# Patient Record
Sex: FEMALE | Race: ASIAN | NOT HISPANIC OR LATINO | Employment: FULL TIME | URBAN - METROPOLITAN AREA
[De-identification: names, ages, dates, MRNs, and addresses within clinical notes are randomized per-mention and may not be internally consistent; named-entity substitution may affect disease eponyms.]

---

## 2017-01-04 ENCOUNTER — OFFICE VISIT (OUTPATIENT)
Dept: FAMILY MEDICINE | Facility: CLINIC | Age: 52
End: 2017-01-04
Payer: COMMERCIAL

## 2017-01-04 VITALS — DIASTOLIC BLOOD PRESSURE: 77 MMHG | SYSTOLIC BLOOD PRESSURE: 119 MMHG

## 2017-01-04 DIAGNOSIS — J06.9 VIRAL UPPER RESPIRATORY INFECTION: ICD-10-CM

## 2017-01-04 DIAGNOSIS — J45.20 MILD INTERMITTENT ASTHMA WITHOUT COMPLICATION: ICD-10-CM

## 2017-01-04 DIAGNOSIS — E78.5 OTHER AND UNSPECIFIED HYPERLIPIDEMIA: ICD-10-CM

## 2017-01-04 DIAGNOSIS — R80.9 MICROALBUMINURIA: ICD-10-CM

## 2017-01-04 DIAGNOSIS — N39.3 STRESS INCONTINENCE IN FEMALE: ICD-10-CM

## 2017-01-04 DIAGNOSIS — Z00.00 ROUTINE GENERAL MEDICAL EXAMINATION AT A HEALTH CARE FACILITY: Primary | ICD-10-CM

## 2017-01-04 DIAGNOSIS — I10 ESSENTIAL HYPERTENSION: ICD-10-CM

## 2017-01-04 PROCEDURE — 3074F SYST BP LT 130 MM HG: CPT | Mod: S$GLB,,, | Performed by: FAMILY MEDICINE

## 2017-01-04 PROCEDURE — 99396 PREV VISIT EST AGE 40-64: CPT | Mod: S$GLB,,, | Performed by: FAMILY MEDICINE

## 2017-01-04 PROCEDURE — 99999 PR PBB SHADOW E&M-EST. PATIENT-LVL II: CPT | Mod: PBBFAC,,, | Performed by: FAMILY MEDICINE

## 2017-01-04 PROCEDURE — 3078F DIAST BP <80 MM HG: CPT | Mod: S$GLB,,, | Performed by: FAMILY MEDICINE

## 2017-01-04 RX ORDER — CLOTRIMAZOLE AND BETAMETHASONE DIPROPIONATE 10; .64 MG/G; MG/G
CREAM TOPICAL
Qty: 45 G | Refills: 0 | Status: SHIPPED | OUTPATIENT
Start: 2017-01-04 | End: 2018-02-20

## 2017-01-04 NOTE — MR AVS SNAPSHOT
[facility]  [address]  [place] [address]  Phone: [phone]  Fax: [phone]                  Shanae Kumari   2017 7:40 AM   Office Visit    Description:  Female : 1965   Provider:  Patrick Murray MD   Department:  [facility]           Reason for Visit     Annual Exam     Asthma     Urinary Frequency           Diagnoses this Visit        Comments    Routine general medical examination at a health care facility    -  Primary     Mild intermittent asthma without complication         Essential hypertension         Hyperlipidemia, unspecified hyperlipidemia type         Microalbuminuria         Uncontrolled type 2 diabetes mellitus with microalbuminuria, without long-term current use of insulin         Other and unspecified hyperlipidemia                To Do List           Goals (5 Years of Data)     None       These Medications        Disp Refills Start End    clotrimazole-betamethasone 1-0.05% (LOTRISONE) cream 45 g 0 2017     Apply to affected area BID    Pharmacy: [place] [address] [place] [address] Ph #: [phone]       albuterol (PROAIR HFA) 90 mcg/actuation inhaler 1 Inhaler 6 2017     Inhale 2 puffs into the lungs every 4 (four) hours as needed for Wheezing. - Inhalation    Pharmacy: [place] [address] [place] [address] Ph #: [phone]       beclomethasone (QVAR) 80 mcg/actuation Aero 120 each 6 2017    Inhale 2 puffs into the lungs 2 (two) times daily. - Inhalation    Pharmacy: [place] [address] [place] [facility] [address] Ph #: [phone]       glipiZIDE (GLUCOTROL) 5 MG tablet 90 tablet 6 2017     Take 2 tablets in the morning and 1 with evening meal    Pharmacy: [place] [address] [place] [facility] [address] Ph #: [phone]       diltiaZEM HCl (MATZIM LA) 240 mg 24 hr tablet 30 tablet 6 2017     Take 1 tablet (240 mg total) by mouth  once daily. - Oral    Pharmacy: [place] [address] [place] [address] Ph #: [phone]       metformin (GLUCOPHAGE-XR) 500 MG 24 hr tablet 120 tablet 6 1/5/2017     TAKE ALL 4 TABLETS BY MOUTH WITH EVENING MEAL    Pharmacy: [place] [address] [place] [address] Ph #: [phone]       montelukast (SINGULAIR) 10 mg tablet 30 tablet 6 1/5/2017     Take 1 tablet (10 mg total) by mouth once daily. - Oral    Pharmacy: [place] [address] [place] [address] Ph #: [phone]       pravastatin (PRAVACHOL) 20 MG tablet 30 tablet 6 1/5/2017     Take 1 tablet (20 mg total) by mouth once daily. - Oral    Pharmacy: [place] [address] [place] [address] Ph #: [phone]         [facility]     [facility] On [facility] Nurse Care Line - 24/7 Assistance  Registered nurses in the [facility] Center provide clinical advisement, health education, appointment booking, and other advisory services.  Call for this free service at [phone].             Medications           Message regarding Medications     Verify the changes and/or additions to your medication regime listed below are the same as discussed with your clinician today.  If any of these changes or additions are incorrect, please notify your healthcare provider.        START taking these NEW medications        Refills    clotrimazole-betamethasone 1-0.05% (LOTRISONE) cream 0    Sig: Apply to affected area BID    Class: Normal      CHANGE how you are taking these medications     Start Taking Instead of    diltiaZEM HCl (MATZIM LA) 240 mg 24 hr tablet MATZIM  mg 24 hr tablet    Dosage:  Take 1 tablet (240 mg total) by mouth once daily. Dosage:  TAKE ONE TABLET BY MOUTH ONE TIME DAILY    Reason for Change:  Reorder     Starting on: 1/5/2017     montelukast (SINGULAIR) 10 mg tablet montelukast (SINGULAIR) 10 mg tablet    Dosage:  Take 1 tablet (10 mg total) by mouth once daily. Dosage:  TAKE ONE  TABLET BY MOUTH ONE TIME DAILY    Reason for Change:  Reorder     Starting on: 1/5/2017     pravastatin (PRAVACHOL) 20 MG tablet pravastatin (PRAVACHOL) 20 MG tablet    Dosage:  Take 1 tablet (20 mg total) by mouth once daily. Dosage:  TAKE 1 TABLET (20 MG TOTAL) BY MOUTH ONCE DAILY.    Reason for Change:  Reorder     Starting on: 1/5/2017       STOP taking these medications     doxazosin (CARDURA) 4 MG tablet Take 1 tablet (4 mg total) by mouth every evening.           Verify that the below list of medications is an accurate representation of the medications you are currently taking.  If none reported, the list may be blank. If incorrect, please contact your healthcare provider. Carry this list with you in case of emergency.           Current Medications     albuterol (PROAIR HFA) 90 mcg/actuation inhaler Inhale 2 puffs into the lungs every 4 (four) hours as needed for Wheezing.    beclomethasone (QVAR) 80 mcg/actuation Aero Inhale 2 puffs into the lungs 2 (two) times daily.    clotrimazole-betamethasone 1-0.05% (LOTRISONE) cream Apply to affected area BID    diltiaZEM HCl (MATZIM LA) 240 mg 24 hr tablet Take 1 tablet (240 mg total) by mouth once daily.    glipiZIDE (GLUCOTROL) 5 MG tablet Take 2 tablets in the morning and 1 with evening meal    medroxyPROGESTERone (PROVERA) 10 MG tablet Take 1 tablet (10 mg total) by mouth once daily.    meloxicam (MOBIC) 15 MG tablet Take 1 tablet (15 mg total) by mouth once daily. Take a Prilosec 20 mg tablet (over the counter) once a day every day while taking Mobic    metformin (GLUCOPHAGE-XR) 500 MG 24 hr tablet TAKE ALL 4 TABLETS BY MOUTH WITH EVENING MEAL    montelukast (SINGULAIR) 10 mg tablet Take 1 tablet (10 mg total) by mouth once daily.    montelukast (SINGULAIR) 10 mg tablet Take 1 tablet (10 mg total) by mouth once daily.    pravastatin (PRAVACHOL) 20 MG tablet Take 1 tablet (20 mg total) by mouth once daily.    PROAIR HFA 90 mcg/actuation inhaler INHALE TWO PUFFS  BY MOUTH EVERY FOUR HOURS AS NEEDED FOR WHEEZING           Clinical Reference Information           Vital Signs - Last Recorded  Most recent update: 1/4/2017  8:01 AM by Kat Carmona LPN    BP                   119/77 (BP Location: Right arm, Patient Position: Sitting, BP Method: Automatic)           Blood Pressure          Most Recent Value    BP  119/77      Allergies as of 1/4/2017     Losartan      Immunizations Administered on Date of Encounter - 1/4/2017     None      Orders Placed During Today's Visit     Future Labs/Procedures Expected by Expires    CBC auto differential  1/4/2017 3/5/2018    Comprehensive metabolic panel  1/4/2017 1/4/2018    Hemoglobin A1c  1/4/2017 3/5/2018    Lipid panel  1/4/2017 3/5/2018    Microalbumin/creatinine urine ratio  1/4/2017 1/4/2018    Urinalysis  1/4/2017 1/4/2018

## 2017-01-05 RX ORDER — PRAVASTATIN SODIUM 20 MG/1
20 TABLET ORAL DAILY
Qty: 30 TABLET | Refills: 6 | Status: SHIPPED | OUTPATIENT
Start: 2017-01-05 | End: 2018-02-28 | Stop reason: SDUPTHER

## 2017-01-05 RX ORDER — ALBUTEROL SULFATE 90 UG/1
2 AEROSOL, METERED RESPIRATORY (INHALATION) EVERY 4 HOURS PRN
Qty: 1 INHALER | Refills: 3 | Status: SHIPPED | OUTPATIENT
Start: 2017-01-05 | End: 2017-01-05 | Stop reason: SDUPTHER

## 2017-01-05 RX ORDER — METFORMIN HYDROCHLORIDE 500 MG/1
TABLET, EXTENDED RELEASE ORAL
Qty: 120 TABLET | Refills: 6 | Status: SHIPPED | OUTPATIENT
Start: 2017-01-05 | End: 2018-01-03 | Stop reason: SDUPTHER

## 2017-01-05 RX ORDER — GLIPIZIDE 5 MG/1
TABLET ORAL
Qty: 90 TABLET | Refills: 6 | Status: SHIPPED | OUTPATIENT
Start: 2017-01-05 | End: 2019-03-07

## 2017-01-05 RX ORDER — MONTELUKAST SODIUM 10 MG/1
10 TABLET ORAL DAILY
Qty: 30 TABLET | Refills: 6 | Status: SHIPPED | OUTPATIENT
Start: 2017-01-05 | End: 2018-02-28 | Stop reason: SDUPTHER

## 2017-01-05 RX ORDER — ALBUTEROL SULFATE 90 UG/1
2 AEROSOL, METERED RESPIRATORY (INHALATION) EVERY 4 HOURS PRN
Qty: 1 INHALER | Refills: 6 | Status: SHIPPED | OUTPATIENT
Start: 2017-01-05 | End: 2019-06-25 | Stop reason: SDUPTHER

## 2017-01-05 RX ORDER — ALBUTEROL SULFATE 90 UG/1
AEROSOL, METERED RESPIRATORY (INHALATION)
Qty: 8.5 INHALER | Refills: 5 | Status: SHIPPED | OUTPATIENT
Start: 2017-01-05 | End: 2018-05-16 | Stop reason: SDUPTHER

## 2017-01-05 RX ORDER — DILTIAZEM HYDROCHLORIDE EXTENDED-RELEASE TABLETS 240 MG/1
240 TABLET, EXTENDED RELEASE ORAL DAILY
Qty: 30 TABLET | Refills: 6 | Status: SHIPPED | OUTPATIENT
Start: 2017-01-05 | End: 2017-08-28 | Stop reason: SDUPTHER

## 2017-01-05 NOTE — PROGRESS NOTES
Subjective:       Patient ID: Shanae Kumari is a 51 y.o. female.    Chief Complaint: Annual Exam; Asthma (Since Gerda BALTAZAR has been using an inhaler); and Urinary Frequency    HPI Comments: Patient seen for annual preventative PCP history and physical examination.  She has cardiovascular risk factors of hypertension hyperlipidemia and type 2 diabetes with microalbuminuria.  Other comorbidities include asthma which is mild and intermittent with increased symptoms recently associated with upper respiratory infection she's had over the last week associated with nasal congestion and clear rhinorrhea.  She also has recurring intertriginous rash.  She has been experiencing stress incontinence.  She denies urge incontinence.  She gets her pelvic examinations and Pap smears and mammograms from her gynecologist.    Review of Systems   Constitutional: Negative for appetite change, chills, fatigue, fever and unexpected weight change.   HENT: Positive for congestion and rhinorrhea. Negative for ear pain, hearing loss, postnasal drip, sinus pressure, sneezing, sore throat, trouble swallowing and voice change.    Eyes: Negative for redness and visual disturbance.   Respiratory: Positive for cough and shortness of breath. Negative for chest tightness and wheezing.    Cardiovascular: Negative for chest pain, palpitations and leg swelling.   Gastrointestinal: Positive for constipation and diarrhea. Negative for abdominal distention, abdominal pain, anal bleeding, blood in stool, nausea and vomiting.        Occasional diarrhea or constipation which she attributes to metformin and diltiazem respectively.   Genitourinary: Negative for dysuria, frequency, hematuria, menstrual problem, pelvic pain and urgency.        Stress incontinence   Musculoskeletal: Negative for arthralgias, back pain and gait problem.   Skin: Positive for wound. Negative for rash.   Neurological: Negative for dizziness, syncope, weakness, numbness and  headaches.   Hematological: Negative for adenopathy. Does not bruise/bleed easily.   Psychiatric/Behavioral: Negative for agitation, confusion, dysphoric mood and sleep disturbance. The patient is not nervous/anxious.        Objective:      Physical Exam   Constitutional: She is oriented to person, place, and time. She appears well-developed and well-nourished. No distress.   HENT:   Head: Normocephalic.   Right Ear: External ear normal.   Left Ear: External ear normal.   Nose: Nose normal.   Mouth/Throat: Oropharynx is clear and moist. No oropharyngeal exudate.   Eyes: Conjunctivae are normal. Pupils are equal, round, and reactive to light. Right eye exhibits no discharge. Left eye exhibits no discharge. No scleral icterus.   Neck: Neck supple. No tracheal deviation present. No thyromegaly present.   Cardiovascular: Normal rate, regular rhythm, normal heart sounds and intact distal pulses.  Exam reveals no friction rub.    No murmur heard.  Pulmonary/Chest: Effort normal and breath sounds normal. No respiratory distress. She has no wheezes. She has no rales.   Abdominal: Soft. Bowel sounds are normal. She exhibits no distension and no mass. There is no tenderness. There is no guarding.   Genitourinary: No vaginal discharge found.   Musculoskeletal: She exhibits no edema.   Lymphadenopathy:     She has no cervical adenopathy.   Neurological: She is alert and oriented to person, place, and time. No cranial nerve deficit. Coordination normal.   Skin: No rash noted. No erythema.   Psychiatric: She has a normal mood and affect. Her behavior is normal. Judgment and thought content normal.   Vitals reviewed.      Assessment:       1. Routine general medical examination at a health care facility    2. Mild intermittent asthma without complication    3. Essential hypertension    4. Microalbuminuria    5. Uncontrolled type 2 diabetes mellitus with microalbuminuria, without long-term current use of insulin    6. Other and  unspecified hyperlipidemia    7. Stress incontinence in female        Plan:       Shanae was seen today for annual exam, asthma and urinary frequency.    Diagnoses and all orders for this visit:    Routine general medical examination at a health care facility  -     Urinalysis; Future    Mild intermittent asthma without complication    Essential hypertension  -     CBC auto differential; Future  -     Comprehensive metabolic panel; Future    Microalbuminuria    Uncontrolled type 2 diabetes mellitus with microalbuminuria, without long-term current use of insulin  -     Hemoglobin A1c; Future  -     Microalbumin/creatinine urine ratio; Future    Other and unspecified hyperlipidemia  -     Lipid panel; Future    Stress incontinence in female    Other orders  -     clotrimazole-betamethasone 1-0.05% (LOTRISONE) cream; Apply to affected area BID  -     albuterol (PROAIR HFA) 90 mcg/actuation inhaler; Inhale 2 puffs into the lungs every 4 (four) hours as needed for Wheezing.  -     beclomethasone (QVAR) 80 mcg/actuation Aero; Inhale 2 puffs into the lungs 2 (two) times daily.  -     glipiZIDE (GLUCOTROL) 5 MG tablet; Take 2 tablets in the morning and 1 with evening meal  -     diltiaZEM HCl (MATZIM LA) 240 mg 24 hr tablet; Take 1 tablet (240 mg total) by mouth once daily.  -     metformin (GLUCOPHAGE-XR) 500 MG 24 hr tablet; TAKE ALL 4 TABLETS BY MOUTH WITH EVENING MEAL  -     montelukast (SINGULAIR) 10 mg tablet; Take 1 tablet (10 mg total) by mouth once daily.  -     pravastatin (PRAVACHOL) 20 MG tablet; Take 1 tablet (20 mg total) by mouth once daily.      patient no longer using her controller medication Qvar.  Suggest she resume this and continue it any time she is having more than occasional mild asthma.  She will use her albuterol as needed.  She also remains on Singulair for additional controller medication for asthma.  Recommend Kegel exercises for stress incontinence.  If this is not helpful suggest she  discuss further with her gynecologist.  Continue current medications for stable problems.  Further plans as indicated pending review of the above studies.

## 2017-01-06 ENCOUNTER — PATIENT MESSAGE (OUTPATIENT)
Dept: FAMILY MEDICINE | Facility: CLINIC | Age: 52
End: 2017-01-06

## 2017-01-07 ENCOUNTER — LAB VISIT (OUTPATIENT)
Dept: LAB | Facility: HOSPITAL | Age: 52
End: 2017-01-07
Attending: FAMILY MEDICINE
Payer: COMMERCIAL

## 2017-01-07 DIAGNOSIS — E78.5 OTHER AND UNSPECIFIED HYPERLIPIDEMIA: ICD-10-CM

## 2017-01-07 DIAGNOSIS — I10 ESSENTIAL HYPERTENSION: ICD-10-CM

## 2017-01-07 LAB
ALBUMIN SERPL BCP-MCNC: 3.9 G/DL
ALP SERPL-CCNC: 57 U/L
ALT SERPL W/O P-5'-P-CCNC: 22 U/L
ANION GAP SERPL CALC-SCNC: 12 MMOL/L
AST SERPL-CCNC: 20 U/L
BASOPHILS # BLD AUTO: 0.05 K/UL
BASOPHILS NFR BLD: 0.5 %
BILIRUB SERPL-MCNC: 0.6 MG/DL
BUN SERPL-MCNC: 18 MG/DL
CALCIUM SERPL-MCNC: 10.1 MG/DL
CHLORIDE SERPL-SCNC: 98 MMOL/L
CHOLEST/HDLC SERPL: 3.5 {RATIO}
CO2 SERPL-SCNC: 24 MMOL/L
CREAT SERPL-MCNC: 0.9 MG/DL
DIFFERENTIAL METHOD: ABNORMAL
EOSINOPHIL # BLD AUTO: 0.5 K/UL
EOSINOPHIL NFR BLD: 4.2 %
ERYTHROCYTE [DISTWIDTH] IN BLOOD BY AUTOMATED COUNT: 12.7 %
EST. GFR  (AFRICAN AMERICAN): >60 ML/MIN/1.73 M^2
EST. GFR  (NON AFRICAN AMERICAN): >60 ML/MIN/1.73 M^2
GLUCOSE SERPL-MCNC: 150 MG/DL
HCT VFR BLD AUTO: 41.1 %
HDL/CHOLESTEROL RATIO: 28.4 %
HDLC SERPL-MCNC: 162 MG/DL
HDLC SERPL-MCNC: 46 MG/DL
HGB BLD-MCNC: 13.9 G/DL
LDLC SERPL CALC-MCNC: 64.6 MG/DL
LYMPHOCYTES # BLD AUTO: 1.7 K/UL
LYMPHOCYTES NFR BLD: 15.8 %
MCH RBC QN AUTO: 28.8 PG
MCHC RBC AUTO-ENTMCNC: 33.8 %
MCV RBC AUTO: 85 FL
MONOCYTES # BLD AUTO: 0.7 K/UL
MONOCYTES NFR BLD: 5.9 %
NEUTROPHILS # BLD AUTO: 8 K/UL
NEUTROPHILS NFR BLD: 73.3 %
NONHDLC SERPL-MCNC: 116 MG/DL
PLATELET # BLD AUTO: 353 K/UL
PMV BLD AUTO: 9.6 FL
POTASSIUM SERPL-SCNC: 4.3 MMOL/L
PROT SERPL-MCNC: 7.4 G/DL
RBC # BLD AUTO: 4.83 M/UL
SODIUM SERPL-SCNC: 134 MMOL/L
TRIGL SERPL-MCNC: 257 MG/DL
WBC # BLD AUTO: 10.95 K/UL

## 2017-01-07 PROCEDURE — 83036 HEMOGLOBIN GLYCOSYLATED A1C: CPT

## 2017-01-07 PROCEDURE — 80053 COMPREHEN METABOLIC PANEL: CPT

## 2017-01-07 PROCEDURE — 36415 COLL VENOUS BLD VENIPUNCTURE: CPT | Mod: PO

## 2017-01-07 PROCEDURE — 80061 LIPID PANEL: CPT

## 2017-01-07 PROCEDURE — 85025 COMPLETE CBC W/AUTO DIFF WBC: CPT

## 2017-01-09 ENCOUNTER — TELEPHONE (OUTPATIENT)
Dept: FAMILY MEDICINE | Facility: CLINIC | Age: 52
End: 2017-01-09

## 2017-01-09 DIAGNOSIS — R82.90 ABNORMAL URINALYSIS: Primary | ICD-10-CM

## 2017-01-09 LAB
ESTIMATED AVG GLUCOSE: 212 MG/DL
HBA1C MFR BLD HPLC: 9 %

## 2017-01-09 NOTE — TELEPHONE ENCOUNTER
Patient advised of physician's recommendation. Patient verbalized an understanding.patient states she will come in for urine culture she doesn't want to make an appointment.

## 2017-01-09 NOTE — TELEPHONE ENCOUNTER
Call patient    Urine markedly contaminated with external secretions.  No way to tell if there is infection or not.  Need a careful MIDSTREAM urine culture, ordered.

## 2017-01-13 ENCOUNTER — PATIENT MESSAGE (OUTPATIENT)
Dept: FAMILY MEDICINE | Facility: CLINIC | Age: 52
End: 2017-01-13

## 2017-01-17 ENCOUNTER — TELEPHONE (OUTPATIENT)
Dept: FAMILY MEDICINE | Facility: CLINIC | Age: 52
End: 2017-01-17

## 2017-01-17 NOTE — TELEPHONE ENCOUNTER
I'm just reading the appointment notes and noticed that I should stop taking doxazosin (CARDURA) 4 MG tablet?  Is this correct?  It was not mentioned during the appointment.

## 2017-01-18 RX ORDER — DOXAZOSIN 4 MG/1
TABLET ORAL
Qty: 30 TABLET | Refills: 11 | Status: SHIPPED | OUTPATIENT
Start: 2017-01-18 | End: 2018-02-06 | Stop reason: SDUPTHER

## 2017-01-18 NOTE — TELEPHONE ENCOUNTER
Call patient    If she has been taking the doxazosin she should stay on it.  I'm not sure how it was dropped from her medication list.  Likely some sort of computer clicking glitch.  I'll send a new prescription with refills to her pharmacy.

## 2017-02-21 ENCOUNTER — PATIENT MESSAGE (OUTPATIENT)
Dept: FAMILY MEDICINE | Facility: CLINIC | Age: 52
End: 2017-02-21

## 2017-02-21 NOTE — TELEPHONE ENCOUNTER
Patient is not known to me.  She was seeing Dr. Murray but I have never seen her.  Will discuss with patient relations.     Tima Stanley

## 2017-04-10 ENCOUNTER — PATIENT OUTREACH (OUTPATIENT)
Dept: ADMINISTRATIVE | Facility: HOSPITAL | Age: 52
End: 2017-04-10

## 2017-04-10 NOTE — PROGRESS NOTES
The patient does not require an appointment w/ NP Sheila Turner at this time. The patient was seen by Dr. Murray on 01/04/17.

## 2017-08-28 RX ORDER — DILTIAZEM HYDROCHLORIDE EXTENDED-RELEASE TABLETS 240 MG/1
240 TABLET, EXTENDED RELEASE ORAL DAILY
Qty: 30 TABLET | Refills: 0 | Status: SHIPPED | OUTPATIENT
Start: 2017-08-28 | End: 2018-02-28 | Stop reason: SDUPTHER

## 2017-08-28 NOTE — TELEPHONE ENCOUNTER
Left message for patient to return call to scheduled appointment to establish care with physician of ling.

## 2017-09-27 ENCOUNTER — TELEPHONE (OUTPATIENT)
Dept: FAMILY MEDICINE | Facility: CLINIC | Age: 52
End: 2017-09-27

## 2017-09-27 RX ORDER — GLIPIZIDE 5 MG/1
TABLET ORAL
Qty: 90 TABLET | Refills: 6 | OUTPATIENT
Start: 2017-09-27

## 2017-09-27 NOTE — TELEPHONE ENCOUNTER
Attempted to contact patient concerning denial of refill. LVM instructing patient to call the clinic to make an appointment. Will try again at a later time.

## 2017-09-29 ENCOUNTER — TELEPHONE (OUTPATIENT)
Dept: FAMILY MEDICINE | Facility: CLINIC | Age: 52
End: 2017-09-29

## 2017-09-29 NOTE — TELEPHONE ENCOUNTER
Received a refill request from [facility] requesting a refill on her Pravastatin. Patient does not have a primary care physician a establish care appointment is needed please schedule with provider of her chose.

## 2017-10-17 DIAGNOSIS — J45.20 MILD INTERMITTENT ASTHMA WITHOUT COMPLICATION: ICD-10-CM

## 2017-10-18 RX ORDER — ALBUTEROL SULFATE 90 UG/1
AEROSOL, METERED RESPIRATORY (INHALATION)
Qty: 8.5 INHALER | Refills: 5 | OUTPATIENT
Start: 2017-10-18

## 2017-10-18 NOTE — TELEPHONE ENCOUNTER
Called patient to inform her medication was denied needs appointment no answer left message to call clinic.

## 2017-10-20 RX ORDER — PRAVASTATIN SODIUM 20 MG/1
20 TABLET ORAL DAILY
Qty: 30 TABLET | Refills: 6
Start: 2017-10-20

## 2018-01-03 RX ORDER — METFORMIN HYDROCHLORIDE 500 MG/1
TABLET, EXTENDED RELEASE ORAL
Qty: 120 TABLET | Refills: 0 | Status: SHIPPED | OUTPATIENT
Start: 2018-01-03 | End: 2018-02-28 | Stop reason: SDUPTHER

## 2018-01-04 RX ORDER — METFORMIN HYDROCHLORIDE 500 MG/1
TABLET, EXTENDED RELEASE ORAL
Qty: 120 TABLET | Refills: 6 | Status: SHIPPED | OUTPATIENT
Start: 2018-01-04 | End: 2018-02-19 | Stop reason: SDUPTHER

## 2018-01-08 RX ORDER — DILTIAZEM HYDROCHLORIDE EXTENDED-RELEASE TABLETS 240 MG/1
240 TABLET, EXTENDED RELEASE ORAL DAILY
Qty: 30 TABLET | Refills: 0 | OUTPATIENT
Start: 2018-01-08

## 2018-02-06 RX ORDER — DOXAZOSIN 4 MG/1
TABLET ORAL
Qty: 30 TABLET | Refills: 11 | Status: SHIPPED | OUTPATIENT
Start: 2018-02-06 | End: 2018-02-28 | Stop reason: SDUPTHER

## 2018-02-17 ENCOUNTER — PATIENT MESSAGE (OUTPATIENT)
Dept: FAMILY MEDICINE | Facility: CLINIC | Age: 53
End: 2018-02-17

## 2018-02-19 ENCOUNTER — OFFICE VISIT (OUTPATIENT)
Dept: PODIATRY | Facility: CLINIC | Age: 53
End: 2018-02-19
Payer: COMMERCIAL

## 2018-02-19 VITALS
WEIGHT: 207.44 LBS | HEIGHT: 63 IN | BODY MASS INDEX: 36.75 KG/M2 | DIASTOLIC BLOOD PRESSURE: 70 MMHG | SYSTOLIC BLOOD PRESSURE: 118 MMHG

## 2018-02-19 DIAGNOSIS — E11.9 COMPREHENSIVE DIABETIC FOOT EXAMINATION, TYPE 2 DM, ENCOUNTER FOR: Primary | ICD-10-CM

## 2018-02-19 DIAGNOSIS — M20.41 HAMMER TOE OF RIGHT FOOT: ICD-10-CM

## 2018-02-19 DIAGNOSIS — M21.42 PES PLANUS OF BOTH FEET: ICD-10-CM

## 2018-02-19 DIAGNOSIS — M76.61 ACHILLES TENDINITIS OF RIGHT LOWER EXTREMITY: ICD-10-CM

## 2018-02-19 DIAGNOSIS — M72.2 PLANTAR FASCIITIS: ICD-10-CM

## 2018-02-19 DIAGNOSIS — M21.6X9: ICD-10-CM

## 2018-02-19 DIAGNOSIS — M21.41 PES PLANUS OF BOTH FEET: ICD-10-CM

## 2018-02-19 DIAGNOSIS — M79.671 RIGHT FOOT PAIN: ICD-10-CM

## 2018-02-19 PROCEDURE — 3008F BODY MASS INDEX DOCD: CPT | Mod: S$GLB,,, | Performed by: PODIATRIST

## 2018-02-19 PROCEDURE — 99999 PR PBB SHADOW E&M-EST. PATIENT-LVL III: CPT | Mod: PBBFAC,,, | Performed by: PODIATRIST

## 2018-02-19 PROCEDURE — 99214 OFFICE O/P EST MOD 30 MIN: CPT | Mod: S$GLB,,, | Performed by: PODIATRIST

## 2018-02-19 RX ORDER — MELOXICAM 15 MG/1
15 TABLET ORAL DAILY
Qty: 30 TABLET | Refills: 3 | Status: SHIPPED | OUTPATIENT
Start: 2018-02-19 | End: 2018-06-19 | Stop reason: SDUPTHER

## 2018-02-19 NOTE — PATIENT INSTRUCTIONS
Recommend lotions: eucerin, aquaphor, A&D ointment, gold bond for diabetics, sween    Shoe recommendations: (try 6pm.com, zappos.com , nordstromrack.com, or shoes.com for discounted prices) you can visit [employer] shoes in [place] as well    Asics (GT 1000 or gel foundations), new balance, saucony (stabil c3),  Garcia (transcend), vionic, propet (tennis shoe)    soft brand, clarks, crocs, aerosoles, naturalizers, SAS, ecco, amber, cynthia mcfadden, rockports (dress shoes)    Vionic, volitiles, burkenstocks, fitflops, propet (sandals)    Nike comfort thong sandals, crocs (house shoes)    Nail Home remedy:  Vicks Vapor rub OR Listerine and apple cider vinegar in a spray bottle to nails      Understanding Achilles Tendonitis    Achilles tendonitis is an overuse injury. It results in inflammation of the Achilles tendon. This tendon is found on the back of the ankle. It links the calf muscle to the heel bone. It helps you do pushing-off movements like running or standing on your toes.     How to say it  [id number]-KILL-eez ten-dun-I-tis   What causes Achilles tendonitis?  Achilles tendonitis can happen if you do an activity like running, walking, or jumping too much. This overuse can strain, or pull, the tendon. It may lead to minor tearing of the tendon. An injury to the lower leg or foot can also cause it.  If you dont warm up before taking part in sports such as basketball, you are more likely to suffer from this condition. You are also more prone to it if you do too much of such an activity too quickly. Proper training and rest can help prevent it.  Symptoms of Achilles tendonitis  The main symptom of Achilles tendonitis is pain. This pain mostly happens when you move the ankle. The tendon may also feel stiff after a period of no activity, such as sleeping. It may also become swollen. You may hear a crackling sound when you move your ankle.  Treatment for Achilles tendonitis  Symptoms often get better after starting treatment. A full  recovery may take several months. Treatments include:  · Rest. You should stop or change the activity that caused the injury. The tendon will then have time to heal.  · Cold or heat pack. These help reduce pain and swelling.  · Prescription or over-the-counter pain medicines. These help reduce pain and swelling.  · Shoe inserts. These devices can reduce strain on the Achilles tendon when you move. You may then feel less pain.  · Stretching and strengthening exercises. Certain exercises can help you regain flexibility and strength in your Achilles tendon.  · Surgery. This option can fix the injured tendon. But you dont often need it unless other treatments dont work.     When to call your healthcare provider   Call your healthcare provider right away if you have any of these:  · Fever of 100.4°F (38°C) or higher, or as directed  · Pain that gets worse  · Symptoms that dont get better, or get worse  · New symptoms    Date Last Reviewed: 3/10/2016  © 6198-3593 [employer]. [address] [place] [address], [place] [address]. All rights reserved. This information is not intended as a substitute for professional medical care. Always follow your healthcare professional's instructions.        Achilles Tendon Rupture, Partial or Complete    The Achilles tendon connects the muscles in the back of your lower leg to your heel bone. It lets you move your foot down--called a step on the gas motion. This movement is important for walking, running, and jumping. A sudden strong contraction of the lower leg can partially tear (rupture) the Achilles tendon. This injury can happen while playing sports. This injury is more likely if you have injured the tendon in the past. It is also more likely if the tendon is inflamed from stress.   When the injury happens, you may feel a pop or snap, or like you have been kicked. An Achilles tendon tear will cause swelling, bruising, and pain in the ankle area. You will  have difficulty walking or pushing off with your foot.   A complete Achilles tear is usually treated with surgery to attach the torn ends of the tendon. This is followed by up to 8 weeks in a walking cast, boot, or splint. The injury can also be treated without surgery, but the tendon will take longer to heal. The risk of rupturing it again is also greater than with surgery. With either type of treatment, you will need physical therapy to strengthen your Achilles tendon. It usually takes up to 6 months to return to your former level of activity and about a year to fully recover.  Home care  Medicine  The doctor may prescribe medicines for pain. Or you may use acetaminophen or Ibuprofen to control the pain.  Talk with your doctor before taking these medicines if you have chronic liver or kidney disease. Also talk with your doctor if you have had an ulcer or GI bleeding.  General care  · Rest. Use crutches as directed. Do not put any weight on the injured leg until your doctor says it is OK.  You will be told to see an orthopedic doctor as soon as possible. This is a doctor with special training to treat foot and ankle problems.  · Use ice. Put a cold pack on the injured area for 20 minutes at a time. Do this at least 4 to 8 times a day for the first 48 hours. After the first 48 hours, use the cold pack to ease pain and swelling, as needed. You can make your own cold pack from a sealed bag of frozen peas or ice. Wrap the bag in a towel. Dont put the cold source directly on your skin. (If you are using ice, be careful to avoid getting the cast wet as the ice melts.) If you have a splint that can't be removed, put the cold pack over the splint.  · Use compression. The doctor may give you an elastic wrap, boot, air cast or splint to help ease swelling. Use this as the doctor tells you to.  · Elevate your leg. During the first 48 hours, keep your injured leg elevated on a pillow when you are sitting or lying down. The  pillow should be at a level above your heart. This is very important to reduce swelling.  · Keep the splint or cast dry. When bathing, protect it with a large plastic bag. Make sure to tape the plastic bag closed. If a fiberglass splint or cast gets wet, you can dry it with a hair dryer.  Follow-up care  You will be referred to an orthopedic doctor for follow-up care. Surgery may be needed to repair this injury, so it is very important to make an appointment with the specialist as soon as you can.  When to seek medical advice  Call your healthcare provider right away if any of these occur:  · A plaster splint or cast gets wet or soft or breaks  · A fiberglass splint or cast gets wet and does not dry in 24 hours  · Pain or swelling gets worse, or redness appears  · Toes or the injured area become cold, blue, numb or tingly  · You cannot put weight on the injured leg  Date Last Reviewed: 9/29/2015  © 5419-3540 [employer]. [address] [place] [address]. All rights reserved. This information is not intended as a substitute for professional medical care. Always follow your healthcare professional's instructions.        Calf Raise (Strength)    1. Stand up straight with both feet flat on the floor, slightly apart. Hold onto a sturdy chair, railing, counter, or table.  2. Raise both heels so youre standing on the balls of your feet. Dont lock your knees or arch your back. Hold for 5 seconds. Then slowly lower your heels back down to the floor.  3. Repeat 10 times, or as instructed.  4. Do this exercise 3 times a day, or as instructed.     Challenge yourself  As you become stronger, do this exercise on one foot at a time.   Date Last Reviewed: 3/10/2016  © 5685-8970 [employer]. [address] [place] [address]. All rights reserved. This information is not intended as a substitute for professional medical care. Always follow your healthcare professional's  instructions.        Plantarflexion (Strength)    These instructions are for your right ankle. Switch sides for your left ankle.  5. Sit on a bed or the floor with your right leg out straight.  6. Loop an elastic exercise band or tubing around your right foot. Hold the ends in your hands.  7. Push on the elastic band with the ball of your foot, pointing your toes. Pull the elastic band toward as you do this. Hold for 5 seconds. Then move your foot back to the starting position.  8. Repeat 10 times, or as instructed.  9. Do this exercise 3 times a day, or as instructed.  Date Last Reviewed: 3/10/2016  © 9065-4171 [employer]. [address] [place] [address], [place] [address]. All rights reserved. This information is not intended as a substitute for professional medical care. Always follow your healthcare professional's instructions.        Wearing Proper Shoes                    You walk on your feet every day, forcing them to support the weight of your body. Repeated stress on your feet can cause damage over time. The right shoes can help protect your feet. The wrong shoes can cause more foot problems. Read the information below to help you find a shoe that fits your foot needs.     A good shoe fit will cover your foot outline.       A shoe that doesnt cover the outline is a bad fit.      Whats your foot shape?  To get a good fit, you need to know the shape of your foot. Do this simple test: While standing, place your foot on a piece of paper and trace around it. Is your foot straight or curved? Do you have a foot problem, such as a bunion, that causes your foot outline to show a bulge on the side of your big toe?  Finding your fit  Bring your foot outline to the shoe store to help you find the right shoe. Place a shoe you like on top of the outline to see if it matches the shape. The shoe should cover the outline. (If you have a bunion, the shoe may not cover the bulge on the outline. Look for soft leather  shoes to stretch over the bunion.) Once youve found a pair of proper shoes, put them on. Walk around. Be sure the shoes dont rub or pinch. If the shoes feel good, youve found your fit!  The right shoe for you  A good shoe has features that provide comfort and support. It must also be the right size and shape for your feet. Look for a shoe made of breathable fabric and lining, such as leather or canvas. Be sure that shoes have enough tread to prevent slipping. Go to a good shoe store for help finding the right shoe.  Good shoe features  An ideal shoe has the following:  · Laces for support. If tying laces is a problem for you, try shoes with Velcro fasteners or micki.  · A front of the shoe (toe box) with ½ inch space in front of your longest toes.  · An arch shape that supports your foot.  · No more than 1½ inches of heel.  · A stiff, snug back of the shoe to keep your foot from sliding around.  · A smooth lining with no rough seams.  Shoe shopping tips  Below are some dos and donts for when you go to the shoe store.  Do:  · Select the shoes that feel right. Wear them around the house. Then bring them to your foot healthcare provider to check for fit. If they dont fit well, return them.  · Shop late in the day, when your feet will be slightly bigger.  · Each time you buy shoes, have both your feet measured while you are standing. Foot size changes with time.  · Pick shoes to suit their purpose. High heels are OK for an occasional night on the town. But for everyday wear, choose a more sensible shoe.  · Try on shoes while wearing any inserts specially made for your feet (orthoses).  · Try on both the right and left shoes. If your feet are different sizes, pick a pair that fits the larger foot.  Dont:  · Dont buy shoes based on shoe size alone. Always try on shoes, as sizes differ from brand to brand and within brands.  · Dont expect shoes to break in. If they dont fit at the store, dont buy  them.  · Dont buy a shoe that doesnt match your foot shape.  What about socks?  Always wear socks with shoes. Socks help absorb sweat and reduce friction and blistering. When shopping for shoes, choose soft, padded socks with seams that dont irritate your feet.  If you have foot problems  Some foot problems cause deformities. This can make it hard to find a good fit. Look for shoes made of soft leather to stretch over the deformity. If you have bunions, buy shoes with a wider toe box. To fit hammertoes, look for shoes with a tall toe box. If you have arch problems, you may need inserts. In some cases, youll need to have custom footwear or orthoses made for your feet.  Suggested footwear  Ask your healthcare provider what kind of footwear you need. He or she may recommend a certain brand or shoe store.  Date Last Reviewed: 8/1/2016 © 2000-2016 [employer]. [address] [place] [address]. All rights reserved. This information is not intended as a substitute for professional medical care. Always follow your healthcare professional's instructions.            Understanding Plantar Fasciitis    Plantar fasciitis is a condition that causes foot and heel pain. The plantar fascia is a tough band of tissue that runs across the bottom of the foot from the heel to the toes. This tissue pulls on the heel bone. It supports the arch of the foot as it pushes off the ground. If the tissue becomes irritated or red and swollen (inflamed), it is called plantar fasciitis.  How to say it  PLAN-tuhr fa-see-IY-tis   What causes plantar fasciitis?  Plantar fasciitis most often occurs from overusing the plantar fascia. The tissue may become damaged from activities that put repeated stress on the heel and foot. Or it may wear down over time with age and ankle stiffness. You are more likely to have plantar fasciitis if you:  · Do activities that require a lot of running, jumping, or dancing  · Have a job that  requires being on your feet for long periods  · Are overweight or obese  · Have certain foot problems, such as a tight Achilles tendon, flat feet, or high arches  · Often wear poorly fitting shoes  Symptoms of plantar fasciitis  The condition most often causes pain in the heel and the bottom of the foot. The pain may occur when you take your first steps in the morning. It may get better as you walk throughout the day. But as you continue to put weight on the foot, the pain often returns. Pain may also occur after standing or sitting for long periods.  Treating plantar fasciitis  Treatments for plantar fasciitis include:  · Resting the foot. This involves limiting movements that make your foot hurt. You may also need to avoid certain sports and types of work for a time.  · Using cold packs. Put an ice pack on the heel and foot to help reduce pain and swelling.  · Taking pain medicines. Prescription and over-the-counter pain medicines can help relieve pain and swelling.  · Using heel cups or foot inserts (orthotics). These are placed in the shoes to help support the heel or arch and cushion the heel. You may also be told to buy proper-fitting shoes with good arch support and cushioned soles.  · Taping the foot. This supports the arch and limits the movement of the plantar fascia to help relieve symptoms.  · Wearing a night splint. This stretches the plantar fascia and leg muscles while you sleep. This may help relieve pain.  · Doing exercises and physical therapy. These stretch and strengthen the plantar fascia and the muscles in the leg that support the heel and foot.  · Getting shots of medicine into the foot. These may help relieve symptoms for a time.  · Having surgery. This may be needed if other treatments fail to relieve symptoms. During surgery, the surgeon may partially cut the plantar fascia to release tension.  Possible complications of plantar fasciitis  Without proper care and treatment, healing may take  longer than normal. Also, symptoms may continue or get worse. Over time, the plantar fascia may be damaged. This can make it hard to walk or even stand without pain.  When to call your healthcare provider  Call your healthcare provider right away if you have any of these:  · Fever of 100.4°F (38°C) or higher, or as directed  · Symptoms that dont get better with treatment, or get worse  · New symptoms, such as numbness, tingling, or weakness in the foot   © 8355-3006 [employer]. [address] [place] [address], [place] [address]. All rights reserved. This information is not intended as a substitute for professional medical care. Always follow your healthcare professional's instructions.        Treating Plantar Fasciitis  First, your healthcare provider tries to determine the cause of your problem in order to suggest ways to relieve pain. If your pain is due to poor foot mechanics, custom-made shoe inserts (orthoses) may help.    Reduce symptoms  · To relieve mild symptoms, try aspirin, ibuprofen, or other medicines as directed. Rubbing ice on the affected area may also help.  · To reduce severe pain and swelling, your healthcare provider may prescribe pills or injections or a walking cast in some instances. Physical therapy, such as ultrasound or a daily stretching program, may also be recommended. Surgery is rarely required.  · To reduce symptoms caused by poor foot mechanics, your foot may be taped. This supports the arch and temporarily controls movement. Night splints may also help by stretching the fascia.  Control movement  If taping helps, your healthcare provider may prescribe orthoses. Built from plaster casts of your feet, these inserts control the way your foot moves. As a result, your symptoms should go away.  Reduce overuse  Every time your foot strikes the ground, the plantar fascia is stretched. You can reduce the strain on the plantar fascia and the possibility of overuse by following these  suggestions:  · Lose any excess weight.  · Avoid running on hard or uneven ground.  · Use orthoses at all times in your shoes and house slippers.  If surgery is needed  Your healthcare provider may consider surgery if other types of treatment don't control your pain. During surgery, the plantar fascia is partially cut to release tension. As you heal, fibrous tissue fills the space between the heel bone and the plantar fascia.   © 5817-9140 [employer]. [address] [place] [address], [place] [address]. All rights reserved. This information is not intended as a substitute for professional medical care. Always follow your healthcare professional's instructions.

## 2018-02-19 NOTE — PROGRESS NOTES
Subjective:      Patient ID: Shanae Kumari is a 52 y.o. female.    Chief Complaint: Diabetes Mellitus (pcp Dr. Stanley 2-28-18); Diabetic Foot Exam; and Nail Care    Shanae is a 52 y.o. female who presents to the clinic for evaluation and treatment of high risk feet. Shanae has a past medical history of Asthma; BRCA negative; Diabetes mellitus; and Hypertension. The patient's chief complaint is right posterior heel pain, especially with the first step in the morning. The pain is described as Aching and Throbbing. The onset of the pain was gradual and has worsened over the past several months. Shanae Kumari rates the pain as 8/10. she denies a history of trauma. she relates pain began in 2014 but relates that it has returned in the last several weeks. Prior treatments include stretching. This patient has documented high risk feet requiring routine maintenance secondary to diabetes mellitis and those secondary complications of diabetes, as mentioned..    Hours on Feet: 6+  Exercise: moderately active    PCP: Primary Doctor No    Date Last Seen by PCP:   Chief Complaint   Patient presents with    Diabetes Mellitus     pcp Dr. Stanley 2-28-18    Diabetic Foot Exam    Nail Care     Current shoe gear:  Dress boots     Hemoglobin A1C   Date Value Ref Range Status   01/07/2017 9.0 (H) 4.5 - 6.2 % Final     Comment:     According to ADA guidelines, hemoglobin A1C <7.0% represents  optimal control in non-pregnant diabetic patients.  Different  metrics may apply to specific populations.   Standards of Medical Care in Diabetes - 2016.  For the purpose of screening for the presence of diabetes:  <5.7%     Consistent with the absence of diabetes  5.7-6.4%  Consistent with increasing risk for diabetes   (prediabetes)  >or=6.5%  Consistent with diabetes  Currently no consensus exists for use of hemoglobin A1C  for diagnosis of diabetes for children.     12/31/2015 9.2 (H) 4.5 - 6.2 % Final   09/05/2015 9.0 (H) 4.5  - 6.2 % Final     Patient Active Problem List   Diagnosis    LSIL (low grade squamous intraepithelial lesion) on Pap smear    Asthma, mild intermittent    Essential hypertension    Hyperlipidemia    Right hip pain    Acetabular labrum tear    Pain    Type II diabetes mellitus, uncontrolled    Microalbuminuria     Current Outpatient Prescriptions on File Prior to Visit   Medication Sig Dispense Refill    albuterol (PROAIR HFA) 90 mcg/actuation inhaler Inhale 2 puffs into the lungs every 4 (four) hours as needed for Wheezing. 1 Inhaler 6    beclomethasone (QVAR) 80 mcg/actuation Aero Inhale 2 puffs into the lungs 2 (two) times daily. 120 each 6    diltiaZEM HCl (MATZIM LA) 240 mg 24 hr tablet Take 1 tablet (240 mg total) by mouth once daily. 30 tablet 0    doxazosin (CARDURA) 4 MG tablet Take 1 hs 30 tablet 11    glipiZIDE (GLUCOTROL) 5 MG tablet Take 2 tablets in the morning and 1 with evening meal 90 tablet 6    metFORMIN (GLUCOPHAGE-XR) 500 MG 24 hr tablet TAKE ALL 4 TABLETS BY MOUTH WITH EVENING MEAL; needs new primary care doctor 120 tablet 0    montelukast (SINGULAIR) 10 mg tablet Take 1 tablet (10 mg total) by mouth once daily. 30 tablet 6    pravastatin (PRAVACHOL) 20 MG tablet Take 1 tablet (20 mg total) by mouth once daily. 30 tablet 6    PROAIR HFA 90 mcg/actuation inhaler INHALE TWO PUFFS BY MOUTH EVERY FOUR HOURS AS NEEDED FOR WHEEZING 8.5 Inhaler 5    [DISCONTINUED] clotrimazole-betamethasone 1-0.05% (LOTRISONE) cream Apply to affected area BID 45 g 0    [DISCONTINUED] medroxyPROGESTERone (PROVERA) 10 MG tablet TAKE ONE TABLET BY MOUTH ONE TIME DAILY 10 tablet 7     No current facility-administered medications on file prior to visit.      Review of patient's allergies indicates:   Allergen Reactions    Losartan Hives     angioedema     Past Surgical History:   Procedure Laterality Date    KNEE ARTHROSCOPY      knee surgery      NO PAST SURGERIES       Family History   Problem  "Relation Age of Onset    Breast cancer Other     Diabetes Mother     Diabetes Father     Heart disease Father     Cancer Sister     Colon cancer Neg Hx     Ovarian cancer Neg Hx     Stroke Neg Hx      Social History     Social History    Marital status: Single     Spouse name: N/A    Number of children: N/A    Years of education: N/A     Occupational History    Not on file.     Social History Main Topics    Smoking status: Never Smoker    Smokeless tobacco: Not on file    Alcohol use No    Drug use: No    Sexual activity: Not on file     Other Topics Concern    Not on file     Social History Narrative    No narrative on file         Review of Systems   Constitution: Negative for chills, fever and weakness.   Cardiovascular: Negative for claudication and leg swelling.   Respiratory: Negative for cough and shortness of breath.    Skin: Positive for dry skin. Negative for itching, nail changes and rash.   Musculoskeletal: Positive for arthritis, joint pain and myalgias. Negative for falls, joint swelling and muscle weakness.   Gastrointestinal: Negative for diarrhea, nausea and vomiting.   Neurological: Negative for numbness, paresthesias and tremors.   Psychiatric/Behavioral: Negative for altered mental status and hallucinations.           Objective:       Vitals:    02/19/18 1409   BP: 118/70   Weight: 94.1 kg (207 lb 7.3 oz)   Height: 5' 3" (1.6 m)   PainSc:   7   PainLoc: Foot       Physical Exam   Constitutional:   General: Pt. is well-developed, well-nourished, appears stated age, in no acute distress, alert and oriented x 3. No evidence of depression, anxiety, or agitation. Calm, cooperative, and communicative. Appropriate interactions and affect.       Cardiovascular:   Pulses:       Dorsalis pedis pulses are 2+ on the right side, and 2+ on the left side.        Posterior tibial pulses are 2+ on the right side, and 2+ on the left side.          Musculoskeletal:        Right ankle: She " exhibits decreased range of motion and swelling. No lateral malleolus, no medial malleolus, no AITFL, no CF ligament, no posterior TFL and no head of 5th metatarsal tenderness found. Achilles tendon exhibits pain and abnormal Olivares's test results. Achilles tendon exhibits no defect.        Left ankle: She exhibits decreased range of motion. She exhibits no swelling. No lateral malleolus, no medial malleolus, no AITFL, no CF ligament and no posterior TFL tenderness found. Achilles tendon exhibits no pain and no defect.        Right foot: There is tenderness. There is normal range of motion.        Left foot: There is normal range of motion and no tenderness.   Biomechanical exam: Pain on palpation right medial calcaneal tubercle at origin of plantar fascia. There is equinus deformity bilateral with decreased dorsiflexion at the ankle joint bilateral. No tenderness with compression of heel. Negative tinels sign. Gait analysis reveals excessive pronation through midstance and propulsion with early heel off. Shoes reveals lateral heel counter wear bilateral     Patient has hammertoes of digits 2-5 bilateral partially reducible without symptom today.    Visible and palpable bunion without pain at dorsomedial 1st metatarsal head right and left.  Hallux abducted right and left partially reducible, tracks laterally without being track bound.  No ecchymosis, erythema, edema, or cardinal signs infection or signs of trauma same foot.                                                                 Neurological: No sensory deficit.   Hannibal-Raymundo 5.07 monofilament is intact bilateral feet. Sharp/dull sensation is also intact Bilateral feet.     Skin: Skin is warm, dry and intact. No abrasion, no ecchymosis, no lesion and no rash noted. She is not diaphoretic. No cyanosis or erythema. No pallor. Nails show no clubbing.   Focal hyperkeratotic lesion consisting entirely of hyperkeratotic tissue without open skin, drainage,  pus, fluctuance, malodor, or signs of infection: sub 2nd MTPJ dangelo   Psychiatric: She has a normal mood and affect. Her speech is normal.   Nursing note and vitals reviewed.            Assessment:       Encounter Diagnoses   Name Primary?    Comprehensive diabetic foot examination, type 2 DM, encounter for Yes    Right foot pain     Achilles tendinitis of right lower extremity     Plantar fasciitis     Pes planus of both feet     Hammer toe of right foot     Equinovalgus, acquired, unspecified laterality          Plan:       Shanae was seen today for diabetes mellitus, diabetic foot exam and nail care.    Diagnoses and all orders for this visit:    Comprehensive diabetic foot examination, type 2 DM, encounter for    Right foot pain    Achilles tendinitis of right lower extremity    Plantar fasciitis    Pes planus of both feet    Hammer toe of right foot    Equinovalgus, acquired, unspecified laterality    Other orders  -     meloxicam (MOBIC) 15 MG tablet; Take 1 tablet (15 mg total) by mouth once daily.      I counseled the patient on her conditions, their implications and medical management. Greater than 20 minutes spent on education about the diabetic foot, neuropathy, the affects of high blood sugars on healing,  and prevention of limb loss.    Shoe inspection. Diabetic Foot Education. Patient reminded of the importance of good nutrition and blood sugar control to help prevent podiatric complications of diabetes. Patient instructed on proper foot hygeine. We discussed wearing proper shoe gear, daily foot inspections, never walking without protective shoe gear.      Educated patient on products such as heel cups, arch supports, and orthotics. Encouraged patient to rest. Patient instructed on adequate icing techniques. Patient should ice the affected area at least once per day x 10 minutes for 10 days . I advised the  patient that extra icing would also be beneficial to ensure adequate anti inflammatory  effect. Mobic prescribed. Patient was instructed on dosing information. Discontinue if adverse effects occur     Instructions on elevation to reduce pain and swelling. When sleeping, place a pillow under the injured leg. When sitting, support the injured leg so it is level with your waist.     Stretching handout dispensed to patient. Instructions on adequate stretching reviewed in clinic     She will continue to monitor the areas daily, inspect her feet, wear protective shoe gear when ambulatory, moisturizer to maintain skin integrity and follow in this office in approximately 1-2 months, sooner p.r.n.

## 2018-02-28 ENCOUNTER — DOCUMENTATION ONLY (OUTPATIENT)
Dept: FAMILY MEDICINE | Facility: CLINIC | Age: 53
End: 2018-02-28

## 2018-02-28 ENCOUNTER — OFFICE VISIT (OUTPATIENT)
Dept: FAMILY MEDICINE | Facility: CLINIC | Age: 53
End: 2018-02-28
Payer: COMMERCIAL

## 2018-02-28 VITALS
TEMPERATURE: 97 F | OXYGEN SATURATION: 97 % | SYSTOLIC BLOOD PRESSURE: 110 MMHG | WEIGHT: 199.44 LBS | HEIGHT: 63 IN | BODY MASS INDEX: 35.34 KG/M2 | DIASTOLIC BLOOD PRESSURE: 74 MMHG | HEART RATE: 101 BPM

## 2018-02-28 DIAGNOSIS — Z12.11 ENCOUNTER FOR FIT (FECAL IMMUNOCHEMICAL TEST) SCREENING: ICD-10-CM

## 2018-02-28 DIAGNOSIS — E11.29 TYPE 2 DIABETES MELLITUS WITH MICROALBUMINURIA, WITHOUT LONG-TERM CURRENT USE OF INSULIN: Chronic | ICD-10-CM

## 2018-02-28 DIAGNOSIS — S73.191D TEAR OF RIGHT ACETABULAR LABRUM, SUBSEQUENT ENCOUNTER: ICD-10-CM

## 2018-02-28 DIAGNOSIS — E78.5 HYPERLIPIDEMIA, UNSPECIFIED HYPERLIPIDEMIA TYPE: Chronic | ICD-10-CM

## 2018-02-28 DIAGNOSIS — J45.20 MILD INTERMITTENT ASTHMA WITHOUT COMPLICATION: ICD-10-CM

## 2018-02-28 DIAGNOSIS — R80.9 TYPE 2 DIABETES MELLITUS WITH MICROALBUMINURIA, WITHOUT LONG-TERM CURRENT USE OF INSULIN: Chronic | ICD-10-CM

## 2018-02-28 DIAGNOSIS — I10 ESSENTIAL HYPERTENSION: Chronic | ICD-10-CM

## 2018-02-28 DIAGNOSIS — Z00.00 ROUTINE MEDICAL EXAM: Primary | ICD-10-CM

## 2018-02-28 PROCEDURE — 99396 PREV VISIT EST AGE 40-64: CPT | Mod: S$GLB,,, | Performed by: INTERNAL MEDICINE

## 2018-02-28 PROCEDURE — 99999 PR PBB SHADOW E&M-EST. PATIENT-LVL III: CPT | Mod: PBBFAC,,, | Performed by: INTERNAL MEDICINE

## 2018-02-28 RX ORDER — METFORMIN HYDROCHLORIDE 500 MG/1
TABLET, EXTENDED RELEASE ORAL
Qty: 120 TABLET | Refills: 0 | OUTPATIENT
Start: 2018-02-28

## 2018-02-28 RX ORDER — DILTIAZEM HYDROCHLORIDE EXTENDED-RELEASE TABLETS 240 MG/1
240 TABLET, EXTENDED RELEASE ORAL DAILY
Qty: 90 TABLET | Refills: 3 | Status: SHIPPED | OUTPATIENT
Start: 2018-02-28 | End: 2019-03-09 | Stop reason: SDUPTHER

## 2018-02-28 RX ORDER — PRAVASTATIN SODIUM 20 MG/1
20 TABLET ORAL DAILY
Qty: 90 TABLET | Refills: 3 | Status: SHIPPED | OUTPATIENT
Start: 2018-02-28 | End: 2018-05-16 | Stop reason: SDUPTHER

## 2018-02-28 RX ORDER — MONTELUKAST SODIUM 10 MG/1
10 TABLET ORAL DAILY
Qty: 90 TABLET | Refills: 3 | Status: SHIPPED | OUTPATIENT
Start: 2018-02-28 | End: 2018-03-08 | Stop reason: SDUPTHER

## 2018-02-28 RX ORDER — METFORMIN HYDROCHLORIDE 500 MG/1
500 TABLET, EXTENDED RELEASE ORAL 2 TIMES DAILY WITH MEALS
Qty: 180 TABLET | Refills: 1 | Status: SHIPPED | OUTPATIENT
Start: 2018-02-28 | End: 2018-10-17 | Stop reason: SDUPTHER

## 2018-02-28 RX ORDER — DOXAZOSIN 4 MG/1
TABLET ORAL
Qty: 90 TABLET | Refills: 3 | Status: SHIPPED | OUTPATIENT
Start: 2018-02-28 | End: 2019-03-09 | Stop reason: SDUPTHER

## 2018-02-28 NOTE — PROGRESS NOTES
Assessment & Plan (all problems are new to me)  Problem List Items Addressed This Visit        Pulmonary    Asthma, mild intermittent (Chronic)    Overview     Cat dander, pollen bloom         Current Assessment & Plan     Doing well with montelukast.  Not consistently taking ICS.           Relevant Medications    montelukast (SINGULAIR) 10 mg tablet       Cardiac/Vascular    Essential hypertension (Chronic)    Current Assessment & Plan     The current medical regimen is effective;  continue present plan and medications.          Relevant Medications    diltiaZEM HCl (MATZIM LA) 240 mg 24 hr tablet    doxazosin (CARDURA) 4 MG tablet    Hyperlipidemia (Chronic)    Current Assessment & Plan     The current medical regimen is effective;  continue present plan and medications.          Relevant Medications    pravastatin (PRAVACHOL) 20 MG tablet       Endocrine    Type 2 diabetes mellitus with microalbuminuria, without long-term current use of insulin (Chronic)    Overview     Angioedema to ARB         Current Assessment & Plan     Counseled on taking metformin XR as 2 tabs total daily.  If no diarrhea after 7-10 days with increase to 3 tabs daily.  Recheck labs.  May need DPP-4         Relevant Medications    metFORMIN (GLUCOPHAGE-XR) 500 MG 24 hr tablet    Other Relevant Orders    Microalbumin/creatinine urine ratio       Orthopedic    Acetabular labrum tear    Current Assessment & Plan     Followed by Ortho.  Monitor           Other Visit Diagnoses     Routine medical exam    -  Primary  -    Discussed healthy diet, regular exercise, necessary labs, age appropriate cancer screening, and routine vaccinations.       Relevant Orders    Lipid panel    Hemoglobin A1c    CBC auto differential    Comprehensive metabolic panel    TSH    Encounter for FIT (fecal immunochemical test) screening    -  FitKit screening provided to pt    Relevant Orders    Fecal Immunochemical Test (iFOBT)            Health Maintenance reviewed,  as above.    Follow-up: Follow-up in about 3 months (around 5/28/2018) for DM follow up.  ______________________________________________________________________    Chief Complaint  Chief Complaint   Patient presents with    Establish Care     new to pcp       HPI  Shanae Kumari is a 52 y.o. female with multiple medical diagnoses as listed in the medical history and problem list that presents for establish care.  Pt is new to me but is known to this clinic with her last appointment being 01/2017.  The patient was previously followed by one of my partners that has since retired .  I have reviewed their most recent previous OV with their previous PCP, most recent labs and most recent imaging studies and interpreted them myself.  She is followed by Podiatry, OBGYN.      She never returned for f/u due to PCP retiring and caring for her mother who was diagnosed with lung Ca.  She reports that she had a torn ligament in the left knee dx by Dr. Soriano.    Reports that metformin gives her diarrhea.  She is taking 2 tabs BID.  She doesn't check her blood sugars.  Hasn't consistently taken her metformin.  Has been out of glipizide for a couple of months. Never tried on DPP-4.  Out of Statin as well      PAST MEDICAL HISTORY:  Past Medical History:   Diagnosis Date    Asthma     BRCA negative     Diabetes mellitus     Hypertension        PAST SURGICAL HISTORY:  Past Surgical History:   Procedure Laterality Date    KNEE ARTHROSCOPY      knee surgery         SOCIAL HISTORY:  Social History     Social History    Marital status: Single     Spouse name: N/A    Number of children: N/A    Years of education: N/A     Occupational History    Not on file.     Social History Main Topics    Smoking status: Never Smoker    Smokeless tobacco: Never Used    Alcohol use No    Drug use: No    Sexual activity: Not Currently     Other Topics Concern    Not on file     Social History Narrative    No narrative on file        FAMILY HISTORY:  Family History   Problem Relation Age of Onset    Breast cancer Other     Diabetes Mother     COPD Mother     Cancer Mother      lung    Diabetes Father     Heart disease Father     Hypertension Father     Hyperlipidemia Father     Kidney disease Father     Cancer Sister     Diabetes Brother     Hypertension Brother     Kidney disease Brother      diabetic    Diabetes Maternal Aunt     Diabetes Maternal Uncle     Diabetes Maternal Grandmother     Colon cancer Neg Hx     Ovarian cancer Neg Hx     Stroke Neg Hx        ALLERGIES AND MEDICATIONS: updated and reviewed.  Review of patient's allergies indicates:   Allergen Reactions    Losartan Hives     angioedema     Current Outpatient Prescriptions   Medication Sig Dispense Refill    albuterol (PROAIR HFA) 90 mcg/actuation inhaler Inhale 2 puffs into the lungs every 4 (four) hours as needed for Wheezing. 1 Inhaler 6    beclomethasone (QVAR) 80 mcg/actuation Aero Inhale 2 puffs into the lungs 2 (two) times daily. 120 each 6    diltiaZEM HCl (MATZIM LA) 240 mg 24 hr tablet Take 1 tablet (240 mg total) by mouth once daily. 90 tablet 3    doxazosin (CARDURA) 4 MG tablet Take 1 hs 90 tablet 3    glipiZIDE (GLUCOTROL) 5 MG tablet Take 2 tablets in the morning and 1 with evening meal 90 tablet 6    medroxyPROGESTERone (PROVERA) 10 MG tablet Take 1 tablet (10 mg total) by mouth once daily. 10 tablet 7    meloxicam (MOBIC) 15 MG tablet Take 1 tablet (15 mg total) by mouth once daily. 30 tablet 3    metFORMIN (GLUCOPHAGE-XR) 500 MG 24 hr tablet Take 1 tablet (500 mg total) by mouth 2 (two) times daily with meals. 180 tablet 1    montelukast (SINGULAIR) 10 mg tablet Take 1 tablet (10 mg total) by mouth once daily. 90 tablet 3    pravastatin (PRAVACHOL) 20 MG tablet Take 1 tablet (20 mg total) by mouth once daily. 90 tablet 3    PROAIR HFA 90 mcg/actuation inhaler INHALE TWO PUFFS BY MOUTH EVERY FOUR HOURS AS NEEDED FOR WHEEZING  "8.5 Inhaler 5     No current facility-administered medications for this visit.          ROS  Review of Systems   Constitutional: Positive for activity change. Negative for chills, diaphoresis, fever and unexpected weight change.   HENT: Negative for congestion, dental problem, ear discharge, ear pain, hearing loss, postnasal drip, rhinorrhea, sinus pressure, sore throat and trouble swallowing.    Eyes: Negative for pain, discharge, redness, itching and visual disturbance.   Respiratory: Negative for cough, chest tightness, shortness of breath and wheezing.    Cardiovascular: Negative for chest pain, palpitations and leg swelling.   Gastrointestinal: Negative for abdominal distention, abdominal pain, blood in stool, constipation, diarrhea, nausea and vomiting.        No melena   Endocrine: Negative for cold intolerance, heat intolerance, polydipsia, polyphagia and polyuria.        No nocturia   Genitourinary: Negative for decreased urine volume, difficulty urinating, dysuria, flank pain, frequency, genital sores, hematuria, menstrual problem, pelvic pain, urgency, vaginal bleeding, vaginal discharge and vaginal pain.   Musculoskeletal: Positive for arthralgias and joint swelling. Negative for myalgias, neck pain and neck stiffness.   Skin: Negative for color change, pallor and rash.   Neurological: Negative for dizziness, tremors, seizures, syncope, weakness, light-headedness and headaches.   Hematological: Negative for adenopathy. Does not bruise/bleed easily.   Psychiatric/Behavioral: Negative for confusion, decreased concentration, dysphoric mood, sleep disturbance and suicidal ideas. The patient is not nervous/anxious.         No depression         Physical Exam  Vitals:    02/28/18 1407   BP: 110/74   Pulse: 101   Temp: 97.4 °F (36.3 °C)   SpO2: 97%   Weight: 90.5 kg (199 lb 6.5 oz)   Height: 5' 3" (1.6 m)    Body mass index is 35.32 kg/m².  Weight: 90.5 kg (199 lb 6.5 oz)   Height: 5' 3" (160 cm)   Physical " Exam   Constitutional: She is oriented to person, place, and time. She appears well-developed and well-nourished. No distress.   HENT:   Head: Normocephalic and atraumatic.   Right Ear: Tympanic membrane, external ear and ear canal normal.   Left Ear: Tympanic membrane, external ear and ear canal normal.   Nose: Nose normal. Right sinus exhibits no maxillary sinus tenderness and no frontal sinus tenderness. Left sinus exhibits no maxillary sinus tenderness and no frontal sinus tenderness.   Mouth/Throat: Uvula is midline, oropharynx is clear and moist and mucous membranes are normal. No tonsillar exudate.   Eyes: Conjunctivae, EOM and lids are normal. Pupils are equal, round, and reactive to light. No scleral icterus.   Neck: Full passive range of motion without pain. Neck supple. No JVD present. No spinous process tenderness and no muscular tenderness present. Carotid bruit is not present. No thyromegaly present.   Cardiovascular: Normal rate, regular rhythm, S1 normal, S2 normal and intact distal pulses.  Exam reveals no S3, no S4 and no friction rub.    No murmur heard.  Pulmonary/Chest: Effort normal and breath sounds normal. She has no wheezes. She has no rhonchi. She has no rales.   Abdominal: Soft. Bowel sounds are normal. She exhibits no distension. There is no hepatosplenomegaly. There is no tenderness. There is no rebound and no CVA tenderness.   Musculoskeletal: Normal range of motion. She exhibits no edema or tenderness.   Brace noted on left knee   Lymphadenopathy:        Head (right side): No submental and no submandibular adenopathy present.        Head (left side): No submental and no submandibular adenopathy present.     She has no cervical adenopathy.   Neurological: She is alert and oriented to person, place, and time. Coordination normal.   Motor grossly intact.  Sensory grossly intact.  Symmetric facial movements palate elevated symmetrically tongue midline     Skin: Skin is warm and dry. No  rash noted. No cyanosis. Nails show no clubbing.   Psychiatric: She has a normal mood and affect. Her speech is normal and behavior is normal. Thought content normal. Cognition and memory are normal.           Health Maintenance       Date Due Completion Date    Eye Exam 09/19/1977 ---    TETANUS VACCINE 09/19/1985 ---    Pneumococcal PPSV23 (Medium Risk) (1) 09/19/1985 ---    Hemoglobin A1c 07/07/2017 1/7/2017    Influenza Vaccine 08/01/2017 8/21/2016 (Done)    Override on 8/21/2016: Done    Override on 9/12/2015: Done    Colonoscopy 09/19/2017 ---    Lipid Panel 01/07/2018 1/7/2017    Urine Microalbumin 02/08/2018 2/8/2017    Foot Exam 02/19/2019 2/19/2018 (Done)    Override on 2/19/2018: Done    Pap Smear 02/20/2019 2/20/2018    Low Dose Statin 02/28/2019 2/28/2018    Mammogram 02/28/2019 2/28/2018

## 2018-02-28 NOTE — ASSESSMENT & PLAN NOTE
Counseled on taking metformin XR as 2 tabs total daily.  If no diarrhea after 7-10 days with increase to 3 tabs daily.  Recheck labs.  May need DPP-4

## 2018-03-03 ENCOUNTER — LAB VISIT (OUTPATIENT)
Dept: LAB | Facility: HOSPITAL | Age: 53
End: 2018-03-03
Attending: INTERNAL MEDICINE
Payer: COMMERCIAL

## 2018-03-03 DIAGNOSIS — Z00.00 ROUTINE MEDICAL EXAM: ICD-10-CM

## 2018-03-03 LAB
ALBUMIN SERPL BCP-MCNC: 3.8 G/DL
ALP SERPL-CCNC: 57 U/L
ALT SERPL W/O P-5'-P-CCNC: 27 U/L
ANION GAP SERPL CALC-SCNC: 12 MMOL/L
AST SERPL-CCNC: 29 U/L
BASOPHILS # BLD AUTO: 0.04 K/UL
BASOPHILS NFR BLD: 0.6 %
BILIRUB SERPL-MCNC: 0.8 MG/DL
BUN SERPL-MCNC: 18 MG/DL
CALCIUM SERPL-MCNC: 9.6 MG/DL
CHLORIDE SERPL-SCNC: 103 MMOL/L
CHOLEST SERPL-MCNC: 212 MG/DL
CHOLEST/HDLC SERPL: 5.4 {RATIO}
CO2 SERPL-SCNC: 21 MMOL/L
CREAT SERPL-MCNC: 0.8 MG/DL
DIFFERENTIAL METHOD: NORMAL
EOSINOPHIL # BLD AUTO: 0.3 K/UL
EOSINOPHIL NFR BLD: 4.4 %
ERYTHROCYTE [DISTWIDTH] IN BLOOD BY AUTOMATED COUNT: 12.2 %
EST. GFR  (AFRICAN AMERICAN): >60 ML/MIN/1.73 M^2
EST. GFR  (NON AFRICAN AMERICAN): >60 ML/MIN/1.73 M^2
ESTIMATED AVG GLUCOSE: 217 MG/DL
GLUCOSE SERPL-MCNC: 107 MG/DL
HBA1C MFR BLD HPLC: 9.2 %
HCT VFR BLD AUTO: 40.5 %
HDLC SERPL-MCNC: 39 MG/DL
HDLC SERPL: 18.4 %
HGB BLD-MCNC: 13.4 G/DL
IMM GRANULOCYTES # BLD AUTO: 0.01 K/UL
IMM GRANULOCYTES NFR BLD AUTO: 0.1 %
LDLC SERPL CALC-MCNC: 128.2 MG/DL
LYMPHOCYTES # BLD AUTO: 1.3 K/UL
LYMPHOCYTES NFR BLD: 18.4 %
MCH RBC QN AUTO: 28.8 PG
MCHC RBC AUTO-ENTMCNC: 33.1 G/DL
MCV RBC AUTO: 87 FL
MONOCYTES # BLD AUTO: 0.5 K/UL
MONOCYTES NFR BLD: 6.9 %
NEUTROPHILS # BLD AUTO: 4.7 K/UL
NEUTROPHILS NFR BLD: 69.6 %
NONHDLC SERPL-MCNC: 173 MG/DL
NRBC BLD-RTO: 0 /100 WBC
PLATELET # BLD AUTO: 336 K/UL
PMV BLD AUTO: 9.3 FL
POTASSIUM SERPL-SCNC: 3.7 MMOL/L
PROT SERPL-MCNC: 7.3 G/DL
RBC # BLD AUTO: 4.66 M/UL
SODIUM SERPL-SCNC: 136 MMOL/L
TRIGL SERPL-MCNC: 224 MG/DL
TSH SERPL DL<=0.005 MIU/L-ACNC: 0.56 UIU/ML
WBC # BLD AUTO: 6.8 K/UL

## 2018-03-03 PROCEDURE — 83036 HEMOGLOBIN GLYCOSYLATED A1C: CPT

## 2018-03-03 PROCEDURE — 80053 COMPREHEN METABOLIC PANEL: CPT

## 2018-03-03 PROCEDURE — 84443 ASSAY THYROID STIM HORMONE: CPT

## 2018-03-03 PROCEDURE — 85025 COMPLETE CBC W/AUTO DIFF WBC: CPT

## 2018-03-03 PROCEDURE — 36415 COLL VENOUS BLD VENIPUNCTURE: CPT | Mod: PO

## 2018-03-03 PROCEDURE — 80061 LIPID PANEL: CPT

## 2018-03-06 ENCOUNTER — PATIENT MESSAGE (OUTPATIENT)
Dept: FAMILY MEDICINE | Facility: CLINIC | Age: 53
End: 2018-03-06

## 2018-03-06 NOTE — PROGRESS NOTES
Your lab results are showing that the diabetes is not well controlled.  How are things going with the metformin?  Also the cholesterol is too high and I would like you to take 2 tabs of the pravastatin daily (40mg).      Sincerely,  Tima Stanley  [email or web address]

## 2018-03-08 DIAGNOSIS — J45.20 MILD INTERMITTENT ASTHMA WITHOUT COMPLICATION: ICD-10-CM

## 2018-03-08 RX ORDER — MONTELUKAST SODIUM 10 MG/1
10 TABLET ORAL DAILY
Qty: 90 TABLET | Refills: 3 | Status: SHIPPED | OUTPATIENT
Start: 2018-03-08 | End: 2019-03-09 | Stop reason: SDUPTHER

## 2018-04-13 DIAGNOSIS — E11.9 DIABETES MELLITUS WITHOUT COMPLICATION: ICD-10-CM

## 2018-04-27 DIAGNOSIS — Z13.5 DIABETIC RETINOPATHY SCREENING: ICD-10-CM

## 2018-05-08 ENCOUNTER — PATIENT OUTREACH (OUTPATIENT)
Dept: ADMINISTRATIVE | Facility: HOSPITAL | Age: 53
End: 2018-05-08

## 2018-05-15 ENCOUNTER — PATIENT MESSAGE (OUTPATIENT)
Dept: FAMILY MEDICINE | Facility: CLINIC | Age: 53
End: 2018-05-15

## 2018-05-15 DIAGNOSIS — E78.5 HYPERLIPIDEMIA, UNSPECIFIED HYPERLIPIDEMIA TYPE: Chronic | ICD-10-CM

## 2018-05-16 DIAGNOSIS — J45.20 MILD INTERMITTENT ASTHMA WITHOUT COMPLICATION: ICD-10-CM

## 2018-05-16 RX ORDER — PRAVASTATIN SODIUM 40 MG/1
40 TABLET ORAL DAILY
Qty: 90 TABLET | Refills: 3 | Status: SHIPPED | OUTPATIENT
Start: 2018-05-16 | End: 2019-03-09 | Stop reason: SDUPTHER

## 2018-05-17 RX ORDER — ALBUTEROL SULFATE 90 UG/1
AEROSOL, METERED RESPIRATORY (INHALATION)
Qty: 8.5 INHALER | Refills: 5 | Status: SHIPPED | OUTPATIENT
Start: 2018-05-17 | End: 2019-06-25 | Stop reason: SDUPTHER

## 2018-06-19 RX ORDER — MELOXICAM 15 MG/1
TABLET ORAL
Qty: 30 TABLET | Refills: 3 | Status: SHIPPED | OUTPATIENT
Start: 2018-06-19 | End: 2018-10-13 | Stop reason: SDUPTHER

## 2018-09-28 DIAGNOSIS — E11.9 TYPE 2 DIABETES MELLITUS WITHOUT COMPLICATION: ICD-10-CM

## 2018-10-13 RX ORDER — MELOXICAM 15 MG/1
TABLET ORAL
Qty: 30 TABLET | Refills: 3 | Status: SHIPPED | OUTPATIENT
Start: 2018-10-13 | End: 2019-03-18 | Stop reason: SDUPTHER

## 2018-10-17 DIAGNOSIS — R80.9 TYPE 2 DIABETES MELLITUS WITH MICROALBUMINURIA, WITHOUT LONG-TERM CURRENT USE OF INSULIN: Chronic | ICD-10-CM

## 2018-10-17 DIAGNOSIS — E11.29 TYPE 2 DIABETES MELLITUS WITH MICROALBUMINURIA, WITHOUT LONG-TERM CURRENT USE OF INSULIN: Chronic | ICD-10-CM

## 2018-10-17 RX ORDER — METFORMIN HYDROCHLORIDE 500 MG/1
TABLET, EXTENDED RELEASE ORAL
Qty: 60 TABLET | Refills: 0 | Status: SHIPPED | OUTPATIENT
Start: 2018-10-17 | End: 2019-02-07 | Stop reason: SDUPTHER

## 2018-11-13 ENCOUNTER — PATIENT OUTREACH (OUTPATIENT)
Dept: ADMINISTRATIVE | Facility: HOSPITAL | Age: 53
End: 2018-11-13

## 2018-11-13 ENCOUNTER — TELEPHONE (OUTPATIENT)
Dept: FAMILY MEDICINE | Facility: CLINIC | Age: 53
End: 2018-11-13

## 2018-11-13 DIAGNOSIS — I10 ESSENTIAL HYPERTENSION: Primary | Chronic | ICD-10-CM

## 2018-11-13 NOTE — TELEPHONE ENCOUNTER
----- Message from Roberta Forrest LPN sent at 11/13/2018 12:23 PM CST -----  Regarding: Additional Labs  Good afternoon Dr. Marie,    This patient has an appointment on 11/21/18. I have ordered a hemoglobin a 1c. Please order any additional labs and I will link them to the appointment.    Thanks,    Roberta

## 2018-12-13 ENCOUNTER — PATIENT OUTREACH (OUTPATIENT)
Dept: ADMINISTRATIVE | Facility: HOSPITAL | Age: 53
End: 2018-12-13

## 2019-01-14 ENCOUNTER — PATIENT OUTREACH (OUTPATIENT)
Dept: ADMINISTRATIVE | Facility: HOSPITAL | Age: 54
End: 2019-01-14

## 2019-02-07 DIAGNOSIS — E11.29 TYPE 2 DIABETES MELLITUS WITH MICROALBUMINURIA, WITHOUT LONG-TERM CURRENT USE OF INSULIN: Chronic | ICD-10-CM

## 2019-02-07 DIAGNOSIS — R80.9 TYPE 2 DIABETES MELLITUS WITH MICROALBUMINURIA, WITHOUT LONG-TERM CURRENT USE OF INSULIN: Chronic | ICD-10-CM

## 2019-02-08 RX ORDER — METFORMIN HYDROCHLORIDE 500 MG/1
500 TABLET, EXTENDED RELEASE ORAL 2 TIMES DAILY WITH MEALS
Qty: 60 TABLET | Refills: 0 | Status: SHIPPED | OUTPATIENT
Start: 2019-02-08 | End: 2019-03-09

## 2019-02-22 ENCOUNTER — PATIENT MESSAGE (OUTPATIENT)
Dept: FAMILY MEDICINE | Facility: CLINIC | Age: 54
End: 2019-02-22

## 2019-02-25 ENCOUNTER — PATIENT OUTREACH (OUTPATIENT)
Dept: ADMINISTRATIVE | Facility: HOSPITAL | Age: 54
End: 2019-02-25

## 2019-02-25 DIAGNOSIS — E11.65 UNCONTROLLED TYPE 2 DIABETES MELLITUS WITH HYPERGLYCEMIA: Primary | ICD-10-CM

## 2019-02-26 ENCOUNTER — PATIENT MESSAGE (OUTPATIENT)
Dept: FAMILY MEDICINE | Facility: CLINIC | Age: 54
End: 2019-02-26

## 2019-03-04 ENCOUNTER — LAB VISIT (OUTPATIENT)
Dept: LAB | Facility: HOSPITAL | Age: 54
End: 2019-03-04
Attending: INTERNAL MEDICINE
Payer: COMMERCIAL

## 2019-03-04 DIAGNOSIS — I10 ESSENTIAL HYPERTENSION: Chronic | ICD-10-CM

## 2019-03-04 DIAGNOSIS — E11.9 TYPE 2 DIABETES MELLITUS WITHOUT COMPLICATION: ICD-10-CM

## 2019-03-04 LAB
ANION GAP SERPL CALC-SCNC: 11 MMOL/L
BUN SERPL-MCNC: 16 MG/DL
CALCIUM SERPL-MCNC: 9.4 MG/DL
CHLORIDE SERPL-SCNC: 101 MMOL/L
CHOLEST SERPL-MCNC: 169 MG/DL
CHOLEST/HDLC SERPL: 4 {RATIO}
CO2 SERPL-SCNC: 19 MMOL/L
CREAT SERPL-MCNC: 0.7 MG/DL
EST. GFR  (AFRICAN AMERICAN): >60 ML/MIN/1.73 M^2
EST. GFR  (NON AFRICAN AMERICAN): >60 ML/MIN/1.73 M^2
ESTIMATED AVG GLUCOSE: 206 MG/DL
GLUCOSE SERPL-MCNC: 171 MG/DL
HBA1C MFR BLD HPLC: 8.8 %
HDLC SERPL-MCNC: 42 MG/DL
HDLC SERPL: 24.9 %
LDLC SERPL CALC-MCNC: 66.6 MG/DL
NONHDLC SERPL-MCNC: 127 MG/DL
POTASSIUM SERPL-SCNC: 3.8 MMOL/L
SODIUM SERPL-SCNC: 131 MMOL/L
TRIGL SERPL-MCNC: 302 MG/DL

## 2019-03-04 PROCEDURE — 80048 BASIC METABOLIC PNL TOTAL CA: CPT

## 2019-03-04 PROCEDURE — 80061 LIPID PANEL: CPT

## 2019-03-04 PROCEDURE — 83036 HEMOGLOBIN GLYCOSYLATED A1C: CPT

## 2019-03-04 PROCEDURE — 36415 COLL VENOUS BLD VENIPUNCTURE: CPT | Mod: PO

## 2019-03-07 ENCOUNTER — OFFICE VISIT (OUTPATIENT)
Dept: PODIATRY | Facility: CLINIC | Age: 54
End: 2019-03-07
Payer: COMMERCIAL

## 2019-03-07 VITALS
BODY MASS INDEX: 35.26 KG/M2 | DIASTOLIC BLOOD PRESSURE: 68 MMHG | HEIGHT: 63 IN | WEIGHT: 199 LBS | SYSTOLIC BLOOD PRESSURE: 132 MMHG

## 2019-03-07 DIAGNOSIS — M21.41 PES PLANUS OF BOTH FEET: ICD-10-CM

## 2019-03-07 DIAGNOSIS — M76.61 ACHILLES TENDINITIS OF RIGHT LOWER EXTREMITY: ICD-10-CM

## 2019-03-07 DIAGNOSIS — M21.6X9: ICD-10-CM

## 2019-03-07 DIAGNOSIS — M20.41 HAMMER TOE OF RIGHT FOOT: ICD-10-CM

## 2019-03-07 DIAGNOSIS — E11.9 COMPREHENSIVE DIABETIC FOOT EXAMINATION, TYPE 2 DM, ENCOUNTER FOR: Primary | ICD-10-CM

## 2019-03-07 DIAGNOSIS — M21.42 PES PLANUS OF BOTH FEET: ICD-10-CM

## 2019-03-07 DIAGNOSIS — B35.3 TINEA PEDIS OF LEFT FOOT: ICD-10-CM

## 2019-03-07 PROCEDURE — 99999 PR PBB SHADOW E&M-EST. PATIENT-LVL III: CPT | Mod: PBBFAC,,, | Performed by: PODIATRIST

## 2019-03-07 PROCEDURE — 3008F BODY MASS INDEX DOCD: CPT | Mod: CPTII,S$GLB,, | Performed by: PODIATRIST

## 2019-03-07 PROCEDURE — 3078F PR MOST RECENT DIASTOLIC BLOOD PRESSURE < 80 MM HG: ICD-10-PCS | Mod: CPTII,S$GLB,, | Performed by: PODIATRIST

## 2019-03-07 PROCEDURE — 3045F PR MOST RECENT HEMOGLOBIN A1C LEVEL 7.0-9.0%: ICD-10-PCS | Mod: CPTII,S$GLB,, | Performed by: PODIATRIST

## 2019-03-07 PROCEDURE — 99214 PR OFFICE/OUTPT VISIT, EST, LEVL IV, 30-39 MIN: ICD-10-PCS | Mod: S$GLB,,, | Performed by: PODIATRIST

## 2019-03-07 PROCEDURE — 3008F PR BODY MASS INDEX (BMI) DOCUMENTED: ICD-10-PCS | Mod: CPTII,S$GLB,, | Performed by: PODIATRIST

## 2019-03-07 PROCEDURE — 3045F PR MOST RECENT HEMOGLOBIN A1C LEVEL 7.0-9.0%: CPT | Mod: CPTII,S$GLB,, | Performed by: PODIATRIST

## 2019-03-07 PROCEDURE — 3078F DIAST BP <80 MM HG: CPT | Mod: CPTII,S$GLB,, | Performed by: PODIATRIST

## 2019-03-07 PROCEDURE — 99999 PR PBB SHADOW E&M-EST. PATIENT-LVL III: ICD-10-PCS | Mod: PBBFAC,,, | Performed by: PODIATRIST

## 2019-03-07 PROCEDURE — 99214 OFFICE O/P EST MOD 30 MIN: CPT | Mod: S$GLB,,, | Performed by: PODIATRIST

## 2019-03-07 PROCEDURE — 3075F PR MOST RECENT SYSTOLIC BLOOD PRESS GE 130-139MM HG: ICD-10-PCS | Mod: CPTII,S$GLB,, | Performed by: PODIATRIST

## 2019-03-07 PROCEDURE — 3075F SYST BP GE 130 - 139MM HG: CPT | Mod: CPTII,S$GLB,, | Performed by: PODIATRIST

## 2019-03-07 RX ORDER — CLOTRIMAZOLE AND BETAMETHASONE DIPROPIONATE 10; .64 MG/G; MG/G
CREAM TOPICAL 2 TIMES DAILY
Qty: 45 G | Refills: 3 | Status: SHIPPED | OUTPATIENT
Start: 2019-03-07 | End: 2020-03-08

## 2019-03-07 NOTE — PATIENT INSTRUCTIONS
Recommend lotions: eucerin, eucerin for diabetics, aquaphor, A&D ointment, gold bond for diabetics, sween, Elvis's Bees all purpose baby ointment,  urea 40 with aloe (found on amazon.com)    Shoe recommendations: (try 6pm.com, zappos.[employer] , nordstromrack.[employer], or shoes.[employer] for discounted prices) you can visit [employer] shoes in [place]  or [facility] (there are also several shoe brand outlets in the [facility])    Asics (GT 2000 or gel foundations), new balance stability type shoes (such as the 940 series), saucony (stabil c3),  Garcia (GTS or Beast or transcend), propet (tennis shoe)    Sofft Brand (women) Ke&Buck (men), clarks, crocs, aerosoles, naturalizers, SAS, ecco, born, cynthia mcfadden, rockports (dress shoes)    Vionic, burkenstocks, fitflops, propet (sandals)  Nike comfort thong sandals, crocs, propet (house shoes)    Nail Home remedy:  Vicks Vapor rub to nails for easier manageability    Athletes Foot     Athletes Foot is caused by a fungal infection in the skin. It affects the skin between the toes where it causes fissures (cracks in the skin). It can also affect the bottom of the foot where it causes dry white scales and peeling of the skin. This infection is more likely to occur when the foot is in hot, sweaty socks and shoes for long periods of time.   This infection is treated with skin creams or oral medicine.     Home Care:   It is important to keep the feet dry. Use absorbent cotton socks and change them if they become sweaty; or wear an open-toe shoe or sandal. Wash the feet at least once a day with soap and water.   Rotate your shoes. If you must wear the same shoes everyday then spray the shoes with lysol or antifungal spray and allow that to dry overnight before wearing the shoes again  Apply the antifungal cream as prescribed. Some antifungal creams are available without a prescription (Lotrimin, Tinactin).   It may take a week before the rash starts to improve and it can  take about three to four weeks to completely clear. Continue the medicine until the rash is all gone.   Use over-the-counter antifungal powders or sprays on your feet after exposure to high-risk environments (public showers, gyms and locker rooms) may prevent future infections. You may wish to use appropriate footwear to reduce exposure.  Clean tubs and bathroom floor with bleach  Clean feet with Nizoral shampoo or dial antibacterial soap and then dry completely.    Follow Up   with your doctor as recommended by our staff if the rash is not starting to improve after TEN days of treatment, or if the rash continues to spread.     Get Prompt Medical Attention   if any of the following occur:   Increasing redness or swelling of the foot   Pus draining from cracks in the skin   Fever of 100.4ºF (38ºC) or higher, or as directed by your healthcare provider    © 6705-8728 [employer], [address] [place] [address]. All rights reserved. This information is not intended as a substitute for professional medical care. Always follow your healthcare professional's instructions.         Diabetes: Inspecting Your Feet  Diabetes increases your chances of developing foot problems. So inspect your feet every day. This helps you find small skin irritations before they become serious infections. If you have trouble seeing the bottoms of your feet, use a mirror or ask a family member or friend to help.     Pressure spots on the bottom of the foot are common areas where problems develop.   How to check your feet  Below are tips to help you look for foot problems. Try to check your feet at the same time each day, such as when you get out of bed in the morning:  · Check the top of each foot. The tops of toes, back of the heel, and outer edge of the foot can get a lot of rubbing from poor-fitting shoes.  · Check the bottom of each foot. Daily wear and tear often leads to problems at pressure spots.  · Check the toes and  nails. Fungal infections often occur between toes. Toenail problems can also be a sign of fungal infections or lead to breaks in the skin.  · Check your shoes, too. Loose objects inside a shoe can injure the foot. Use your hand to feel inside your shoes for things like juan, loose stitching, or rough areas that could irritate your skin.  Warning signs  Look for any color changes in the foot. Redness with streaks can signal a severe infection, which needs immediate medical attention. Tell your doctor right away if you have any of these problems:  · Swelling, sometimes with color changes, may be a sign of poor blood flow or infection. Symptoms include tenderness and an increase in the size of your foot.  · Warm or hot areas on your feet may be signs of infection. A foot that is cold may not be getting enough blood.  · Sensations such as burning, tingling, or pins and needles can be signs of a problem. Also check for areas that may be numb.  · Hot spots are caused by friction or pressure. Look for hot spots in areas that get a lot of rubbing. Hot spots can turn into blisters, calluses, or sores.  · Cracks and sores are caused by dry or irritated skin. They are a sign that the skin is breaking down, which can lead to infection.  · Toenail problems to watch for include nails growing into the skin (ingrown toenail) and causing redness or pain. Thick, yellow, or discolored nails can signal a fungal infection.  · Drainage and odor can develop from untreated sores and ulcers. Call your doctor right away if you notice white or yellow drainage, bleeding, or unpleasant odor.   © 6507-3899 [employer] [address] [place] [address]. All rights reserved. This information is not intended as a substitute for professional medical care. Always follow your healthcare professional's instructions.        Step-by-Step:  Inspecting Your Feet (Diabetes)    Date Last Reviewed: 10/1/2016  © 6465-2564 [employer]  [employer]. [address] [place] [address], [place] [address]. All rights reserved. This information is not intended as a substitute for professional medical care. Always follow your healthcare professional's instructions.

## 2019-03-07 NOTE — PROGRESS NOTES
Subjective:      Patient ID: Shanae Kumari is a 53 y.o. female.    Chief Complaint: Diabetes Mellitus; Diabetic Foot Exam; and Nail Care    Shanae is a 53 y.o. female who presents to the clinic for evaluation and treatment of high risk feet. Shanae has a past medical history of Asthma, BRCA negative, Diabetes mellitus, and Hypertension. The patient's chief complaint is itching and burning to the left great toe in the evening, after shoe removal, after water exposure. This patient has documented high risk feet requiring routine maintenance secondary to diabetes mellitis and those secondary complications of diabetes, as mentioned..    Hours on Feet: 6+  Exercise: moderately active    PCP: Tima Stanley MD    Date Last Seen by PCP:   Chief Complaint   Patient presents with    Diabetes Mellitus    Diabetic Foot Exam    Nail Care     Current shoe gear:  Slip on shoes     Hemoglobin A1C   Date Value Ref Range Status   03/04/2019 8.8 (H) 4.0 - 5.6 % Final     Comment:     ADA Screening Guidelines:  5.7-6.4%  Consistent with prediabetes  >or=6.5%  Consistent with diabetes  High levels of fetal hemoglobin interfere with the HbA1C  assay. Heterozygous hemoglobin variants (HbS, HgC, etc)do  not significantly interfere with this assay.   However, presence of multiple variants may affect accuracy.     03/03/2018 9.2 (H) 4.0 - 5.6 % Final     Comment:     According to ADA guidelines, hemoglobin A1c <7.0% represents  optimal control in non-pregnant diabetic patients. Different  metrics may apply to specific patient populations.   Standards of Medical Care in Diabetes-2016.  For the purpose of screening for the presence of diabetes:  <5.7%     Consistent with the absence of diabetes  5.7-6.4%  Consistent with increasing risk for diabetes   (prediabetes)  >or=6.5%  Consistent with diabetes  Currently, no consensus exists for use of hemoglobin A1c  for diagnosis of diabetes for children.  This Hemoglobin A1c assay has  significant interference with fetal   hemoglobin   (HbF). The results are invalid for patients with abnormal amounts of   HbF,   including those with known Hereditary Persistence   of Fetal Hemoglobin. Heterozygous hemoglobin variants (HbAS, HbAC,   HbAD, HbAE, HbA2) do not significantly interfere with this assay;   however, presence of multiple variants in a sample may impact the %   interference.     01/07/2017 9.0 (H) 4.5 - 6.2 % Final     Comment:     According to ADA guidelines, hemoglobin A1C <7.0% represents  optimal control in non-pregnant diabetic patients.  Different  metrics may apply to specific populations.   Standards of Medical Care in Diabetes - 2016.  For the purpose of screening for the presence of diabetes:  <5.7%     Consistent with the absence of diabetes  5.7-6.4%  Consistent with increasing risk for diabetes   (prediabetes)  >or=6.5%  Consistent with diabetes  Currently no consensus exists for use of hemoglobin A1C  for diagnosis of diabetes for children.       Patient Active Problem List   Diagnosis    LSIL (low grade squamous intraepithelial lesion) on Pap smear    Asthma, mild intermittent    Essential hypertension    Hyperlipidemia    Right hip pain    Acetabular labrum tear    Type 2 diabetes mellitus with microalbuminuria, without long-term current use of insulin     Current Outpatient Medications on File Prior to Visit   Medication Sig Dispense Refill    AFLURIA QUAD 8044-4089, PF, 60 mcg/0.5 mL vaccine TO BE ADMINISTERED BY PHARMACIST FOR IMMUNIZATION  0    albuterol (PROAIR HFA) 90 mcg/actuation inhaler Inhale 2 puffs into the lungs every 4 (four) hours as needed for Wheezing. 1 Inhaler 6    albuterol (PROAIR HFA) 90 mcg/actuation inhaler INHALE TWO PUFFS BY MOUTH EVERY FOUR HOURS AS NEEDED FOR WHEEZING 8.5 Inhaler 5    diltiaZEM HCl (MATZIM LA) 240 mg 24 hr tablet Take 1 tablet (240 mg total) by mouth once daily. 90 tablet 3    doxazosin (CARDURA) 4 MG tablet Take 1 hs 90  tablet 3    medroxyPROGESTERone (PROVERA) 10 MG tablet Take 1 tablet (10 mg total) by mouth once daily. 10 tablet 7    meloxicam (MOBIC) 15 MG tablet TAKE 1 TABLET BY MOUTH EVERY DAY 30 tablet 3    metFORMIN (GLUCOPHAGE-XR) 500 MG 24 hr tablet Take 1 tablet (500 mg total) by mouth 2 (two) times daily with meals. 60 tablet 0    montelukast (SINGULAIR) 10 mg tablet Take 1 tablet (10 mg total) by mouth once daily. 90 tablet 3    pravastatin (PRAVACHOL) 40 MG tablet Take 1 tablet (40 mg total) by mouth once daily. 90 tablet 3    [DISCONTINUED] beclomethasone (QVAR) 80 mcg/actuation Aero Inhale 2 puffs into the lungs 2 (two) times daily. 120 each 6    [DISCONTINUED] glipiZIDE (GLUCOTROL) 5 MG tablet Take 2 tablets in the morning and 1 with evening meal 90 tablet 6     No current facility-administered medications on file prior to visit.      Review of patient's allergies indicates:   Allergen Reactions    Losartan Hives     angioedema     Past Surgical History:   Procedure Laterality Date    INJECTION MAJOR JOINT Right 3/10/2016    [facility] [place] [facility]    INJECTION-JOINT Right 9/10/2015    Performed by Quan Butler [facility] at [facility] [place] [facility]    KNEE ARTHROSCOPY      knee surgery       Family History   Problem Relation Age of Onset    Breast cancer Other     Diabetes Mother     COPD Mother     Cancer Mother         lung    Diabetes Father     Heart disease Father     Hypertension Father     Hyperlipidemia Father     Kidney disease Father     Cancer Sister     Diabetes Brother     Hypertension Brother     Kidney disease Brother         diabetic    Diabetes Maternal Aunt     Diabetes Maternal Uncle     Diabetes Maternal Grandmother     Colon cancer Neg Hx     Ovarian cancer Neg Hx     Stroke Neg Hx      Social History     Socioeconomic History    Marital status: Single     Spouse name: Not on file    Number of children: Not on file    Years of education: Not on file     "Highest education level: Not on file   Social Needs    Financial resource strain: Not on file    Food insecurity - worry: Not on file    Food insecurity - inability: Not on file    Transportation needs - medical: Not on file    Transportation needs - non-medical: Not on file   Occupational History    Not on file   Tobacco Use    Smoking status: Never Smoker    Smokeless tobacco: Never Used   Substance and Sexual Activity    Alcohol use: No    Drug use: No    Sexual activity: Not Currently   Other Topics Concern    Not on file   Social History Narrative    Not on file         Review of Systems   Constitution: Negative for chills, fever and weakness.   Cardiovascular: Negative for claudication and leg swelling.   Respiratory: Negative for cough and shortness of breath.    Skin: Positive for dry skin, itching and rash (dorsal left hallux). Negative for nail changes.   Musculoskeletal: Positive for arthritis, joint pain and myalgias. Negative for falls, joint swelling and muscle weakness.   Gastrointestinal: Negative for diarrhea, nausea and vomiting.   Neurological: Negative for numbness, paresthesias and tremors.   Psychiatric/Behavioral: Negative for altered mental status and hallucinations.           Objective:       Vitals:    03/07/19 1109   BP: 132/68   Weight: 90.3 kg (199 lb)   Height: 5' 3" (1.6 m)   PainSc: 0-No pain       Physical Exam   Constitutional:   General: Pt. is well-developed, well-nourished, appears stated age, in no acute distress, alert and oriented x 3. No evidence of depression, anxiety, or agitation. Calm, cooperative, and communicative. Appropriate interactions and affect.       Cardiovascular:   Pulses:       Dorsalis pedis pulses are 2+ on the right side, and 2+ on the left side.        Posterior tibial pulses are 2+ on the right side, and 2+ on the left side.          Musculoskeletal:        Right ankle: She exhibits decreased range of motion. She exhibits no swelling. No " lateral malleolus, no medial malleolus, no AITFL, no CF ligament, no posterior TFL and no head of 5th metatarsal tenderness found. Achilles tendon exhibits pain. Achilles tendon exhibits no defect and normal Olivares's test results.        Left ankle: She exhibits decreased range of motion. She exhibits no swelling. No lateral malleolus, no medial malleolus, no AITFL, no CF ligament and no posterior TFL tenderness found. Achilles tendon exhibits no pain and no defect.        Right foot: There is no tenderness.        Left foot: There is normal range of motion and no tenderness.   Biomechanical exam: Pain on palpation right medial calcaneal tubercle at origin of plantar fascia. There is equinus deformity bilateral with decreased dorsiflexion at the ankle joint bilateral. No tenderness with compression of heel. Negative tinels sign. Gait analysis reveals excessive pronation through midstance and propulsion with early heel off. Shoes reveals lateral heel counter wear bilateral     Patient has hammertoes of digits 2-5 bilateral partially reducible without symptom today.    Visible and palpable bunion without pain at dorsomedial 1st metatarsal head right and left.  Hallux abducted right and left partially reducible, tracks laterally without being track bound.  No ecchymosis, erythema, edema, or cardinal signs infection or signs of trauma same foot.                                                                 Neurological: No sensory deficit.   [place] 5.07 monofilament is intact bilateral feet. Sharp/dull sensation is also intact Bilateral feet.     Skin: Skin is warm and dry. Rash (dorsal left hallux) noted. No abrasion, no bruising, no ecchymosis and no laceration noted. She is not diaphoretic. There is erythema. No cyanosis. No pallor. Nails show no clubbing.   Scaling dryness to dorsal left hallux with associated erythema.    Focal hyperkeratotic lesion consisting entirely of hyperkeratotic tissue without  open skin, drainage, pus, fluctuance, malodor, or signs of infection: sub 2nd MTPJ dangelo   Psychiatric: She has a normal mood and affect. Her speech is normal.   Nursing note and vitals reviewed.            Assessment:       Encounter Diagnoses   Name Primary?    Comprehensive diabetic foot examination, type 2 DM, encounter for Yes    Pes planus of both feet     Hammer toe of right foot     Equinovalgus, acquired, unspecified laterality     Achilles tendinitis of right lower extremity     Tinea pedis of left foot          Plan:       Shanae was seen today for diabetes mellitus, diabetic foot exam and nail care.    Diagnoses and all orders for this visit:    Comprehensive diabetic foot examination, type 2 DM, encounter for    Pes planus of both feet    Hammer toe of right foot    Equinovalgus, acquired, unspecified laterality    Achilles tendinitis of right lower extremity    Tinea pedis of left foot    Other orders  -     clotrimazole-betamethasone 1-0.05% (LOTRISONE) cream; Apply topically 2 (two) times daily.      I        Greater than 50% of this visit spent on counseling and coordination of care.    Education about the diabetic foot, neuropathy, and prevention of limb loss.    Shoe inspection. Diabetic Foot Education. Patient reminded of the importance of good nutrition/healthy diet/weight management and blood sugar control to help prevent podiatric complications of diabetes. Patient instructed on proper foot hygeine. Wear comfortable, proper fitting shoes. Wash feet daily. Dry well. After drying, apply moisturizer to feet (no lotion to webspaces). Inspect feet daily for skin breaks, blisters, swelling, or redness. Wear cotton socks (preferably white)  Change socks every day. Do NOT walk barefoot. Do NOT use heating pads or hot water soaks. We discussed wearing proper shoe gear, daily foot inspections, never walking without protective shoe gear.     Educated patient on products such as heel cups, arch  supports, and orthotics. Encouraged patient to rest. Patient instructed on adequate icing techniques. Patient should ice the affected area at least once per day x 10 minutes for 10 days . I advised the  patient that extra icing would also be beneficial to ensure adequate anti inflammatory effect.     Instructions on elevation to reduce pain and swelling. When sleeping, place a pillow under the injured leg. When sitting, support the injured leg so it is level with your waist.     Stretching handout dispensed to patient. Instructions on adequate stretching reviewed in clinic     Discussed edema control and the importance of daily moisturizer to the feet such as Gold bonds diabetic foot cream    Recommend applying vicks vaporub to thick abnormal toenails daily x 6 months to treat fungal nail infection.    Instructed patient on the importance of keeping feet dry. Patient instructed to use absorbent cotton socks and change them if they become sweaty; or wear an open-toe shoe or sandal. Wash the feet at least once a day with soap and water. Apply the antifungal gel as prescribed. Instructed patient that it takes time for symptoms to completely dissipate. Patient instructed to use lysol or over-the-counter antifungal powders or sprays to shoes daily and allow them to air dry, switching shoes from every other day would be optimal. Patient is to avoid barefoot walking in  high-risk environments (public showers, gyms and locker rooms) may prevent future infections.     Patient to RTC if:  Increasing redness or swelling of the foot   Pus draining from cracks in the skin   Fever of 100.4ºF (38ºC) or higher    She will continue to monitor the areas daily, inspect her feet, wear protective shoe gear when ambulatory, moisturizer to maintain skin integrity and follow in this office in approximately 12 months, sooner p.r.n.

## 2019-03-09 ENCOUNTER — OFFICE VISIT (OUTPATIENT)
Dept: FAMILY MEDICINE | Facility: CLINIC | Age: 54
End: 2019-03-09
Payer: COMMERCIAL

## 2019-03-09 VITALS
SYSTOLIC BLOOD PRESSURE: 116 MMHG | TEMPERATURE: 99 F | DIASTOLIC BLOOD PRESSURE: 76 MMHG | BODY MASS INDEX: 34.25 KG/M2 | HEIGHT: 64 IN | OXYGEN SATURATION: 97 % | HEART RATE: 102 BPM | WEIGHT: 200.63 LBS

## 2019-03-09 DIAGNOSIS — E78.5 HYPERLIPIDEMIA, UNSPECIFIED HYPERLIPIDEMIA TYPE: Chronic | ICD-10-CM

## 2019-03-09 DIAGNOSIS — Z12.11 ENCOUNTER FOR FIT (FECAL IMMUNOCHEMICAL TEST) SCREENING: ICD-10-CM

## 2019-03-09 DIAGNOSIS — J45.20 MILD INTERMITTENT ASTHMA WITHOUT COMPLICATION: ICD-10-CM

## 2019-03-09 DIAGNOSIS — I10 ESSENTIAL HYPERTENSION: Chronic | ICD-10-CM

## 2019-03-09 DIAGNOSIS — Z00.00 ROUTINE MEDICAL EXAM: Primary | ICD-10-CM

## 2019-03-09 DIAGNOSIS — E11.29 TYPE 2 DIABETES MELLITUS WITH MICROALBUMINURIA, WITHOUT LONG-TERM CURRENT USE OF INSULIN: Chronic | ICD-10-CM

## 2019-03-09 DIAGNOSIS — R80.9 TYPE 2 DIABETES MELLITUS WITH MICROALBUMINURIA, WITHOUT LONG-TERM CURRENT USE OF INSULIN: Chronic | ICD-10-CM

## 2019-03-09 DIAGNOSIS — Z23 NEED FOR TDAP VACCINATION: ICD-10-CM

## 2019-03-09 PROCEDURE — 3074F PR MOST RECENT SYSTOLIC BLOOD PRESSURE < 130 MM HG: ICD-10-PCS | Mod: CPTII,S$GLB,, | Performed by: INTERNAL MEDICINE

## 2019-03-09 PROCEDURE — 3078F DIAST BP <80 MM HG: CPT | Mod: CPTII,S$GLB,, | Performed by: INTERNAL MEDICINE

## 2019-03-09 PROCEDURE — 3045F PR MOST RECENT HEMOGLOBIN A1C LEVEL 7.0-9.0%: CPT | Mod: CPTII,S$GLB,, | Performed by: INTERNAL MEDICINE

## 2019-03-09 PROCEDURE — 99396 PR PREVENTIVE VISIT,EST,40-64: ICD-10-PCS | Mod: 25,S$GLB,, | Performed by: INTERNAL MEDICINE

## 2019-03-09 PROCEDURE — 99396 PREV VISIT EST AGE 40-64: CPT | Mod: 25,S$GLB,, | Performed by: INTERNAL MEDICINE

## 2019-03-09 PROCEDURE — 90715 TDAP VACCINE 7 YRS/> IM: CPT | Mod: S$GLB,,, | Performed by: INTERNAL MEDICINE

## 2019-03-09 PROCEDURE — 3078F PR MOST RECENT DIASTOLIC BLOOD PRESSURE < 80 MM HG: ICD-10-PCS | Mod: CPTII,S$GLB,, | Performed by: INTERNAL MEDICINE

## 2019-03-09 PROCEDURE — 3045F PR MOST RECENT HEMOGLOBIN A1C LEVEL 7.0-9.0%: ICD-10-PCS | Mod: CPTII,S$GLB,, | Performed by: INTERNAL MEDICINE

## 2019-03-09 PROCEDURE — 90471 IMMUNIZATION ADMIN: CPT | Mod: S$GLB,,, | Performed by: INTERNAL MEDICINE

## 2019-03-09 PROCEDURE — 3074F SYST BP LT 130 MM HG: CPT | Mod: CPTII,S$GLB,, | Performed by: INTERNAL MEDICINE

## 2019-03-09 PROCEDURE — 90471 TDAP VACCINE GREATER THAN OR EQUAL TO 7YO IM: ICD-10-PCS | Mod: S$GLB,,, | Performed by: INTERNAL MEDICINE

## 2019-03-09 PROCEDURE — 90715 TDAP VACCINE GREATER THAN OR EQUAL TO 7YO IM: ICD-10-PCS | Mod: S$GLB,,, | Performed by: INTERNAL MEDICINE

## 2019-03-09 PROCEDURE — 99999 PR PBB SHADOW E&M-EST. PATIENT-LVL IV: ICD-10-PCS | Mod: PBBFAC,,, | Performed by: INTERNAL MEDICINE

## 2019-03-09 PROCEDURE — 99999 PR PBB SHADOW E&M-EST. PATIENT-LVL IV: CPT | Mod: PBBFAC,,, | Performed by: INTERNAL MEDICINE

## 2019-03-09 RX ORDER — DOXAZOSIN 4 MG/1
TABLET ORAL
Qty: 90 TABLET | Refills: 3 | Status: SHIPPED | OUTPATIENT
Start: 2019-03-09 | End: 2020-01-20

## 2019-03-09 RX ORDER — PRAVASTATIN SODIUM 40 MG/1
40 TABLET ORAL DAILY
Qty: 90 TABLET | Refills: 3 | Status: SHIPPED | OUTPATIENT
Start: 2019-03-09 | End: 2020-07-23 | Stop reason: SDUPTHER

## 2019-03-09 RX ORDER — MONTELUKAST SODIUM 10 MG/1
10 TABLET ORAL DAILY
Qty: 90 TABLET | Refills: 3 | Status: SHIPPED | OUTPATIENT
Start: 2019-03-09 | End: 2020-01-20

## 2019-03-09 RX ORDER — DILTIAZEM HYDROCHLORIDE EXTENDED-RELEASE TABLETS 240 MG/1
240 TABLET, EXTENDED RELEASE ORAL DAILY
Qty: 90 TABLET | Refills: 3 | Status: SHIPPED | OUTPATIENT
Start: 2019-03-09 | End: 2020-03-23 | Stop reason: SDUPTHER

## 2019-03-09 RX ORDER — METFORMIN HYDROCHLORIDE 500 MG/1
500 TABLET, EXTENDED RELEASE ORAL 2 TIMES DAILY WITH MEALS
Qty: 180 TABLET | Refills: 3 | Status: SHIPPED | OUTPATIENT
Start: 2019-03-09 | End: 2019-06-26 | Stop reason: ALTCHOICE

## 2019-03-09 NOTE — PROGRESS NOTES
Assessment & Plan  Problem List Items Addressed This Visit        Pulmonary    Asthma, mild intermittent (Chronic)    Overview     Cat dander, pollen bloom         Current Assessment & Plan     The current medical regimen is effective;  continue present plan and medications.          Relevant Medications    montelukast (SINGULAIR) 10 mg tablet       Cardiac/Vascular    Essential hypertension (Chronic)    Current Assessment & Plan     The current medical regimen is effective;  continue present plan and medications.          Relevant Medications    diltiaZEM HCl (MATZIM LA) 240 mg 24 hr tablet    doxazosin (CARDURA) 4 MG tablet    Hyperlipidemia (Chronic)    Current Assessment & Plan     The current medical regimen is effective;  continue present plan and medications.          Relevant Medications    pravastatin (PRAVACHOL) 40 MG tablet       Endocrine    Type 2 diabetes mellitus with microalbuminuria, without long-term current use of insulin (Chronic)    Overview     Angioedema to ARB         Current Assessment & Plan     Continue metformin.  Had GI side effects with higher doses.  Add DPP-4 and start tighter dietary compliance.  Needs to see ophtho due to abnormal outside optometry findings.          Relevant Medications    SITagliptin (JANUVIA) 100 MG Tab    metFORMIN (GLUCOPHAGE-XR) 500 MG 24 hr tablet    Other Relevant Orders    Ambulatory referral to Ophthalmology    Hemoglobin A1c    Basic metabolic panel      Other Visit Diagnoses     Routine medical exam    -  Primary  -    Discussed healthy diet, regular exercise, necessary labs, age appropriate cancer screening, and routine vaccinations.       Encounter for FIT (fecal immunochemical test) screening    -  FitKit was given to patient on 3/9/2019 12:11 PM     Relevant Orders    Fecal Immunochemical Test (iFOBT)    Need for Tdap vaccination    -  vaccinate    Relevant Orders    Tdap Vaccine            Health Maintenance reviewed, as above.  Has OV with her  LUCY lopez.    Follow-up: Follow-up in about 3 months (around 6/9/2019) for DM follow up Kat.  ______________________________________________________________________    Chief Complaint  Chief Complaint   Patient presents with    Annual Exam       HPI  Shanae Kumari is a 53 y.o. female with medical diagnoses as listed in the medical history and problem list that presents for routine physical.  Pt is known to me with her last appointment 02/2018.  Of note she did not return for her diabetes follow up after that visit.     She had labs prior to this OV that showed reassuring CMP except for elevated glucose, controlled LDL, and reduction in her A1c from 9.2 to 8.8%    Having some diarrhea with her metformin but not as much with the extended release.        PAST MEDICAL HISTORY:  Past Medical History:   Diagnosis Date    Asthma     BRCA negative     Diabetes mellitus     Hypertension        PAST SURGICAL HISTORY:  Past Surgical History:   Procedure Laterality Date    INJECTION MAJOR JOINT Right 3/10/2016    [facility] at [facility]    INJECTION-JOINT Right 9/10/2015    [facility]    KNEE ARTHROSCOPY      knee surgery         SOCIAL HISTORY:  Social History     Socioeconomic History    Marital status: Single     Spouse name: Not on file    Number of children: Not on file    Years of education: Not on file    Highest education level: Not on file   Social Needs    Financial resource strain: Not on file    Food insecurity - worry: Not on file    Food insecurity - inability: Not on file    Transportation needs - medical: Not on file    Transportation needs - non-medical: Not on file   Occupational History    Not on file   Tobacco Use    Smoking status: Never Smoker    Smokeless tobacco: Never Used   Substance and Sexual Activity    Alcohol use: No    Drug use: No    Sexual activity: Not Currently   Other Topics Concern    Not on file    Social History Narrative    Not on file       FAMILY HISTORY:  Family History   Problem Relation Age of Onset    Breast cancer Other     Diabetes Mother     COPD Mother     Cancer Mother         lung    Diabetes Father     Heart disease Father     Hypertension Father     Hyperlipidemia Father     Kidney disease Father     Cancer Sister     Diabetes Brother     Hypertension Brother     Kidney disease Brother         diabetic    Diabetes Maternal Aunt     Diabetes Maternal Uncle     Diabetes Maternal Grandmother     Colon cancer Neg Hx     Ovarian cancer Neg Hx     Stroke Neg Hx        ALLERGIES AND MEDICATIONS: updated and reviewed.  Review of patient's allergies indicates:   Allergen Reactions    Losartan Other (See Comments)     angioedema     Current Outpatient Medications   Medication Sig Dispense Refill    albuterol (PROAIR HFA) 90 mcg/actuation inhaler Inhale 2 puffs into the lungs every 4 (four) hours as needed for Wheezing. 1 Inhaler 6    albuterol (PROAIR HFA) 90 mcg/actuation inhaler INHALE TWO PUFFS BY MOUTH EVERY FOUR HOURS AS NEEDED FOR WHEEZING 8.5 Inhaler 5    clotrimazole-betamethasone 1-0.05% (LOTRISONE) cream Apply topically 2 (two) times daily. 45 g 3    diltiaZEM HCl (MATZIM LA) 240 mg 24 hr tablet Take 1 tablet (240 mg total) by mouth once daily. 90 tablet 3    doxazosin (CARDURA) 4 MG tablet Take 1 hs 90 tablet 3    medroxyPROGESTERone (PROVERA) 10 MG tablet Take 1 tablet (10 mg total) by mouth once daily. 10 tablet 7    meloxicam (MOBIC) 15 MG tablet TAKE 1 TABLET BY MOUTH EVERY DAY 30 tablet 3    metFORMIN (GLUCOPHAGE-XR) 500 MG 24 hr tablet Take 1 tablet (500 mg total) by mouth 2 (two) times daily with meals. 180 tablet 3    montelukast (SINGULAIR) 10 mg tablet Take 1 tablet (10 mg total) by mouth once daily. 90 tablet 3    pravastatin (PRAVACHOL) 40 MG tablet Take 1 tablet (40 mg total) by mouth once daily. 90 tablet 3    SITagliptin (JANUVIA) 100 MG Tab  "Take 1 tablet (100 mg total) by mouth once daily. 90 tablet 3     No current facility-administered medications for this visit.          ROS  Review of Systems   Constitutional: Negative for activity change, chills, fever and unexpected weight change.   HENT: Negative for congestion, ear pain, hearing loss, rhinorrhea, sore throat and trouble swallowing.    Eyes: Negative for discharge, redness and visual disturbance.   Respiratory: Negative for cough, chest tightness, shortness of breath and wheezing.    Cardiovascular: Negative for chest pain, palpitations and leg swelling.   Gastrointestinal: Negative for abdominal pain, blood in stool, constipation, diarrhea, nausea and vomiting.   Endocrine: Negative for polydipsia, polyphagia and polyuria.   Genitourinary: Negative for decreased urine volume, difficulty urinating, dysuria, hematuria and menstrual problem.   Musculoskeletal: Positive for arthralgias. Negative for joint swelling, myalgias and neck pain.   Skin: Negative for color change and rash.   Neurological: Negative for dizziness, weakness, light-headedness and headaches.   Psychiatric/Behavioral: Negative for confusion, decreased concentration, dysphoric mood, sleep disturbance and suicidal ideas.           Physical Exam  Vitals:    03/09/19 1125   BP: 116/76   Pulse: 102   Temp: 98.5 °F (36.9 °C)   TempSrc: Oral   SpO2: 97%   Weight: 91 kg (200 lb 9.9 oz)   Height: 5' 3.75" (1.619 m)    Body mass index is 34.71 kg/m².  Weight: 91 kg (200 lb 9.9 oz)   Height: 5' 3.75" (161.9 cm)   Physical Exam   Constitutional: She is oriented to person, place, and time. She appears well-developed and well-nourished. No distress.   HENT:   Head: Normocephalic and atraumatic.   Right Ear: Tympanic membrane, external ear and ear canal normal.   Left Ear: Tympanic membrane, external ear and ear canal normal.   Nose: Nose normal. Right sinus exhibits no maxillary sinus tenderness and no frontal sinus tenderness. Left sinus " exhibits no maxillary sinus tenderness and no frontal sinus tenderness.   Mouth/Throat: Uvula is midline, oropharynx is clear and moist and mucous membranes are normal. No tonsillar exudate.   Eyes: Conjunctivae, EOM and lids are normal. Pupils are equal, round, and reactive to light. No scleral icterus.   Neck: Full passive range of motion without pain. Neck supple. No JVD present. No spinous process tenderness and no muscular tenderness present. Carotid bruit is not present. No thyromegaly present.   Cardiovascular: Normal rate, regular rhythm, S1 normal, S2 normal and intact distal pulses. Exam reveals no S3, no S4 and no friction rub.   No murmur heard.  Pulmonary/Chest: Effort normal and breath sounds normal. She has no wheezes. She has no rhonchi. She has no rales.   Abdominal: Soft. Bowel sounds are normal. She exhibits no distension. There is no hepatosplenomegaly. There is no tenderness. There is no rebound and no CVA tenderness.   Musculoskeletal: Normal range of motion. She exhibits no edema or tenderness.   Lymphadenopathy:        Head (right side): No submental and no submandibular adenopathy present.        Head (left side): No submental and no submandibular adenopathy present.     She has no cervical adenopathy.   Neurological: She is alert and oriented to person, place, and time. Coordination normal.   Motor grossly intact.  Sensory grossly intact.  Symmetric facial movements palate elevated symmetrically tongue midline     Skin: Skin is warm and dry. No rash noted. No cyanosis. Nails show no clubbing.   Psychiatric: She has a normal mood and affect. Her speech is normal and behavior is normal. Thought content normal. Cognition and memory are normal.           Health Maintenance       Date Due Completion Date    Eye Exam 09/19/1977 ---    TETANUS VACCINE 09/19/1985 ---    Pneumococcal Vaccine (Medium Risk) (1 of 1 - PPSV23) 09/19/1986 ---    Colonoscopy 09/19/2017 ---    Mammogram 02/28/2019  2/28/2018    Pap Smear 02/20/2019 2/20/2018    Hemoglobin A1c 09/04/2019 3/4/2019    Lipid Panel 03/04/2020 3/4/2019    Urine Microalbumin 03/04/2020 3/4/2019    Low Dose Statin 03/07/2020 3/7/2019    Foot Exam 03/07/2020 3/7/2019 (Done)    Override on 3/7/2019: Done    Override on 2/19/2018: Done

## 2019-03-09 NOTE — ASSESSMENT & PLAN NOTE
Continue metformin.  Had GI side effects with higher doses.  Add DPP-4 and start tighter dietary compliance.  Needs to see ophtho due to abnormal outside optometry findings.

## 2019-03-12 ENCOUNTER — TELEPHONE (OUTPATIENT)
Dept: OPTOMETRY | Facility: CLINIC | Age: 54
End: 2019-03-12

## 2019-03-18 RX ORDER — MELOXICAM 15 MG/1
TABLET ORAL
Qty: 30 TABLET | Refills: 3 | Status: SHIPPED | OUTPATIENT
Start: 2019-03-18 | End: 2019-08-01 | Stop reason: SDUPTHER

## 2019-05-29 DIAGNOSIS — E11.9 TYPE 2 DIABETES MELLITUS WITHOUT COMPLICATION, UNSPECIFIED WHETHER LONG TERM INSULIN USE: ICD-10-CM

## 2019-06-11 ENCOUNTER — PATIENT MESSAGE (OUTPATIENT)
Dept: ADMINISTRATIVE | Facility: OTHER | Age: 54
End: 2019-06-11

## 2019-06-11 ENCOUNTER — PATIENT MESSAGE (OUTPATIENT)
Dept: FAMILY MEDICINE | Facility: CLINIC | Age: 54
End: 2019-06-11

## 2019-06-12 DIAGNOSIS — E11.9 TYPE 2 DIABETES MELLITUS: ICD-10-CM

## 2019-06-12 LAB
LEFT EYE DM RETINOPATHY: POSITIVE
RIGHT EYE DM RETINOPATHY: POSITIVE

## 2019-06-17 ENCOUNTER — TELEPHONE (OUTPATIENT)
Dept: ADMINISTRATIVE | Facility: HOSPITAL | Age: 54
End: 2019-06-17

## 2019-06-20 ENCOUNTER — PATIENT MESSAGE (OUTPATIENT)
Dept: FAMILY MEDICINE | Facility: CLINIC | Age: 54
End: 2019-06-20

## 2019-06-21 ENCOUNTER — LAB VISIT (OUTPATIENT)
Dept: LAB | Facility: HOSPITAL | Age: 54
End: 2019-06-21
Attending: INTERNAL MEDICINE
Payer: COMMERCIAL

## 2019-06-21 DIAGNOSIS — R80.9 TYPE 2 DIABETES MELLITUS WITH MICROALBUMINURIA, WITHOUT LONG-TERM CURRENT USE OF INSULIN: Chronic | ICD-10-CM

## 2019-06-21 DIAGNOSIS — E11.29 TYPE 2 DIABETES MELLITUS WITH MICROALBUMINURIA, WITHOUT LONG-TERM CURRENT USE OF INSULIN: Chronic | ICD-10-CM

## 2019-06-21 LAB
ANION GAP SERPL CALC-SCNC: 18 MMOL/L (ref 8–16)
BUN SERPL-MCNC: 14 MG/DL (ref 6–20)
CALCIUM SERPL-MCNC: 10.1 MG/DL (ref 8.7–10.5)
CHLORIDE SERPL-SCNC: 102 MMOL/L (ref 95–110)
CO2 SERPL-SCNC: 17 MMOL/L (ref 23–29)
CREAT SERPL-MCNC: 0.8 MG/DL (ref 0.5–1.4)
EST. GFR  (AFRICAN AMERICAN): >60 ML/MIN/1.73 M^2
EST. GFR  (NON AFRICAN AMERICAN): >60 ML/MIN/1.73 M^2
ESTIMATED AVG GLUCOSE: 192 MG/DL (ref 68–131)
GLUCOSE SERPL-MCNC: 175 MG/DL (ref 70–110)
HBA1C MFR BLD HPLC: 8.3 % (ref 4–5.6)
POTASSIUM SERPL-SCNC: 4.1 MMOL/L (ref 3.5–5.1)
SODIUM SERPL-SCNC: 137 MMOL/L (ref 136–145)

## 2019-06-21 PROCEDURE — 80048 BASIC METABOLIC PNL TOTAL CA: CPT

## 2019-06-21 PROCEDURE — 83036 HEMOGLOBIN GLYCOSYLATED A1C: CPT

## 2019-06-21 PROCEDURE — 36415 COLL VENOUS BLD VENIPUNCTURE: CPT | Mod: PO

## 2019-06-25 ENCOUNTER — OFFICE VISIT (OUTPATIENT)
Dept: FAMILY MEDICINE | Facility: CLINIC | Age: 54
End: 2019-06-25
Payer: COMMERCIAL

## 2019-06-25 ENCOUNTER — LAB VISIT (OUTPATIENT)
Dept: LAB | Facility: HOSPITAL | Age: 54
End: 2019-06-25
Attending: INTERNAL MEDICINE
Payer: COMMERCIAL

## 2019-06-25 VITALS
BODY MASS INDEX: 34.18 KG/M2 | SYSTOLIC BLOOD PRESSURE: 120 MMHG | TEMPERATURE: 99 F | HEIGHT: 64 IN | HEART RATE: 101 BPM | WEIGHT: 200.19 LBS | DIASTOLIC BLOOD PRESSURE: 70 MMHG | OXYGEN SATURATION: 98 %

## 2019-06-25 DIAGNOSIS — R80.9 TYPE 2 DIABETES MELLITUS WITH MICROALBUMINURIA, WITHOUT LONG-TERM CURRENT USE OF INSULIN: Chronic | ICD-10-CM

## 2019-06-25 DIAGNOSIS — J45.20 MILD INTERMITTENT ASTHMA WITHOUT COMPLICATION: Chronic | ICD-10-CM

## 2019-06-25 DIAGNOSIS — E87.20 METABOLIC ACIDOSIS: ICD-10-CM

## 2019-06-25 DIAGNOSIS — E11.29 TYPE 2 DIABETES MELLITUS WITH MICROALBUMINURIA, WITHOUT LONG-TERM CURRENT USE OF INSULIN: Chronic | ICD-10-CM

## 2019-06-25 DIAGNOSIS — I10 ESSENTIAL HYPERTENSION: Primary | Chronic | ICD-10-CM

## 2019-06-25 LAB
BACTERIA #/AREA URNS AUTO: ABNORMAL /HPF
BILIRUB UR QL STRIP: ABNORMAL
CLARITY UR REFRACT.AUTO: ABNORMAL
COLOR UR AUTO: ABNORMAL
GLUCOSE UR QL STRIP: NEGATIVE
HGB UR QL STRIP: NEGATIVE
HYALINE CASTS UR QL AUTO: 24 /LPF
KETONES UR QL STRIP: ABNORMAL
LEUKOCYTE ESTERASE UR QL STRIP: ABNORMAL
MICROSCOPIC COMMENT: ABNORMAL
NITRITE UR QL STRIP: NEGATIVE
PH UR STRIP: 5 [PH] (ref 5–8)
PROT UR QL STRIP: ABNORMAL
RBC #/AREA URNS AUTO: 1 /HPF (ref 0–4)
SP GR UR STRIP: 1.03 (ref 1–1.03)
SQUAMOUS #/AREA URNS AUTO: 9 /HPF
URN SPEC COLLECT METH UR: ABNORMAL
WBC #/AREA URNS AUTO: 13 /HPF (ref 0–5)

## 2019-06-25 PROCEDURE — 81001 URINALYSIS AUTO W/SCOPE: CPT

## 2019-06-25 PROCEDURE — 3008F BODY MASS INDEX DOCD: CPT | Mod: CPTII,S$GLB,, | Performed by: INTERNAL MEDICINE

## 2019-06-25 PROCEDURE — 3078F DIAST BP <80 MM HG: CPT | Mod: CPTII,S$GLB,, | Performed by: INTERNAL MEDICINE

## 2019-06-25 PROCEDURE — 3074F PR MOST RECENT SYSTOLIC BLOOD PRESSURE < 130 MM HG: ICD-10-PCS | Mod: CPTII,S$GLB,, | Performed by: INTERNAL MEDICINE

## 2019-06-25 PROCEDURE — 99214 OFFICE O/P EST MOD 30 MIN: CPT | Mod: S$GLB,,, | Performed by: INTERNAL MEDICINE

## 2019-06-25 PROCEDURE — 3045F PR MOST RECENT HEMOGLOBIN A1C LEVEL 7.0-9.0%: ICD-10-PCS | Mod: CPTII,S$GLB,, | Performed by: INTERNAL MEDICINE

## 2019-06-25 PROCEDURE — 3008F PR BODY MASS INDEX (BMI) DOCUMENTED: ICD-10-PCS | Mod: CPTII,S$GLB,, | Performed by: INTERNAL MEDICINE

## 2019-06-25 PROCEDURE — 99214 PR OFFICE/OUTPT VISIT, EST, LEVL IV, 30-39 MIN: ICD-10-PCS | Mod: S$GLB,,, | Performed by: INTERNAL MEDICINE

## 2019-06-25 PROCEDURE — 99999 PR PBB SHADOW E&M-EST. PATIENT-LVL III: ICD-10-PCS | Mod: PBBFAC,,, | Performed by: INTERNAL MEDICINE

## 2019-06-25 PROCEDURE — 3074F SYST BP LT 130 MM HG: CPT | Mod: CPTII,S$GLB,, | Performed by: INTERNAL MEDICINE

## 2019-06-25 PROCEDURE — 3078F PR MOST RECENT DIASTOLIC BLOOD PRESSURE < 80 MM HG: ICD-10-PCS | Mod: CPTII,S$GLB,, | Performed by: INTERNAL MEDICINE

## 2019-06-25 PROCEDURE — 99999 PR PBB SHADOW E&M-EST. PATIENT-LVL III: CPT | Mod: PBBFAC,,, | Performed by: INTERNAL MEDICINE

## 2019-06-25 PROCEDURE — 3045F PR MOST RECENT HEMOGLOBIN A1C LEVEL 7.0-9.0%: CPT | Mod: CPTII,S$GLB,, | Performed by: INTERNAL MEDICINE

## 2019-06-25 RX ORDER — ALBUTEROL SULFATE 90 UG/1
2 AEROSOL, METERED RESPIRATORY (INHALATION) EVERY 4 HOURS PRN
Qty: 1 INHALER | Refills: 6 | Status: SHIPPED | OUTPATIENT
Start: 2019-06-25 | End: 2020-07-23 | Stop reason: SDUPTHER

## 2019-06-25 NOTE — ASSESSMENT & PLAN NOTE
Discussed other options.  Will consider adding sulfonylurea and rechecking in 3 months.  May need GLP-1.  May have to stop metformin due to acidosis

## 2019-06-25 NOTE — PROGRESS NOTES
Assessment & Plan  Problem List Items Addressed This Visit        Pulmonary    Asthma, mild intermittent (Chronic)    Overview     Cat dander, pollen bloom         Current Assessment & Plan     The current medical regimen is effective;  continue present plan and medications.          Relevant Medications    albuterol (PROAIR HFA) 90 mcg/actuation inhaler       Cardiac/Vascular    Essential hypertension - Primary (Chronic)    Current Assessment & Plan     The current medical regimen is effective;  continue present plan and medications.             Endocrine    Type 2 diabetes mellitus with microalbuminuria, without long-term current use of insulin (Chronic)    Overview     Angioedema to ARB         Current Assessment & Plan     Discussed other options.  Will consider adding sulfonylurea and rechecking in 3 months.  May need GLP-1.  May have to stop metformin due to acidosis         Relevant Orders    Hemoglobin A1c    Basic metabolic panel      Other Visit Diagnoses     Metabolic acidosis      -    Proximal RTA low on differential.  Suspect lactate from metformin or GI loss from metformin.  Check labs.  As above regarding DM control     Relevant Orders    Comprehensive metabolic panel    Urinalysis    Lactate dehydrogenase            Health Maintenance reviewed, pt asked to turn in fitkit.    Follow-up: No follow-ups on file.    ______________________________________________________________________    Chief Complaint  Chief Complaint   Patient presents with    Hypertension    Diabetes    Follow-up       HPI  Shanae NOEMI Kumari is a 53 y.o. female with multiple medical diagnoses as listed in the medical history and problem list that presents for HTN and DM follow up.  Pt is known to me with his last appointment 3/9/2019.  She had labs prior to this OV that showed BMP with low bicarb elevated glucose, and A1c of 8.3%.      Taking and tolerating medications as prescribed without perceived side effects.  No CP, SOB,  palpitations, hypoglycemic symptoms.  She is still having a fair amount of loose stools from her metformin.      Answers for HPI/ROS submitted by the patient on 6/20/2019   Diabetes problem  Diabetes type: type 2  MedicAlert ID: No  Disease duration: 8 years  blurred vision: No  foot paresthesias: No  foot ulcerations: No  visual change: No  weight loss: No  Symptom course: stable  hunger: No  mood changes: No  sleepiness: No  sweats: No  blackouts: No  hospitalization: No  nocturnal hypoglycemia: No  required assistance: No  required glucagon: No  CVA: No  heart disease: No  impotence: No  nephropathy: No  peripheral neuropathy: No  PVD: No  retinopathy: No  autonomic neuropathy: No  CAD risks: dyslipidemia, family history, hypertension, obesity, diabetes mellitus  Current treatments: oral agent (dual therapy)  Treatment compliance: most of the time  Monitoring compliance: poor  Blood glucose trend: fluctuating minimally  Weight trend: stable  Exercise: rarely  Dietitian visit: No  Eye exam current: Yes  Sees podiatrist: Yes        PAST MEDICAL HISTORY:  Past Medical History:   Diagnosis Date    Asthma     BRCA negative     Diabetes mellitus     Hypertension        PAST SURGICAL HISTORY:  Past Surgical History:   Procedure Laterality Date    INJECTION MAJOR JOINT Right 3/10/2016    [facility]    INJECTION-JOINT Right 9/10/2015    [facility]    KNEE ARTHROSCOPY      knee surgery         SOCIAL HISTORY:  Social History     Socioeconomic History    Marital status: Single     Spouse name: Not on file    Number of children: Not on file    Years of education: Not on file    Highest education level: Not on file   Occupational History    Not on file   Social Needs    Financial resource strain: Not on file    Food insecurity:     Worry: Not on file     Inability: Not on file    Transportation needs:     Medical: Not on file      Non-medical: Not on file   Tobacco Use    Smoking status: Never Smoker    Smokeless tobacco: Never Used   Substance and Sexual Activity    Alcohol use: No    Drug use: No    Sexual activity: Not Currently   Lifestyle    Physical activity:     Days per week: Not on file     Minutes per session: Not on file    Stress: Not on file   Relationships    Social connections:     Talks on phone: Not on file     Gets together: Not on file     Attends Orthodoxy service: Not on file     Active member of club or organization: Not on file     Attends meetings of clubs or organizations: Not on file     Relationship status: Not on file   Other Topics Concern    Not on file   Social History Narrative    Not on file       FAMILY HISTORY:  Family History   Problem Relation Age of Onset    Breast cancer Other     Diabetes Mother     COPD Mother     Cancer Mother         lung    Diabetes Father     Heart disease Father     Hypertension Father     Hyperlipidemia Father     Kidney disease Father     Cancer Sister     Diabetes Brother     Hypertension Brother     Kidney disease Brother         diabetic    Diabetes Maternal Aunt     Diabetes Maternal Uncle     Diabetes Maternal Grandmother     Colon cancer Neg Hx     Ovarian cancer Neg Hx     Stroke Neg Hx        ALLERGIES AND MEDICATIONS: updated and reviewed.  Review of patient's allergies indicates:   Allergen Reactions    Allergenic extracts Hives, Itching, Other (See Comments), Rash and Shortness Of Breath    Dog dander Hives, Shortness Of Breath, Itching, Swelling and Rash     Cat's dander, dust,  pollen    Losartan Other (See Comments)     angioedema    Mold Itching and Shortness Of Breath     Current Outpatient Medications   Medication Sig Dispense Refill    albuterol (PROAIR HFA) 90 mcg/actuation inhaler Inhale 2 puffs into the lungs every 4 (four) hours as needed for Wheezing. 1 Inhaler 6    diltiaZEM HCl (MATZIM LA) 240 mg 24 hr tablet Take 1  tablet (240 mg total) by mouth once daily. 90 tablet 3    doxazosin (CARDURA) 4 MG tablet Take 1 hs 90 tablet 3    metFORMIN (GLUCOPHAGE-XR) 500 MG 24 hr tablet Take 1 tablet (500 mg total) by mouth 2 (two) times daily with meals. 180 tablet 3    montelukast (SINGULAIR) 10 mg tablet Take 1 tablet (10 mg total) by mouth once daily. 90 tablet 3    pravastatin (PRAVACHOL) 40 MG tablet Take 1 tablet (40 mg total) by mouth once daily. 90 tablet 3    SITagliptin (JANUVIA) 100 MG Tab Take 1 tablet (100 mg total) by mouth once daily. 90 tablet 3    clotrimazole-betamethasone 1-0.05% (LOTRISONE) cream Apply topically 2 (two) times daily. 45 g 3    medroxyPROGESTERone (PROVERA) 10 MG tablet Take 1 tablet (10 mg total) by mouth once daily. 10 tablet 12    meloxicam (MOBIC) 15 MG tablet TAKE 1 TABLET BY MOUTH EVERY DAY 30 tablet 3     No current facility-administered medications for this visit.          ROS  Review of Systems   Constitutional: Positive for fatigue. Negative for chills, fever and unexpected weight change.   HENT: Negative for congestion, ear pain, hearing loss, rhinorrhea, sore throat and trouble swallowing.    Eyes: Negative for discharge, redness and visual disturbance.   Respiratory: Negative for cough, chest tightness, shortness of breath and wheezing.    Cardiovascular: Negative for chest pain, palpitations and leg swelling.   Gastrointestinal: Negative for abdominal pain, constipation, diarrhea, nausea and vomiting.   Endocrine: Negative for polydipsia, polyphagia and polyuria.   Genitourinary: Negative for decreased urine volume, dysuria and hematuria.   Musculoskeletal: Negative for arthralgias, joint swelling and myalgias.   Skin: Negative for color change, pallor and rash.   Neurological: Negative for dizziness, tremors, seizures, speech difficulty, weakness, light-headedness and headaches.   Psychiatric/Behavioral: Negative for confusion, decreased concentration, dysphoric mood, sleep  "disturbance and suicidal ideas. The patient is not nervous/anxious.            Physical Exam  Vitals:    06/25/19 1305   BP: 120/70   Pulse: 101   Temp: 98.6 °F (37 °C)   SpO2: 98%   Weight: 90.8 kg (200 lb 2.8 oz)   Height: 5' 4" (1.626 m)    Body mass index is 34.36 kg/m².  Weight: 90.8 kg (200 lb 2.8 oz)   Height: 5' 4" (162.6 cm)   Physical Exam   Constitutional: She is oriented to person, place, and time. She appears well-developed and well-nourished. No distress.   HENT:   Head: Normocephalic and atraumatic.   Eyes: Pupils are equal, round, and reactive to light. Conjunctivae, EOM and lids are normal. No scleral icterus.   Neck: Full passive range of motion without pain. Neck supple. No JVD present. Carotid bruit is not present. No thyromegaly present.   Cardiovascular: Normal rate, regular rhythm, normal heart sounds, intact distal pulses and normal pulses. Exam reveals no S3, no S4 and no friction rub.   No murmur heard.  Pulmonary/Chest: Effort normal and breath sounds normal. She has no wheezes. She has no rhonchi. She has no rales.   Abdominal: Soft. Bowel sounds are normal. There is no tenderness.   Musculoskeletal: She exhibits no edema or tenderness.   Lymphadenopathy:        Head (right side): No submental and no submandibular adenopathy present.        Head (left side): No submental and no submandibular adenopathy present.     She has no cervical adenopathy.   Neurological: She is alert and oriented to person, place, and time.   Motor grossly intact.  Sensory grossly intact.  Symmetric facial movements palate elevated symmetrically tongue midline   Skin: Skin is warm and dry. No rash noted.   Psychiatric: She has a normal mood and affect. Her speech is normal and behavior is normal. Thought content normal.         Health Maintenance       Date Due Completion Date    Shingles Vaccine (1 of 2) 09/19/2017 ---    Fecal Occult Blood Test (FOBT)/FitKit 02/20/2019 2/20/2018    Influenza Vaccine 08/01/2019 " 8/23/2018    Override on 8/21/2016: Done    Override on 9/12/2015: Done    Hemoglobin A1c 09/21/2019 6/21/2019    Lipid Panel 03/04/2020 3/4/2019    Urine Microalbumin 03/04/2020 3/4/2019    Foot Exam 03/07/2020 3/7/2019 (Done)    Override on 3/7/2019: Done    Override on 2/19/2018: Done    Low Dose Statin 03/09/2020 3/9/2019    Pap Smear 03/20/2020 3/20/2019    Mammogram 03/25/2020 3/25/2019    Eye Exam 06/12/2020 6/12/2019    TETANUS VACCINE 03/09/2029 3/9/2019

## 2019-06-26 ENCOUNTER — PATIENT MESSAGE (OUTPATIENT)
Dept: FAMILY MEDICINE | Facility: CLINIC | Age: 54
End: 2019-06-26

## 2019-06-26 DIAGNOSIS — R80.9 TYPE 2 DIABETES MELLITUS WITH MICROALBUMINURIA, WITHOUT LONG-TERM CURRENT USE OF INSULIN: Chronic | ICD-10-CM

## 2019-06-26 DIAGNOSIS — E11.29 TYPE 2 DIABETES MELLITUS WITH MICROALBUMINURIA, WITHOUT LONG-TERM CURRENT USE OF INSULIN: Chronic | ICD-10-CM

## 2019-06-26 RX ORDER — GLIMEPIRIDE 2 MG/1
2 TABLET ORAL
Qty: 90 TABLET | Refills: 3 | Status: SHIPPED | OUTPATIENT
Start: 2019-06-26 | End: 2020-02-18 | Stop reason: SDUPTHER

## 2019-06-28 ENCOUNTER — PATIENT OUTREACH (OUTPATIENT)
Dept: ADMINISTRATIVE | Facility: OTHER | Age: 54
End: 2019-06-28

## 2019-07-05 ENCOUNTER — PATIENT MESSAGE (OUTPATIENT)
Dept: FAMILY MEDICINE | Facility: CLINIC | Age: 54
End: 2019-07-05

## 2019-07-05 ENCOUNTER — PATIENT OUTREACH (OUTPATIENT)
Dept: OTHER | Facility: OTHER | Age: 54
End: 2019-07-05

## 2019-07-05 NOTE — LETTER
October 17, 2019     Shanae MONTIEL [address]  [place] [address]       Dear Shanae,    Thank you for your interest in [facility], a clinically-proven program designed to help you manage your chronic condition more conveniently and effectively. [facility] gives you:   Technology that lets you monitor your health at home and send readings directly to your care team at [facility]   Medications managed by a dedicated pharmacist or clinician    A  who calls and helps you take manageable steps towards a healthier lifestyle       We have tried to reach you via phone and [facility] Message to complete your enrollment in the Digital Medicine program. Unfortunately, weve been unable to reach you.     Please call us to complete your enrollment. Our number is [phone]. If we dont hear from you by 11/7/2019, we will be unable to complete your enrollment at this time.     We look forward to hearing from you soon.    Sincerely,     The Digital Medicine Team

## 2019-07-05 NOTE — LETTER
October 29, 2019     Shanae MONTIEL [address]  [place] [address]       Dear Shanae,    We have made several attempts to encourage your participation in [facility]s [facility] Program. Unfortunately, we have been unsuccessful.     This is an official notice that you are no longer enrolled in the digital medicine program, and thus, we will no longer be managing your disease states. Please note this has no impact on your relationship with [facility] or your providers. Going forward, please reach out to your primary care provider with any questions or concerns regarding your health.    Please contact [phone] if you have any additional questions.    Sincerely,  The [facility] Team

## 2019-07-05 NOTE — LETTER
October 17, 2019     Shanae MONTIEL [address]  [place] [address]       Dear Shanae,    Thank you for your interest in [facility], a clinically-proven program designed to help you manage your chronic condition more conveniently and effectively. [facility] gives you:   Technology that lets you monitor your health at home and send readings directly to your care team at [facility]   Medications managed by a dedicated pharmacist or clinician    A  who calls and helps you take manageable steps towards a healthier lifestyle       We have tried to reach you via phone and [facility] Message to complete your enrollment in the Digital Medicine program. Unfortunately, weve been unable to reach you.     Please call us to complete your enrollment. Our number is [phone]. If we dont hear from you by 11/7/2019, we will be unable to complete your enrollment at this time.     We look forward to hearing from you soon.    Sincerely,     [facility] Team

## 2019-07-05 NOTE — PROGRESS NOTES
1st attempt to complete enrollment call. No answer, left voicemail.       Last 6 Patient Entered Readings                                          Most Recent A1c: 8.3% on 6/21/2019  (Goal: 7%)     Recent Readings 7/5/2019 7/4/2019 7/4/2019 7/4/2019 7/4/2019    Blood Glucose (mg/dL) 258 275 217 221 234

## 2019-07-05 NOTE — LETTER
October 29, 2019     Shanae MONTIEL [address]  [place] [address]       Dear Shanae,    We have made several attempts to encourage your participation in [facility]s Digital [facility] Program. Unfortunately, we have been unsuccessful.     This is an official notice that you are no longer enrolled in the digital medicine program, and thus, we will no longer be managing your disease states. Please note this has no impact on your relationship with [facility] or your providers. Going forward, please reach out to your primary care provider with any questions or concerns regarding your health.    Please contact [phone] if you have any additional questions.    Sincerely,  The [facility] Team

## 2019-07-16 NOTE — PROGRESS NOTES
2nd attempt for enrollment call.  No answer, left voicemail.  Sent my portal message.      Last 6 Patient Entered Readings                                          Most Recent A1c: 8.3% on 6/21/2019  (Goal: 7%)     Recent Readings 7/15/2019 7/14/2019 7/10/2019 7/9/2019 7/8/2019    Blood Glucose (mg/dL) 242 246 [phone]

## 2019-07-24 NOTE — PROGRESS NOTES
3rd attempt for enrollment call.  No answer, left voicemail.      Last 6 Patient Entered Readings                                          Most Recent A1c: 8.3% on 6/21/2019  (Goal: 7%)     Recent Readings 7/23/2019 7/19/2019 7/19/2019 7/15/2019 7/14/2019    Blood Glucose (mg/dL) 322 [phone] 174

## 2019-07-31 NOTE — PROGRESS NOTES
4th attempt for enrollment call.  No answer, left voicemail.    Last 6 Patient Entered Readings                                          Most Recent A1c: 8.3% on 6/21/2019  (Goal: 7%)     Recent Readings 7/23/2019 7/19/2019 7/19/2019 7/15/2019 7/14/2019    Blood Glucose (mg/dL) 322 267 [phone]

## 2019-08-01 RX ORDER — MELOXICAM 15 MG/1
TABLET ORAL
Qty: 30 TABLET | Refills: 3 | Status: SHIPPED | OUTPATIENT
Start: 2019-08-01 | End: 2019-10-31 | Stop reason: SDUPTHER

## 2019-08-06 LAB
LEFT EYE DM RETINOPATHY: NEGATIVE
RIGHT EYE DM RETINOPATHY: NEGATIVE

## 2019-08-13 ENCOUNTER — TELEPHONE (OUTPATIENT)
Dept: ADMINISTRATIVE | Facility: HOSPITAL | Age: 54
End: 2019-08-13

## 2019-09-03 ENCOUNTER — PATIENT MESSAGE (OUTPATIENT)
Dept: FAMILY MEDICINE | Facility: CLINIC | Age: 54
End: 2019-09-03

## 2019-09-21 ENCOUNTER — LAB VISIT (OUTPATIENT)
Dept: LAB | Facility: HOSPITAL | Age: 54
End: 2019-09-21
Attending: INTERNAL MEDICINE
Payer: COMMERCIAL

## 2019-09-21 DIAGNOSIS — E11.29 TYPE 2 DIABETES MELLITUS WITH MICROALBUMINURIA, WITHOUT LONG-TERM CURRENT USE OF INSULIN: Chronic | ICD-10-CM

## 2019-09-21 DIAGNOSIS — R80.9 TYPE 2 DIABETES MELLITUS WITH MICROALBUMINURIA, WITHOUT LONG-TERM CURRENT USE OF INSULIN: Chronic | ICD-10-CM

## 2019-09-21 LAB
ANION GAP SERPL CALC-SCNC: 9 MMOL/L (ref 8–16)
BUN SERPL-MCNC: 14 MG/DL (ref 6–20)
CALCIUM SERPL-MCNC: 9.4 MG/DL (ref 8.7–10.5)
CHLORIDE SERPL-SCNC: 102 MMOL/L (ref 95–110)
CO2 SERPL-SCNC: 23 MMOL/L (ref 23–29)
CREAT SERPL-MCNC: 0.8 MG/DL (ref 0.5–1.4)
EST. GFR  (AFRICAN AMERICAN): >60 ML/MIN/1.73 M^2
EST. GFR  (NON AFRICAN AMERICAN): >60 ML/MIN/1.73 M^2
ESTIMATED AVG GLUCOSE: 246 MG/DL (ref 68–131)
GLUCOSE SERPL-MCNC: 207 MG/DL (ref 70–110)
HBA1C MFR BLD HPLC: 10.2 % (ref 4–5.6)
POTASSIUM SERPL-SCNC: 4.1 MMOL/L (ref 3.5–5.1)
SODIUM SERPL-SCNC: 134 MMOL/L (ref 136–145)

## 2019-09-21 PROCEDURE — 83036 HEMOGLOBIN GLYCOSYLATED A1C: CPT

## 2019-09-21 PROCEDURE — 80048 BASIC METABOLIC PNL TOTAL CA: CPT

## 2019-09-21 PROCEDURE — 36415 COLL VENOUS BLD VENIPUNCTURE: CPT | Mod: PO

## 2019-09-24 ENCOUNTER — OFFICE VISIT (OUTPATIENT)
Dept: FAMILY MEDICINE | Facility: CLINIC | Age: 54
End: 2019-09-24
Payer: COMMERCIAL

## 2019-09-24 VITALS
TEMPERATURE: 98 F | BODY MASS INDEX: 36.37 KG/M2 | HEIGHT: 63 IN | SYSTOLIC BLOOD PRESSURE: 126 MMHG | DIASTOLIC BLOOD PRESSURE: 80 MMHG | OXYGEN SATURATION: 98 % | HEART RATE: 92 BPM | WEIGHT: 205.25 LBS

## 2019-09-24 DIAGNOSIS — J30.89 NON-SEASONAL ALLERGIC RHINITIS, UNSPECIFIED TRIGGER: Chronic | ICD-10-CM

## 2019-09-24 DIAGNOSIS — R80.9 TYPE 2 DIABETES MELLITUS WITH MICROALBUMINURIA, WITHOUT LONG-TERM CURRENT USE OF INSULIN: Chronic | ICD-10-CM

## 2019-09-24 DIAGNOSIS — I10 ESSENTIAL HYPERTENSION: Chronic | ICD-10-CM

## 2019-09-24 DIAGNOSIS — E11.29 TYPE 2 DIABETES MELLITUS WITH MICROALBUMINURIA, WITHOUT LONG-TERM CURRENT USE OF INSULIN: Chronic | ICD-10-CM

## 2019-09-24 DIAGNOSIS — K59.01 SLOW TRANSIT CONSTIPATION: Chronic | ICD-10-CM

## 2019-09-24 PROCEDURE — 99214 OFFICE O/P EST MOD 30 MIN: CPT | Mod: S$GLB,,, | Performed by: INTERNAL MEDICINE

## 2019-09-24 PROCEDURE — 3074F PR MOST RECENT SYSTOLIC BLOOD PRESSURE < 130 MM HG: ICD-10-PCS | Mod: CPTII,S$GLB,, | Performed by: INTERNAL MEDICINE

## 2019-09-24 PROCEDURE — 3079F DIAST BP 80-89 MM HG: CPT | Mod: CPTII,S$GLB,, | Performed by: INTERNAL MEDICINE

## 2019-09-24 PROCEDURE — 3046F HEMOGLOBIN A1C LEVEL >9.0%: CPT | Mod: CPTII,S$GLB,, | Performed by: INTERNAL MEDICINE

## 2019-09-24 PROCEDURE — 3046F PR MOST RECENT HEMOGLOBIN A1C LEVEL > 9.0%: ICD-10-PCS | Mod: CPTII,S$GLB,, | Performed by: INTERNAL MEDICINE

## 2019-09-24 PROCEDURE — 99214 PR OFFICE/OUTPT VISIT, EST, LEVL IV, 30-39 MIN: ICD-10-PCS | Mod: S$GLB,,, | Performed by: INTERNAL MEDICINE

## 2019-09-24 PROCEDURE — 99999 PR PBB SHADOW E&M-EST. PATIENT-LVL III: ICD-10-PCS | Mod: PBBFAC,,, | Performed by: INTERNAL MEDICINE

## 2019-09-24 PROCEDURE — 3074F SYST BP LT 130 MM HG: CPT | Mod: CPTII,S$GLB,, | Performed by: INTERNAL MEDICINE

## 2019-09-24 PROCEDURE — 3079F PR MOST RECENT DIASTOLIC BLOOD PRESSURE 80-89 MM HG: ICD-10-PCS | Mod: CPTII,S$GLB,, | Performed by: INTERNAL MEDICINE

## 2019-09-24 PROCEDURE — 99999 PR PBB SHADOW E&M-EST. PATIENT-LVL III: CPT | Mod: PBBFAC,,, | Performed by: INTERNAL MEDICINE

## 2019-09-24 PROCEDURE — 3008F BODY MASS INDEX DOCD: CPT | Mod: CPTII,S$GLB,, | Performed by: INTERNAL MEDICINE

## 2019-09-24 PROCEDURE — 3008F PR BODY MASS INDEX (BMI) DOCUMENTED: ICD-10-PCS | Mod: CPTII,S$GLB,, | Performed by: INTERNAL MEDICINE

## 2019-09-24 RX ORDER — FLUTICASONE PROPIONATE 50 MCG
1 SPRAY, SUSPENSION (ML) NASAL DAILY
Qty: 3 BOTTLE | Refills: 3 | Status: SHIPPED | OUTPATIENT
Start: 2019-09-24 | End: 2023-10-17 | Stop reason: SDUPTHER

## 2019-09-24 NOTE — PROGRESS NOTES
Assessment & Plan  Problem List Items Addressed This Visit        Cardiac/Vascular    Essential hypertension (Chronic)    Current Assessment & Plan     The current medical regimen is effective;  continue present plan and medications.              Endocrine    Type 2 diabetes mellitus with microalbuminuria, without long-term current use of insulin (Chronic)    Overview     Angioedema to ARB.  Persistent metabolic acidosis when on metformin that resolved with stopping.  ?GI losses         Current Assessment & Plan     Continue glimepiride.  Will change DPP-4 to GLP-1.  Start lower dose and will increase to full dose in 1 month.  May need to increase glimepiride during ramp up period.           Relevant Medications    dulaglutide (TRULICITY) 0.75 mg/0.5 mL PnIj    Other Relevant Orders    Hemoglobin A1c    Comprehensive metabolic panel       GI    Slow transit constipation (Chronic)    Current Assessment & Plan     Counseled on stool softening regimen with Colace or Miralax            Other    Non-seasonal allergic rhinitis (Chronic)    Current Assessment & Plan     Continue singulair.  Add flonase; I have discussed the common side effects of this medication and black box warnings (if applicable to this medication) with the patient and answered all of the questions they had at the time of this visit regarding this medication. I taught the patient the correct technique for nasal spray use.           Relevant Medications    fluticasone propionate (FLONASE) 50 mcg/actuation nasal spray            Health Maintenance reviewed, has FitKit. Will turn in once constipation better.    Follow-up: Follow up in about 3 months (around 12/24/2019) for DM follow up.  ______________________________________________________________________    Chief Complaint  Chief Complaint   Patient presents with    Diabetes    Hypertension    Follow-up       HPI  Shanae NOEMI Quoc is a 54 y.o. female with medical diagnoses as listed in the medical  history and problem list that presents for DM, HTN follow up.  Pt is known to me with her last appointment 6/25/2019.  She had labs prior to this OV that showed A1c increase to 10.3%.  BMP reassuring with improvement in her metabolic acidosis off of metformin.      Answers for HPI/ROS submitted by the patient on 9/22/2019   Diabetes problem  Diabetes type: type 2  MedicAlert ID: No  Disease duration: 9 years  blurred vision: No  foot paresthesias: No  foot ulcerations: No  visual change: No  weight loss: No  Symptom course: stable  hunger: No  mood changes: No  sleepiness: Yes  sweats: No  blackouts: No  hospitalization: No  nocturnal hypoglycemia: No  required assistance: No  required glucagon: No  CVA: No  heart disease: No  impotence: No  nephropathy: No  peripheral neuropathy: No  PVD: No  retinopathy: No  autonomic neuropathy: No  CAD risks: family history, hypertension, obesity, diabetes mellitus  Current treatments: diet, oral agent (dual therapy)  Treatment compliance: most of the time  Home urines: <1 x per month  Monitoring compliance: inadequate  Blood glucose trend: increasing steadily  Weight trend: stable  Meal planning: avoidance of concentrated sweets  Exercise: intermittently  Dietitian visit: No  Eye exam current: Yes  Sees podiatrist: Yes    Taking and tolerating medications as prescribed without perceived side effects.  No CP, SOB, palpitations, hypoglycemic symptoms.        PAST MEDICAL HISTORY:  Past Medical History:   Diagnosis Date    Asthma     BRCA negative     Diabetes mellitus     Hypertension        PAST SURGICAL HISTORY:  Past Surgical History:   Procedure Laterality Date    KNEE ARTHROSCOPY      knee surgery         SOCIAL HISTORY:  Social History     Socioeconomic History    Marital status: Single     Spouse name: Not on file    Number of children: Not on file    Years of education: Not on file    Highest education level: Not on file   Occupational History    Not on file    Social Needs    Financial resource strain: Not on file    Food insecurity:     Worry: Not on file     Inability: Not on file    Transportation needs:     Medical: Not on file     Non-medical: Not on file   Tobacco Use    Smoking status: Never Smoker    Smokeless tobacco: Never Used   Substance and Sexual Activity    Alcohol use: No     Frequency: Never     Drinks per session: Patient refused     Binge frequency: Never    Drug use: No    Sexual activity: Not Currently   Lifestyle    Physical activity:     Days per week: Not on file     Minutes per session: Not on file    Stress: Not on file   Relationships    Social connections:     Talks on phone: Not on file     Gets together: Not on file     Attends Shinto service: Not on file     Active member of club or organization: Not on file     Attends meetings of clubs or organizations: Not on file     Relationship status: Not on file   Other Topics Concern    Not on file   Social History Narrative    Not on file       FAMILY HISTORY:  Family History   Problem Relation Age of Onset    Breast cancer Other     Diabetes Mother     COPD Mother     Cancer Mother         lung    Diabetes Father     Heart disease Father     Hypertension Father     Hyperlipidemia Father     Kidney disease Father     Cancer Sister     Diabetes Brother     Hypertension Brother     Kidney disease Brother         diabetic    Diabetes Maternal Aunt     Diabetes Maternal Uncle     Diabetes Maternal Grandmother     Colon cancer Neg Hx     Ovarian cancer Neg Hx     Stroke Neg Hx        ALLERGIES AND MEDICATIONS: updated and reviewed.  Review of patient's allergies indicates:   Allergen Reactions    Allergenic extracts Hives, Itching, Other (See Comments), Rash and Shortness Of Breath    Dog dander Hives, Shortness Of Breath, Itching, Swelling and Rash     Cat's dander, dust,  pollen    Losartan Other (See Comments)     angioedema    Mold Itching and Shortness Of  Breath     Current Outpatient Medications   Medication Sig Dispense Refill    diltiaZEM HCl (MATZIM LA) 240 mg 24 hr tablet Take 1 tablet (240 mg total) by mouth once daily. 90 tablet 3    doxazosin (CARDURA) 4 MG tablet Take 1 hs 90 tablet 3    glimepiride (AMARYL) 2 MG tablet Take 1 tablet (2 mg total) by mouth before breakfast. 90 tablet 3    medroxyPROGESTERone (PROVERA) 10 MG tablet Take 1 tablet (10 mg total) by mouth once daily. 10 tablet 12    meloxicam (MOBIC) 15 MG tablet TAKE 1 TABLET BY MOUTH EVERY DAY 30 tablet 3    montelukast (SINGULAIR) 10 mg tablet Take 1 tablet (10 mg total) by mouth once daily. 90 tablet 3    pravastatin (PRAVACHOL) 40 MG tablet Take 1 tablet (40 mg total) by mouth once daily. 90 tablet 3    albuterol (PROAIR HFA) 90 mcg/actuation inhaler Inhale 2 puffs into the lungs every 4 (four) hours as needed for Wheezing. (Patient not taking: Reported on 9/24/2019) 1 Inhaler 6    clotrimazole-betamethasone 1-0.05% (LOTRISONE) cream Apply topically 2 (two) times daily. (Patient not taking: Reported on 9/24/2019) 45 g 3    dulaglutide (TRULICITY) 0.75 mg/0.5 mL PnIj Inject 0.5 mLs (0.75 mg total) into the skin every 7 days. 1 Syringe 3    fluticasone propionate (FLONASE) 50 mcg/actuation nasal spray 1 spray (50 mcg total) by Each Nostril route once daily. 3 Bottle 3     No current facility-administered medications for this visit.          ROS  Review of Systems   Constitutional: Positive for fatigue.   Cardiovascular: Negative for chest pain.   Endocrine: Positive for polyuria. Negative for polydipsia and polyphagia.   Skin: Negative for pallor.   Neurological: Negative for dizziness, tremors, seizures, speech difficulty, weakness and headaches.   Psychiatric/Behavioral: Negative for confusion. The patient is not nervous/anxious.            Physical Exam  Vitals:    09/24/19 1508 09/24/19 1535   BP: (!) 140/80 126/80   Pulse: 101 92   Temp: 98.3 °F (36.8 °C)    SpO2: 98%    Weight:  "93.1 kg (205 lb 4 oz)    Height: 5' 3" (1.6 m)     Body mass index is 36.36 kg/m².  Weight: 93.1 kg (205 lb 4 oz)   Height: 5' 3" (160 cm)   Physical Exam   Constitutional: She is oriented to person, place, and time. She appears well-developed and well-nourished. No distress.   HENT:   Head: Normocephalic and atraumatic.   Eyes: Pupils are equal, round, and reactive to light. Conjunctivae, EOM and lids are normal. No scleral icterus.   Neck: Full passive range of motion without pain. Neck supple. No JVD present. Carotid bruit is not present. No thyromegaly present.   Cardiovascular: Normal rate, regular rhythm, normal heart sounds, intact distal pulses and normal pulses. Exam reveals no S3, no S4 and no friction rub.   No murmur heard.  Pulmonary/Chest: Effort normal and breath sounds normal. She has no wheezes. She has no rhonchi. She has no rales.   Abdominal: Soft. Bowel sounds are normal. There is no tenderness.   Musculoskeletal: She exhibits no edema or tenderness.   Lymphadenopathy:        Head (right side): No submental and no submandibular adenopathy present.        Head (left side): No submental and no submandibular adenopathy present.     She has no cervical adenopathy.   Neurological: She is alert and oriented to person, place, and time.   Motor grossly intact.  Sensory grossly intact.  Symmetric facial movements palate elevated symmetrically tongue midline   Skin: Skin is warm and dry. No rash noted.   Psychiatric: She has a normal mood and affect. Her speech is normal and behavior is normal. Thought content normal.           Health Maintenance       Date Due Completion Date    Shingles Vaccine (1 of 2) 09/19/2017 ---    Fecal Occult Blood Test (FOBT)/FitKit 02/20/2019 2/20/2018    Hemoglobin A1c 12/21/2019 9/21/2019    Lipid Panel 03/04/2020 3/4/2019    Urine Microalbumin 03/04/2020 3/4/2019    Foot Exam 03/07/2020 3/7/2019 (Done)    Override on 3/7/2019: Done    Override on 2/19/2018: Done    Pap " Smear 03/20/2020 3/20/2019    Mammogram 03/25/2020 3/25/2019    Low Dose Statin 06/25/2020 6/25/2019    Eye Exam 08/06/2020 8/6/2019    TETANUS VACCINE 03/09/2029 3/9/2019

## 2019-09-24 NOTE — PATIENT INSTRUCTIONS
Use pre-bottled nasal saline at least once a day but as often as desired for comfort (if you are mixing your own solution, you MUST use either distilled water or boiled water that has been allowed to cool).  Blow your nose after you spray each nostril.  AFTER using the nasal saline, use the nasal steroid in the following manner:    Place the tip of the bottle into your nostril aiming towards the outside corner of each eye.  You may find it beneficial to use the opposite hand for the nostril that you are spraying. Do not aim the bottle straight up your nose. Mosca the medicine but do not perform a hard sniff when you do.  If you can taste the medication, then you have sniffed too hard.  Dab any medication that drips out of your nose but do not blow your nose as this will expel the medication.

## 2019-09-24 NOTE — ASSESSMENT & PLAN NOTE
Continue singulair.  Add flonase; I have discussed the common side effects of this medication and black box warnings (if applicable to this medication) with the patient and answered all of the questions they had at the time of this visit regarding this medication. I taught the patient the correct technique for nasal spray use.

## 2019-09-27 NOTE — PROGRESS NOTES
Digital Medicine Program Enrollment  HPI     5th  attempt for enrollment call.  No answer, left voicemail.  Also sent enrolling provider message

## 2019-10-10 ENCOUNTER — PATIENT MESSAGE (OUTPATIENT)
Dept: FAMILY MEDICINE | Facility: CLINIC | Age: 54
End: 2019-10-10

## 2019-10-17 NOTE — PROGRESS NOTES
6th attempt to complete enrollment call. No answer, left voicemail. Sent incomplete enrollment letter.

## 2019-10-29 NOTE — PROGRESS NOTES
Digital Medicine Program Enrollment  HPI     Several attempts have been made to complete the enrollment for the Diabetes program however all attempts have been unsuccessful.  Pt has been discharged from the program as of today and discharge letter has been sent via certified mail.  Tracking#[id number]

## 2019-11-01 RX ORDER — MELOXICAM 15 MG/1
TABLET ORAL
Qty: 30 TABLET | Refills: 3 | Status: SHIPPED | OUTPATIENT
Start: 2019-11-01 | End: 2020-09-02

## 2019-11-20 ENCOUNTER — TELEPHONE (OUTPATIENT)
Dept: FAMILY MEDICINE | Facility: CLINIC | Age: 54
End: 2019-11-20

## 2019-12-29 ENCOUNTER — PATIENT MESSAGE (OUTPATIENT)
Dept: FAMILY MEDICINE | Facility: CLINIC | Age: 54
End: 2019-12-29

## 2020-01-02 ENCOUNTER — PATIENT MESSAGE (OUTPATIENT)
Dept: FAMILY MEDICINE | Facility: CLINIC | Age: 55
End: 2020-01-02

## 2020-01-07 DIAGNOSIS — E11.29 TYPE 2 DIABETES MELLITUS WITH MICROALBUMINURIA, WITHOUT LONG-TERM CURRENT USE OF INSULIN: Primary | Chronic | ICD-10-CM

## 2020-01-07 DIAGNOSIS — R80.9 TYPE 2 DIABETES MELLITUS WITH MICROALBUMINURIA, WITHOUT LONG-TERM CURRENT USE OF INSULIN: Primary | Chronic | ICD-10-CM

## 2020-01-10 RX ORDER — CLOTRIMAZOLE AND BETAMETHASONE DIPROPIONATE 10; .64 MG/G; MG/G
CREAM TOPICAL
Qty: 45 G | Refills: 3 | OUTPATIENT
Start: 2020-01-10

## 2020-01-13 ENCOUNTER — PATIENT MESSAGE (OUTPATIENT)
Dept: FAMILY MEDICINE | Facility: CLINIC | Age: 55
End: 2020-01-13

## 2020-01-18 DIAGNOSIS — J45.20 MILD INTERMITTENT ASTHMA WITHOUT COMPLICATION: ICD-10-CM

## 2020-01-18 DIAGNOSIS — E11.29 TYPE 2 DIABETES MELLITUS WITH MICROALBUMINURIA, WITHOUT LONG-TERM CURRENT USE OF INSULIN: Chronic | ICD-10-CM

## 2020-01-18 DIAGNOSIS — R80.9 TYPE 2 DIABETES MELLITUS WITH MICROALBUMINURIA, WITHOUT LONG-TERM CURRENT USE OF INSULIN: Chronic | ICD-10-CM

## 2020-01-18 DIAGNOSIS — I10 ESSENTIAL HYPERTENSION: Chronic | ICD-10-CM

## 2020-01-20 RX ORDER — SITAGLIPTIN 100 MG/1
TABLET, FILM COATED ORAL
Qty: 90 TABLET | Refills: 0 | OUTPATIENT
Start: 2020-01-20

## 2020-01-20 RX ORDER — MONTELUKAST SODIUM 10 MG/1
TABLET ORAL
Qty: 90 TABLET | Refills: 0 | Status: SHIPPED | OUTPATIENT
Start: 2020-01-20 | End: 2020-07-23 | Stop reason: SDUPTHER

## 2020-01-20 RX ORDER — DOXAZOSIN 4 MG/1
TABLET ORAL
Qty: 90 TABLET | Refills: 0 | Status: SHIPPED | OUTPATIENT
Start: 2020-01-20 | End: 2020-07-23 | Stop reason: SDUPTHER

## 2020-01-20 NOTE — TELEPHONE ENCOUNTER
Refill approved.  Overdue for DM follow up with labs prior.  Please sched with me or Kat    Thank you,  Wagner

## 2020-02-18 DIAGNOSIS — R80.9 TYPE 2 DIABETES MELLITUS WITH MICROALBUMINURIA, WITHOUT LONG-TERM CURRENT USE OF INSULIN: Chronic | ICD-10-CM

## 2020-02-18 DIAGNOSIS — E11.29 TYPE 2 DIABETES MELLITUS WITH MICROALBUMINURIA, WITHOUT LONG-TERM CURRENT USE OF INSULIN: Chronic | ICD-10-CM

## 2020-02-18 RX ORDER — GLIMEPIRIDE 2 MG/1
2 TABLET ORAL
Qty: 90 TABLET | Refills: 3 | Status: SHIPPED | OUTPATIENT
Start: 2020-02-18 | End: 2020-09-02 | Stop reason: SDUPTHER

## 2020-02-18 NOTE — TELEPHONE ENCOUNTER
Missed her follow up.  Refill approved.  Due for DM follow up with labs prior.  Please sched with Kat    Thank you,  Wagner

## 2020-02-19 NOTE — TELEPHONE ENCOUNTER
Left message for the patient to return staff call.  Please call to schedule labs and an office visit

## 2020-02-21 DIAGNOSIS — R80.9 TYPE 2 DIABETES MELLITUS WITH MICROALBUMINURIA, WITHOUT LONG-TERM CURRENT USE OF INSULIN: Chronic | ICD-10-CM

## 2020-02-21 DIAGNOSIS — E11.29 TYPE 2 DIABETES MELLITUS WITH MICROALBUMINURIA, WITHOUT LONG-TERM CURRENT USE OF INSULIN: Chronic | ICD-10-CM

## 2020-02-22 RX ORDER — SITAGLIPTIN 100 MG/1
TABLET, FILM COATED ORAL
Qty: 90 TABLET | Refills: 3 | Status: SHIPPED | OUTPATIENT
Start: 2020-02-22 | End: 2020-11-23

## 2020-02-26 ENCOUNTER — TELEPHONE (OUTPATIENT)
Dept: FAMILY MEDICINE | Facility: CLINIC | Age: 55
End: 2020-02-26

## 2020-03-08 RX ORDER — CLOTRIMAZOLE AND BETAMETHASONE DIPROPIONATE 10; .64 MG/G; MG/G
CREAM TOPICAL 2 TIMES DAILY
Qty: 45 G | Refills: 0 | Status: SHIPPED | OUTPATIENT
Start: 2020-03-08 | End: 2020-11-27 | Stop reason: SDUPTHER

## 2020-03-13 DIAGNOSIS — E11.9 TYPE 2 DIABETES MELLITUS WITHOUT COMPLICATION: ICD-10-CM

## 2020-03-23 ENCOUNTER — PATIENT MESSAGE (OUTPATIENT)
Dept: FAMILY MEDICINE | Facility: CLINIC | Age: 55
End: 2020-03-23

## 2020-03-23 DIAGNOSIS — I10 ESSENTIAL HYPERTENSION: Chronic | ICD-10-CM

## 2020-03-24 RX ORDER — DILTIAZEM HYDROCHLORIDE EXTENDED-RELEASE TABLETS 240 MG/1
240 TABLET, EXTENDED RELEASE ORAL DAILY
Qty: 90 TABLET | Refills: 0 | Status: SHIPPED | OUTPATIENT
Start: 2020-03-24 | End: 2020-06-15

## 2020-03-24 NOTE — TELEPHONE ENCOUNTER
Refill approved.  Due for DM follow up with labs prior.  Please sched as virtual visit    Thank you,  Wagner

## 2020-03-28 ENCOUNTER — PATIENT MESSAGE (OUTPATIENT)
Dept: FAMILY MEDICINE | Facility: CLINIC | Age: 55
End: 2020-03-28

## 2020-04-13 ENCOUNTER — PATIENT MESSAGE (OUTPATIENT)
Dept: FAMILY MEDICINE | Facility: CLINIC | Age: 55
End: 2020-04-13

## 2020-04-19 ENCOUNTER — PATIENT MESSAGE (OUTPATIENT)
Dept: FAMILY MEDICINE | Facility: CLINIC | Age: 55
End: 2020-04-19

## 2020-04-19 DIAGNOSIS — Z12.11 SCREENING FOR COLON CANCER: Primary | ICD-10-CM

## 2020-05-07 DIAGNOSIS — E11.9 TYPE 2 DIABETES MELLITUS WITHOUT COMPLICATION: ICD-10-CM

## 2020-05-07 DIAGNOSIS — Z12.11 COLON CANCER SCREENING: ICD-10-CM

## 2020-06-26 ENCOUNTER — PATIENT MESSAGE (OUTPATIENT)
Dept: FAMILY MEDICINE | Facility: CLINIC | Age: 55
End: 2020-06-26

## 2020-06-26 DIAGNOSIS — Z12.11 SCREENING FOR COLON CANCER: Primary | ICD-10-CM

## 2020-07-13 ENCOUNTER — PATIENT MESSAGE (OUTPATIENT)
Dept: ADMINISTRATIVE | Facility: HOSPITAL | Age: 55
End: 2020-07-13

## 2020-07-23 DIAGNOSIS — J45.20 MILD INTERMITTENT ASTHMA WITHOUT COMPLICATION: ICD-10-CM

## 2020-07-23 DIAGNOSIS — E78.5 HYPERLIPIDEMIA, UNSPECIFIED HYPERLIPIDEMIA TYPE: Chronic | ICD-10-CM

## 2020-07-23 DIAGNOSIS — I10 ESSENTIAL HYPERTENSION: Chronic | ICD-10-CM

## 2020-07-27 RX ORDER — PRAVASTATIN SODIUM 40 MG/1
40 TABLET ORAL DAILY
Qty: 90 TABLET | Refills: 0 | Status: SHIPPED | OUTPATIENT
Start: 2020-07-27 | End: 2020-11-26

## 2020-07-27 RX ORDER — MONTELUKAST SODIUM 10 MG/1
10 TABLET ORAL DAILY
Qty: 90 TABLET | Refills: 0 | Status: SHIPPED | OUTPATIENT
Start: 2020-07-27 | End: 2020-11-27 | Stop reason: SDUPTHER

## 2020-07-27 RX ORDER — ALBUTEROL SULFATE 90 UG/1
2 AEROSOL, METERED RESPIRATORY (INHALATION) EVERY 4 HOURS PRN
Qty: 18 G | Refills: 0 | Status: SHIPPED | OUTPATIENT
Start: 2020-07-27 | End: 2022-05-16 | Stop reason: SDUPTHER

## 2020-07-27 RX ORDER — DOXAZOSIN 4 MG/1
TABLET ORAL
Qty: 90 TABLET | Refills: 0 | Status: SHIPPED | OUTPATIENT
Start: 2020-07-27 | End: 2020-12-24 | Stop reason: SDUPTHER

## 2020-07-27 NOTE — TELEPHONE ENCOUNTER
Refill approved.  Due for lab follow up with virtual visit afterwards.  Ok for me or Kat.  Please sched.    Thank you,  Wagner

## 2020-08-07 ENCOUNTER — PATIENT MESSAGE (OUTPATIENT)
Dept: FAMILY MEDICINE | Facility: CLINIC | Age: 55
End: 2020-08-07

## 2020-08-07 DIAGNOSIS — E11.29 TYPE 2 DIABETES MELLITUS WITH MICROALBUMINURIA, WITHOUT LONG-TERM CURRENT USE OF INSULIN: Chronic | ICD-10-CM

## 2020-08-07 DIAGNOSIS — R80.9 TYPE 2 DIABETES MELLITUS WITH MICROALBUMINURIA, WITHOUT LONG-TERM CURRENT USE OF INSULIN: Chronic | ICD-10-CM

## 2020-08-13 LAB — HM MAMMOGRAM: NEGATIVE

## 2020-08-21 DIAGNOSIS — E11.9 TYPE 2 DIABETES MELLITUS WITHOUT COMPLICATION, UNSPECIFIED WHETHER LONG TERM INSULIN USE: ICD-10-CM

## 2020-08-26 ENCOUNTER — LAB VISIT (OUTPATIENT)
Dept: LAB | Facility: HOSPITAL | Age: 55
End: 2020-08-26
Attending: INTERNAL MEDICINE
Payer: COMMERCIAL

## 2020-08-26 DIAGNOSIS — R80.9 TYPE 2 DIABETES MELLITUS WITH MICROALBUMINURIA, WITHOUT LONG-TERM CURRENT USE OF INSULIN: Chronic | ICD-10-CM

## 2020-08-26 DIAGNOSIS — E11.29 TYPE 2 DIABETES MELLITUS WITH MICROALBUMINURIA, WITHOUT LONG-TERM CURRENT USE OF INSULIN: Chronic | ICD-10-CM

## 2020-08-26 LAB
ALBUMIN/CREAT UR: 206.7 UG/MG (ref 0–30)
CREAT UR-MCNC: 15 MG/DL (ref 15–325)
MICROALBUMIN UR DL<=1MG/L-MCNC: 31 UG/ML

## 2020-08-26 PROCEDURE — 82043 UR ALBUMIN QUANTITATIVE: CPT

## 2020-08-28 ENCOUNTER — PATIENT MESSAGE (OUTPATIENT)
Dept: FAMILY MEDICINE | Facility: CLINIC | Age: 55
End: 2020-08-28

## 2020-09-02 ENCOUNTER — OFFICE VISIT (OUTPATIENT)
Dept: FAMILY MEDICINE | Facility: CLINIC | Age: 55
End: 2020-09-02
Payer: COMMERCIAL

## 2020-09-02 VITALS
HEIGHT: 63 IN | HEART RATE: 99 BPM | DIASTOLIC BLOOD PRESSURE: 86 MMHG | SYSTOLIC BLOOD PRESSURE: 138 MMHG | WEIGHT: 205.56 LBS | TEMPERATURE: 99 F | OXYGEN SATURATION: 96 % | BODY MASS INDEX: 36.42 KG/M2

## 2020-09-02 DIAGNOSIS — J45.20 MILD INTERMITTENT ASTHMA WITHOUT COMPLICATION: Chronic | ICD-10-CM

## 2020-09-02 DIAGNOSIS — E11.29 TYPE 2 DIABETES MELLITUS WITH MICROALBUMINURIA, WITHOUT LONG-TERM CURRENT USE OF INSULIN: Primary | Chronic | ICD-10-CM

## 2020-09-02 DIAGNOSIS — R80.9 TYPE 2 DIABETES MELLITUS WITH MICROALBUMINURIA, WITHOUT LONG-TERM CURRENT USE OF INSULIN: Primary | Chronic | ICD-10-CM

## 2020-09-02 DIAGNOSIS — Z23 NEEDS FLU SHOT: ICD-10-CM

## 2020-09-02 DIAGNOSIS — E78.5 HYPERLIPIDEMIA, UNSPECIFIED HYPERLIPIDEMIA TYPE: Chronic | ICD-10-CM

## 2020-09-02 DIAGNOSIS — I10 ESSENTIAL HYPERTENSION: Chronic | ICD-10-CM

## 2020-09-02 PROCEDURE — 90686 IIV4 VACC NO PRSV 0.5 ML IM: CPT | Mod: S$GLB,,, | Performed by: NURSE PRACTITIONER

## 2020-09-02 PROCEDURE — 3008F PR BODY MASS INDEX (BMI) DOCUMENTED: ICD-10-PCS | Mod: CPTII,S$GLB,, | Performed by: NURSE PRACTITIONER

## 2020-09-02 PROCEDURE — 90471 IMMUNIZATION ADMIN: CPT | Mod: S$GLB,,, | Performed by: NURSE PRACTITIONER

## 2020-09-02 PROCEDURE — 3046F HEMOGLOBIN A1C LEVEL >9.0%: CPT | Mod: CPTII,S$GLB,, | Performed by: NURSE PRACTITIONER

## 2020-09-02 PROCEDURE — 99214 PR OFFICE/OUTPT VISIT, EST, LEVL IV, 30-39 MIN: ICD-10-PCS | Mod: 25,S$GLB,, | Performed by: NURSE PRACTITIONER

## 2020-09-02 PROCEDURE — 99999 PR PBB SHADOW E&M-EST. PATIENT-LVL IV: ICD-10-PCS | Mod: PBBFAC,,, | Performed by: NURSE PRACTITIONER

## 2020-09-02 PROCEDURE — 3075F PR MOST RECENT SYSTOLIC BLOOD PRESS GE 130-139MM HG: ICD-10-PCS | Mod: CPTII,S$GLB,, | Performed by: NURSE PRACTITIONER

## 2020-09-02 PROCEDURE — 3079F DIAST BP 80-89 MM HG: CPT | Mod: CPTII,S$GLB,, | Performed by: NURSE PRACTITIONER

## 2020-09-02 PROCEDURE — 3046F PR MOST RECENT HEMOGLOBIN A1C LEVEL > 9.0%: ICD-10-PCS | Mod: CPTII,S$GLB,, | Performed by: NURSE PRACTITIONER

## 2020-09-02 PROCEDURE — 3008F BODY MASS INDEX DOCD: CPT | Mod: CPTII,S$GLB,, | Performed by: NURSE PRACTITIONER

## 2020-09-02 PROCEDURE — 3075F SYST BP GE 130 - 139MM HG: CPT | Mod: CPTII,S$GLB,, | Performed by: NURSE PRACTITIONER

## 2020-09-02 PROCEDURE — 99999 PR PBB SHADOW E&M-EST. PATIENT-LVL IV: CPT | Mod: PBBFAC,,, | Performed by: NURSE PRACTITIONER

## 2020-09-02 PROCEDURE — 90471 FLU VACCINE (QUAD) GREATER THAN OR EQUAL TO 3YO PRESERVATIVE FREE IM: ICD-10-PCS | Mod: S$GLB,,, | Performed by: NURSE PRACTITIONER

## 2020-09-02 PROCEDURE — 3079F PR MOST RECENT DIASTOLIC BLOOD PRESSURE 80-89 MM HG: ICD-10-PCS | Mod: CPTII,S$GLB,, | Performed by: NURSE PRACTITIONER

## 2020-09-02 PROCEDURE — 90686 FLU VACCINE (QUAD) GREATER THAN OR EQUAL TO 3YO PRESERVATIVE FREE IM: ICD-10-PCS | Mod: S$GLB,,, | Performed by: NURSE PRACTITIONER

## 2020-09-02 PROCEDURE — 99214 OFFICE O/P EST MOD 30 MIN: CPT | Mod: 25,S$GLB,, | Performed by: NURSE PRACTITIONER

## 2020-09-02 RX ORDER — GLIMEPIRIDE 4 MG/1
4 TABLET ORAL
Qty: 90 TABLET | Refills: 3 | Status: SHIPPED | OUTPATIENT
Start: 2020-09-02 | End: 2021-08-13

## 2020-09-02 NOTE — PROGRESS NOTES
Pt tolerated injection well, pt instructed to wait inside this facility for 15 min prior to departing to monitor for allergic/adverse reaction. Pt verbalized understanding.

## 2020-09-02 NOTE — PROGRESS NOTES
History of Present Illness   Shanae Kumari is a 54 y.o. woman with medical history as listed below who presents today for routine follow-up, T2DM, HTN, and HLD. She had labs prior to our visit with worsening hemoglobin A1c from 10.2 to 11.3 and microalbumin/creatinine ratio 206.7, hyponatremia 129, and otherwise stable CMP, CBC, and lipid panel. She reports she is taking medications as prescribed, taking Glimepiride and Januvia without perceived SE. She never received the Trulicity. She cannot take Metformin secondary to persistent metabolic acidosis. She does not monitor her glucose at home denies symptoms of hypoglycemia. Diet recall: not monitoring her diet. Complaints today: None. She is requesting a letter for work accommodations given her uncontrolled T2DM and risks associated with COVID-19. She has no additional complaints and is otherwise healthy on today's visit.     Past Medical History:   Diagnosis Date    Asthma     BRCA negative     Diabetes mellitus     Hypertension        Past Surgical History:   Procedure Laterality Date    KNEE ARTHROSCOPY      knee surgery         Social History     Socioeconomic History    Marital status: Single     Spouse name: Not on file    Number of children: Not on file    Years of education: Not on file    Highest education level: Not on file   Occupational History    Not on file   Social Needs    Financial resource strain: Not on file    Food insecurity     Worry: Not on file     Inability: Not on file    Transportation needs     Medical: Not on file     Non-medical: Not on file   Tobacco Use    Smoking status: Never Smoker    Smokeless tobacco: Never Used   Substance and Sexual Activity    Alcohol use: No     Frequency: Never     Drinks per session: Patient refused     Binge frequency: Never    Drug use: No    Sexual activity: Not Currently   Lifestyle    Physical activity     Days per week: Not on file     Minutes per session: Not on file     "Stress: Not on file   Relationships    Social connections     Talks on phone: Not on file     Gets together: Not on file     Attends [employer] service: Not on file     Active member of club or organization: Not on file     Attends meetings of clubs or organizations: Not on file     Relationship status: Not on file   Other Topics Concern    Not on file   Social History Narrative    Not on file       Family History   Problem Relation Age of Onset    Breast cancer Other     Diabetes Mother     COPD Mother     Cancer Mother         lung    Diabetes Father     Heart disease Father     Hypertension Father     Hyperlipidemia Father     Kidney disease Father     Cancer Sister     Diabetes Brother     Hypertension Brother     Kidney disease Brother         diabetic    Diabetes Maternal Aunt     Diabetes Maternal Uncle     Diabetes Maternal Grandmother     Colon cancer Neg Hx     Ovarian cancer Neg Hx     Stroke Neg Hx        Review of Systems  Review of Systems   Constitutional: Negative for malaise/fatigue and weight loss.   Eyes: Negative for blurred vision and double vision.   Respiratory: Negative for shortness of breath and wheezing.    Cardiovascular: Negative for chest pain, palpitations, orthopnea and leg swelling.   Gastrointestinal: Negative for blood in stool and melena.   Genitourinary: Negative for flank pain and hematuria.   Musculoskeletal: Positive for joint pain (bilateral feet).   Neurological: Negative for dizziness, loss of consciousness and headaches.   Endo/Heme/Allergies: Negative for polydipsia.     A complete review of systems was otherwise negative.    Physical Exam  /86   Pulse 99   Temp 98.8 °F (37.1 °C) (Temporal)   Ht 5' 3" (1.6 m)   Wt 93.2 kg (205 lb 9.3 oz)   SpO2 96%   BMI 36.42 kg/m²   General appearance: alert, appears stated age, cooperative and no distress  Eyes: negative findings: lids and lashes normal, conjunctivae and sclerae normal and pupils equal, " round, reactive to light and accomodation  Lungs: clear to auscultation bilaterally  Heart: regular rate and rhythm, S1, S2 normal, no murmur, click, rub or gallop  Extremities: extremities normal, atraumatic, no cyanosis or edema  Pulses: 2+ and symmetric  Skin: Skin color, texture, turgor normal. No rashes or lesions  Neurologic: Grossly normal    Assessment/Plan  Type 2 diabetes mellitus with microalbuminuria, without long-term current use of insulin  Increase Glimepiride.  I will resend the RX for Trulicity with plan to double dose after one month.  Continue Januvia until she receives the Trulicity and stop the day before her first Trulicity dose.  Repeat labs in 3 months with follow-up.  If glycemic control still poor, add Lantus and referral to endocrinology.  She is aware that she is due to schedule follow-up with optometry and podiatry as well.  -     glimepiride (AMARYL) 4 MG tablet; Take 1 tablet (4 mg total) by mouth before breakfast.  Dispense: 90 tablet; Refill: 3  -     dulaglutide (TRULICITY) 0.75 mg/0.5 mL pen injector; Inject 0.75 mg into the skin every 7 days.  Dispense: 2 mL; Refill: 3  -     Hemoglobin A1C; Future; Expected date: 12/02/2020  -     Basic metabolic panel; Future; Expected date: 12/02/2020    Essential hypertension  The current medical regimen is effective;  continue present plan and medications.    Hyperlipidemia, unspecified hyperlipidemia type  The current medical regimen is effective;  continue present plan and medications.    Mild intermittent asthma without complication  The current medical regimen is effective;  continue present plan and medications.    Needs flu shot  Given today.  -     Influenza - Quadrivalent (PF)    Patient has verbalized understanding and is in agreement with plan of care.    Follow up in about 3 months (around 12/2/2020) for diabetes follow-up with labs prior, shingrix at next appointment.

## 2020-09-05 DIAGNOSIS — I10 ESSENTIAL HYPERTENSION: Chronic | ICD-10-CM

## 2020-09-07 RX ORDER — DILTIAZEM HYDROCHLORIDE 240 MG/1
TABLET, EXTENDED RELEASE ORAL
Qty: 90 TABLET | Refills: 1 | Status: SHIPPED | OUTPATIENT
Start: 2020-09-07 | End: 2020-12-24 | Stop reason: SDUPTHER

## 2020-09-08 ENCOUNTER — TELEPHONE (OUTPATIENT)
Dept: FAMILY MEDICINE | Facility: CLINIC | Age: 55
End: 2020-09-08

## 2020-09-08 NOTE — TELEPHONE ENCOUNTER
Please contact the patient and check on the Trulicity- was she able to get it filled?    Thanks!  CLARITA Ascencio

## 2020-10-05 ENCOUNTER — PATIENT MESSAGE (OUTPATIENT)
Dept: FAMILY MEDICINE | Facility: CLINIC | Age: 55
End: 2020-10-05

## 2020-10-31 ENCOUNTER — PATIENT MESSAGE (OUTPATIENT)
Dept: FAMILY MEDICINE | Facility: CLINIC | Age: 55
End: 2020-10-31

## 2020-10-31 DIAGNOSIS — E11.29 TYPE 2 DIABETES MELLITUS WITH MICROALBUMINURIA, WITHOUT LONG-TERM CURRENT USE OF INSULIN: Chronic | ICD-10-CM

## 2020-10-31 DIAGNOSIS — R80.9 TYPE 2 DIABETES MELLITUS WITH MICROALBUMINURIA, WITHOUT LONG-TERM CURRENT USE OF INSULIN: Chronic | ICD-10-CM

## 2020-11-02 ENCOUNTER — PATIENT MESSAGE (OUTPATIENT)
Dept: FAMILY MEDICINE | Facility: CLINIC | Age: 55
End: 2020-11-02

## 2020-11-22 ENCOUNTER — PATIENT MESSAGE (OUTPATIENT)
Dept: FAMILY MEDICINE | Facility: CLINIC | Age: 55
End: 2020-11-22

## 2020-11-22 DIAGNOSIS — R80.9 TYPE 2 DIABETES MELLITUS WITH MICROALBUMINURIA, WITHOUT LONG-TERM CURRENT USE OF INSULIN: Chronic | ICD-10-CM

## 2020-11-22 DIAGNOSIS — E11.29 TYPE 2 DIABETES MELLITUS WITH MICROALBUMINURIA, WITHOUT LONG-TERM CURRENT USE OF INSULIN: Chronic | ICD-10-CM

## 2020-11-23 ENCOUNTER — HOSPITAL ENCOUNTER (OUTPATIENT)
Dept: RADIOLOGY | Facility: HOSPITAL | Age: 55
Discharge: HOME | End: 2020-11-23
Attending: ORTHOPAEDIC SURGERY
Payer: COMMERCIAL

## 2020-11-23 ENCOUNTER — OFFICE VISIT (OUTPATIENT)
Dept: SPORTS MEDICINE | Facility: CLINIC | Age: 55
End: 2020-11-23
Payer: COMMERCIAL

## 2020-11-23 VITALS
DIASTOLIC BLOOD PRESSURE: 86 MMHG | SYSTOLIC BLOOD PRESSURE: 149 MMHG | HEART RATE: 82 BPM | WEIGHT: 205 LBS | HEIGHT: 63 IN | BODY MASS INDEX: 36.32 KG/M2

## 2020-11-23 DIAGNOSIS — M25.551 RIGHT HIP PAIN: Primary | ICD-10-CM

## 2020-11-23 DIAGNOSIS — M25.551 RIGHT HIP PAIN: ICD-10-CM

## 2020-11-23 DIAGNOSIS — M76.891 TENDINITIS OF RIGHT HIP FLEXOR: ICD-10-CM

## 2020-11-23 DIAGNOSIS — M76.11 PSOAS TENDONITIS OF RIGHT SIDE: ICD-10-CM

## 2020-11-23 PROCEDURE — 1125F PR PAIN SEVERITY QUANTIFIED, PAIN PRESENT: ICD-10-PCS | Mod: S$GLB,,, | Performed by: ORTHOPAEDIC SURGERY

## 2020-11-23 PROCEDURE — 99203 OFFICE O/P NEW LOW 30 MIN: CPT | Mod: S$GLB,,, | Performed by: ORTHOPAEDIC SURGERY

## 2020-11-23 PROCEDURE — 1125F AMNT PAIN NOTED PAIN PRSNT: CPT | Mod: S$GLB,,, | Performed by: ORTHOPAEDIC SURGERY

## 2020-11-23 PROCEDURE — 99999 PR PBB SHADOW E&M-EST. PATIENT-LVL III: ICD-10-PCS | Mod: PBBFAC,,, | Performed by: ORTHOPAEDIC SURGERY

## 2020-11-23 PROCEDURE — 3008F BODY MASS INDEX DOCD: CPT | Mod: CPTII,S$GLB,, | Performed by: ORTHOPAEDIC SURGERY

## 2020-11-23 PROCEDURE — 3079F DIAST BP 80-89 MM HG: CPT | Mod: CPTII,S$GLB,, | Performed by: ORTHOPAEDIC SURGERY

## 2020-11-23 PROCEDURE — 73503 X-RAY EXAM HIP UNI 4/> VIEWS: CPT | Mod: TC,RT

## 2020-11-23 PROCEDURE — 73503 X-RAY EXAM HIP UNI 4/> VIEWS: CPT | Mod: 26,RT,, | Performed by: RADIOLOGY

## 2020-11-23 PROCEDURE — 3077F SYST BP >= 140 MM HG: CPT | Mod: CPTII,S$GLB,, | Performed by: ORTHOPAEDIC SURGERY

## 2020-11-23 PROCEDURE — 3079F PR MOST RECENT DIASTOLIC BLOOD PRESSURE 80-89 MM HG: ICD-10-PCS | Mod: CPTII,S$GLB,, | Performed by: ORTHOPAEDIC SURGERY

## 2020-11-23 PROCEDURE — 73503 XR HIP 4 OR MORE VIEWS RIGHT: ICD-10-PCS | Mod: 26,RT,, | Performed by: RADIOLOGY

## 2020-11-23 PROCEDURE — 3008F PR BODY MASS INDEX (BMI) DOCUMENTED: ICD-10-PCS | Mod: CPTII,S$GLB,, | Performed by: ORTHOPAEDIC SURGERY

## 2020-11-23 PROCEDURE — 99203 PR OFFICE/OUTPT VISIT, NEW, LEVL III, 30-44 MIN: ICD-10-PCS | Mod: S$GLB,,, | Performed by: ORTHOPAEDIC SURGERY

## 2020-11-23 PROCEDURE — 99999 PR PBB SHADOW E&M-EST. PATIENT-LVL III: CPT | Mod: PBBFAC,,, | Performed by: ORTHOPAEDIC SURGERY

## 2020-11-23 PROCEDURE — 3077F PR MOST RECENT SYSTOLIC BLOOD PRESSURE >= 140 MM HG: ICD-10-PCS | Mod: CPTII,S$GLB,, | Performed by: ORTHOPAEDIC SURGERY

## 2020-11-23 NOTE — PROGRESS NOTES
CC: right hip pain, patient works for the public school system (involves walking, standing, and driving)    HPI:   Shanae Kumari is a pleasant 55 y.o. patient who reports to clinic with right hip pain. No trauma[facility]    Patient notes feeling the same symptoms as last time (2016)  She notes that she has been doing well since then but the pain flared up    Patient notes that her pain has been present for the past 10 days to 2 weeks   She denies any new injury or change in her activity  She notes snapping in her hip that was present last time her hip was bothering her in 2016    She is taking Mobic for other issues, she is unsure if this is helping her hip pain     Affecting ADLs and exercising    Prolonged walking activity worst followed by prolonged driving     Patient was previously treated with physical therapy, hip joint injection with Dr. Butler      Patient notes 3 year history of left knee ACL tear treated conservatively     Review of Systems   Constitution: Negative. Negative for chills, fever and night sweats.   HENT: Negative for congestion and headaches.    Eyes: Negative for blurred vision, left vision loss and right vision loss.   Cardiovascular: Negative for chest pain and syncope.   Respiratory: Negative for cough and shortness of breath.    Endocrine: Negative for polydipsia, polyphagia and polyuria.   Hematologic/Lymphatic: Negative for bleeding problem. Does not bruise/bleed easily.   Skin: Negative for dry skin, itching and rash.   Musculoskeletal: Negative for falls and muscle weakness.   Gastrointestinal: Negative for abdominal pain and bowel incontinence.   Genitourinary: Negative for bladder incontinence and nocturia.   Neurological: Negative for disturbances in coordination, loss of balance and seizures.   Psychiatric/Behavioral: Negative for depression. The patient does not have insomnia.    Allergic/Immunologic: Negative for hives and persistent infections.     PAST  MEDICAL HISTORY:   Past Medical History:   Diagnosis Date    Asthma     BRCA negative     Diabetes mellitus     Hypertension      PAST SURGICAL HISTORY:   Past Surgical History:   Procedure Laterality Date    KNEE ARTHROSCOPY      knee surgery       FAMILY HISTORY:   Family History   Problem Relation Age of Onset    Breast cancer Other     Diabetes Mother     COPD Mother     Cancer Mother         lung    Diabetes Father     Heart disease Father     Hypertension Father     Hyperlipidemia Father     Kidney disease Father     Cancer Sister     Diabetes Brother     Hypertension Brother     Kidney disease Brother         diabetic    Diabetes Maternal Aunt     Diabetes Maternal Uncle     Diabetes Maternal Grandmother     Colon cancer Neg Hx     Ovarian cancer Neg Hx     Stroke Neg Hx      SOCIAL HISTORY:   Social History     Socioeconomic History    Marital status: Single     Spouse name: Not on file    Number of children: Not on file    Years of education: Not on file    Highest education level: Not on file   Occupational History    Not on file   Social Needs    Financial resource strain: Not on file    Food insecurity     Worry: Not on file     Inability: Not on file    Transportation needs     Medical: Not on file     Non-medical: Not on file   Tobacco Use    Smoking status: Never Smoker    Smokeless tobacco: Never Used   Substance and Sexual Activity    Alcohol use: No     Frequency: Never     Drinks per session: Patient refused     Binge frequency: Never    Drug use: No    Sexual activity: Not Currently   Lifestyle    Physical activity     Days per week: Not on file     Minutes per session: Not on file    Stress: Not on file   Relationships    Social connections     Talks on phone: Not on file     Gets together: Not on file     Attends Protestant service: Not on file     Active member of club or organization: Not on file     Attends meetings of clubs or organizations: Not on  "file     Relationship status: Not on file   Other Topics Concern    Not on file   Social History Narrative    Not on file       MEDICATIONS:   Current Outpatient Medications:     albuterol (PROAIR HFA) 90 mcg/actuation inhaler, Inhale 2 puffs into the lungs every 4 (four) hours as needed for Wheezing., Disp: 18 g, Rfl: 0    clotrimazole-betamethasone 1-0.05% (LOTRISONE) cream, Apply topically 2 (two) times daily. Apply to foot, Disp: 45 g, Rfl: 0    doxazosin (CARDURA) 4 MG tablet, TAKE 1 TABLET BY MOUTH ONCE NIGHTLY, Disp: 90 tablet, Rfl: 0    dulaglutide (TRULICITY) 1.5 mg/0.5 mL pen injector, Inject 1.5 mg into the skin every 7 days., Disp: 4 pen, Rfl: 2    fluticasone propionate (FLONASE) 50 mcg/actuation nasal spray, 1 spray (50 mcg total) by Each Nostril route once daily., Disp: 3 Bottle, Rfl: 3    glimepiride (AMARYL) 4 MG tablet, Take 1 tablet (4 mg total) by mouth before breakfast., Disp: 90 tablet, Rfl: 3    MATZIM  mg 24 hr tablet, TAKE 1 TABLET (240 MG TOTAL) BY MOUTH ONCE DAILY., Disp: 90 tablet, Rfl: 1    medroxyPROGESTERone (PROVERA) 10 MG tablet, Take 1 tablet (10 mg total) by mouth once daily., Disp: 10 tablet, Rfl: 12    montelukast (SINGULAIR) 10 mg tablet, Take 1 tablet (10 mg total) by mouth once daily., Disp: 90 tablet, Rfl: 0    pravastatin (PRAVACHOL) 40 MG tablet, Take 1 tablet (40 mg total) by mouth once daily., Disp: 90 tablet, Rfl: 0  ALLERGIES:   Review of patient's allergies indicates:   Allergen Reactions    Allergenic extracts Hives, Itching, Other (See Comments), Rash and Shortness Of Breath    Dog dander Hives, Shortness Of Breath, Itching, Swelling and Rash     Cat's dander, dust,  pollen    Losartan Other (See Comments)     angioedema    Mold Itching and Shortness Of Breath       VITAL SIGNS: BP (!) 149/86   Pulse 82   Ht 5' 3" (1.6 m)   Wt 93 kg (205 lb)   BMI 36.31 kg/m²        PHYSICAL EXAM /  HIP  PHYSICAL EXAMINATION  General:  The patient is alert " and oriented x 3.  Mood is pleasant.  Observation of ears, eyes and nose reveal no gross abnormalities.  HEENT: NCAT, sclera nonicteric  Lungs: Respirations are equal and unlabored..    right HIP EXAMINATION     OBSERVATION / INSPECTION  Gait:   Nonantalgic   Alignment:  Neutral   Scars:   None   Muscle atrophy: None   Effusion:  None   Warmth:  None   Discoloration:   None   Leg lengths:   Equal   Pelvis:   Level     TENDERNESS / CREPITUS (T/C):      T / C  Trochanteric bursa   - / -  Piriformis    - / -  SI joint    - / -  Psoas tendon   - / -  Rectus insertion  - / -  Adductor insertion  - / -  Pubic symphysis  - / -    ROM: (* = pain)    Flexion:    125 degrees  External rotation: 75 degrees  Internal rotation with axial load: 40 degrees  Internal rotation without axial load: 30 degrees  Abduction:  90 degrees  Adduction:   25 degrees    SPECIAL TESTS:  Pain w/ forced internal rotation (FADIR): -   Pain w/ forced external rotation (LYNN): Absent   Circumduction test:    + for pain and snapping  Stinchfield test:    Negative   Log roll:      Negative   Snapping hip (internal):   Negative   Sit-up pain:     Negative   Resisted sit-up pain:    Negative   Resisted sit-up with adductor contraction pain:  Negative   Step-down test:    NT  Trendelenburg test:    Negative  Bridge test     +     EXTREMITY NEURO-VASCULAR EXAMINATION:   Sensation:  Grossly intact to light touch all dermatomal regions.   Motor Function:  Fully intact motor function at hip, knee, foot and ankle    DTRs;  quadriceps and  achilles 2+.  No clonus and downgoing Babinski.    Vascular status:  DP and PT pulses 2+, brisk capillary refill, symmetric.    Skin:  intact, compartments soft.    OTHER FINDINGS:  Pain with resisted hip flexion (knee straight and bent)    XRAYS:  Mild osteoarthritis with no acute process seen.    ASSESSMENT:    1. right hip abd/core weakness    PLAN:  1. PT for right hip abd/core strengthing  2. NSAIDS prn  3. All  questions were answered, pt will contact us for questions or concerns in the interim.

## 2020-11-27 DIAGNOSIS — J45.20 MILD INTERMITTENT ASTHMA WITHOUT COMPLICATION: ICD-10-CM

## 2020-11-27 RX ORDER — CLOTRIMAZOLE AND BETAMETHASONE DIPROPIONATE 10; .64 MG/G; MG/G
CREAM TOPICAL 2 TIMES DAILY
Qty: 45 G | Refills: 0 | Status: SHIPPED | OUTPATIENT
Start: 2020-11-27 | End: 2021-11-18

## 2020-11-27 RX ORDER — MONTELUKAST SODIUM 10 MG/1
10 TABLET ORAL DAILY
Qty: 90 TABLET | Refills: 1 | Status: SHIPPED | OUTPATIENT
Start: 2020-11-27 | End: 2022-01-12

## 2020-12-16 ENCOUNTER — PATIENT MESSAGE (OUTPATIENT)
Dept: FAMILY MEDICINE | Facility: CLINIC | Age: 55
End: 2020-12-16

## 2020-12-17 ENCOUNTER — OFFICE VISIT (OUTPATIENT)
Dept: FAMILY MEDICINE | Facility: CLINIC | Age: 55
End: 2020-12-17
Payer: COMMERCIAL

## 2020-12-17 ENCOUNTER — PATIENT MESSAGE (OUTPATIENT)
Dept: FAMILY MEDICINE | Facility: CLINIC | Age: 55
End: 2020-12-17

## 2020-12-17 ENCOUNTER — TELEPHONE (OUTPATIENT)
Dept: FAMILY MEDICINE | Facility: CLINIC | Age: 55
End: 2020-12-17

## 2020-12-17 ENCOUNTER — LAB VISIT (OUTPATIENT)
Dept: LAB | Facility: HOSPITAL | Age: 55
End: 2020-12-17
Payer: COMMERCIAL

## 2020-12-17 VITALS
TEMPERATURE: 98 F | HEART RATE: 104 BPM | SYSTOLIC BLOOD PRESSURE: 132 MMHG | DIASTOLIC BLOOD PRESSURE: 86 MMHG | HEIGHT: 63 IN | WEIGHT: 198.5 LBS | BODY MASS INDEX: 35.17 KG/M2 | OXYGEN SATURATION: 97 %

## 2020-12-17 DIAGNOSIS — F43.22 ADJUSTMENT DISORDER WITH ANXIOUS MOOD: Primary | ICD-10-CM

## 2020-12-17 DIAGNOSIS — I10 ESSENTIAL HYPERTENSION: Chronic | ICD-10-CM

## 2020-12-17 DIAGNOSIS — E78.5 HYPERLIPIDEMIA, UNSPECIFIED HYPERLIPIDEMIA TYPE: Chronic | ICD-10-CM

## 2020-12-17 DIAGNOSIS — E11.29 TYPE 2 DIABETES MELLITUS WITH MICROALBUMINURIA, WITHOUT LONG-TERM CURRENT USE OF INSULIN: Chronic | ICD-10-CM

## 2020-12-17 DIAGNOSIS — R80.9 TYPE 2 DIABETES MELLITUS WITH MICROALBUMINURIA, WITHOUT LONG-TERM CURRENT USE OF INSULIN: Chronic | ICD-10-CM

## 2020-12-17 LAB
AMYLASE SERPL-CCNC: 74 U/L (ref 20–110)
ANION GAP SERPL CALC-SCNC: 10 MMOL/L (ref 8–16)
BUN SERPL-MCNC: 20 MG/DL (ref 6–20)
CALCIUM SERPL-MCNC: 9.8 MG/DL (ref 8.7–10.5)
CHLORIDE SERPL-SCNC: 103 MMOL/L (ref 95–110)
CO2 SERPL-SCNC: 25 MMOL/L (ref 23–29)
CREAT SERPL-MCNC: 0.9 MG/DL (ref 0.5–1.4)
EST. GFR  (AFRICAN AMERICAN): >60 ML/MIN/1.73 M^2
EST. GFR  (NON AFRICAN AMERICAN): >60 ML/MIN/1.73 M^2
ESTIMATED AVG GLUCOSE: 223 MG/DL (ref 68–131)
GLUCOSE SERPL-MCNC: 318 MG/DL (ref 70–110)
HBA1C MFR BLD HPLC: 9.4 % (ref 4–5.6)
LIPASE SERPL-CCNC: 59 U/L (ref 4–60)
POTASSIUM SERPL-SCNC: 4.2 MMOL/L (ref 3.5–5.1)
SODIUM SERPL-SCNC: 138 MMOL/L (ref 136–145)

## 2020-12-17 PROCEDURE — 83036 HEMOGLOBIN GLYCOSYLATED A1C: CPT

## 2020-12-17 PROCEDURE — 3079F DIAST BP 80-89 MM HG: CPT | Mod: CPTII,S$GLB,, | Performed by: NURSE PRACTITIONER

## 2020-12-17 PROCEDURE — 82150 ASSAY OF AMYLASE: CPT

## 2020-12-17 PROCEDURE — 1126F AMNT PAIN NOTED NONE PRSNT: CPT | Mod: S$GLB,,, | Performed by: NURSE PRACTITIONER

## 2020-12-17 PROCEDURE — 80048 BASIC METABOLIC PNL TOTAL CA: CPT

## 2020-12-17 PROCEDURE — 3075F PR MOST RECENT SYSTOLIC BLOOD PRESS GE 130-139MM HG: ICD-10-PCS | Mod: CPTII,S$GLB,, | Performed by: NURSE PRACTITIONER

## 2020-12-17 PROCEDURE — 3008F PR BODY MASS INDEX (BMI) DOCUMENTED: ICD-10-PCS | Mod: CPTII,S$GLB,, | Performed by: NURSE PRACTITIONER

## 2020-12-17 PROCEDURE — 3046F PR MOST RECENT HEMOGLOBIN A1C LEVEL > 9.0%: ICD-10-PCS | Mod: CPTII,S$GLB,, | Performed by: NURSE PRACTITIONER

## 2020-12-17 PROCEDURE — 99214 OFFICE O/P EST MOD 30 MIN: CPT | Mod: S$GLB,,, | Performed by: NURSE PRACTITIONER

## 2020-12-17 PROCEDURE — 99999 PR PBB SHADOW E&M-EST. PATIENT-LVL IV: CPT | Mod: PBBFAC,,, | Performed by: NURSE PRACTITIONER

## 2020-12-17 PROCEDURE — 3079F PR MOST RECENT DIASTOLIC BLOOD PRESSURE 80-89 MM HG: ICD-10-PCS | Mod: CPTII,S$GLB,, | Performed by: NURSE PRACTITIONER

## 2020-12-17 PROCEDURE — 3008F BODY MASS INDEX DOCD: CPT | Mod: CPTII,S$GLB,, | Performed by: NURSE PRACTITIONER

## 2020-12-17 PROCEDURE — 3075F SYST BP GE 130 - 139MM HG: CPT | Mod: CPTII,S$GLB,, | Performed by: NURSE PRACTITIONER

## 2020-12-17 PROCEDURE — 3046F HEMOGLOBIN A1C LEVEL >9.0%: CPT | Mod: CPTII,S$GLB,, | Performed by: NURSE PRACTITIONER

## 2020-12-17 PROCEDURE — 1126F PR PAIN SEVERITY QUANTIFIED, NO PAIN PRESENT: ICD-10-PCS | Mod: S$GLB,,, | Performed by: NURSE PRACTITIONER

## 2020-12-17 PROCEDURE — 83690 ASSAY OF LIPASE: CPT

## 2020-12-17 PROCEDURE — 36415 COLL VENOUS BLD VENIPUNCTURE: CPT | Mod: PO

## 2020-12-17 PROCEDURE — 99214 PR OFFICE/OUTPT VISIT, EST, LEVL IV, 30-39 MIN: ICD-10-PCS | Mod: S$GLB,,, | Performed by: NURSE PRACTITIONER

## 2020-12-17 PROCEDURE — 99999 PR PBB SHADOW E&M-EST. PATIENT-LVL IV: ICD-10-PCS | Mod: PBBFAC,,, | Performed by: NURSE PRACTITIONER

## 2020-12-17 RX ORDER — SERTRALINE HYDROCHLORIDE 25 MG/1
25 TABLET, FILM COATED ORAL DAILY
Qty: 30 TABLET | Refills: 2 | Status: SHIPPED | OUTPATIENT
Start: 2020-12-17 | End: 2021-02-18

## 2020-12-17 NOTE — TELEPHONE ENCOUNTER
----- Message from Jessica Dominguez sent at 12/17/2020  2:29 PM CST -----  Regarding: Return Call  Contact: Patient  Type:  Patient Returning Call    Who Called: Patient    Who Left Message for Patient: Lopez    Does the patient know what this is regarding?: Yes     Would the patient rather a call back or a response via [facility]?  Call    Best Call Back Number: [phone] (home)     Additional Information:

## 2020-12-17 NOTE — PROGRESS NOTES
History of Present Illness   Shanae Kumari is a 55 y.o. woman with medical history as listed below who presents today for evaluation of work related anxiety and stress. 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 She reports her anxiety has increased significantly with the increase in cases among her students and staff at the schools. She reports concern given her chronic medical conditions and risk of severe COVID-19 symptoms. She reports panic symptoms before going in to the schools. She is feeling on edge and irritable as well. She would like to discuss medication management for anxiety.     Of note, at the end of the visit, she reports that she believes she is having side effects from the Trulicity. After each dose, she has one day of nausea, vomiting, and LUQ abdominal pain. This resolves within 24 hours.     She has no additional complaints and is otherwise healthy on today's visit.    Past Medical History:   Diagnosis Date    Asthma     BRCA negative     Diabetes mellitus     Hypertension        Past Surgical History:   Procedure Laterality Date    KNEE ARTHROSCOPY      knee surgery         Social History     Socioeconomic History    Marital status: Unknown     Spouse name: Not on file    Number of children: Not on file    Years of education: Not on file    Highest education level: Not on file   Occupational History    Not on file   Social Needs    Financial resource strain: Not very hard    Food insecurity     Worry: Never true     Inability: Never true    Transportation needs     Medical: No     Non-medical: No   Tobacco Use    Smoking status: Never Smoker    Smokeless tobacco: Never Used   Substance and Sexual Activity    Alcohol use: No     Frequency: Never     Drinks per session: Patient refused     Binge frequency: Never    Drug use: No    Sexual activity: Not Currently  "  Lifestyle    Physical activity     Days per week: 0 days     Minutes per session: 0 min    Stress: Rather much   Relationships    Social connections     Talks on phone: More than three times a week     Gets together: Never     Attends Hoahaoism service: Not on file     Active member of club or organization: Yes     Attends meetings of clubs or organizations: Never     Relationship status: Patient refused   Other Topics Concern    Not on file   Social History Narrative    Not on file       Family History   Problem Relation Age of Onset    Breast cancer Other     Diabetes Mother     COPD Mother     Cancer Mother         lung    Diabetes Father     Heart disease Father     Hypertension Father     Hyperlipidemia Father     Kidney disease Father     Cancer Sister     Diabetes Brother     Hypertension Brother     Kidney disease Brother         diabetic    Diabetes Maternal Aunt     Diabetes Maternal Uncle     Diabetes Maternal Grandmother     Colon cancer Neg Hx     Ovarian cancer Neg Hx     Stroke Neg Hx        Review of Systems  Review of Systems   Constitutional: Negative for malaise/fatigue and weight loss.   Respiratory: Negative for shortness of breath and wheezing.    Cardiovascular: Negative for chest pain and palpitations.   Gastrointestinal: Positive for abdominal pain, nausea and vomiting. Negative for blood in stool, constipation and diarrhea.   Genitourinary: Negative for dysuria and hematuria.   Neurological: Negative for dizziness, loss of consciousness and headaches.   Endo/Heme/Allergies: Negative for polydipsia.   Psychiatric/Behavioral: Negative for depression and suicidal ideas. The patient is nervous/anxious and has insomnia.      A complete review of systems was otherwise negative.    Physical Exam  /86   Pulse 104   Temp 97.6 °F (36.4 °C) (Temporal)   Ht 5' 3" (1.6 m)   Wt 90.1 kg (198 lb 8.4 oz)   SpO2 97%   BMI 35.17 kg/m²   General appearance: alert, appears " stated age, cooperative and no distress  Lungs: clear to auscultation bilaterally  Heart: regular rate and rhythm, S1, S2 normal, no murmur, click, rub or gallop  Abdomen: soft, non-tender; bowel sounds normal; no masses,  no organomegaly  Extremities: extremities normal, atraumatic, no cyanosis or edema  Pulses: 2+ and symmetric  Skin: Skin color, texture, turgor normal. No rashes or lesions  Neurologic: Grossly normal  Psychiatric: She displays normal mood and affect, she denies depressed mood, she denies SI or HI, she reports intermittent panic episodes    Assessment/Plan  Adjustment disorder with anxious mood  Start Zoloft daily.  Discussed coping mechanisms and ways to manage work stressors.  Follow-up in one week as scheduled with PCP.  -     sertraline (ZOLOFT) 25 MG tablet; Take 1 tablet (25 mg total) by mouth once daily.  Dispense: 30 tablet; Refill: 2    Type 2 diabetes mellitus with microalbuminuria, without long-term current use of insulin  Labs today as ordered per PCP.  Concern given LUQ pain and N/V with Trulicity- hold Trulicity dose pending labs below.  Follow-up in one week as scheduled with PCP.  -     Amylase; Future; Expected date: 12/17/2020  -     Lipase; Future; Expected date: 12/17/2020    Essential hypertension  The current medical regimen is effective;  continue present plan and medications.    Hyperlipidemia, unspecified hyperlipidemia type  The current medical regimen is effective;  continue present plan and medications.    Patient has verbalized understanding and is in agreement with plan of care.    Follow up in about 1 week (around 12/24/2020) for routine physical.

## 2020-12-24 ENCOUNTER — OFFICE VISIT (OUTPATIENT)
Dept: FAMILY MEDICINE | Facility: CLINIC | Age: 55
End: 2020-12-24
Payer: COMMERCIAL

## 2020-12-24 VITALS
BODY MASS INDEX: 34.88 KG/M2 | OXYGEN SATURATION: 97 % | DIASTOLIC BLOOD PRESSURE: 76 MMHG | WEIGHT: 196.88 LBS | HEIGHT: 63 IN | SYSTOLIC BLOOD PRESSURE: 134 MMHG | HEART RATE: 74 BPM

## 2020-12-24 DIAGNOSIS — E78.5 HYPERLIPIDEMIA, UNSPECIFIED HYPERLIPIDEMIA TYPE: Chronic | ICD-10-CM

## 2020-12-24 DIAGNOSIS — R80.9 TYPE 2 DIABETES MELLITUS WITH MICROALBUMINURIA, WITHOUT LONG-TERM CURRENT USE OF INSULIN: Chronic | ICD-10-CM

## 2020-12-24 DIAGNOSIS — Z00.00 ROUTINE MEDICAL EXAM: Primary | ICD-10-CM

## 2020-12-24 DIAGNOSIS — E66.9 OBESITY, CLASS I, BMI 30-34.9: Chronic | ICD-10-CM

## 2020-12-24 DIAGNOSIS — R07.89 OTHER CHEST PAIN: ICD-10-CM

## 2020-12-24 DIAGNOSIS — E11.29 TYPE 2 DIABETES MELLITUS WITH MICROALBUMINURIA, WITHOUT LONG-TERM CURRENT USE OF INSULIN: Chronic | ICD-10-CM

## 2020-12-24 DIAGNOSIS — Z12.11 SCREENING FOR COLON CANCER: ICD-10-CM

## 2020-12-24 DIAGNOSIS — F43.22 ADJUSTMENT DISORDER WITH ANXIOUS MOOD: ICD-10-CM

## 2020-12-24 DIAGNOSIS — I10 ESSENTIAL HYPERTENSION: Chronic | ICD-10-CM

## 2020-12-24 PROBLEM — E66.811 OBESITY, CLASS I, BMI 30-34.9: Chronic | Status: ACTIVE | Noted: 2020-12-24

## 2020-12-24 PROCEDURE — 1126F PR PAIN SEVERITY QUANTIFIED, NO PAIN PRESENT: ICD-10-PCS | Mod: S$GLB,,, | Performed by: INTERNAL MEDICINE

## 2020-12-24 PROCEDURE — 99396 PR PREVENTIVE VISIT,EST,40-64: ICD-10-PCS | Mod: S$GLB,,, | Performed by: INTERNAL MEDICINE

## 2020-12-24 PROCEDURE — 3046F PR MOST RECENT HEMOGLOBIN A1C LEVEL > 9.0%: ICD-10-PCS | Mod: CPTII,S$GLB,, | Performed by: INTERNAL MEDICINE

## 2020-12-24 PROCEDURE — 3075F PR MOST RECENT SYSTOLIC BLOOD PRESS GE 130-139MM HG: ICD-10-PCS | Mod: CPTII,S$GLB,, | Performed by: INTERNAL MEDICINE

## 2020-12-24 PROCEDURE — 93010 ELECTROCARDIOGRAM REPORT: CPT | Mod: S$GLB,,, | Performed by: INTERNAL MEDICINE

## 2020-12-24 PROCEDURE — 99999 PR PBB SHADOW E&M-EST. PATIENT-LVL IV: CPT | Mod: PBBFAC,,, | Performed by: INTERNAL MEDICINE

## 2020-12-24 PROCEDURE — 3046F HEMOGLOBIN A1C LEVEL >9.0%: CPT | Mod: CPTII,S$GLB,, | Performed by: INTERNAL MEDICINE

## 2020-12-24 PROCEDURE — 3078F PR MOST RECENT DIASTOLIC BLOOD PRESSURE < 80 MM HG: ICD-10-PCS | Mod: CPTII,S$GLB,, | Performed by: INTERNAL MEDICINE

## 2020-12-24 PROCEDURE — 93005 ELECTROCARDIOGRAM TRACING: CPT | Mod: S$GLB,,, | Performed by: INTERNAL MEDICINE

## 2020-12-24 PROCEDURE — 93010 EKG 12-LEAD: ICD-10-PCS | Mod: S$GLB,,, | Performed by: INTERNAL MEDICINE

## 2020-12-24 PROCEDURE — 3075F SYST BP GE 130 - 139MM HG: CPT | Mod: CPTII,S$GLB,, | Performed by: INTERNAL MEDICINE

## 2020-12-24 PROCEDURE — 3008F PR BODY MASS INDEX (BMI) DOCUMENTED: ICD-10-PCS | Mod: CPTII,S$GLB,, | Performed by: INTERNAL MEDICINE

## 2020-12-24 PROCEDURE — 3078F DIAST BP <80 MM HG: CPT | Mod: CPTII,S$GLB,, | Performed by: INTERNAL MEDICINE

## 2020-12-24 PROCEDURE — 3008F BODY MASS INDEX DOCD: CPT | Mod: CPTII,S$GLB,, | Performed by: INTERNAL MEDICINE

## 2020-12-24 PROCEDURE — 99396 PREV VISIT EST AGE 40-64: CPT | Mod: S$GLB,,, | Performed by: INTERNAL MEDICINE

## 2020-12-24 PROCEDURE — 93005 EKG 12-LEAD: ICD-10-PCS | Mod: S$GLB,,, | Performed by: INTERNAL MEDICINE

## 2020-12-24 PROCEDURE — 1126F AMNT PAIN NOTED NONE PRSNT: CPT | Mod: S$GLB,,, | Performed by: INTERNAL MEDICINE

## 2020-12-24 PROCEDURE — 99999 PR PBB SHADOW E&M-EST. PATIENT-LVL IV: ICD-10-PCS | Mod: PBBFAC,,, | Performed by: INTERNAL MEDICINE

## 2020-12-24 RX ORDER — DOXAZOSIN 4 MG/1
TABLET ORAL
Qty: 90 TABLET | Refills: 1 | Status: SHIPPED | OUTPATIENT
Start: 2020-12-24 | End: 2021-10-14

## 2020-12-24 RX ORDER — PRAVASTATIN SODIUM 40 MG/1
40 TABLET ORAL DAILY
Qty: 90 TABLET | Refills: 1 | Status: SHIPPED | OUTPATIENT
Start: 2020-12-24 | End: 2021-04-13

## 2020-12-24 RX ORDER — DILTIAZEM HYDROCHLORIDE EXTENDED-RELEASE TABLETS 240 MG/1
TABLET, EXTENDED RELEASE ORAL
Qty: 90 TABLET | Refills: 1 | Status: SHIPPED | OUTPATIENT
Start: 2020-12-24 | End: 2022-01-04 | Stop reason: SDUPTHER

## 2020-12-24 NOTE — ASSESSMENT & PLAN NOTE
The current medical regimen is effective;  continue present plan and medications. Hasn't been on full dose long enough to see full effect on A1c.  Recheck labs in 3 mo

## 2020-12-24 NOTE — PROGRESS NOTES
Assessment & Plan  Problem List Items Addressed This Visit        Cardiac/Vascular    Hyperlipidemia (Chronic)    Current Assessment & Plan     The current medical regimen is effective;  continue present plan and medications.          Relevant Medications    pravastatin (PRAVACHOL) 40 MG tablet    Essential hypertension (Chronic)    Current Assessment & Plan     The current medical regimen is effective;  continue present plan and medications.          Relevant Medications    doxazosin (CARDURA) 4 MG tablet    diltiaZEM HCl (MATZIM LA) 240 mg 24 hr tablet       Endocrine    Type 2 diabetes mellitus with microalbuminuria, without long-term current use of insulin (Chronic)    Overview     Angioedema to ARB.  Persistent metabolic acidosis when on metformin that resolved with stopping.  ?GI losses         Current Assessment & Plan     The current medical regimen is effective;  continue present plan and medications. Hasn't been on full dose long enough to see full effect on A1c.  Recheck labs in 3 mo         Relevant Orders    Hemoglobin A1C    Comprehensive Metabolic Panel    Microalbumin/Creatinine Ratio, Urine    Obesity, Class I, BMI 30-34.9 (Chronic)    Current Assessment & Plan     Doing well with weight loss.  Keep up the great work!            Other Visit Diagnoses     Routine medical exam    -  Primary  -    Discussed healthy diet, regular exercise, necessary labs, age appropriate cancer screening, and routine vaccinations.       Other chest pain    -  EKG per my read shows NSR without AV block, prolonged QTc, Q wave, or ST changes.   There is some T wave change noted in precordial leads.  Given risk factors, will refer to cardiology for evaluation for baseline testing.     Relevant Orders    EKG 12-lead    Ambulatory referral/consult to Cardiology    Screening for colon cancer    -  Screen with cologuard    Relevant Orders    Cologuard Screening (Multitarget Stool DNA)    Adjustment disorder with anxious mood     -  The current medical regimen is effective;  continue present plan and medications. Portal outreach in 4 weeks            Health Maintenance reviewed, foot exam deferred today due to above.    Follow-up: Follow up in about 3 months (around 3/24/2021) for follow up for chronic conditions.  ______________________________________________________________________    Chief Complaint  Chief Complaint   Patient presents with    Annual Exam       HPI  Shanae Kumari is a 55 y.o. female with medical diagnoses as listed in the medical history and problem list that presents for routine physical.  Pt is known to me with her last appointment 12/17/2020.  Labs at that time showed reassuring BMP other than sugar of 318.  Amylase and lipase normal (eval'd for Nausea after trulicity doses).  A1c down to 9.4% <--11.3% <-- 10.2%.  Had been on the full dose of Trulicity for only 3 weeks prior to the labs.      She was started on sertraline for anxiety and panic attacks at the [facility].  She has been having some chest tightness/pain and palpitations that she feels is with her anxiety/stress levels.  No diaphoresis, dizziness with these episodes,  These episodes last minutes.  No clear exertional symptoms.      No concern for side effects from the sertraline.       PAST MEDICAL HISTORY:  Past Medical History:   Diagnosis Date    Asthma     BRCA negative     Diabetes mellitus     Hypertension        PAST SURGICAL HISTORY:  Past Surgical History:   Procedure Laterality Date    KNEE ARTHROSCOPY      knee surgery         SOCIAL HISTORY:  Social History     Socioeconomic History    Marital status: Unknown     Spouse name: Not on file    Number of children: Not on file    Years of education: Not on file    Highest education level: Not on file   Occupational History    Not on file   Social Needs    Financial resource strain: Not very hard    Food insecurity     Worry: Never true     Inability: Never true    Transportation needs      Medical: No     Non-medical: No   Tobacco Use    Smoking status: Never Smoker    Smokeless tobacco: Never Used   Substance and Sexual Activity    Alcohol use: No     Frequency: Never     Drinks per session: Patient refused     Binge frequency: Never    Drug use: No    Sexual activity: Not Currently   Lifestyle    Physical activity     Days per week: 0 days     Minutes per session: 0 min    Stress: Not at all   Relationships    Social connections     Talks on phone: Three times a week     Gets together: Never     Attends Zoroastrian service: Not on file     Active member of club or organization: Yes     Attends meetings of clubs or organizations: Never     Relationship status: Patient refused   Other Topics Concern    Not on file   Social History Narrative    Not on file       FAMILY HISTORY:  Family History   Problem Relation Age of Onset    Breast cancer Other     Diabetes Mother     COPD Mother     Cancer Mother         lung    Diabetes Father     Heart disease Father     Hypertension Father     Hyperlipidemia Father     Kidney disease Father     Cancer Sister     Diabetes Brother     Hypertension Brother     Kidney disease Brother         diabetic    Diabetes Maternal Aunt     Diabetes Maternal Uncle     Diabetes Maternal Grandmother     Colon cancer Neg Hx     Ovarian cancer Neg Hx     Stroke Neg Hx        ALLERGIES AND MEDICATIONS: updated and reviewed.  Review of patient's allergies indicates:   Allergen Reactions    Allergenic extracts Hives, Itching, Other (See Comments), Rash and Shortness Of Breath    Dog dander Hives, Shortness Of Breath, Itching, Swelling and Rash     Cat's dander, dust,  pollen    Losartan Other (See Comments)     angioedema    Mold Itching and Shortness Of Breath    Metformin Other (See Comments)     Current Outpatient Medications   Medication Sig Dispense Refill    albuterol (PROAIR HFA) 90 mcg/actuation inhaler Inhale 2 puffs into the lungs every 4  (four) hours as needed for Wheezing. 18 g 0    clotrimazole-betamethasone 1-0.05% (LOTRISONE) cream Apply topically 2 (two) times daily. Apply to foot 45 g 0    diltiaZEM HCl (MATZIM LA) 240 mg 24 hr tablet TAKE 1 TABLET (240 MG TOTAL) BY MOUTH ONCE DAILY. 90 tablet 1    doxazosin (CARDURA) 4 MG tablet TAKE 1 TABLET BY MOUTH ONCE NIGHTLY 90 tablet 1    dulaglutide (TRULICITY) 1.5 mg/0.5 mL pen injector Inject 1.5 mg into the skin every 7 days. 4 pen 2    fluticasone propionate (FLONASE) 50 mcg/actuation nasal spray 1 spray (50 mcg total) by Each Nostril route once daily. 3 Bottle 3    glimepiride (AMARYL) 4 MG tablet Take 1 tablet (4 mg total) by mouth before breakfast. 90 tablet 3    medroxyPROGESTERone (PROVERA) 10 MG tablet Take 1 tablet (10 mg total) by mouth once daily. 10 tablet 12    meloxicam (MOBIC) 15 MG tablet TAKE 1 TABLET BY MOUTH EVERY DAY 90 tablet 1    montelukast (SINGULAIR) 10 mg tablet Take 1 tablet (10 mg total) by mouth once daily. 90 tablet 1    pravastatin (PRAVACHOL) 40 MG tablet Take 1 tablet (40 mg total) by mouth once daily. 90 tablet 1    sertraline (ZOLOFT) 25 MG tablet Take 1 tablet (25 mg total) by mouth once daily. 30 tablet 2     No current facility-administered medications for this visit.          ROS  Review of Systems   Constitutional: Negative for activity change and unexpected weight change.   HENT: Negative for hearing loss, rhinorrhea and trouble swallowing.    Eyes: Negative for discharge and visual disturbance.   Respiratory: Positive for chest tightness. Negative for wheezing.    Cardiovascular: Positive for chest pain and palpitations. Negative for leg swelling.   Gastrointestinal: Positive for constipation and vomiting. Negative for blood in stool and diarrhea.   Endocrine: Negative for polydipsia and polyuria.   Genitourinary: Negative for difficulty urinating, dysuria, hematuria and menstrual problem.   Musculoskeletal: Positive for arthralgias and joint  "swelling. Negative for neck pain.   Neurological: Positive for weakness and headaches.   Psychiatric/Behavioral: Positive for dysphoric mood. Negative for confusion. The patient is nervous/anxious.            Physical Exam  Vitals:    12/24/20 0722 12/24/20 0758   BP: 132/84 134/76   BP Location: Right arm    Patient Position: Sitting    BP Method: Large (Automatic)    Pulse: 74    SpO2: 97%    Weight: 89.3 kg (196 lb 13.9 oz)    Height: 5' 3" (1.6 m)     Body mass index is 34.87 kg/m².  Weight: 89.3 kg (196 lb 13.9 oz)   Height: 5' 3" (160 cm)   Physical Exam  Constitutional:       General: She is not in acute distress.     Appearance: She is well-developed.   HENT:      Head: Normocephalic and atraumatic.   Eyes:      General: Lids are normal. No scleral icterus.     Conjunctiva/sclera: Conjunctivae normal.      Pupils: Pupils are equal, round, and reactive to light.   Neck:      Musculoskeletal: Full passive range of motion without pain and neck supple.      Thyroid: No thyromegaly.      Vascular: No carotid bruit or JVD.   Cardiovascular:      Rate and Rhythm: Normal rate and regular rhythm.      Pulses: Normal pulses.      Heart sounds: Normal heart sounds. No murmur. No friction rub. No S3 or S4 sounds.    Pulmonary:      Effort: Pulmonary effort is normal.      Breath sounds: Normal breath sounds. No wheezing, rhonchi or rales.   Abdominal:      General: Bowel sounds are normal.      Palpations: Abdomen is soft.      Tenderness: There is no abdominal tenderness.   Musculoskeletal:         General: No tenderness.      Right lower leg: No edema.      Left lower leg: No edema.   Skin:     General: Skin is warm and dry.      Findings: No rash.   Neurological:      Mental Status: She is alert and oriented to person, place, and time.   Psychiatric:         Speech: Speech normal.         Behavior: Behavior normal.         Thought Content: Thought content normal.             Health Maintenance       Date Due " Completion Date    Shingles Vaccine (1 of 2) 09/19/2017 ---    Colorectal Cancer Screening 02/21/2018 2/20/2018    Foot Exam 03/07/2020 3/7/2019 (Done)    Override on 3/7/2019: Done    Override on 2/19/2018: Done    Eye Exam 08/06/2020 8/6/2019    Hemoglobin A1c 03/17/2021 12/17/2020    Mammogram 08/13/2021 8/13/2020    Lipid Panel 08/26/2021 8/26/2020    Urine Microalbumin 08/26/2021 8/26/2020    Low Dose Statin 12/17/2021 12/17/2020    Cervical Cancer Screening 03/20/2022 3/20/2019    TETANUS VACCINE 03/09/2029 3/9/2019

## 2020-12-29 ENCOUNTER — TELEPHONE (OUTPATIENT)
Dept: FAMILY MEDICINE | Facility: CLINIC | Age: 55
End: 2020-12-29

## 2021-01-13 ENCOUNTER — PATIENT MESSAGE (OUTPATIENT)
Dept: FAMILY MEDICINE | Facility: CLINIC | Age: 56
End: 2021-01-13

## 2021-01-24 ENCOUNTER — PATIENT MESSAGE (OUTPATIENT)
Dept: FAMILY MEDICINE | Facility: CLINIC | Age: 56
End: 2021-01-24

## 2021-02-17 ENCOUNTER — PATIENT MESSAGE (OUTPATIENT)
Dept: FAMILY MEDICINE | Facility: CLINIC | Age: 56
End: 2021-02-17

## 2021-02-17 DIAGNOSIS — F43.22 ADJUSTMENT DISORDER WITH ANXIOUS MOOD: ICD-10-CM

## 2021-02-18 ENCOUNTER — PATIENT MESSAGE (OUTPATIENT)
Dept: FAMILY MEDICINE | Facility: CLINIC | Age: 56
End: 2021-02-18

## 2021-02-18 RX ORDER — SERTRALINE HYDROCHLORIDE 50 MG/1
50 TABLET, FILM COATED ORAL DAILY
Qty: 90 TABLET | Refills: 0 | Status: SHIPPED | OUTPATIENT
Start: 2021-02-18 | End: 2021-05-10

## 2021-02-27 ENCOUNTER — IMMUNIZATION (OUTPATIENT)
Dept: INTERNAL MEDICINE | Facility: CLINIC | Age: 56
End: 2021-02-27
Payer: COMMERCIAL

## 2021-02-27 DIAGNOSIS — Z23 NEED FOR VACCINATION: Primary | ICD-10-CM

## 2021-02-27 PROCEDURE — 91300 COVID-19, MRNA, LNP-S, PF, 30 MCG/0.3 ML DOSE VACCINE: CPT | Mod: PBBFAC | Performed by: FAMILY MEDICINE

## 2021-03-09 ENCOUNTER — PATIENT MESSAGE (OUTPATIENT)
Dept: FAMILY MEDICINE | Facility: CLINIC | Age: 56
End: 2021-03-09

## 2021-03-15 ENCOUNTER — PATIENT OUTREACH (OUTPATIENT)
Dept: ADMINISTRATIVE | Facility: OTHER | Age: 56
End: 2021-03-15

## 2021-03-17 ENCOUNTER — PATIENT MESSAGE (OUTPATIENT)
Dept: CARDIOLOGY | Facility: CLINIC | Age: 56
End: 2021-03-17

## 2021-03-19 ENCOUNTER — OFFICE VISIT (OUTPATIENT)
Dept: CARDIOLOGY | Facility: CLINIC | Age: 56
End: 2021-03-19
Payer: COMMERCIAL

## 2021-03-19 VITALS
DIASTOLIC BLOOD PRESSURE: 84 MMHG | OXYGEN SATURATION: 98 % | SYSTOLIC BLOOD PRESSURE: 138 MMHG | HEIGHT: 63 IN | BODY MASS INDEX: 35.7 KG/M2 | HEART RATE: 98 BPM | WEIGHT: 201.5 LBS

## 2021-03-19 DIAGNOSIS — R07.9 CHEST PAIN, UNSPECIFIED TYPE: Primary | ICD-10-CM

## 2021-03-19 DIAGNOSIS — I10 ESSENTIAL HYPERTENSION: ICD-10-CM

## 2021-03-19 DIAGNOSIS — R80.9 TYPE 2 DIABETES MELLITUS WITH MICROALBUMINURIA, WITHOUT LONG-TERM CURRENT USE OF INSULIN: ICD-10-CM

## 2021-03-19 DIAGNOSIS — E66.9 OBESITY, CLASS I, BMI 30-34.9: ICD-10-CM

## 2021-03-19 DIAGNOSIS — J30.89 NON-SEASONAL ALLERGIC RHINITIS, UNSPECIFIED TRIGGER: ICD-10-CM

## 2021-03-19 DIAGNOSIS — E11.29 TYPE 2 DIABETES MELLITUS WITH MICROALBUMINURIA, WITHOUT LONG-TERM CURRENT USE OF INSULIN: ICD-10-CM

## 2021-03-19 DIAGNOSIS — J45.20 MILD INTERMITTENT ASTHMA WITHOUT COMPLICATION: ICD-10-CM

## 2021-03-19 DIAGNOSIS — E78.5 HYPERLIPIDEMIA, UNSPECIFIED HYPERLIPIDEMIA TYPE: ICD-10-CM

## 2021-03-19 PROCEDURE — 3008F PR BODY MASS INDEX (BMI) DOCUMENTED: ICD-10-PCS | Mod: CPTII,S$GLB,, | Performed by: INTERNAL MEDICINE

## 2021-03-19 PROCEDURE — 1126F PR PAIN SEVERITY QUANTIFIED, NO PAIN PRESENT: ICD-10-PCS | Mod: S$GLB,,, | Performed by: INTERNAL MEDICINE

## 2021-03-19 PROCEDURE — 3075F SYST BP GE 130 - 139MM HG: CPT | Mod: CPTII,S$GLB,, | Performed by: INTERNAL MEDICINE

## 2021-03-19 PROCEDURE — 99204 PR OFFICE/OUTPT VISIT, NEW, LEVL IV, 45-59 MIN: ICD-10-PCS | Mod: S$GLB,,, | Performed by: INTERNAL MEDICINE

## 2021-03-19 PROCEDURE — 3079F DIAST BP 80-89 MM HG: CPT | Mod: CPTII,S$GLB,, | Performed by: INTERNAL MEDICINE

## 2021-03-19 PROCEDURE — 99204 OFFICE O/P NEW MOD 45 MIN: CPT | Mod: S$GLB,,, | Performed by: INTERNAL MEDICINE

## 2021-03-19 PROCEDURE — 3075F PR MOST RECENT SYSTOLIC BLOOD PRESS GE 130-139MM HG: ICD-10-PCS | Mod: CPTII,S$GLB,, | Performed by: INTERNAL MEDICINE

## 2021-03-19 PROCEDURE — 99999 PR PBB SHADOW E&M-EST. PATIENT-LVL IV: CPT | Mod: PBBFAC,,, | Performed by: INTERNAL MEDICINE

## 2021-03-19 PROCEDURE — 99999 PR PBB SHADOW E&M-EST. PATIENT-LVL IV: ICD-10-PCS | Mod: PBBFAC,,, | Performed by: INTERNAL MEDICINE

## 2021-03-19 PROCEDURE — 1126F AMNT PAIN NOTED NONE PRSNT: CPT | Mod: S$GLB,,, | Performed by: INTERNAL MEDICINE

## 2021-03-19 PROCEDURE — 3079F PR MOST RECENT DIASTOLIC BLOOD PRESSURE 80-89 MM HG: ICD-10-PCS | Mod: CPTII,S$GLB,, | Performed by: INTERNAL MEDICINE

## 2021-03-19 PROCEDURE — 3046F HEMOGLOBIN A1C LEVEL >9.0%: CPT | Mod: CPTII,S$GLB,, | Performed by: INTERNAL MEDICINE

## 2021-03-19 PROCEDURE — 3046F PR MOST RECENT HEMOGLOBIN A1C LEVEL > 9.0%: ICD-10-PCS | Mod: CPTII,S$GLB,, | Performed by: INTERNAL MEDICINE

## 2021-03-19 PROCEDURE — 3008F BODY MASS INDEX DOCD: CPT | Mod: CPTII,S$GLB,, | Performed by: INTERNAL MEDICINE

## 2021-03-20 ENCOUNTER — IMMUNIZATION (OUTPATIENT)
Dept: INTERNAL MEDICINE | Facility: CLINIC | Age: 56
End: 2021-03-20
Payer: COMMERCIAL

## 2021-03-20 DIAGNOSIS — Z23 NEED FOR VACCINATION: Primary | ICD-10-CM

## 2021-03-20 PROCEDURE — 0002A COVID-19, MRNA, LNP-S, PF, 30 MCG/0.3 ML DOSE VACCINE: CPT | Mod: PBBFAC | Performed by: FAMILY MEDICINE

## 2021-03-20 PROCEDURE — 91300 COVID-19, MRNA, LNP-S, PF, 30 MCG/0.3 ML DOSE VACCINE: CPT | Mod: PBBFAC | Performed by: FAMILY MEDICINE

## 2021-03-30 ENCOUNTER — OFFICE VISIT (OUTPATIENT)
Dept: FAMILY MEDICINE | Facility: CLINIC | Age: 56
End: 2021-03-30
Payer: COMMERCIAL

## 2021-03-30 VITALS
DIASTOLIC BLOOD PRESSURE: 87 MMHG | TEMPERATURE: 98 F | BODY MASS INDEX: 34.45 KG/M2 | RESPIRATION RATE: 14 BRPM | SYSTOLIC BLOOD PRESSURE: 138 MMHG | OXYGEN SATURATION: 97 % | HEIGHT: 64 IN | HEART RATE: 95 BPM | WEIGHT: 201.81 LBS

## 2021-03-30 DIAGNOSIS — E11.29 TYPE 2 DIABETES MELLITUS WITH MICROALBUMINURIA, WITHOUT LONG-TERM CURRENT USE OF INSULIN: Chronic | ICD-10-CM

## 2021-03-30 DIAGNOSIS — E66.9 OBESITY, CLASS I, BMI 30-34.9: Chronic | ICD-10-CM

## 2021-03-30 DIAGNOSIS — E78.5 HYPERLIPIDEMIA, UNSPECIFIED HYPERLIPIDEMIA TYPE: Chronic | ICD-10-CM

## 2021-03-30 DIAGNOSIS — R80.9 TYPE 2 DIABETES MELLITUS WITH MICROALBUMINURIA, WITHOUT LONG-TERM CURRENT USE OF INSULIN: Chronic | ICD-10-CM

## 2021-03-30 DIAGNOSIS — R20.2 PARESTHESIA OF FINGER: Primary | ICD-10-CM

## 2021-03-30 DIAGNOSIS — I10 ESSENTIAL HYPERTENSION: Chronic | ICD-10-CM

## 2021-03-30 PROCEDURE — 99213 OFFICE O/P EST LOW 20 MIN: CPT | Mod: S$GLB,,, | Performed by: NURSE PRACTITIONER

## 2021-03-30 PROCEDURE — 1125F AMNT PAIN NOTED PAIN PRSNT: CPT | Mod: S$GLB,,, | Performed by: NURSE PRACTITIONER

## 2021-03-30 PROCEDURE — 3008F PR BODY MASS INDEX (BMI) DOCUMENTED: ICD-10-PCS | Mod: CPTII,S$GLB,, | Performed by: NURSE PRACTITIONER

## 2021-03-30 PROCEDURE — 3075F PR MOST RECENT SYSTOLIC BLOOD PRESS GE 130-139MM HG: ICD-10-PCS | Mod: CPTII,S$GLB,, | Performed by: NURSE PRACTITIONER

## 2021-03-30 PROCEDURE — 3046F PR MOST RECENT HEMOGLOBIN A1C LEVEL > 9.0%: ICD-10-PCS | Mod: CPTII,S$GLB,, | Performed by: NURSE PRACTITIONER

## 2021-03-30 PROCEDURE — 1125F PR PAIN SEVERITY QUANTIFIED, PAIN PRESENT: ICD-10-PCS | Mod: S$GLB,,, | Performed by: NURSE PRACTITIONER

## 2021-03-30 PROCEDURE — 3008F BODY MASS INDEX DOCD: CPT | Mod: CPTII,S$GLB,, | Performed by: NURSE PRACTITIONER

## 2021-03-30 PROCEDURE — 3079F PR MOST RECENT DIASTOLIC BLOOD PRESSURE 80-89 MM HG: ICD-10-PCS | Mod: CPTII,S$GLB,, | Performed by: NURSE PRACTITIONER

## 2021-03-30 PROCEDURE — 3075F SYST BP GE 130 - 139MM HG: CPT | Mod: CPTII,S$GLB,, | Performed by: NURSE PRACTITIONER

## 2021-03-30 PROCEDURE — 3046F HEMOGLOBIN A1C LEVEL >9.0%: CPT | Mod: CPTII,S$GLB,, | Performed by: NURSE PRACTITIONER

## 2021-03-30 PROCEDURE — 99999 PR PBB SHADOW E&M-EST. PATIENT-LVL V: ICD-10-PCS | Mod: PBBFAC,,, | Performed by: NURSE PRACTITIONER

## 2021-03-30 PROCEDURE — 99213 PR OFFICE/OUTPT VISIT, EST, LEVL III, 20-29 MIN: ICD-10-PCS | Mod: S$GLB,,, | Performed by: NURSE PRACTITIONER

## 2021-03-30 PROCEDURE — 3079F DIAST BP 80-89 MM HG: CPT | Mod: CPTII,S$GLB,, | Performed by: NURSE PRACTITIONER

## 2021-03-30 PROCEDURE — 99999 PR PBB SHADOW E&M-EST. PATIENT-LVL V: CPT | Mod: PBBFAC,,, | Performed by: NURSE PRACTITIONER

## 2021-04-05 ENCOUNTER — HOSPITAL ENCOUNTER (OUTPATIENT)
Dept: RADIOLOGY | Facility: HOSPITAL | Age: 56
Discharge: HOME | End: 2021-04-05
Attending: INTERNAL MEDICINE
Payer: COMMERCIAL

## 2021-04-05 ENCOUNTER — HOSPITAL ENCOUNTER (OUTPATIENT)
Dept: CARDIOLOGY | Facility: HOSPITAL | Age: 56
Discharge: HOME | End: 2021-04-05
Attending: INTERNAL MEDICINE
Payer: COMMERCIAL

## 2021-04-05 DIAGNOSIS — R07.9 CHEST PAIN, UNSPECIFIED TYPE: ICD-10-CM

## 2021-04-05 LAB
ASCENDING AORTA: 3.29 CM
AV INDEX (PROSTH): 0.9
AV MEAN GRADIENT: 5 MMHG
AV PEAK GRADIENT: 9 MMHG
AV VALVE AREA: 2.93 CM2
AV VELOCITY RATIO: 0.88
CV ECHO LV RWT: 0.61 CM
CV STRESS BASE HR: 88 BPM
DIASTOLIC BLOOD PRESSURE: 63 MMHG
DOP CALC AO PEAK VEL: 1.48 M/S
DOP CALC AO VTI: 29.02 CM
DOP CALC LVOT AREA: 3.3 CM2
DOP CALC LVOT DIAMETER: 2.04 CM
DOP CALC LVOT PEAK VEL: 1.3 M/S
DOP CALC LVOT STROKE VOLUME: 85.04 CM3
DOP CALC RVOT PEAK VEL: 0.94 M/S
DOP CALCLVOT PEAK VEL VTI: 26.03 CM
E WAVE DECELERATION TIME: 174.59 MSEC
E/A RATIO: 0.78
E/E' RATIO: 13 M/S
ECHO LV POSTERIOR WALL: 1.16 CM (ref 0.6–1.1)
EJECTION FRACTION: 65 %
FRACTIONAL SHORTENING: 48 % (ref 28–44)
INTERVENTRICULAR SEPTUM: 1.13 CM (ref 0.6–1.1)
IVRT: 156.99 MSEC
LA MAJOR: 5.85 CM
LA MINOR: 5.04 CM
LA WIDTH: 4.17 CM
LEFT ATRIUM SIZE: 5.25 CM
LEFT ATRIUM VOLUME: 100.76 CM3
LEFT INTERNAL DIMENSION IN SYSTOLE: 1.98 CM (ref 2.1–4)
LEFT VENTRICLE DIASTOLIC VOLUME: 62.28 ML
LEFT VENTRICLE SYSTOLIC VOLUME: 12.38 ML
LEFT VENTRICULAR INTERNAL DIMENSION IN DIASTOLE: 3.81 CM (ref 3.5–6)
LEFT VENTRICULAR MASS: 143.43 G
LV LATERAL E/E' RATIO: 11.14 M/S
LV SEPTAL E/E' RATIO: 15.6 M/S
MV MEAN GRADIENT: 1 MMHG
MV PEAK A VEL: 1 M/S
MV PEAK E VEL: 0.78 M/S
MV PEAK GRADIENT: 4 MMHG
MV STENOSIS PRESSURE HALF TIME: 50.63 MS
MV VALVE AREA P 1/2 METHOD: 4.35 CM2
OHS CV CPX 85 PERCENT MAX PREDICTED HEART RATE MALE: 135
OHS CV CPX MAX PREDICTED HEART RATE: 159
OHS CV CPX PATIENT IS FEMALE: 1
OHS CV CPX PATIENT IS MALE: 0
OHS CV CPX PEAK DIASTOLIC BLOOD PRESSURE: 54 MMHG
OHS CV CPX PEAK HEAR RATE: 95 BPM
OHS CV CPX PEAK RATE PRESSURE PRODUCT: NORMAL
OHS CV CPX PEAK SYSTOLIC BLOOD PRESSURE: 129 MMHG
OHS CV CPX PERCENT MAX PREDICTED HEART RATE ACHIEVED: 60
OHS CV CPX RATE PRESSURE PRODUCT PRESENTING: 8888
PISA TR MAX VEL: 1.18 M/S
PULM VEIN S/D RATIO: 2.24
PV PEAK D VEL: 0.21 M/S
PV PEAK S VEL: 0.47 M/S
PV PEAK VELOCITY: 1.1 CM/S
RA MAJOR: 4.82 CM
RA PRESSURE: 8 MMHG
RA WIDTH: 2.92 CM
RIGHT VENTRICULAR END-DIASTOLIC DIMENSION: 3.51 CM
RV TISSUE DOPPLER FREE WALL SYSTOLIC VELOCITY 1 (APICAL 4 CHAMBER VIEW): 13.08 CM/S
SINUS: 3.6 CM
STJ: 3.13 CM
SYSTOLIC BLOOD PRESSURE: 101 MMHG
TDI LATERAL: 0.07 M/S
TDI SEPTAL: 0.05 M/S
TDI: 0.06 M/S
TR MAX PG: 6 MMHG
TRICUSPID ANNULAR PLANE SYSTOLIC EXCURSION: 1.94 CM
TV REST PULMONARY ARTERY PRESSURE: 14 MMHG

## 2021-04-05 PROCEDURE — 63600175 PHARM REV CODE 636 W HCPCS: Performed by: INTERNAL MEDICINE

## 2021-04-05 PROCEDURE — A9502 TC99M TETROFOSMIN: HCPCS

## 2021-04-05 PROCEDURE — 93018 CV STRESS TEST I&R ONLY: CPT | Mod: ,,, | Performed by: INTERNAL MEDICINE

## 2021-04-05 PROCEDURE — 93018 STRESS TEST WITH MYOCARDIAL PERFUSION (CUPID ONLY): ICD-10-PCS | Mod: ,,, | Performed by: INTERNAL MEDICINE

## 2021-04-05 PROCEDURE — 78452 HT MUSCLE IMAGE SPECT MULT: CPT | Mod: 26,,, | Performed by: INTERNAL MEDICINE

## 2021-04-05 PROCEDURE — 78452 STRESS TEST WITH MYOCARDIAL PERFUSION (CUPID ONLY): ICD-10-PCS | Mod: 26,,, | Performed by: INTERNAL MEDICINE

## 2021-04-05 PROCEDURE — 93016 STRESS TEST WITH MYOCARDIAL PERFUSION (CUPID ONLY): ICD-10-PCS | Mod: ,,, | Performed by: INTERNAL MEDICINE

## 2021-04-05 PROCEDURE — 78452 HT MUSCLE IMAGE SPECT MULT: CPT

## 2021-04-05 PROCEDURE — 93306 TTE W/DOPPLER COMPLETE: CPT

## 2021-04-05 PROCEDURE — 93017 CV STRESS TEST TRACING ONLY: CPT

## 2021-04-05 PROCEDURE — 93306 TTE W/DOPPLER COMPLETE: CPT | Mod: 26,,, | Performed by: INTERNAL MEDICINE

## 2021-04-05 PROCEDURE — 93306 ECHO (CUPID ONLY): ICD-10-PCS | Mod: 26,,, | Performed by: INTERNAL MEDICINE

## 2021-04-05 PROCEDURE — 93016 CV STRESS TEST SUPVJ ONLY: CPT | Mod: ,,, | Performed by: INTERNAL MEDICINE

## 2021-04-05 RX ORDER — REGADENOSON 0.08 MG/ML
0.4 INJECTION, SOLUTION INTRAVENOUS ONCE
Status: COMPLETED | OUTPATIENT
Start: 2021-04-05 | End: 2021-04-05

## 2021-04-05 RX ADMIN — REGADENOSON 0.4 MG: 0.08 INJECTION, SOLUTION INTRAVENOUS at 09:04

## 2021-04-13 ENCOUNTER — OFFICE VISIT (OUTPATIENT)
Dept: CARDIOLOGY | Facility: CLINIC | Age: 56
End: 2021-04-13
Payer: COMMERCIAL

## 2021-04-13 VITALS
OXYGEN SATURATION: 97 % | BODY MASS INDEX: 34.25 KG/M2 | HEIGHT: 64 IN | WEIGHT: 200.63 LBS | DIASTOLIC BLOOD PRESSURE: 62 MMHG | SYSTOLIC BLOOD PRESSURE: 104 MMHG | HEART RATE: 104 BPM

## 2021-04-13 DIAGNOSIS — E78.5 HYPERLIPIDEMIA, UNSPECIFIED HYPERLIPIDEMIA TYPE: ICD-10-CM

## 2021-04-13 DIAGNOSIS — R80.9 TYPE 2 DIABETES MELLITUS WITH MICROALBUMINURIA, WITHOUT LONG-TERM CURRENT USE OF INSULIN: ICD-10-CM

## 2021-04-13 DIAGNOSIS — I10 ESSENTIAL HYPERTENSION: Primary | ICD-10-CM

## 2021-04-13 DIAGNOSIS — E66.9 OBESITY, CLASS I, BMI 30-34.9: ICD-10-CM

## 2021-04-13 DIAGNOSIS — E11.29 TYPE 2 DIABETES MELLITUS WITH MICROALBUMINURIA, WITHOUT LONG-TERM CURRENT USE OF INSULIN: ICD-10-CM

## 2021-04-13 PROCEDURE — 3078F DIAST BP <80 MM HG: CPT | Mod: CPTII,S$GLB,, | Performed by: INTERNAL MEDICINE

## 2021-04-13 PROCEDURE — 1126F PR PAIN SEVERITY QUANTIFIED, NO PAIN PRESENT: ICD-10-PCS | Mod: S$GLB,,, | Performed by: INTERNAL MEDICINE

## 2021-04-13 PROCEDURE — 99999 PR PBB SHADOW E&M-EST. PATIENT-LVL IV: CPT | Mod: PBBFAC,,, | Performed by: INTERNAL MEDICINE

## 2021-04-13 PROCEDURE — 3008F PR BODY MASS INDEX (BMI) DOCUMENTED: ICD-10-PCS | Mod: CPTII,S$GLB,, | Performed by: INTERNAL MEDICINE

## 2021-04-13 PROCEDURE — 99214 PR OFFICE/OUTPT VISIT, EST, LEVL IV, 30-39 MIN: ICD-10-PCS | Mod: S$GLB,,, | Performed by: INTERNAL MEDICINE

## 2021-04-13 PROCEDURE — 3008F BODY MASS INDEX DOCD: CPT | Mod: CPTII,S$GLB,, | Performed by: INTERNAL MEDICINE

## 2021-04-13 PROCEDURE — 1126F AMNT PAIN NOTED NONE PRSNT: CPT | Mod: S$GLB,,, | Performed by: INTERNAL MEDICINE

## 2021-04-13 PROCEDURE — 3074F SYST BP LT 130 MM HG: CPT | Mod: CPTII,S$GLB,, | Performed by: INTERNAL MEDICINE

## 2021-04-13 PROCEDURE — 3074F PR MOST RECENT SYSTOLIC BLOOD PRESSURE < 130 MM HG: ICD-10-PCS | Mod: CPTII,S$GLB,, | Performed by: INTERNAL MEDICINE

## 2021-04-13 PROCEDURE — 99999 PR PBB SHADOW E&M-EST. PATIENT-LVL IV: ICD-10-PCS | Mod: PBBFAC,,, | Performed by: INTERNAL MEDICINE

## 2021-04-13 PROCEDURE — 99214 OFFICE O/P EST MOD 30 MIN: CPT | Mod: S$GLB,,, | Performed by: INTERNAL MEDICINE

## 2021-04-13 PROCEDURE — 3078F PR MOST RECENT DIASTOLIC BLOOD PRESSURE < 80 MM HG: ICD-10-PCS | Mod: CPTII,S$GLB,, | Performed by: INTERNAL MEDICINE

## 2021-04-13 RX ORDER — ATORVASTATIN CALCIUM 40 MG/1
40 TABLET, FILM COATED ORAL NIGHTLY
Qty: 90 TABLET | Refills: 3 | Status: SHIPPED | OUTPATIENT
Start: 2021-04-13 | End: 2022-03-10

## 2021-04-13 RX ORDER — NAPROXEN SODIUM 220 MG/1
81 TABLET, FILM COATED ORAL DAILY
Qty: 90 TABLET | Refills: 3 | Status: SHIPPED | OUTPATIENT
Start: 2021-04-13 | End: 2022-03-10

## 2021-04-13 RX ORDER — NITROGLYCERIN 0.4 MG/1
0.4 TABLET SUBLINGUAL EVERY 5 MIN PRN
Qty: 90 TABLET | Refills: 3 | Status: SHIPPED | OUTPATIENT
Start: 2021-04-13 | End: 2022-05-16 | Stop reason: SDUPTHER

## 2021-04-20 ENCOUNTER — PATIENT MESSAGE (OUTPATIENT)
Dept: FAMILY MEDICINE | Facility: CLINIC | Age: 56
End: 2021-04-20

## 2021-04-20 DIAGNOSIS — R20.2 PARESTHESIA OF FINGER: Primary | ICD-10-CM

## 2021-04-22 ENCOUNTER — TELEPHONE (OUTPATIENT)
Dept: FAMILY MEDICINE | Facility: CLINIC | Age: 56
End: 2021-04-22

## 2021-04-29 ENCOUNTER — PATIENT MESSAGE (OUTPATIENT)
Dept: FAMILY MEDICINE | Facility: CLINIC | Age: 56
End: 2021-04-29

## 2021-05-09 DIAGNOSIS — F43.22 ADJUSTMENT DISORDER WITH ANXIOUS MOOD: ICD-10-CM

## 2021-05-10 RX ORDER — SERTRALINE HYDROCHLORIDE 50 MG/1
TABLET, FILM COATED ORAL
Qty: 90 TABLET | Refills: 1 | Status: SHIPPED | OUTPATIENT
Start: 2021-05-10 | End: 2021-10-14

## 2021-05-14 ENCOUNTER — PATIENT MESSAGE (OUTPATIENT)
Dept: OPHTHALMOLOGY | Facility: CLINIC | Age: 56
End: 2021-05-14

## 2021-06-01 ENCOUNTER — PATIENT OUTREACH (OUTPATIENT)
Dept: ADMINISTRATIVE | Facility: OTHER | Age: 56
End: 2021-06-01

## 2021-06-30 DIAGNOSIS — E11.9 TYPE 2 DIABETES MELLITUS WITHOUT COMPLICATION: ICD-10-CM

## 2021-07-02 ENCOUNTER — PATIENT OUTREACH (OUTPATIENT)
Dept: ADMINISTRATIVE | Facility: OTHER | Age: 56
End: 2021-07-02

## 2021-07-12 ENCOUNTER — DOCUMENTATION ONLY (OUTPATIENT)
Dept: BARIATRICS | Facility: CLINIC | Age: 56
End: 2021-07-12

## 2021-07-21 ENCOUNTER — OFFICE VISIT (OUTPATIENT)
Dept: CARDIOLOGY | Facility: CLINIC | Age: 56
End: 2021-07-21
Payer: COMMERCIAL

## 2021-07-21 ENCOUNTER — TELEPHONE (OUTPATIENT)
Dept: FAMILY MEDICINE | Facility: CLINIC | Age: 56
End: 2021-07-21

## 2021-07-21 VITALS
BODY MASS INDEX: 35.31 KG/M2 | OXYGEN SATURATION: 97 % | HEART RATE: 95 BPM | HEIGHT: 64 IN | DIASTOLIC BLOOD PRESSURE: 82 MMHG | SYSTOLIC BLOOD PRESSURE: 128 MMHG | WEIGHT: 206.81 LBS

## 2021-07-21 DIAGNOSIS — I25.10 CORONARY ARTERY DISEASE, ANGINA PRESENCE UNSPECIFIED, UNSPECIFIED VESSEL OR LESION TYPE, UNSPECIFIED WHETHER NATIVE OR TRANSPLANTED HEART: Primary | ICD-10-CM

## 2021-07-21 DIAGNOSIS — I10 ESSENTIAL HYPERTENSION: ICD-10-CM

## 2021-07-21 DIAGNOSIS — E66.9 OBESITY, CLASS I, BMI 30-34.9: ICD-10-CM

## 2021-07-21 DIAGNOSIS — J30.89 NON-SEASONAL ALLERGIC RHINITIS, UNSPECIFIED TRIGGER: ICD-10-CM

## 2021-07-21 DIAGNOSIS — E78.5 HYPERLIPIDEMIA, UNSPECIFIED HYPERLIPIDEMIA TYPE: ICD-10-CM

## 2021-07-21 DIAGNOSIS — R80.9 TYPE 2 DIABETES MELLITUS WITH MICROALBUMINURIA, WITHOUT LONG-TERM CURRENT USE OF INSULIN: ICD-10-CM

## 2021-07-21 DIAGNOSIS — E11.29 TYPE 2 DIABETES MELLITUS WITH MICROALBUMINURIA, WITHOUT LONG-TERM CURRENT USE OF INSULIN: ICD-10-CM

## 2021-07-21 PROCEDURE — 3008F PR BODY MASS INDEX (BMI) DOCUMENTED: ICD-10-PCS | Mod: CPTII,S$GLB,, | Performed by: INTERNAL MEDICINE

## 2021-07-21 PROCEDURE — 93000 EKG 12-LEAD: ICD-10-PCS | Mod: S$GLB,,, | Performed by: INTERNAL MEDICINE

## 2021-07-21 PROCEDURE — 3074F PR MOST RECENT SYSTOLIC BLOOD PRESSURE < 130 MM HG: ICD-10-PCS | Mod: CPTII,S$GLB,, | Performed by: INTERNAL MEDICINE

## 2021-07-21 PROCEDURE — 3079F PR MOST RECENT DIASTOLIC BLOOD PRESSURE 80-89 MM HG: ICD-10-PCS | Mod: CPTII,S$GLB,, | Performed by: INTERNAL MEDICINE

## 2021-07-21 PROCEDURE — 99214 PR OFFICE/OUTPT VISIT, EST, LEVL IV, 30-39 MIN: ICD-10-PCS | Mod: S$GLB,,, | Performed by: INTERNAL MEDICINE

## 2021-07-21 PROCEDURE — 99214 OFFICE O/P EST MOD 30 MIN: CPT | Mod: S$GLB,,, | Performed by: INTERNAL MEDICINE

## 2021-07-21 PROCEDURE — 3074F SYST BP LT 130 MM HG: CPT | Mod: CPTII,S$GLB,, | Performed by: INTERNAL MEDICINE

## 2021-07-21 PROCEDURE — 3079F DIAST BP 80-89 MM HG: CPT | Mod: CPTII,S$GLB,, | Performed by: INTERNAL MEDICINE

## 2021-07-21 PROCEDURE — 99999 PR PBB SHADOW E&M-EST. PATIENT-LVL IV: CPT | Mod: PBBFAC,,, | Performed by: INTERNAL MEDICINE

## 2021-07-21 PROCEDURE — 93000 ELECTROCARDIOGRAM COMPLETE: CPT | Mod: S$GLB,,, | Performed by: INTERNAL MEDICINE

## 2021-07-21 PROCEDURE — 1126F PR PAIN SEVERITY QUANTIFIED, NO PAIN PRESENT: ICD-10-PCS | Mod: CPTII,S$GLB,, | Performed by: INTERNAL MEDICINE

## 2021-07-21 PROCEDURE — 3008F BODY MASS INDEX DOCD: CPT | Mod: CPTII,S$GLB,, | Performed by: INTERNAL MEDICINE

## 2021-07-21 PROCEDURE — 1126F AMNT PAIN NOTED NONE PRSNT: CPT | Mod: CPTII,S$GLB,, | Performed by: INTERNAL MEDICINE

## 2021-07-21 PROCEDURE — 99999 PR PBB SHADOW E&M-EST. PATIENT-LVL IV: ICD-10-PCS | Mod: PBBFAC,,, | Performed by: INTERNAL MEDICINE

## 2021-07-21 RX ORDER — METOPROLOL TARTRATE 50 MG/1
50 TABLET ORAL
Qty: 4 TABLET | Refills: 0 | Status: SHIPPED | OUTPATIENT
Start: 2021-07-21 | End: 2021-08-02

## 2021-07-29 ENCOUNTER — PATIENT MESSAGE (OUTPATIENT)
Dept: CARDIOLOGY | Facility: CLINIC | Age: 56
End: 2021-07-29

## 2021-08-04 ENCOUNTER — PATIENT MESSAGE (OUTPATIENT)
Dept: ADMINISTRATIVE | Facility: HOSPITAL | Age: 56
End: 2021-08-04

## 2021-08-07 ENCOUNTER — PATIENT MESSAGE (OUTPATIENT)
Dept: CARDIOLOGY | Facility: CLINIC | Age: 56
End: 2021-08-07

## 2021-08-16 LAB
HM MAMMOGRAM: NEGATIVE
HPV, HIGH-RISK: NOT DETECTED
PAP RECOMMENDATION EXT: ABNORMAL
PAP SMEAR: ABNORMAL

## 2021-08-18 ENCOUNTER — HOSPITAL ENCOUNTER (OUTPATIENT)
Dept: RADIOLOGY | Facility: HOSPITAL | Age: 56
Discharge: HOME | End: 2021-08-18
Attending: INTERNAL MEDICINE
Payer: COMMERCIAL

## 2021-08-18 VITALS
DIASTOLIC BLOOD PRESSURE: 60 MMHG | HEART RATE: 70 BPM | RESPIRATION RATE: 18 BRPM | OXYGEN SATURATION: 100 % | SYSTOLIC BLOOD PRESSURE: 102 MMHG

## 2021-08-18 DIAGNOSIS — I25.10 CORONARY ARTERY DISEASE, ANGINA PRESENCE UNSPECIFIED, UNSPECIFIED VESSEL OR LESION TYPE, UNSPECIFIED WHETHER NATIVE OR TRANSPLANTED HEART: ICD-10-CM

## 2021-08-18 LAB
CREAT SERPL-MCNC: 0.7 MG/DL (ref 0.5–1.4)
SAMPLE: NORMAL

## 2021-08-18 PROCEDURE — 25000003 PHARM REV CODE 250: Performed by: INTERNAL MEDICINE

## 2021-08-18 PROCEDURE — 75574 CTA CARDIAC: ICD-10-PCS | Mod: 26,,, | Performed by: RADIOLOGY

## 2021-08-18 PROCEDURE — 25000242 PHARM REV CODE 250 ALT 637 W/ HCPCS: Performed by: INTERNAL MEDICINE

## 2021-08-18 PROCEDURE — 75574 CT ANGIO HRT W/3D IMAGE: CPT | Mod: 26,,, | Performed by: RADIOLOGY

## 2021-08-18 PROCEDURE — 25500020 PHARM REV CODE 255: Performed by: INTERNAL MEDICINE

## 2021-08-18 PROCEDURE — 75574 CT ANGIO HRT W/3D IMAGE: CPT | Mod: TC

## 2021-08-18 RX ORDER — METOPROLOL TARTRATE 1 MG/ML
30 INJECTION, SOLUTION INTRAVENOUS ONCE
Status: COMPLETED | OUTPATIENT
Start: 2021-08-18 | End: 2021-08-18

## 2021-08-18 RX ORDER — NITROGLYCERIN 0.4 MG/1
0.4 TABLET SUBLINGUAL ONCE
Status: COMPLETED | OUTPATIENT
Start: 2021-08-18 | End: 2021-08-18

## 2021-08-18 RX ADMIN — NITROGLYCERIN 0.4 MG: 0.4 TABLET, ORALLY DISINTEGRATING SUBLINGUAL at 02:08

## 2021-08-18 RX ADMIN — IOHEXOL 100 ML: 350 INJECTION, SOLUTION INTRAVENOUS at 03:08

## 2021-08-18 RX ADMIN — METOROPROLOL TARTRATE 30 MG: 5 INJECTION, SOLUTION INTRAVENOUS at 02:08

## 2021-08-25 ENCOUNTER — PATIENT MESSAGE (OUTPATIENT)
Dept: CARDIOLOGY | Facility: CLINIC | Age: 56
End: 2021-08-25

## 2021-09-07 ENCOUNTER — PATIENT OUTREACH (OUTPATIENT)
Dept: ADMINISTRATIVE | Facility: OTHER | Age: 56
End: 2021-09-07

## 2021-09-21 ENCOUNTER — PATIENT MESSAGE (OUTPATIENT)
Dept: FAMILY MEDICINE | Facility: CLINIC | Age: 56
End: 2021-09-21

## 2021-09-21 DIAGNOSIS — R80.9 TYPE 2 DIABETES MELLITUS WITH MICROALBUMINURIA, WITHOUT LONG-TERM CURRENT USE OF INSULIN: Chronic | ICD-10-CM

## 2021-09-21 DIAGNOSIS — I10 ESSENTIAL HYPERTENSION: Primary | Chronic | ICD-10-CM

## 2021-09-21 DIAGNOSIS — E11.29 TYPE 2 DIABETES MELLITUS WITH MICROALBUMINURIA, WITHOUT LONG-TERM CURRENT USE OF INSULIN: Chronic | ICD-10-CM

## 2021-09-21 DIAGNOSIS — Z00.00 ROUTINE MEDICAL EXAM: ICD-10-CM

## 2021-09-21 DIAGNOSIS — E78.5 HYPERLIPIDEMIA, UNSPECIFIED HYPERLIPIDEMIA TYPE: Chronic | ICD-10-CM

## 2021-09-25 ENCOUNTER — IMMUNIZATION (OUTPATIENT)
Dept: PRIMARY CARE CLINIC | Facility: CLINIC | Age: 56
End: 2021-09-25
Payer: COMMERCIAL

## 2021-09-25 DIAGNOSIS — Z23 NEED FOR VACCINATION: Primary | ICD-10-CM

## 2021-09-25 PROCEDURE — 0003A COVID-19, MRNA, LNP-S, PF, 30 MCG/0.3 ML DOSE VACCINE: CPT | Mod: CV19,PBBFAC | Performed by: INTERNAL MEDICINE

## 2021-09-25 PROCEDURE — 91300 COVID-19, MRNA, LNP-S, PF, 30 MCG/0.3 ML DOSE VACCINE: CPT | Mod: PBBFAC | Performed by: INTERNAL MEDICINE

## 2021-09-28 ENCOUNTER — LAB VISIT (OUTPATIENT)
Dept: LAB | Facility: HOSPITAL | Age: 56
End: 2021-09-28
Attending: NURSE PRACTITIONER
Payer: COMMERCIAL

## 2021-09-28 DIAGNOSIS — R80.9 TYPE 2 DIABETES MELLITUS WITH MICROALBUMINURIA, WITHOUT LONG-TERM CURRENT USE OF INSULIN: Chronic | ICD-10-CM

## 2021-09-28 DIAGNOSIS — E11.29 TYPE 2 DIABETES MELLITUS WITH MICROALBUMINURIA, WITHOUT LONG-TERM CURRENT USE OF INSULIN: Chronic | ICD-10-CM

## 2021-09-28 LAB
ALBUMIN/CREAT UR: 171.7 UG/MG (ref 0–30)
CREAT UR-MCNC: 92 MG/DL (ref 15–325)
MICROALBUMIN UR DL<=1MG/L-MCNC: 158 UG/ML

## 2021-09-28 PROCEDURE — 82570 ASSAY OF URINE CREATININE: CPT | Performed by: NURSE PRACTITIONER

## 2021-09-29 ENCOUNTER — OFFICE VISIT (OUTPATIENT)
Dept: CARDIOLOGY | Facility: CLINIC | Age: 56
End: 2021-09-29
Payer: COMMERCIAL

## 2021-09-29 VITALS
WEIGHT: 199.75 LBS | DIASTOLIC BLOOD PRESSURE: 74 MMHG | SYSTOLIC BLOOD PRESSURE: 132 MMHG | OXYGEN SATURATION: 97 % | HEIGHT: 64 IN | BODY MASS INDEX: 34.1 KG/M2 | HEART RATE: 91 BPM

## 2021-09-29 DIAGNOSIS — I25.10 CORONARY ARTERY DISEASE, ANGINA PRESENCE UNSPECIFIED, UNSPECIFIED VESSEL OR LESION TYPE, UNSPECIFIED WHETHER NATIVE OR TRANSPLANTED HEART: Primary | ICD-10-CM

## 2021-09-29 DIAGNOSIS — E66.01 MORBID OBESITY: ICD-10-CM

## 2021-09-29 DIAGNOSIS — I10 ESSENTIAL HYPERTENSION: ICD-10-CM

## 2021-09-29 DIAGNOSIS — E78.5 HYPERLIPIDEMIA, UNSPECIFIED HYPERLIPIDEMIA TYPE: ICD-10-CM

## 2021-09-29 DIAGNOSIS — R80.9 TYPE 2 DIABETES MELLITUS WITH MICROALBUMINURIA, WITHOUT LONG-TERM CURRENT USE OF INSULIN: ICD-10-CM

## 2021-09-29 DIAGNOSIS — I25.10 CAD (CORONARY ARTERY DISEASE): ICD-10-CM

## 2021-09-29 DIAGNOSIS — E78.5 DYSLIPIDEMIA: ICD-10-CM

## 2021-09-29 DIAGNOSIS — E11.29 TYPE 2 DIABETES MELLITUS WITH MICROALBUMINURIA, WITHOUT LONG-TERM CURRENT USE OF INSULIN: ICD-10-CM

## 2021-09-29 DIAGNOSIS — E66.9 OBESITY, CLASS I, BMI 30-34.9: ICD-10-CM

## 2021-09-29 PROCEDURE — 1159F PR MEDICATION LIST DOCUMENTED IN MEDICAL RECORD: ICD-10-PCS | Mod: CPTII,S$GLB,, | Performed by: INTERNAL MEDICINE

## 2021-09-29 PROCEDURE — 3046F HEMOGLOBIN A1C LEVEL >9.0%: CPT | Mod: CPTII,S$GLB,, | Performed by: INTERNAL MEDICINE

## 2021-09-29 PROCEDURE — 3060F PR POS MICROALBUMINURIA RESULT DOCUMENTED/REVIEW: ICD-10-PCS | Mod: CPTII,S$GLB,, | Performed by: INTERNAL MEDICINE

## 2021-09-29 PROCEDURE — 3008F PR BODY MASS INDEX (BMI) DOCUMENTED: ICD-10-PCS | Mod: CPTII,S$GLB,, | Performed by: INTERNAL MEDICINE

## 2021-09-29 PROCEDURE — 3078F DIAST BP <80 MM HG: CPT | Mod: CPTII,S$GLB,, | Performed by: INTERNAL MEDICINE

## 2021-09-29 PROCEDURE — 3008F BODY MASS INDEX DOCD: CPT | Mod: CPTII,S$GLB,, | Performed by: INTERNAL MEDICINE

## 2021-09-29 PROCEDURE — 99215 PR OFFICE/OUTPT VISIT, EST, LEVL V, 40-54 MIN: ICD-10-PCS | Mod: S$GLB,,, | Performed by: INTERNAL MEDICINE

## 2021-09-29 PROCEDURE — 99999 PR PBB SHADOW E&M-EST. PATIENT-LVL IV: CPT | Mod: PBBFAC,,, | Performed by: INTERNAL MEDICINE

## 2021-09-29 PROCEDURE — 1160F RVW MEDS BY RX/DR IN RCRD: CPT | Mod: CPTII,S$GLB,, | Performed by: INTERNAL MEDICINE

## 2021-09-29 PROCEDURE — 99215 OFFICE O/P EST HI 40 MIN: CPT | Mod: S$GLB,,, | Performed by: INTERNAL MEDICINE

## 2021-09-29 PROCEDURE — 3075F PR MOST RECENT SYSTOLIC BLOOD PRESS GE 130-139MM HG: ICD-10-PCS | Mod: CPTII,S$GLB,, | Performed by: INTERNAL MEDICINE

## 2021-09-29 PROCEDURE — 3075F SYST BP GE 130 - 139MM HG: CPT | Mod: CPTII,S$GLB,, | Performed by: INTERNAL MEDICINE

## 2021-09-29 PROCEDURE — 3066F NEPHROPATHY DOC TX: CPT | Mod: CPTII,S$GLB,, | Performed by: INTERNAL MEDICINE

## 2021-09-29 PROCEDURE — 3046F PR MOST RECENT HEMOGLOBIN A1C LEVEL > 9.0%: ICD-10-PCS | Mod: CPTII,S$GLB,, | Performed by: INTERNAL MEDICINE

## 2021-09-29 PROCEDURE — 1160F PR REVIEW ALL MEDS BY PRESCRIBER/CLIN PHARMACIST DOCUMENTED: ICD-10-PCS | Mod: CPTII,S$GLB,, | Performed by: INTERNAL MEDICINE

## 2021-09-29 PROCEDURE — 3078F PR MOST RECENT DIASTOLIC BLOOD PRESSURE < 80 MM HG: ICD-10-PCS | Mod: CPTII,S$GLB,, | Performed by: INTERNAL MEDICINE

## 2021-09-29 PROCEDURE — 3060F POS MICROALBUMINURIA REV: CPT | Mod: CPTII,S$GLB,, | Performed by: INTERNAL MEDICINE

## 2021-09-29 PROCEDURE — 99999 PR PBB SHADOW E&M-EST. PATIENT-LVL IV: ICD-10-PCS | Mod: PBBFAC,,, | Performed by: INTERNAL MEDICINE

## 2021-09-29 PROCEDURE — 3066F PR DOCUMENTATION OF TREATMENT FOR NEPHROPATHY: ICD-10-PCS | Mod: CPTII,S$GLB,, | Performed by: INTERNAL MEDICINE

## 2021-09-29 PROCEDURE — 1159F MED LIST DOCD IN RCRD: CPT | Mod: CPTII,S$GLB,, | Performed by: INTERNAL MEDICINE

## 2021-09-29 RX ORDER — SODIUM CHLORIDE 9 MG/ML
INJECTION, SOLUTION INTRAVENOUS CONTINUOUS
Status: CANCELLED | OUTPATIENT
Start: 2021-09-29

## 2021-09-29 RX ORDER — CLOPIDOGREL 300 MG/1
600 TABLET, FILM COATED ORAL ONCE
Status: CANCELLED | OUTPATIENT
Start: 2021-11-24

## 2021-09-29 RX ORDER — DIPHENHYDRAMINE HCL 25 MG
50 CAPSULE ORAL ONCE
Status: CANCELLED | OUTPATIENT
Start: 2021-09-29 | End: 2021-09-29

## 2021-10-04 ENCOUNTER — PATIENT MESSAGE (OUTPATIENT)
Dept: ADMINISTRATIVE | Facility: HOSPITAL | Age: 56
End: 2021-10-04

## 2021-10-05 ENCOUNTER — TELEPHONE (OUTPATIENT)
Dept: FAMILY MEDICINE | Facility: CLINIC | Age: 56
End: 2021-10-05

## 2021-10-06 ENCOUNTER — PATIENT MESSAGE (OUTPATIENT)
Dept: FAMILY MEDICINE | Facility: CLINIC | Age: 56
End: 2021-10-06

## 2021-10-09 ENCOUNTER — IMMUNIZATION (OUTPATIENT)
Dept: PRIMARY CARE CLINIC | Facility: CLINIC | Age: 56
End: 2021-10-09
Payer: COMMERCIAL

## 2021-10-09 PROCEDURE — 90686 FLU VACCINE (QUAD) GREATER THAN OR EQUAL TO 3YO PRESERVATIVE FREE IM: ICD-10-PCS | Mod: S$GLB,,, | Performed by: INTERNAL MEDICINE

## 2021-10-09 PROCEDURE — 90471 IMMUNIZATION ADMIN: CPT | Mod: S$GLB,,, | Performed by: INTERNAL MEDICINE

## 2021-10-09 PROCEDURE — 90471 FLU VACCINE (QUAD) GREATER THAN OR EQUAL TO 3YO PRESERVATIVE FREE IM: ICD-10-PCS | Mod: S$GLB,,, | Performed by: INTERNAL MEDICINE

## 2021-10-09 PROCEDURE — 90686 IIV4 VACC NO PRSV 0.5 ML IM: CPT | Mod: S$GLB,,, | Performed by: INTERNAL MEDICINE

## 2021-10-14 DIAGNOSIS — I10 ESSENTIAL HYPERTENSION: Chronic | ICD-10-CM

## 2021-10-14 DIAGNOSIS — F43.22 ADJUSTMENT DISORDER WITH ANXIOUS MOOD: ICD-10-CM

## 2021-10-14 RX ORDER — DOXAZOSIN 4 MG/1
TABLET ORAL
Qty: 90 TABLET | Refills: 1 | Status: ON HOLD | OUTPATIENT
Start: 2021-10-14 | End: 2021-11-24 | Stop reason: HOSPADM

## 2021-10-14 RX ORDER — SERTRALINE HYDROCHLORIDE 50 MG/1
TABLET, FILM COATED ORAL
Qty: 90 TABLET | Refills: 1 | Status: SHIPPED | OUTPATIENT
Start: 2021-10-14 | End: 2022-04-11

## 2021-10-15 ENCOUNTER — TELEPHONE (OUTPATIENT)
Dept: FAMILY MEDICINE | Facility: CLINIC | Age: 56
End: 2021-10-15

## 2021-10-18 ENCOUNTER — PATIENT MESSAGE (OUTPATIENT)
Dept: ADMINISTRATIVE | Facility: HOSPITAL | Age: 56
End: 2021-10-18
Payer: COMMERCIAL

## 2021-10-29 ENCOUNTER — PATIENT MESSAGE (OUTPATIENT)
Dept: OPHTHALMOLOGY | Facility: CLINIC | Age: 56
End: 2021-10-29
Payer: COMMERCIAL

## 2021-11-02 ENCOUNTER — PATIENT OUTREACH (OUTPATIENT)
Dept: ADMINISTRATIVE | Facility: OTHER | Age: 56
End: 2021-11-02
Payer: COMMERCIAL

## 2021-11-17 ENCOUNTER — HOSPITAL ENCOUNTER (OUTPATIENT)
Dept: PREADMISSION TESTING | Facility: HOSPITAL | Age: 56
Discharge: HOME | End: 2021-11-17
Attending: INTERNAL MEDICINE
Payer: COMMERCIAL

## 2021-11-17 VITALS
BODY MASS INDEX: 34.89 KG/M2 | HEART RATE: 88 BPM | SYSTOLIC BLOOD PRESSURE: 115 MMHG | OXYGEN SATURATION: 97 % | HEIGHT: 64 IN | WEIGHT: 204.38 LBS | RESPIRATION RATE: 18 BRPM | TEMPERATURE: 97 F | DIASTOLIC BLOOD PRESSURE: 72 MMHG

## 2021-11-17 DIAGNOSIS — I25.10 CORONARY ARTERY DISEASE: ICD-10-CM

## 2021-11-17 DIAGNOSIS — Z01.818 PREOPERATIVE TESTING: Primary | ICD-10-CM

## 2021-11-17 DIAGNOSIS — I25.10 CORONARY ARTERY DISEASE, ANGINA PRESENCE UNSPECIFIED, UNSPECIFIED VESSEL OR LESION TYPE, UNSPECIFIED WHETHER NATIVE OR TRANSPLANTED HEART: ICD-10-CM

## 2021-11-17 LAB
ANION GAP SERPL CALC-SCNC: 9 MMOL/L (ref 8–16)
BASOPHILS # BLD AUTO: 0.04 K/UL (ref 0–0.2)
BASOPHILS NFR BLD: 0.4 % (ref 0–1.9)
BUN SERPL-MCNC: 25 MG/DL (ref 6–20)
CALCIUM SERPL-MCNC: 10.6 MG/DL (ref 8.7–10.5)
CHLORIDE SERPL-SCNC: 103 MMOL/L (ref 95–110)
CO2 SERPL-SCNC: 25 MMOL/L (ref 23–29)
CREAT SERPL-MCNC: 1 MG/DL (ref 0.5–1.4)
DIFFERENTIAL METHOD: ABNORMAL
EOSINOPHIL # BLD AUTO: 0.4 K/UL (ref 0–0.5)
EOSINOPHIL NFR BLD: 4.5 % (ref 0–8)
ERYTHROCYTE [DISTWIDTH] IN BLOOD BY AUTOMATED COUNT: 12.4 % (ref 11.5–14.5)
EST. GFR  (AFRICAN AMERICAN): >60 ML/MIN/1.73 M^2
EST. GFR  (NON AFRICAN AMERICAN): >60 ML/MIN/1.73 M^2
GLUCOSE SERPL-MCNC: 165 MG/DL (ref 70–110)
HCT VFR BLD AUTO: 41.3 % (ref 37–48.5)
HGB BLD-MCNC: 13.8 G/DL (ref 12–16)
IMM GRANULOCYTES # BLD AUTO: 0.02 K/UL (ref 0–0.04)
IMM GRANULOCYTES NFR BLD AUTO: 0.2 % (ref 0–0.5)
LYMPHOCYTES # BLD AUTO: 1.2 K/UL (ref 1–4.8)
LYMPHOCYTES NFR BLD: 11.8 % (ref 18–48)
MCH RBC QN AUTO: 28.9 PG (ref 27–31)
MCHC RBC AUTO-ENTMCNC: 33.4 G/DL (ref 32–36)
MCV RBC AUTO: 87 FL (ref 82–98)
MONOCYTES # BLD AUTO: 0.6 K/UL (ref 0.3–1)
MONOCYTES NFR BLD: 6.4 % (ref 4–15)
NEUTROPHILS # BLD AUTO: 7.5 K/UL (ref 1.8–7.7)
NEUTROPHILS NFR BLD: 76.7 % (ref 38–73)
NRBC BLD-RTO: 0 /100 WBC
PLATELET # BLD AUTO: 306 K/UL (ref 150–450)
PMV BLD AUTO: 9.2 FL (ref 9.2–12.9)
POTASSIUM SERPL-SCNC: 4.5 MMOL/L (ref 3.5–5.1)
RBC # BLD AUTO: 4.77 M/UL (ref 4–5.4)
SODIUM SERPL-SCNC: 137 MMOL/L (ref 136–145)
WBC # BLD AUTO: 9.79 K/UL (ref 3.9–12.7)

## 2021-11-17 PROCEDURE — 36415 COLL VENOUS BLD VENIPUNCTURE: CPT | Performed by: INTERNAL MEDICINE

## 2021-11-17 PROCEDURE — 80048 BASIC METABOLIC PNL TOTAL CA: CPT | Performed by: INTERNAL MEDICINE

## 2021-11-17 PROCEDURE — 85025 COMPLETE CBC W/AUTO DIFF WBC: CPT | Performed by: INTERNAL MEDICINE

## 2021-11-18 ENCOUNTER — OFFICE VISIT (OUTPATIENT)
Dept: ENDOCRINOLOGY | Facility: CLINIC | Age: 56
End: 2021-11-18
Payer: COMMERCIAL

## 2021-11-18 ENCOUNTER — LAB VISIT (OUTPATIENT)
Dept: LAB | Facility: HOSPITAL | Age: 56
End: 2021-11-18
Payer: COMMERCIAL

## 2021-11-18 VITALS
SYSTOLIC BLOOD PRESSURE: 132 MMHG | HEART RATE: 102 BPM | BODY MASS INDEX: 34.84 KG/M2 | HEIGHT: 64 IN | OXYGEN SATURATION: 98 % | DIASTOLIC BLOOD PRESSURE: 84 MMHG | WEIGHT: 204.06 LBS

## 2021-11-18 DIAGNOSIS — E11.29 TYPE 2 DIABETES MELLITUS WITH MICROALBUMINURIA, WITHOUT LONG-TERM CURRENT USE OF INSULIN: Primary | ICD-10-CM

## 2021-11-18 DIAGNOSIS — E66.9 OBESITY (BMI 30-39.9): ICD-10-CM

## 2021-11-18 DIAGNOSIS — R80.9 TYPE 2 DIABETES MELLITUS WITH MICROALBUMINURIA, WITHOUT LONG-TERM CURRENT USE OF INSULIN: Primary | ICD-10-CM

## 2021-11-18 DIAGNOSIS — R80.9 TYPE 2 DIABETES MELLITUS WITH MICROALBUMINURIA, WITHOUT LONG-TERM CURRENT USE OF INSULIN: ICD-10-CM

## 2021-11-18 DIAGNOSIS — I10 ESSENTIAL HYPERTENSION: Chronic | ICD-10-CM

## 2021-11-18 DIAGNOSIS — E11.29 TYPE 2 DIABETES MELLITUS WITH MICROALBUMINURIA, WITHOUT LONG-TERM CURRENT USE OF INSULIN: ICD-10-CM

## 2021-11-18 LAB
ESTIMATED AVG GLUCOSE: 263 MG/DL (ref 68–131)
HBA1C MFR BLD: 10.8 % (ref 4–5.6)

## 2021-11-18 PROCEDURE — 99204 OFFICE O/P NEW MOD 45 MIN: CPT | Mod: S$GLB,,, | Performed by: INTERNAL MEDICINE

## 2021-11-18 PROCEDURE — 99204 PR OFFICE/OUTPT VISIT, NEW, LEVL IV, 45-59 MIN: ICD-10-PCS | Mod: S$GLB,,, | Performed by: INTERNAL MEDICINE

## 2021-11-18 PROCEDURE — 99999 PR PBB SHADOW E&M-EST. PATIENT-LVL V: ICD-10-PCS | Mod: PBBFAC,,, | Performed by: INTERNAL MEDICINE

## 2021-11-18 PROCEDURE — 36415 COLL VENOUS BLD VENIPUNCTURE: CPT | Performed by: STUDENT IN AN ORGANIZED HEALTH CARE EDUCATION/TRAINING PROGRAM

## 2021-11-18 PROCEDURE — 99999 PR PBB SHADOW E&M-EST. PATIENT-LVL V: CPT | Mod: PBBFAC,,, | Performed by: INTERNAL MEDICINE

## 2021-11-18 PROCEDURE — 83036 HEMOGLOBIN GLYCOSYLATED A1C: CPT | Performed by: STUDENT IN AN ORGANIZED HEALTH CARE EDUCATION/TRAINING PROGRAM

## 2021-11-18 RX ORDER — CANAGLIFLOZIN 100 MG/1
100 TABLET, FILM COATED ORAL DAILY
Qty: 30 TABLET | Refills: 2 | Status: SHIPPED | OUTPATIENT
Start: 2021-11-18 | End: 2021-12-01 | Stop reason: SDUPTHER

## 2021-11-19 ENCOUNTER — OFFICE VISIT (OUTPATIENT)
Dept: FAMILY MEDICINE | Facility: CLINIC | Age: 56
End: 2021-11-19
Payer: COMMERCIAL

## 2021-11-19 VITALS
BODY MASS INDEX: 35.12 KG/M2 | HEART RATE: 89 BPM | OXYGEN SATURATION: 96 % | WEIGHT: 205.69 LBS | DIASTOLIC BLOOD PRESSURE: 74 MMHG | RESPIRATION RATE: 18 BRPM | HEIGHT: 64 IN | TEMPERATURE: 98 F | SYSTOLIC BLOOD PRESSURE: 132 MMHG

## 2021-11-19 DIAGNOSIS — G89.29 CHRONIC BILATERAL LOW BACK PAIN WITHOUT SCIATICA: Chronic | ICD-10-CM

## 2021-11-19 DIAGNOSIS — E11.29 TYPE 2 DIABETES MELLITUS WITH MICROALBUMINURIA, WITHOUT LONG-TERM CURRENT USE OF INSULIN: Chronic | ICD-10-CM

## 2021-11-19 DIAGNOSIS — I25.10 CORONARY ARTERY DISEASE, UNSPECIFIED VESSEL OR LESION TYPE, UNSPECIFIED WHETHER ANGINA PRESENT, UNSPECIFIED WHETHER NATIVE OR TRANSPLANTED HEART: ICD-10-CM

## 2021-11-19 DIAGNOSIS — G47.9 SLEEP DISTURBANCES: Chronic | ICD-10-CM

## 2021-11-19 DIAGNOSIS — Z00.00 ROUTINE MEDICAL EXAM: Primary | ICD-10-CM

## 2021-11-19 DIAGNOSIS — M54.50 CHRONIC BILATERAL LOW BACK PAIN WITHOUT SCIATICA: Chronic | ICD-10-CM

## 2021-11-19 DIAGNOSIS — F43.22 ADJUSTMENT DISORDER WITH ANXIOUS MOOD: ICD-10-CM

## 2021-11-19 DIAGNOSIS — R80.9 TYPE 2 DIABETES MELLITUS WITH MICROALBUMINURIA, WITHOUT LONG-TERM CURRENT USE OF INSULIN: Chronic | ICD-10-CM

## 2021-11-19 DIAGNOSIS — I10 ESSENTIAL HYPERTENSION: Chronic | ICD-10-CM

## 2021-11-19 PROCEDURE — 3046F PR MOST RECENT HEMOGLOBIN A1C LEVEL > 9.0%: ICD-10-PCS | Mod: CPTII,S$GLB,, | Performed by: INTERNAL MEDICINE

## 2021-11-19 PROCEDURE — 3066F NEPHROPATHY DOC TX: CPT | Mod: CPTII,S$GLB,, | Performed by: INTERNAL MEDICINE

## 2021-11-19 PROCEDURE — 3078F DIAST BP <80 MM HG: CPT | Mod: CPTII,S$GLB,, | Performed by: INTERNAL MEDICINE

## 2021-11-19 PROCEDURE — 99999 PR PBB SHADOW E&M-EST. PATIENT-LVL V: ICD-10-PCS | Mod: PBBFAC,,, | Performed by: INTERNAL MEDICINE

## 2021-11-19 PROCEDURE — 3008F BODY MASS INDEX DOCD: CPT | Mod: CPTII,S$GLB,, | Performed by: INTERNAL MEDICINE

## 2021-11-19 PROCEDURE — 1160F PR REVIEW ALL MEDS BY PRESCRIBER/CLIN PHARMACIST DOCUMENTED: ICD-10-PCS | Mod: CPTII,S$GLB,, | Performed by: INTERNAL MEDICINE

## 2021-11-19 PROCEDURE — 1160F RVW MEDS BY RX/DR IN RCRD: CPT | Mod: CPTII,S$GLB,, | Performed by: INTERNAL MEDICINE

## 2021-11-19 PROCEDURE — 1159F PR MEDICATION LIST DOCUMENTED IN MEDICAL RECORD: ICD-10-PCS | Mod: CPTII,S$GLB,, | Performed by: INTERNAL MEDICINE

## 2021-11-19 PROCEDURE — 3075F PR MOST RECENT SYSTOLIC BLOOD PRESS GE 130-139MM HG: ICD-10-PCS | Mod: CPTII,S$GLB,, | Performed by: INTERNAL MEDICINE

## 2021-11-19 PROCEDURE — 99999 PR PBB SHADOW E&M-EST. PATIENT-LVL V: CPT | Mod: PBBFAC,,, | Performed by: INTERNAL MEDICINE

## 2021-11-19 PROCEDURE — 3075F SYST BP GE 130 - 139MM HG: CPT | Mod: CPTII,S$GLB,, | Performed by: INTERNAL MEDICINE

## 2021-11-19 PROCEDURE — 1159F MED LIST DOCD IN RCRD: CPT | Mod: CPTII,S$GLB,, | Performed by: INTERNAL MEDICINE

## 2021-11-19 PROCEDURE — 3078F PR MOST RECENT DIASTOLIC BLOOD PRESSURE < 80 MM HG: ICD-10-PCS | Mod: CPTII,S$GLB,, | Performed by: INTERNAL MEDICINE

## 2021-11-19 PROCEDURE — 99396 PREV VISIT EST AGE 40-64: CPT | Mod: S$GLB,,, | Performed by: INTERNAL MEDICINE

## 2021-11-19 PROCEDURE — 3060F PR POS MICROALBUMINURIA RESULT DOCUMENTED/REVIEW: ICD-10-PCS | Mod: CPTII,S$GLB,, | Performed by: INTERNAL MEDICINE

## 2021-11-19 PROCEDURE — 3008F PR BODY MASS INDEX (BMI) DOCUMENTED: ICD-10-PCS | Mod: CPTII,S$GLB,, | Performed by: INTERNAL MEDICINE

## 2021-11-19 PROCEDURE — 99396 PR PREVENTIVE VISIT,EST,40-64: ICD-10-PCS | Mod: S$GLB,,, | Performed by: INTERNAL MEDICINE

## 2021-11-19 PROCEDURE — 3046F HEMOGLOBIN A1C LEVEL >9.0%: CPT | Mod: CPTII,S$GLB,, | Performed by: INTERNAL MEDICINE

## 2021-11-19 PROCEDURE — 3066F PR DOCUMENTATION OF TREATMENT FOR NEPHROPATHY: ICD-10-PCS | Mod: CPTII,S$GLB,, | Performed by: INTERNAL MEDICINE

## 2021-11-19 PROCEDURE — 3060F POS MICROALBUMINURIA REV: CPT | Mod: CPTII,S$GLB,, | Performed by: INTERNAL MEDICINE

## 2021-11-21 ENCOUNTER — PATIENT MESSAGE (OUTPATIENT)
Dept: ENDOCRINOLOGY | Facility: CLINIC | Age: 56
End: 2021-11-21
Payer: COMMERCIAL

## 2021-11-21 ENCOUNTER — PATIENT MESSAGE (OUTPATIENT)
Dept: FAMILY MEDICINE | Facility: CLINIC | Age: 56
End: 2021-11-21
Payer: COMMERCIAL

## 2021-11-21 DIAGNOSIS — R80.9 TYPE 2 DIABETES MELLITUS WITH MICROALBUMINURIA, WITHOUT LONG-TERM CURRENT USE OF INSULIN: Chronic | ICD-10-CM

## 2021-11-21 DIAGNOSIS — E11.29 TYPE 2 DIABETES MELLITUS WITH MICROALBUMINURIA, WITHOUT LONG-TERM CURRENT USE OF INSULIN: Chronic | ICD-10-CM

## 2021-11-22 ENCOUNTER — LAB VISIT (OUTPATIENT)
Dept: LAB | Facility: HOSPITAL | Age: 56
End: 2021-11-22
Attending: INTERNAL MEDICINE
Payer: COMMERCIAL

## 2021-11-22 DIAGNOSIS — Z01.818 PREOPERATIVE TESTING: ICD-10-CM

## 2021-11-22 LAB — B-HCG UR QL: NEGATIVE

## 2021-11-22 PROCEDURE — 81025 URINE PREGNANCY TEST: CPT | Performed by: INTERNAL MEDICINE

## 2021-11-22 RX ORDER — DULAGLUTIDE 1.5 MG/.5ML
1.5 INJECTION, SOLUTION SUBCUTANEOUS WEEKLY
Qty: 4 PEN | Refills: 2 | Status: SHIPPED | OUTPATIENT
Start: 2021-11-22 | End: 2022-01-19 | Stop reason: SDUPTHER

## 2021-11-23 ENCOUNTER — PATIENT MESSAGE (OUTPATIENT)
Dept: CARDIOLOGY | Facility: HOSPITAL | Age: 56
End: 2021-11-23
Payer: COMMERCIAL

## 2021-11-23 ENCOUNTER — TELEPHONE (OUTPATIENT)
Dept: CARDIOLOGY | Facility: CLINIC | Age: 56
End: 2021-11-23
Payer: COMMERCIAL

## 2021-11-24 ENCOUNTER — HOSPITAL ENCOUNTER (OUTPATIENT)
Facility: HOSPITAL | Age: 56
Discharge: HOME | End: 2021-11-24
Attending: INTERNAL MEDICINE | Admitting: INTERNAL MEDICINE
Payer: COMMERCIAL

## 2021-11-24 VITALS
HEART RATE: 66 BPM | SYSTOLIC BLOOD PRESSURE: 110 MMHG | BODY MASS INDEX: 35.07 KG/M2 | OXYGEN SATURATION: 97 % | WEIGHT: 204.31 LBS | TEMPERATURE: 98 F | DIASTOLIC BLOOD PRESSURE: 57 MMHG | RESPIRATION RATE: 18 BRPM

## 2021-11-24 DIAGNOSIS — R07.9 CHEST PAIN: ICD-10-CM

## 2021-11-24 DIAGNOSIS — I25.10 CAD (CORONARY ARTERY DISEASE): ICD-10-CM

## 2021-11-24 DIAGNOSIS — I10 ESSENTIAL HYPERTENSION: Chronic | ICD-10-CM

## 2021-11-24 DIAGNOSIS — Z01.818 PREOPERATIVE TESTING: Primary | ICD-10-CM

## 2021-11-24 DIAGNOSIS — I25.10 CORONARY ARTERY DISEASE: ICD-10-CM

## 2021-11-24 DIAGNOSIS — I25.10 CORONARY ARTERY DISEASE, ANGINA PRESENCE UNSPECIFIED, UNSPECIFIED VESSEL OR LESION TYPE, UNSPECIFIED WHETHER NATIVE OR TRANSPLANTED HEART: ICD-10-CM

## 2021-11-24 PROCEDURE — 25000003 PHARM REV CODE 250: Performed by: INTERNAL MEDICINE

## 2021-11-24 PROCEDURE — 99499 NO LOS: ICD-10-PCS | Mod: ,,, | Performed by: INTERNAL MEDICINE

## 2021-11-24 PROCEDURE — 99499 UNLISTED E&M SERVICE: CPT | Mod: ,,, | Performed by: INTERNAL MEDICINE

## 2021-11-24 RX ORDER — CLOPIDOGREL 300 MG/1
600 TABLET, FILM COATED ORAL ONCE
Status: COMPLETED | OUTPATIENT
Start: 2021-11-24 | End: 2021-11-24

## 2021-11-24 RX ORDER — SODIUM CHLORIDE 9 MG/ML
INJECTION, SOLUTION INTRAVENOUS CONTINUOUS
Status: DISCONTINUED | OUTPATIENT
Start: 2021-11-24 | End: 2021-11-26 | Stop reason: HOSPADM

## 2021-11-24 RX ORDER — CLOPIDOGREL BISULFATE 75 MG/1
75 TABLET ORAL DAILY
Qty: 30 TABLET | Refills: 11 | Status: SHIPPED | OUTPATIENT
Start: 2021-11-24 | End: 2023-01-15

## 2021-11-24 RX ORDER — DIPHENHYDRAMINE HCL 25 MG
50 CAPSULE ORAL ONCE
Status: COMPLETED | OUTPATIENT
Start: 2021-11-24 | End: 2021-11-24

## 2021-11-24 RX ADMIN — SODIUM CHLORIDE: 0.9 INJECTION, SOLUTION INTRAVENOUS at 02:11

## 2021-11-24 RX ADMIN — SODIUM CHLORIDE: 0.9 INJECTION, SOLUTION INTRAVENOUS at 08:11

## 2021-11-24 RX ADMIN — CLOPIDOGREL BISULFATE 600 MG: 300 TABLET, FILM COATED ORAL at 08:11

## 2021-11-24 RX ADMIN — DIPHENHYDRAMINE HYDROCHLORIDE 50 MG: 25 CAPSULE ORAL at 08:11

## 2021-11-26 ENCOUNTER — HOSPITAL ENCOUNTER (OUTPATIENT)
Facility: HOSPITAL | Age: 56
Discharge: HOME | End: 2021-11-26
Attending: INTERNAL MEDICINE | Admitting: INTERNAL MEDICINE
Payer: COMMERCIAL

## 2021-11-26 VITALS
DIASTOLIC BLOOD PRESSURE: 84 MMHG | SYSTOLIC BLOOD PRESSURE: 144 MMHG | TEMPERATURE: 98 F | RESPIRATION RATE: 16 BRPM | HEART RATE: 82 BPM | OXYGEN SATURATION: 99 %

## 2021-11-26 DIAGNOSIS — S73.191D TEAR OF RIGHT ACETABULAR LABRUM, SUBSEQUENT ENCOUNTER: ICD-10-CM

## 2021-11-26 DIAGNOSIS — E78.5 HYPERLIPIDEMIA, UNSPECIFIED HYPERLIPIDEMIA TYPE: Chronic | ICD-10-CM

## 2021-11-26 DIAGNOSIS — I10 ESSENTIAL HYPERTENSION: Chronic | ICD-10-CM

## 2021-11-26 DIAGNOSIS — J30.89 NON-SEASONAL ALLERGIC RHINITIS, UNSPECIFIED TRIGGER: Chronic | ICD-10-CM

## 2021-11-26 DIAGNOSIS — M25.551 RIGHT HIP PAIN: ICD-10-CM

## 2021-11-26 DIAGNOSIS — R07.9 CHEST PAIN: ICD-10-CM

## 2021-11-26 DIAGNOSIS — E66.9 OBESITY (BMI 30-39.9): ICD-10-CM

## 2021-11-26 DIAGNOSIS — I77.9 CAROTID ARTERY DISEASE, UNSPECIFIED LATERALITY, UNSPECIFIED TYPE: ICD-10-CM

## 2021-11-26 DIAGNOSIS — I25.10 CORONARY ARTERY DISEASE, UNSPECIFIED VESSEL OR LESION TYPE, UNSPECIFIED WHETHER ANGINA PRESENT, UNSPECIFIED WHETHER NATIVE OR TRANSPLANTED HEART: Primary | ICD-10-CM

## 2021-11-26 DIAGNOSIS — G89.29 CHRONIC BILATERAL LOW BACK PAIN WITHOUT SCIATICA: Chronic | ICD-10-CM

## 2021-11-26 DIAGNOSIS — M54.50 CHRONIC BILATERAL LOW BACK PAIN WITHOUT SCIATICA: Chronic | ICD-10-CM

## 2021-11-26 DIAGNOSIS — R80.9 TYPE 2 DIABETES MELLITUS WITH MICROALBUMINURIA, WITHOUT LONG-TERM CURRENT USE OF INSULIN: Chronic | ICD-10-CM

## 2021-11-26 DIAGNOSIS — K59.01 SLOW TRANSIT CONSTIPATION: Chronic | ICD-10-CM

## 2021-11-26 DIAGNOSIS — G47.9 SLEEP DISTURBANCES: Chronic | ICD-10-CM

## 2021-11-26 DIAGNOSIS — E11.29 TYPE 2 DIABETES MELLITUS WITH MICROALBUMINURIA, WITHOUT LONG-TERM CURRENT USE OF INSULIN: Chronic | ICD-10-CM

## 2021-11-26 DIAGNOSIS — J45.20 MILD INTERMITTENT ASTHMA WITHOUT COMPLICATION: Chronic | ICD-10-CM

## 2021-11-26 LAB
ALLENS TEST: ABNORMAL
ALLENS TEST: ABNORMAL
POC ACTIVATED CLOTTING TIME K: 261 SEC (ref 74–137)
POC ACTIVATED CLOTTING TIME K: 321 SEC (ref 74–137)
POCT GLUCOSE: 106 MG/DL (ref 70–110)
POCT GLUCOSE: 201 MG/DL (ref 70–110)
POCT GLUCOSE: 296 MG/DL (ref 70–110)
SAMPLE: ABNORMAL
SAMPLE: ABNORMAL
SITE: ABNORMAL
SITE: ABNORMAL

## 2021-11-26 PROCEDURE — 93005 ELECTROCARDIOGRAM TRACING: CPT

## 2021-11-26 PROCEDURE — 99152 MOD SED SAME PHYS/QHP 5/>YRS: CPT | Performed by: INTERNAL MEDICINE

## 2021-11-26 PROCEDURE — 92928 PRQ TCAT PLMT NTRAC ST 1 LES: CPT | Mod: LD,,, | Performed by: INTERNAL MEDICINE

## 2021-11-26 PROCEDURE — C1874 STENT, COATED/COV W/DEL SYS: HCPCS | Performed by: INTERNAL MEDICINE

## 2021-11-26 PROCEDURE — 25500020 PHARM REV CODE 255: Performed by: INTERNAL MEDICINE

## 2021-11-26 PROCEDURE — 92928 PR STENT: ICD-10-PCS | Mod: LD,,, | Performed by: INTERNAL MEDICINE

## 2021-11-26 PROCEDURE — C1725 CATH, TRANSLUMIN NON-LASER: HCPCS | Performed by: INTERNAL MEDICINE

## 2021-11-26 PROCEDURE — 25000003 PHARM REV CODE 250: Performed by: INTERNAL MEDICINE

## 2021-11-26 PROCEDURE — 99152 PR MOD CONSCIOUS SEDATION, SAME PHYS, 5+ YRS, FIRST 15 MIN: ICD-10-PCS | Mod: ,,, | Performed by: INTERNAL MEDICINE

## 2021-11-26 PROCEDURE — 27201423 OPTIME MED/SURG SUP & DEVICES STERILE SUPPLY: Performed by: INTERNAL MEDICINE

## 2021-11-26 PROCEDURE — 99152 MOD SED SAME PHYS/QHP 5/>YRS: CPT | Mod: ,,, | Performed by: INTERNAL MEDICINE

## 2021-11-26 PROCEDURE — 63600175 PHARM REV CODE 636 W HCPCS: Performed by: INTERNAL MEDICINE

## 2021-11-26 PROCEDURE — 99153 MOD SED SAME PHYS/QHP EA: CPT | Performed by: INTERNAL MEDICINE

## 2021-11-26 PROCEDURE — C1887 CATHETER, GUIDING: HCPCS | Performed by: INTERNAL MEDICINE

## 2021-11-26 PROCEDURE — 93010 EKG 12-LEAD: ICD-10-PCS | Mod: ,,, | Performed by: INTERNAL MEDICINE

## 2021-11-26 PROCEDURE — C1894 INTRO/SHEATH, NON-LASER: HCPCS | Performed by: INTERNAL MEDICINE

## 2021-11-26 PROCEDURE — 93010 ELECTROCARDIOGRAM REPORT: CPT | Mod: ,,, | Performed by: INTERNAL MEDICINE

## 2021-11-26 PROCEDURE — C1753 CATH, INTRAVAS ULTRASOUND: HCPCS | Performed by: INTERNAL MEDICINE

## 2021-11-26 PROCEDURE — C9600 PERC DRUG-EL COR STENT SING: HCPCS | Mod: LD | Performed by: INTERNAL MEDICINE

## 2021-11-26 PROCEDURE — C1769 GUIDE WIRE: HCPCS | Performed by: INTERNAL MEDICINE

## 2021-11-26 DEVICE — STENT RONYX35038UX RESOLUTE ONYX 3.50X38
Type: IMPLANTABLE DEVICE | Site: HEART | Status: FUNCTIONAL
Brand: RESOLUTE ONYX™

## 2021-11-26 RX ORDER — NITROGLYCERIN 20 MG/100ML
INJECTION INTRAVENOUS
Status: DISCONTINUED | OUTPATIENT
Start: 2021-11-26 | End: 2021-11-26 | Stop reason: HOSPADM

## 2021-11-26 RX ORDER — MORPHINE SULFATE 4 MG/ML
3 INJECTION, SOLUTION INTRAMUSCULAR; INTRAVENOUS
Status: DISCONTINUED | OUTPATIENT
Start: 2021-11-26 | End: 2021-11-26 | Stop reason: HOSPADM

## 2021-11-26 RX ORDER — VERAPAMIL HYDROCHLORIDE 2.5 MG/ML
INJECTION, SOLUTION INTRAVENOUS
Status: DISCONTINUED | OUTPATIENT
Start: 2021-11-26 | End: 2021-11-26 | Stop reason: HOSPADM

## 2021-11-26 RX ORDER — ONDANSETRON 8 MG/1
8 TABLET, ORALLY DISINTEGRATING ORAL EVERY 8 HOURS PRN
Status: DISCONTINUED | OUTPATIENT
Start: 2021-11-26 | End: 2021-11-26 | Stop reason: HOSPADM

## 2021-11-26 RX ORDER — ACETAMINOPHEN 325 MG/1
650 TABLET ORAL EVERY 4 HOURS PRN
Status: DISCONTINUED | OUTPATIENT
Start: 2021-11-26 | End: 2021-11-26 | Stop reason: HOSPADM

## 2021-11-26 RX ORDER — OXYCODONE HYDROCHLORIDE 5 MG/1
5 TABLET ORAL EVERY 4 HOURS PRN
Status: DISCONTINUED | OUTPATIENT
Start: 2021-11-26 | End: 2021-11-26 | Stop reason: HOSPADM

## 2021-11-26 RX ORDER — HEPARIN SODIUM 1000 [USP'U]/ML
INJECTION, SOLUTION INTRAVENOUS; SUBCUTANEOUS
Status: DISCONTINUED | OUTPATIENT
Start: 2021-11-26 | End: 2021-11-26 | Stop reason: HOSPADM

## 2021-11-26 RX ORDER — FENTANYL CITRATE 50 UG/ML
INJECTION, SOLUTION INTRAMUSCULAR; INTRAVENOUS
Status: DISCONTINUED | OUTPATIENT
Start: 2021-11-26 | End: 2021-11-26 | Stop reason: HOSPADM

## 2021-11-26 RX ORDER — SODIUM CHLORIDE 9 MG/ML
INJECTION, SOLUTION INTRAVENOUS CONTINUOUS
Status: DISCONTINUED | OUTPATIENT
Start: 2021-11-26 | End: 2021-11-26 | Stop reason: HOSPADM

## 2021-11-26 RX ORDER — MIDAZOLAM HYDROCHLORIDE 1 MG/ML
INJECTION INTRAMUSCULAR; INTRAVENOUS
Status: DISCONTINUED | OUTPATIENT
Start: 2021-11-26 | End: 2021-11-26 | Stop reason: HOSPADM

## 2021-11-26 RX ORDER — ONDANSETRON 2 MG/ML
4 INJECTION INTRAMUSCULAR; INTRAVENOUS EVERY 12 HOURS PRN
Status: DISCONTINUED | OUTPATIENT
Start: 2021-11-26 | End: 2021-11-26 | Stop reason: HOSPADM

## 2021-11-26 RX ORDER — DIPHENHYDRAMINE HCL 25 MG
50 CAPSULE ORAL ONCE
Status: DISCONTINUED | OUTPATIENT
Start: 2021-11-26 | End: 2021-11-26

## 2021-11-26 RX ORDER — LIDOCAINE HYDROCHLORIDE 10 MG/ML
INJECTION, SOLUTION EPIDURAL; INFILTRATION; INTRACAUDAL; PERINEURAL
Status: DISCONTINUED | OUTPATIENT
Start: 2021-11-26 | End: 2021-11-26 | Stop reason: HOSPADM

## 2021-11-26 RX ORDER — ACETAMINOPHEN 325 MG/1
650 TABLET ORAL EVERY 4 HOURS PRN
Status: DISCONTINUED | OUTPATIENT
Start: 2021-11-26 | End: 2021-11-26

## 2021-11-26 RX ORDER — DIPHENHYDRAMINE HCL 25 MG
50 CAPSULE ORAL ONCE
Status: COMPLETED | OUTPATIENT
Start: 2021-11-26 | End: 2021-11-26

## 2021-11-26 RX ADMIN — SODIUM CHLORIDE: 0.9 INJECTION, SOLUTION INTRAVENOUS at 12:11

## 2021-11-26 RX ADMIN — SODIUM CHLORIDE: 0.9 INJECTION, SOLUTION INTRAVENOUS at 07:11

## 2021-11-26 RX ADMIN — DIPHENHYDRAMINE HYDROCHLORIDE 50 MG: 25 CAPSULE ORAL at 10:11

## 2021-12-01 DIAGNOSIS — E11.29 TYPE 2 DIABETES MELLITUS WITH MICROALBUMINURIA, WITHOUT LONG-TERM CURRENT USE OF INSULIN: ICD-10-CM

## 2021-12-01 DIAGNOSIS — R80.9 TYPE 2 DIABETES MELLITUS WITH MICROALBUMINURIA, WITHOUT LONG-TERM CURRENT USE OF INSULIN: ICD-10-CM

## 2021-12-02 RX ORDER — CANAGLIFLOZIN 100 MG/1
100 TABLET, FILM COATED ORAL DAILY
Qty: 30 TABLET | Refills: 2 | Status: SHIPPED | OUTPATIENT
Start: 2021-12-02 | End: 2021-12-15

## 2021-12-03 ENCOUNTER — OFFICE VISIT (OUTPATIENT)
Dept: DERMATOLOGY | Facility: CLINIC | Age: 56
End: 2021-12-03
Payer: COMMERCIAL

## 2021-12-03 DIAGNOSIS — L40.0 PSORIASIS VULGARIS: ICD-10-CM

## 2021-12-03 DIAGNOSIS — L23.9 ALLERGIC CONTACT DERMATITIS, UNSPECIFIED TRIGGER: Primary | ICD-10-CM

## 2021-12-03 PROCEDURE — 99204 PR OFFICE/OUTPT VISIT, NEW, LEVL IV, 45-59 MIN: ICD-10-PCS | Mod: S$GLB,,, | Performed by: DERMATOLOGY

## 2021-12-03 PROCEDURE — 99204 OFFICE O/P NEW MOD 45 MIN: CPT | Mod: S$GLB,,, | Performed by: DERMATOLOGY

## 2021-12-03 PROCEDURE — 99999 PR PBB SHADOW E&M-EST. PATIENT-LVL III: ICD-10-PCS | Mod: PBBFAC,,, | Performed by: DERMATOLOGY

## 2021-12-03 PROCEDURE — 3060F PR POS MICROALBUMINURIA RESULT DOCUMENTED/REVIEW: ICD-10-PCS | Mod: CPTII,S$GLB,, | Performed by: DERMATOLOGY

## 2021-12-03 PROCEDURE — 3066F NEPHROPATHY DOC TX: CPT | Mod: CPTII,S$GLB,, | Performed by: DERMATOLOGY

## 2021-12-03 PROCEDURE — 99999 PR PBB SHADOW E&M-EST. PATIENT-LVL III: CPT | Mod: PBBFAC,,, | Performed by: DERMATOLOGY

## 2021-12-03 PROCEDURE — 3060F POS MICROALBUMINURIA REV: CPT | Mod: CPTII,S$GLB,, | Performed by: DERMATOLOGY

## 2021-12-03 PROCEDURE — 3066F PR DOCUMENTATION OF TREATMENT FOR NEPHROPATHY: ICD-10-PCS | Mod: CPTII,S$GLB,, | Performed by: DERMATOLOGY

## 2021-12-03 RX ORDER — CLOBETASOL PROPIONATE 0.5 MG/G
OINTMENT TOPICAL
Qty: 60 G | Refills: 3 | Status: SHIPPED | OUTPATIENT
Start: 2021-12-03 | End: 2022-10-20 | Stop reason: SDUPTHER

## 2021-12-07 ENCOUNTER — PATIENT OUTREACH (OUTPATIENT)
Dept: ADMINISTRATIVE | Facility: OTHER | Age: 56
End: 2021-12-07
Payer: COMMERCIAL

## 2021-12-09 ENCOUNTER — PATIENT MESSAGE (OUTPATIENT)
Dept: CARDIOLOGY | Facility: CLINIC | Age: 56
End: 2021-12-09
Payer: COMMERCIAL

## 2021-12-10 ENCOUNTER — OFFICE VISIT (OUTPATIENT)
Dept: CARDIOLOGY | Facility: CLINIC | Age: 56
End: 2021-12-10
Payer: COMMERCIAL

## 2021-12-10 VITALS
OXYGEN SATURATION: 98 % | WEIGHT: 204 LBS | DIASTOLIC BLOOD PRESSURE: 82 MMHG | BODY MASS INDEX: 34.83 KG/M2 | SYSTOLIC BLOOD PRESSURE: 124 MMHG | HEIGHT: 64 IN | HEART RATE: 104 BPM

## 2021-12-10 DIAGNOSIS — E66.9 OBESITY (BMI 30-39.9): ICD-10-CM

## 2021-12-10 DIAGNOSIS — G89.29 CHRONIC BILATERAL LOW BACK PAIN WITHOUT SCIATICA: ICD-10-CM

## 2021-12-10 DIAGNOSIS — R80.9 TYPE 2 DIABETES MELLITUS WITH MICROALBUMINURIA, WITHOUT LONG-TERM CURRENT USE OF INSULIN: ICD-10-CM

## 2021-12-10 DIAGNOSIS — M54.50 CHRONIC BILATERAL LOW BACK PAIN WITHOUT SCIATICA: ICD-10-CM

## 2021-12-10 DIAGNOSIS — E11.29 TYPE 2 DIABETES MELLITUS WITH MICROALBUMINURIA, WITHOUT LONG-TERM CURRENT USE OF INSULIN: ICD-10-CM

## 2021-12-10 DIAGNOSIS — E78.5 HYPERLIPIDEMIA, UNSPECIFIED HYPERLIPIDEMIA TYPE: ICD-10-CM

## 2021-12-10 DIAGNOSIS — I77.9 CAROTID ARTERY DISEASE, UNSPECIFIED LATERALITY, UNSPECIFIED TYPE: ICD-10-CM

## 2021-12-10 DIAGNOSIS — R07.9 CHEST PAIN, UNSPECIFIED TYPE: ICD-10-CM

## 2021-12-10 DIAGNOSIS — I25.10 CORONARY ARTERY DISEASE, UNSPECIFIED VESSEL OR LESION TYPE, UNSPECIFIED WHETHER ANGINA PRESENT, UNSPECIFIED WHETHER NATIVE OR TRANSPLANTED HEART: ICD-10-CM

## 2021-12-10 DIAGNOSIS — I10 ESSENTIAL HYPERTENSION: Primary | ICD-10-CM

## 2021-12-10 PROCEDURE — 3079F DIAST BP 80-89 MM HG: CPT | Mod: CPTII,S$GLB,, | Performed by: INTERNAL MEDICINE

## 2021-12-10 PROCEDURE — 3066F NEPHROPATHY DOC TX: CPT | Mod: CPTII,S$GLB,, | Performed by: INTERNAL MEDICINE

## 2021-12-10 PROCEDURE — 3046F HEMOGLOBIN A1C LEVEL >9.0%: CPT | Mod: CPTII,S$GLB,, | Performed by: INTERNAL MEDICINE

## 2021-12-10 PROCEDURE — 1159F PR MEDICATION LIST DOCUMENTED IN MEDICAL RECORD: ICD-10-PCS | Mod: CPTII,S$GLB,, | Performed by: INTERNAL MEDICINE

## 2021-12-10 PROCEDURE — 99999 PR PBB SHADOW E&M-EST. PATIENT-LVL IV: CPT | Mod: PBBFAC,,, | Performed by: INTERNAL MEDICINE

## 2021-12-10 PROCEDURE — 3046F PR MOST RECENT HEMOGLOBIN A1C LEVEL > 9.0%: ICD-10-PCS | Mod: CPTII,S$GLB,, | Performed by: INTERNAL MEDICINE

## 2021-12-10 PROCEDURE — 99214 OFFICE O/P EST MOD 30 MIN: CPT | Mod: S$GLB,,, | Performed by: INTERNAL MEDICINE

## 2021-12-10 PROCEDURE — 3008F BODY MASS INDEX DOCD: CPT | Mod: CPTII,S$GLB,, | Performed by: INTERNAL MEDICINE

## 2021-12-10 PROCEDURE — 1160F PR REVIEW ALL MEDS BY PRESCRIBER/CLIN PHARMACIST DOCUMENTED: ICD-10-PCS | Mod: CPTII,S$GLB,, | Performed by: INTERNAL MEDICINE

## 2021-12-10 PROCEDURE — 1160F RVW MEDS BY RX/DR IN RCRD: CPT | Mod: CPTII,S$GLB,, | Performed by: INTERNAL MEDICINE

## 2021-12-10 PROCEDURE — 3074F SYST BP LT 130 MM HG: CPT | Mod: CPTII,S$GLB,, | Performed by: INTERNAL MEDICINE

## 2021-12-10 PROCEDURE — 3008F PR BODY MASS INDEX (BMI) DOCUMENTED: ICD-10-PCS | Mod: CPTII,S$GLB,, | Performed by: INTERNAL MEDICINE

## 2021-12-10 PROCEDURE — 3066F PR DOCUMENTATION OF TREATMENT FOR NEPHROPATHY: ICD-10-PCS | Mod: CPTII,S$GLB,, | Performed by: INTERNAL MEDICINE

## 2021-12-10 PROCEDURE — 99214 PR OFFICE/OUTPT VISIT, EST, LEVL IV, 30-39 MIN: ICD-10-PCS | Mod: S$GLB,,, | Performed by: INTERNAL MEDICINE

## 2021-12-10 PROCEDURE — 3079F PR MOST RECENT DIASTOLIC BLOOD PRESSURE 80-89 MM HG: ICD-10-PCS | Mod: CPTII,S$GLB,, | Performed by: INTERNAL MEDICINE

## 2021-12-10 PROCEDURE — 3060F PR POS MICROALBUMINURIA RESULT DOCUMENTED/REVIEW: ICD-10-PCS | Mod: CPTII,S$GLB,, | Performed by: INTERNAL MEDICINE

## 2021-12-10 PROCEDURE — 99999 PR PBB SHADOW E&M-EST. PATIENT-LVL IV: ICD-10-PCS | Mod: PBBFAC,,, | Performed by: INTERNAL MEDICINE

## 2021-12-10 PROCEDURE — 3060F POS MICROALBUMINURIA REV: CPT | Mod: CPTII,S$GLB,, | Performed by: INTERNAL MEDICINE

## 2021-12-10 PROCEDURE — 1159F MED LIST DOCD IN RCRD: CPT | Mod: CPTII,S$GLB,, | Performed by: INTERNAL MEDICINE

## 2021-12-10 PROCEDURE — 3074F PR MOST RECENT SYSTOLIC BLOOD PRESSURE < 130 MM HG: ICD-10-PCS | Mod: CPTII,S$GLB,, | Performed by: INTERNAL MEDICINE

## 2021-12-14 DIAGNOSIS — R80.9 TYPE 2 DIABETES MELLITUS WITH MICROALBUMINURIA, WITHOUT LONG-TERM CURRENT USE OF INSULIN: ICD-10-CM

## 2021-12-14 DIAGNOSIS — E11.29 TYPE 2 DIABETES MELLITUS WITH MICROALBUMINURIA, WITHOUT LONG-TERM CURRENT USE OF INSULIN: ICD-10-CM

## 2021-12-16 RX ORDER — EMPAGLIFLOZIN 25 MG/1
25 TABLET, FILM COATED ORAL DAILY
Qty: 90 TABLET | Refills: 1 | Status: SHIPPED | OUTPATIENT
Start: 2021-12-16 | End: 2022-05-16 | Stop reason: SDUPTHER

## 2021-12-17 ENCOUNTER — PATIENT MESSAGE (OUTPATIENT)
Dept: FAMILY MEDICINE | Facility: CLINIC | Age: 56
End: 2021-12-17
Payer: COMMERCIAL

## 2021-12-18 ENCOUNTER — PATIENT MESSAGE (OUTPATIENT)
Dept: CARDIOLOGY | Facility: CLINIC | Age: 56
End: 2021-12-18
Payer: COMMERCIAL

## 2021-12-22 ENCOUNTER — CLINICAL SUPPORT (OUTPATIENT)
Dept: DIABETES | Facility: CLINIC | Age: 56
End: 2021-12-22
Payer: COMMERCIAL

## 2021-12-22 DIAGNOSIS — R80.9 TYPE 2 DIABETES MELLITUS WITH MICROALBUMINURIA, WITHOUT LONG-TERM CURRENT USE OF INSULIN: Primary | ICD-10-CM

## 2021-12-22 DIAGNOSIS — E11.29 TYPE 2 DIABETES MELLITUS WITH MICROALBUMINURIA, WITHOUT LONG-TERM CURRENT USE OF INSULIN: ICD-10-CM

## 2021-12-22 DIAGNOSIS — R80.9 TYPE 2 DIABETES MELLITUS WITH MICROALBUMINURIA, WITHOUT LONG-TERM CURRENT USE OF INSULIN: ICD-10-CM

## 2021-12-22 DIAGNOSIS — E11.29 TYPE 2 DIABETES MELLITUS WITH MICROALBUMINURIA, WITHOUT LONG-TERM CURRENT USE OF INSULIN: Primary | ICD-10-CM

## 2021-12-22 PROCEDURE — G0108 PR DIAB MANAGE TRN  PER INDIV: ICD-10-PCS | Mod: S$GLB,,, | Performed by: INTERNAL MEDICINE

## 2021-12-22 PROCEDURE — 99999 PR PBB SHADOW E&M-EST. PATIENT-LVL I: CPT | Mod: PBBFAC,,,

## 2021-12-22 PROCEDURE — G0108 DIAB MANAGE TRN  PER INDIV: HCPCS | Mod: S$GLB,,, | Performed by: INTERNAL MEDICINE

## 2021-12-22 PROCEDURE — 99999 PR PBB SHADOW E&M-EST. PATIENT-LVL I: ICD-10-PCS | Mod: PBBFAC,,,

## 2021-12-22 RX ORDER — BLOOD-GLUCOSE CONTROL, NORMAL
1 EACH MISCELLANEOUS DAILY
Qty: 100 EACH | Refills: 3 | Status: SHIPPED | OUTPATIENT
Start: 2021-12-22 | End: 2022-12-22

## 2021-12-22 RX ORDER — INSULIN PUMP SYRINGE, 3 ML
EACH MISCELLANEOUS
Qty: 1 EACH | Refills: 0 | Status: CANCELLED | OUTPATIENT
Start: 2021-12-22 | End: 2022-12-22

## 2021-12-22 RX ORDER — FLASH GLUCOSE SENSOR
1 KIT MISCELLANEOUS
Qty: 2 KIT | Refills: 11 | Status: SHIPPED | OUTPATIENT
Start: 2021-12-22 | End: 2022-08-06 | Stop reason: SDUPTHER

## 2021-12-22 RX ORDER — INSULIN PUMP SYRINGE, 3 ML
EACH MISCELLANEOUS
Qty: 1 EACH | Refills: 0 | Status: SHIPPED | OUTPATIENT
Start: 2021-12-22

## 2021-12-22 RX ORDER — BLOOD-GLUCOSE CONTROL, NORMAL
1 EACH MISCELLANEOUS DAILY
Qty: 100 EACH | Refills: 3 | Status: CANCELLED | OUTPATIENT
Start: 2021-12-22 | End: 2022-12-22

## 2021-12-22 RX ORDER — FLASH GLUCOSE SCANNING READER
1 EACH MISCELLANEOUS DAILY
Qty: 1 EACH | Refills: 0 | Status: SHIPPED | OUTPATIENT
Start: 2021-12-22 | End: 2022-10-15 | Stop reason: SDUPTHER

## 2022-01-03 ENCOUNTER — PATIENT MESSAGE (OUTPATIENT)
Dept: FAMILY MEDICINE | Facility: CLINIC | Age: 57
End: 2022-01-03
Payer: COMMERCIAL

## 2022-01-03 DIAGNOSIS — M54.50 CHRONIC BILATERAL LOW BACK PAIN WITHOUT SCIATICA: Chronic | ICD-10-CM

## 2022-01-03 DIAGNOSIS — G89.29 CHRONIC BILATERAL LOW BACK PAIN WITHOUT SCIATICA: Chronic | ICD-10-CM

## 2022-01-08 ENCOUNTER — PATIENT MESSAGE (OUTPATIENT)
Dept: FAMILY MEDICINE | Facility: CLINIC | Age: 57
End: 2022-01-08
Payer: COMMERCIAL

## 2022-01-08 ENCOUNTER — PATIENT MESSAGE (OUTPATIENT)
Dept: CARDIOLOGY | Facility: CLINIC | Age: 57
End: 2022-01-08
Payer: COMMERCIAL

## 2022-01-08 DIAGNOSIS — M54.50 CHRONIC BILATERAL LOW BACK PAIN WITHOUT SCIATICA: Chronic | ICD-10-CM

## 2022-01-08 DIAGNOSIS — G89.29 CHRONIC BILATERAL LOW BACK PAIN WITHOUT SCIATICA: Chronic | ICD-10-CM

## 2022-01-09 DIAGNOSIS — J45.20 MILD INTERMITTENT ASTHMA WITHOUT COMPLICATION: ICD-10-CM

## 2022-01-09 RX ORDER — TIZANIDINE 2 MG/1
TABLET ORAL
Qty: 60 TABLET | Refills: 2 | Status: SHIPPED | OUTPATIENT
Start: 2022-01-09 | End: 2022-04-04

## 2022-01-09 NOTE — TELEPHONE ENCOUNTER
No new care gaps identified.  Powered by [employer]. Reference number: [id number].   1/09/2022 3:20:34 PM CST

## 2022-01-12 RX ORDER — MONTELUKAST SODIUM 10 MG/1
TABLET ORAL
Qty: 90 TABLET | Refills: 1 | Status: SHIPPED | OUTPATIENT
Start: 2022-01-12 | End: 2022-07-11 | Stop reason: SDUPTHER

## 2022-01-19 ENCOUNTER — PATIENT MESSAGE (OUTPATIENT)
Dept: CARDIOLOGY | Facility: CLINIC | Age: 57
End: 2022-01-19
Payer: COMMERCIAL

## 2022-01-19 DIAGNOSIS — E11.29 TYPE 2 DIABETES MELLITUS WITH MICROALBUMINURIA, WITHOUT LONG-TERM CURRENT USE OF INSULIN: Chronic | ICD-10-CM

## 2022-01-19 DIAGNOSIS — R80.9 TYPE 2 DIABETES MELLITUS WITH MICROALBUMINURIA, WITHOUT LONG-TERM CURRENT USE OF INSULIN: Chronic | ICD-10-CM

## 2022-01-19 RX ORDER — DULAGLUTIDE 1.5 MG/.5ML
1.5 INJECTION, SOLUTION SUBCUTANEOUS WEEKLY
Qty: 4 PEN | Refills: 0 | Status: SHIPPED | OUTPATIENT
Start: 2022-01-19 | End: 2022-04-02 | Stop reason: SDUPTHER

## 2022-01-19 NOTE — TELEPHONE ENCOUNTER
No new care gaps identified.  Powered by "[employer] by [employer]. Reference number: [id number].   1/19/2022 4:22:34 AM CST

## 2022-01-21 ENCOUNTER — PATIENT OUTREACH (OUTPATIENT)
Dept: ADMINISTRATIVE | Facility: OTHER | Age: 57
End: 2022-01-21
Payer: COMMERCIAL

## 2022-02-03 ENCOUNTER — PATIENT MESSAGE (OUTPATIENT)
Dept: REHABILITATION | Facility: HOSPITAL | Age: 57
End: 2022-02-03

## 2022-02-03 ENCOUNTER — CLINICAL SUPPORT (OUTPATIENT)
Dept: REHABILITATION | Facility: HOSPITAL | Age: 57
End: 2022-02-03
Attending: INTERNAL MEDICINE
Payer: COMMERCIAL

## 2022-02-03 DIAGNOSIS — R29.898 DECREASED STRENGTH OF TRUNK AND BACK: ICD-10-CM

## 2022-02-03 DIAGNOSIS — M54.50 CHRONIC BILATERAL LOW BACK PAIN WITHOUT SCIATICA: Chronic | ICD-10-CM

## 2022-02-03 DIAGNOSIS — G89.29 CHRONIC BILATERAL LOW BACK PAIN WITHOUT SCIATICA: Chronic | ICD-10-CM

## 2022-02-03 PROBLEM — M25.69 DECREASED ROM OF TRUNK AND BACK: Status: ACTIVE | Noted: 2022-02-03

## 2022-02-03 PROCEDURE — 97162 PT EVAL MOD COMPLEX 30 MIN: CPT | Mod: PN

## 2022-02-03 PROCEDURE — 97110 THERAPEUTIC EXERCISES: CPT | Mod: PN

## 2022-02-03 NOTE — PLAN OF CARE
"  [facility] - PHYSICAL THERAPY EVALUATION     Name: Shanae Kumari  Clinic number: [id number]    Therapy diagnosis:   Encounter Diagnoses   Name Primary?    Chronic bilateral low back pain without sciatica     Decreased ROM of trunk and back     Decreased strength of trunk and back      Physician: Tima Stanley MD    Physician orders: PT Eval and Treat   Medical diagnosis from referral: chronic bilateral low back pain without sciatica  Evaluation date: 02/03/22  Authorization period expiration: 01/04/23  Plan of care expiration: 05/03/22  Reassessment due: 03/03/22  Visit # / visits authorized: 1/1    Time in: 1505  Time out: 1615  Total billable time: 70 minutes    Precautions: Standard, DM, HTN, currently in cardiac rehab*    Pattern of pain determined: Undetermined; decreased trunk endurance and trunk instability      SUBJECTIVE   Date of onset: "years ago"  History of current condition - Shanae reports that back pain issues began years ago. Remarks that the issue first started in low back, but over the years has progressed upwards towards thoracic spine. Describes sensation moreso as "fatigue, tiredness" than pain, stating when she stands/walks for 30 minutes or more she feels as if she has to sit because her back is no longer supporting her. Destinee has worked as a  for years (is still currently working), and reports that because of this she has learned some compensatory patterns/techniques over the years (ie. pillow propping, sitting with orthopedic pillow, etc.) that she uses to support back. Does not use lumbar roll. Reports alleviating factors to be heat, rest/sitting on orthopedic cushion, and aleve. Other contributing orthopedic injuries include torn ACL of L knee (managing c/ bracing) and R hip issues (impingement) for which she has done extensive PT. Has never participated in PT to address back. Is also starting cardiac rehab at this time, as she had stent placement in " Nov 2021. States she would just like to go about her workday without so many rest breaks.      Medical history:   Past Medical History:   Diagnosis Date    Asthma     BRCA negative     Diabetes mellitus     Hypertension     Nausea after anesthesia 1989       Surgical history:   Shanae Kumari  has a past surgical history that includes knee surgery; Knee arthroscopy; Coronary angiography (N/A, 11/26/2021); and Ultrasound guidance (Right, 11/26/2021).    Medications:   Shanae has a current medication list which includes the following prescription(s): albuterol, aspirin, atorvastatin, blood sugar diagnostic, blood-glucose meter, clobetasol 0.05%, clopidogrel, trulicity, jardiance, freestyle jatinder 2 reader, freestyle jatinder 2 sensor, fluticasone propionate, glimepiride, lancets, matzim la, medroxyprogesterone, meloxicam, montelukast, nitroglycerin, sertraline, and tizanidine.    Allergies:   Review of patient's allergies indicates:   Allergen Reactions    Allergenic extracts Hives, Itching, Other (See Comments), Rash and Shortness Of Breath    Dog dander Hives, Shortness Of Breath, Itching, Swelling and Rash     Cat's dander, dust,  pollen    Losartan Other (See Comments)     angioedema    Mold Itching and Shortness Of Breath    Metformin Other (See Comments)        Imaging: none related to encounter     Prior therapy and/or treatment(s): stretch zone (stretching studio), swedish or Maltese massage, PT (for hip)  Social history: mom lives c/ her; helps to take care of mom  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  Where is your pain the worst? low back  2.  Is your pain constant or intermittent? constant  3.  Does bending forward make your typical pain worse? not usually   4.  Since the start of your back pain, has there been a change in your bowel or bladder? no  5.  What can't you do now that you use to be able to do? stand/walk for more than 30 min without back pain/fatigue      Pts goals: "If I could get through the day of work without stopping/slowing down."      *Clinical red flags*:  Cough/sneeze strain: (--)  Bladder/bowel changes: (--)  Falls: (--)  Night pain: sometimes  Unexplained weight loss: (--)  General health: fair     OBJECTIVE     Postural examination/scapula alignment: Rounded shoulder, Head forward, Posterior pelvic tilt and Slouched posture  Joint integrity: hypomobility of t-spine  Palpation: T8-L4 spinous processes TTP  Leg length: symmetrical  Posture (sitting/standing): poor  Correction of posture: no effect with lumbar roll    Trunk mobility:   Norm ROM loss   Flexion Fingers touch toes, sacral angle >/= 70 deg, uniform spinal curvature, posterior weight shift  minimal loss   Extension ASIS surpasses toes, spine of scapulae surpasses heels, uniform spinal curve minimal loss   Side-bending right  minimal loss   Side-bending left  minimal loss   Rotation right PT observes contralateral shoulder minimal loss   Rotation left PT observes contra lateral shoulder minimal loss   Thoracic rotation (QD) 50 deg Limited B   Lumbar rotation (QD) 30 deg Limited B   *no pain in any direction    LE mobility:   Norm Observed   HS length        70-90 deg WNL   Hip rotation     IR 30 deg    ER 40 deg TBA   Patricio test (hip flexors/quads)  TBA     LE strength:  RLE  LLE    Hip flexion 4/5 Hip flexion 4-/5   Hip extension 4-/5 Hip " extension: 4-/5   Hip abduction 4-/5 Hip abduction 4-/5   Hip adduction 4-/5 Hip adduction  4-/5   Hip IR/ER   4-/5 Hip IR/ER 4-/5   Knee flexion 4-/5 Knee flexion 4-/5   Knee extension 5/5 Knee extension 5/5   Ankle dorsiflexion 5/5 Ankle dorsiflexion 5/5   Ankle plantarflexion 4/5 Ankle plantarflexion 4/5     Gait:  Assistive device used: none  Level of assistance: independent  Patient displays the following gait deviations: dec gait speed, asymmetrical weight shifting     Special tests:   Test name  Result   Prone Instability Test (--)   SIJ Provocation Test(s):  LYNN, compression, distraction, sacral thrust (--)   Straight Leg Raise (--)   Neural Tension (Slump) Test (--)   Crossed Straight Leg Raise (--)   [place]'s sign  (--)   Bridge test (--) no pain   Active SLR (--) no pain       Neurological screening:    Sensation: intact LT BLEs  LE Reflexes: N/T  Clonus and/or Babinski: N/T    Repeated movements:  Repeated standing flexion no worse   Repeated standing extension no worse   Repeated lying flexion no effect   Repeated lying extension mildly worse- reported as pressure      Static postures:  Sitting slouched  worse   Sitting erect better   Standing slouched worse   Standing erect  better   Prone extension mildly worse- reported as pressure       Baseline isometric testing on [employer] equipment:   Testing administered by PT    Date of testin22  ROM 0-45 deg   Max Peak Torque 51   Min Peak Torque 13   Flex/Ext Ratio 3.9:1   % below normative data 72%         Limitation/restriction for FOTO 22 Survey    Therapist reviewed FOTO scores for Shanae Kumari on 2/3/2022.   FOTO documents entered into [employer] - see Media section.    Limitation Score: 56%         TREATMENT   Treatment time in: 1550  Treatment time out: 1615  Total tx time separate from evaluation: 25 minutes      Shanae received therapeutic exercises to develop/improve posture, lumbar/thoracic ROM, strength and muscular endurance for 25  minutes including the following exercises:     MedX dynamic exercise and baseline IM test    [facility] 2/3/2022   Visit Number 1   VAS Pain Rating 5   Lumbar Extension Seat Pad 1   Femur Restraint 5   Top Dead Center 24   Counterweight 123   Lumbar Flexion 45   Lumbar Extension 0   Lumbar Peak Torque 51   Min Torque 13   Test Percent Below Normative Data 72       Manual therapy provided: null    Written home exercises provided: yes; PPT, glute bridge, slouch correct, supine alternating leg lifts    Exercises were reviewed and Shanae was able to demonstrate them prior to the end of the session.  Shanae demonstrated good understanding of the education provided.     See EMR under patient instructions for exercises provided 2/3/2022.      Education provided:   - Discussed proper sitting and standing posture, body mechanics, applicable ergonomics, and lumbar roll implications.  - Discussed sleep positioning, morning stiffness, and log rolling technique for reduced spinal strain.  - Pt received [facility] handouts which discussed therapy expectations, purpose/opportunity for health coaching and wellness program when discharged from therapy, back education and care specifically for seated/standing posture, lifting techniques, components of exercise, and the importance of nutrition, hydration, and sleep.   - Pt received a handout regarding anticipated muscular soreness following MedX isometric testing and strategies for management were reviewed with patient including stretching, cold application and scheduled rest.   - Pt received education on the [facility] program, purpose of isometric test assessment, and progression of back and peripheral strengthening.  - PT discussed importance of attending therapy and performing home exercises consistently for optimal therapeutic gain, as well as discussed attendance policy in full.       ASSESSMENT   Shanae is a 56 y.o. female referred to [facility] with a medical diagnosis of chronic bilateral low back pain without  sciatica. Pt presents with reported low back fatigue that is chronic and constant in nature and is reproduced c/ prolonged standing or walking. Upon physical assessment, pt demonstrates significantly poor posturing in both standing and sitting, impaired hip strength and trunk and hip mobility deficits. Aggravating movements/postures included prone lying and repeated prone extension, though this was reported to be a "pressure" sensation. Upon further local examination, hip, lumbar, and thoracic rotation were all limited significantly. All special and neurogenic testing was negative. Palpation demonstrated hypomobility of thoracic spine and tenderness of various thoracolumbar vertebrae. Per lumbar MedX testing, pt was 72% below average in strength when compared to those of her same age/height/weight/gender. All of the above noted supports general trunkal instability, decreased trunk endurance, and postural-associated pain, thus pt is a good candidate for the [facility] Program. Pt would benefit from LE and trunk mobility training, stability training,  improved cardiovascular and muscular endurance, neuromuscular re-education for posture, coordination, and muscular recruitment and education on positional offloading techniques to decrease the intensity and frequency of flare-ups.    Pain pattern: Pattern 2; decreased endurance and trunk instability    Based on the above history and physical examination, an active physical therapy program is recommended. Pt prognosis is good and pt would likely benefit from skilled outpatient physical therapy to address the deficits stated above and in the chart below, to provide pt/family education, and to maximize pt's level of function and independence in both the home and community.     Plan of care discussed with patient: yes  Pt's spiritual, cultural and educational needs have been considered and pt is " agreeable to the plan of care and goals as stated below:     Anticipated barriers for therapy: chronicity of condition    PT evaluation completed? yes    Medical necessity is demonstrated by the following problem list.    Pt presents with the following impairments:     History  Co-morbidities and personal factors that may impact POC Co-morbidities:   HTN, DM, asthma  Hx R hip issues  Torn ACL  Heart stent placed in Nov 2021    Personal factors:   lifestyle     high   Examination  Body structures and functions, activity limitations and participation restrictions that may impact POC Body regions:   back  lower extremities  trunk    Body systems:    gross symmetry  ROM  strength  gross coordinated movement  gait    Participation restrictions:  Performing full extent of work duties  Lifting  Activities that involve prolonged standing/walking    Activity limitations:  Learning and applying knowledge  no deficits    General tasks/commands  no deficits    Communication  no deficits    Mobility  lifting and carrying objects  walking    Self-care  looking after one's health    Domestic life  shopping  cooking  doing house work (cleaning house, washing dishes, laundry)    Interactions/relationships  no deficits     Life areas  no deficits    Community/social life  no deficits         moderate   Clinical presentation evolving clinical presentation with changing clinical characteristics moderate   Decision making/complexity score: moderate       Goals:  Pt is in agreement with the following goals.    Short term goals: 6 weeks or 10 visits   1.  Pt will demonstrate increased lumbar ROM by at least 3 degrees from the initial ROM value with improvements noted in functional ROM and ability to perform ADLs.  (appropriate & ongoing)  2.  Pt will demonstrate increased MedX average isometric strength value by 40% from initial test, resulting in improved ability to perform bending, lifting, and carrying activities safely and  "confidently.  (appropriate & ongoing)  3.  Pt will report a reduction in worst pain score by 1-2 points for improved tolerance for 30 min of standing/walking for work duties.  (appropriate & ongoing)  4.  Pt will be able to perform HEP correctly with minimal cueing or supervision from therapist to encourage independent management of symptoms. (appropriate & ongoing)    Long term goals: 10 weeks or 20 visits   1. Pt will demonstrate increased lumbar ROM by at least 6 degrees from initial ROM value, resulting in improved ability to perform functional fwd bending while standing and sitting. (appropriate & ongoing)  2. Pt will demonstrate increased MedX average isometric strength value by 80% from initial test, resulting in improved ability to perform bending, lifting, and carrying activities safely and confidently.  (appropriate & ongoing)  3. Pt to demonstrate ability to independently control and/or reduce their pain through posture positioning and mechanical movements throughout a typical day. (appropriate & ongoing)  4.  Pt will demonstrate reduced pain and improved functional outcomes as reported on the FOTO by reaching a limitation score of < or = 40% or less in order to demonstrate subjective improvement in pt's condition.  (appropriate & ongoing)  5. Pt will demonstrate independence with HEP at discharge. (appropriate & ongoing)  6.  Pt will report being able to walk/stand for at least 45 minutes without needing a rest break. (appropriate & ongoing)      Plan   Outpatient physical therapy 2x week for 10 weeks or 20 visits to include the following:   - Patient education  - Therapeutic exercise  - Manual therapy  - Performance testing   - Neuromuscular re-education  - Therapeutic activity   - Modalities    Pt may be seen by PTA as part of the rehabilitation team.     Therapist: Suri Luna, PT, DPT  02/03/22    "I certify the need for these services furnished under this plan of treatment and while under my " "care."    ____________________________________  Physician/referring practitioner    _______________  Date of signature            "

## 2022-02-07 ENCOUNTER — PATIENT MESSAGE (OUTPATIENT)
Dept: CARDIOLOGY | Facility: CLINIC | Age: 57
End: 2022-02-07
Payer: COMMERCIAL

## 2022-02-07 DIAGNOSIS — I25.10 CORONARY ARTERY DISEASE, UNSPECIFIED VESSEL OR LESION TYPE, UNSPECIFIED WHETHER ANGINA PRESENT, UNSPECIFIED WHETHER NATIVE OR TRANSPLANTED HEART: Primary | ICD-10-CM

## 2022-02-09 ENCOUNTER — PATIENT MESSAGE (OUTPATIENT)
Dept: DERMATOLOGY | Facility: CLINIC | Age: 57
End: 2022-02-09
Payer: COMMERCIAL

## 2022-02-09 ENCOUNTER — OFFICE VISIT (OUTPATIENT)
Dept: OPTOMETRY | Facility: CLINIC | Age: 57
End: 2022-02-09
Payer: COMMERCIAL

## 2022-02-09 DIAGNOSIS — E11.3293 MILD NONPROLIFERATIVE DIABETIC RETINOPATHY OF BOTH EYES WITHOUT MACULAR EDEMA ASSOCIATED WITH TYPE 2 DIABETES MELLITUS: Primary | ICD-10-CM

## 2022-02-09 PROCEDURE — 92004 COMPRE OPH EXAM NEW PT 1/>: CPT | Mod: S$GLB,,, | Performed by: OPTOMETRIST

## 2022-02-09 PROCEDURE — 99999 PR PBB SHADOW E&M-EST. PATIENT-LVL III: CPT | Mod: PBBFAC,,, | Performed by: OPTOMETRIST

## 2022-02-09 PROCEDURE — 92004 PR EYE EXAM, NEW PATIENT,COMPREHESV: ICD-10-PCS | Mod: S$GLB,,, | Performed by: OPTOMETRIST

## 2022-02-09 PROCEDURE — 1159F PR MEDICATION LIST DOCUMENTED IN MEDICAL RECORD: ICD-10-PCS | Mod: CPTII,S$GLB,, | Performed by: OPTOMETRIST

## 2022-02-09 PROCEDURE — 1160F RVW MEDS BY RX/DR IN RCRD: CPT | Mod: CPTII,S$GLB,, | Performed by: OPTOMETRIST

## 2022-02-09 PROCEDURE — 1160F PR REVIEW ALL MEDS BY PRESCRIBER/CLIN PHARMACIST DOCUMENTED: ICD-10-PCS | Mod: CPTII,S$GLB,, | Performed by: OPTOMETRIST

## 2022-02-09 PROCEDURE — 99999 PR PBB SHADOW E&M-EST. PATIENT-LVL III: ICD-10-PCS | Mod: PBBFAC,,, | Performed by: OPTOMETRIST

## 2022-02-09 PROCEDURE — 1159F MED LIST DOCD IN RCRD: CPT | Mod: CPTII,S$GLB,, | Performed by: OPTOMETRIST

## 2022-02-09 NOTE — PROGRESS NOTES
"[facility] Physical Therapy Treatment      Name: Shanae Kumari  Clinic Number: [id number]    Therapy Diagnosis:   Encounter Diagnosis   Name Primary?    Decreased strength of trunk and back Yes     Physician: Tima Stanley MD    Visit Date: 2/10/2022    Physician orders: PT Eval and Treat   Medical diagnosis from referral: chronic bilateral low back pain without sciatica  Evaluation date: 22  Authorization period expiration: 23  Plan of care expiration: 22  Reassessment due: 22  Visit # / visits authorized:      Time in:  4:30 pm  Time out: 5: 34 pm  Total billable time: 61 minutes     Precautions: Standard, DM, HTN, currently in cardiac rehab*     Pattern of pain determined: Undetermined; decreased trunk endurance and trunk instability         Subjective   Shanae reports she works as a  for years.   Reports she is very mobile and has had multiple joint problems including left ACL tear knee and shoulder issues-discussed we would watch joints during peripheral strengthening and she should inform us of any difficulties or pain.   She has never participated in PT to address back.  She is also starting cardiac rehab at this time, as she had stent placement in 2021. States she would just like to go about her workday without so many rest breaks.      .      Patient reports tolerating previous visit well  Patient reports their pain to be no discomfort/10 on a 0-10 scale with 0 being no pain and 10 being the worst pain imaginable.  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   FOTO documents entered into [employer] - see Media section.     Limitation Score: 56%  Goal < 40% limited             Treatment    Pt was instructed in and performed the following:     Shanae received therapeutic exercises to develop/improved posture, cardiovascular endurance, muscular endurance, lumbar  ROM, strength and muscular endurance for 61 minutes including the following exercises:     Engaging core 10 reps  Engaging core and pelvic tilts 10 reps  Bridging 10 reps  Discussed breathing and diaphragmatic breathing for her to work on in the evening    [facility] 2/10/2022   Visit Number 2   VAS Pain Rating 0   Treadmill Time (in min.) 5   Speed 1.5   Lumbar Extension Seat Pad -   Femur Restraint -   Top Dead Center -   Counterweight -   Lumbar Flexion -   Lumbar Extension -   Lumbar Peak Torque -   Min Torque -   Test Percent Below Normative Data -   Lumbar Weight 40   Repetitions 18   Rating of Perceived Exertion 3                 Peripheral muscle strengthening which included 1 set of 15-20 repetitions at a slow, controlled 10-13 second per rep pace focused on strengthening supporting musculature for improved body mechanics and functional mobility.  Pt and therapist focused on proper form during treatment to ensure optimal strengthening of each targeted muscle group.  Machines were utilized including torso rotation, chest press, rowing, biceps, and triceps. Leg extension, leg curl, hip abd, hip add, and leg press added visit 3       Shanae received the following manual therapy techniques: nil      Home Exercises Provided and Patient Education Provided   Stretching: glute bridge, slouch correct, supine alternating leg lifts    Strengthening: not yet started  Cardio program (V5):  not yet started  Lifting education (V11):not yet started  Using Lumbar Roll:encouraged    Education provided:   - progressive resistance strengthening - including 1 set of 15-20 repetitions at a slow, controlled 10-13 second per rep pace focused on strengthening supporting musculature for improved body mechanics and functional mobility and feeling exertion not pain  -patient told to inform us of any joint problems     Written Home Exercises Provided: Patient instructed to cont prior HEP.  Exercises were reviewed and Shanae was able to demonstrate them prior to the end of the session.  Shanae demonstrated good  understanding of the education provided.     See EMR under Patient Instructions for exercises provided prior visit.          Assessment       Patient is making good progress towards established goals.   She attended for her 2nd healthy back visit and we implemented the strengthening component for her back and upper extremities today, protecting joints and encouraging patient to inform us of any issues/pain.  She presents with significant mobility of peripheral joints and we discussed maximizing stability to match mobility.   She tolerated treadmill, stretching, engaging core, and medx back strengthening well.   UE machines introduced with joint protection.      Pt will continue to benefit from skilled outpatient physical therapy to address the deficits stated in the impairment chart, provide pt/family education and to maximize pt's level of independence in the home and community environment.     Anticipated Barriers for therapy: nil  Pt's spiritual, cultural and educational needs considered and pt agreeable to plan of care and goals as stated below:     Goals:  Pt is in agreement with the following goals.     Short term goals: 6 weeks or 10 visits   1.  Pt will demonstrate increased lumbar ROM by at least 3 degrees from the initial ROM value with improvements noted in functional ROM and ability to perform  ADLs.  (appropriate & ongoing)  2.  Pt will demonstrate increased MedX average isometric strength value by 40% from initial test, resulting in improved ability to perform bending, lifting, and carrying activities safely and confidently.  (appropriate & ongoing)  3.  Pt will report a reduction in worst pain score by 1-2 points for improved tolerance for 30 min of standing/walking for work duties.  (appropriate & ongoing)  4.  Pt will be able to perform HEP correctly with minimal cueing or supervision from therapist to encourage independent management of symptoms. (appropriate & ongoing)     Long term goals: 10 weeks or 20 visits   1. Pt will demonstrate increased lumbar ROM by at least 6 degrees from initial ROM value, resulting in improved ability to perform functional fwd bending while standing and sitting. (appropriate & ongoing)  2. Pt will demonstrate increased MedX average isometric strength value by 80% from initial test, resulting in improved ability to perform bending, lifting, and carrying activities safely and confidently.  (appropriate & ongoing)  3. Pt to demonstrate ability to independently control and/or reduce their pain through posture positioning and mechanical movements throughout a typical day. (appropriate & ongoing)  4.  Pt will demonstrate reduced pain and improved functional outcomes as reported on the FOTO by reaching a limitation score of < or = 40% or less in order to demonstrate subjective improvement in pt's condition.  (appropriate & ongoing)  5. Pt will demonstrate independence with HEP at discharge. (appropriate & ongoing)  6.  Pt will report being able to walk/stand for at least 45 minutes without needing a rest break. (appropriate & ongoing)         Plan   Continue with established Plan of Care towards established PT goals.     Renita Arrington, PT  2/10/22

## 2022-02-09 NOTE — PROGRESS NOTES
Subjective:       Patient ID: Shanae Kumari is a 56 y.o. female      Chief Complaint   Patient presents with    Concerns About Ocular Health    Diabetic Eye Exam     History of Present Illness   Dls: vision works x 4-5 months     55 y/o female presents today for diabetic eye exam.   Pt states no changes in vision. Pt wears single vision glasses and pal's    LBS ?     No tearing  No itching  No burning  No  pain  No ha's  No floaters  No flashes    Eye meds  Otc gtts ou prn    Hemoglobin A1C       Date                     Value               Ref Range             Status                11/18/2021               10.8 (H)            4.0 - 5.6 %           Final                  09/28/2021               11.8 (H)            4.0 - 5.6 %           Final                  12/17/2020               9.4 (H)             4.0 - 5.6 %           Final                 Assessment/Plan:     1. 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 Discussed with pt the effects of diabetes on vision, importance of good blood sugar control, compliance with meds, and follow up care with PCP. Return in 1 year for dilated eye exam, sooner PRN.      Follow up in about 1 year (around 2/9/2023) for Diabetic Eye Exam.

## 2022-02-10 ENCOUNTER — CLINICAL SUPPORT (OUTPATIENT)
Dept: REHABILITATION | Facility: HOSPITAL | Age: 57
End: 2022-02-10
Attending: INTERNAL MEDICINE
Payer: COMMERCIAL

## 2022-02-10 DIAGNOSIS — R29.898 DECREASED STRENGTH OF TRUNK AND BACK: Primary | ICD-10-CM

## 2022-02-10 PROCEDURE — 97110 THERAPEUTIC EXERCISES: CPT | Mod: PN | Performed by: PHYSICAL MEDICINE & REHABILITATION

## 2022-02-11 ENCOUNTER — PATIENT MESSAGE (OUTPATIENT)
Dept: CARDIOLOGY | Facility: CLINIC | Age: 57
End: 2022-02-11
Payer: COMMERCIAL

## 2022-02-11 ENCOUNTER — TELEPHONE (OUTPATIENT)
Dept: CARDIAC REHAB | Facility: CLINIC | Age: 57
End: 2022-02-11
Payer: COMMERCIAL

## 2022-02-11 NOTE — TELEPHONE ENCOUNTER
Left message for pt to see where she would like to attend Phase II cardiac rehab.  She lives in [place].

## 2022-02-16 ENCOUNTER — TELEPHONE (OUTPATIENT)
Dept: CARDIAC REHAB | Facility: CLINIC | Age: 57
End: 2022-02-16
Payer: COMMERCIAL

## 2022-02-21 NOTE — PROGRESS NOTES
"[facility]      Name: Shanae Kumari  Clinic Number: [id number]    Therapy Diagnosis:   Encounter Diagnosis   Name Primary?    Decreased strength of trunk and back Yes     Physician: Tima Stanley MD    Visit Date: 2022    Physician orders: PT Eval and Treat   Medical diagnosis from referral: chronic bilateral low back pain without sciatica  Evaluation date: 22  Authorization period expiration: 23  Plan of care expiration: 22  Reassessment due: 22  Visit # / visits authorized: 3/20     Time in:  3:48 pm   Time out: 4:32 pm   Total billable time: 44 minutes     Precautions: Standard, DM, HTN, currently in cardiac rehab*,      Pattern of pain determined: Undetermined; decreased trunk endurance and trunk instability         Subjective   Shanae reports she feels exhausted. Her back feels tired all the time.   .      Patient reports tolerating previous visit well. No soreness.   Patient reports their pain to be no discomfort/10 on a 0-10 scale with 0 being no pain and 10 being the worst pain imaginable.  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   FOTO documents entered into [employer] - see Media section.     Limitation Score: 56%  Goal < 40% limited             Treatment    Pt was instructed in and performed the following:     Shanae received " therapeutic exercises to develop/improved posture, cardiovascular endurance, muscular endurance, lumbar  ROM, strength and muscular endurance for 44 minutes including the following exercises:     Treadmill 8'     Engaging core 10 reps  Engaging core and pelvic tilts 10 reps  Bridging +2 x10 reps  +clams RTB 2 x 10     [facility] 2/22/2022   Visit Number 3   VAS Pain Rating 0   Treadmill Time (in min.) 8   Speed 1.5   Lumbar Extension Seat Pad -   Femur Restraint -   Top Dead Center -   Counterweight -   Lumbar Flexion -   Lumbar Extension -   Lumbar Peak Torque -   Min Torque -   Test Percent Below Normative Data -   Lumbar Weight 40   Repetitions 20   Rating of Perceived Exertion 2         Peripheral muscle strengthening which included 1 set of 15-20 repetitions at a slow, controlled 10-13 second per rep pace focused on strengthening supporting musculature for improved body mechanics and functional mobility.  Pt and therapist focused on proper form during treatment to ensure optimal strengthening of each targeted muscle group.  Machines were utilized including torso rotation, chest press, rowing, biceps, and triceps. Leg extension, leg curl, hip abd, hip add, and leg press added visit 3       Shanae received the following manual therapy techniques: nil      Home Exercises Provided and Patient Education Provided   Stretching: glute bridge, slouch correct, supine alternating leg lifts    Strengthening: not yet started  Cardio program (V5): not yet started  Lifting education (V11):not yet started  Using Lumbar Roll:encouraged    Education provided:   - progressive resistance strengthening - including 1 set of 15-20 repetitions at a slow, controlled 10-13 second per rep pace focused on strengthening supporting musculature for improved body mechanics and functional mobility and feeling exertion not pain  -patient told to inform us of any joint problems     Written Home Exercises Provided: Patient instructed  to cont prior HEP.  Exercises were reviewed and Shanae was able to demonstrate them prior to the end of the session.  Shanae demonstrated good  understanding of the education provided.     See EMR under Patient Instructions for exercises provided prior visit.    Assessment   Shanae tolerated session well. She reports to session without c/o pain. Continued previously prescribed mat exercises with some progression of glut strengthening without issue. MedX was performed at 40ft lbs with 20 reps performed at an exertion rating of 2/10. Recommend an increase of 10% next visit. Introduced LE Matrix strengthening with caution due to pt reports of an old ACL tear.       Pt will continue to benefit from skilled outpatient physical therapy to address the deficits stated in the impairment chart, provide pt/family education and to maximize pt's level of independence in the home and community environment.     Anticipated Barriers for therapy: nil  Pt's spiritual, cultural and educational needs considered and pt agreeable to plan of care and goals as stated below:     Goals:  Pt is in agreement with the following goals.     Short term goals: 6 weeks or 10 visits   1.  Pt will demonstrate increased lumbar ROM by at least 3 degrees from the initial ROM value with improvements noted in functional ROM and ability to perform ADLs.  (appropriate & ongoing)  2.  Pt will demonstrate increased MedX average isometric strength value by 40% from initial test, resulting in improved ability to perform bending, lifting, and carrying activities safely and confidently.  (appropriate & ongoing)  3.  Pt will report a reduction in worst pain score by 1-2 points for improved tolerance for 30 min of standing/walking for work duties.  (appropriate & ongoing)  4.  Pt will be able to perform HEP correctly with minimal cueing or supervision from therapist to encourage independent management of symptoms. (appropriate & ongoing)     Long term goals:  10 weeks or 20 visits   1. Pt will demonstrate increased lumbar ROM by at least 6 degrees from initial ROM value, resulting in improved ability to perform functional fwd bending while standing and sitting. (appropriate & ongoing)  2. Pt will demonstrate increased MedX average isometric strength value by 80% from initial test, resulting in improved ability to perform bending, lifting, and carrying activities safely and confidently.  (appropriate & ongoing)  3. Pt to demonstrate ability to independently control and/or reduce their pain through posture positioning and mechanical movements throughout a typical day. (appropriate & ongoing)  4.  Pt will demonstrate reduced pain and improved functional outcomes as reported on the FOTO by reaching a limitation score of < or = 40% or less in order to demonstrate subjective improvement in pt's condition.  (appropriate & ongoing)  5. Pt will demonstrate independence with HEP at discharge. (appropriate & ongoing)  6.  Pt will report being able to walk/stand for at least 45 minutes without needing a rest break. (appropriate & ongoing)         Plan   Continue with established Plan of Care towards established PT goals.     Benny Sosa, PTA   02/22/2022

## 2022-02-22 ENCOUNTER — CLINICAL SUPPORT (OUTPATIENT)
Dept: REHABILITATION | Facility: HOSPITAL | Age: 57
End: 2022-02-22
Attending: INTERNAL MEDICINE
Payer: COMMERCIAL

## 2022-02-22 DIAGNOSIS — R29.898 DECREASED STRENGTH OF TRUNK AND BACK: Primary | ICD-10-CM

## 2022-02-22 PROCEDURE — 97110 THERAPEUTIC EXERCISES: CPT | Mod: PN,CQ

## 2022-02-23 NOTE — PROGRESS NOTES
"[facility]      Name: Shanae Kumari  Clinic Number: [id number]    Therapy Diagnosis:   Encounter Diagnosis   Name Primary?    Decreased strength of trunk and back Yes     Physician: Tima Stanley MD    Visit Date: 2022    Physician orders: PT Eval and Treat   Medical diagnosis from referral: chronic bilateral low back pain without sciatica  Evaluation date: 22  Authorization period expiration: 23  Plan of care expiration: 22  Reassessment due: 22  Visit # / visits authorized:      Time in:  4:20 pm   Time out:  5:15 pm   Total billable time: 54 minutes     Precautions: Standard, DM, HTN, currently in cardiac rehab*,      Pattern of pain determined: Undetermined; decreased trunk endurance and trunk instability         Subjective   Shanae reports she good today.   No back pain. She got her lumbar roll, but hasn't used it.  Suggested she use it in the car and at the office.    .      Patient reports tolerating previous visit well. No soreness.   Patient reports their pain to be no discomfort/10 on a 0-10 scale with 0 being no pain and 10 being the worst pain imaginable.  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   FOTO documents entered into [employer] - see Media section.     Limitation Score: 56%  Goal < 40% limited             Treatment    Pt " was instructed in and performed the following:     Shanae received therapeutic exercises to develop/improved posture, cardiovascular endurance, muscular endurance, lumbar  ROM, strength and muscular endurance for 44 minutes including the following exercises:     Treadmill 8' 1.6 mph    Engaging core 10 reps  Engaging core and pelvic tilts 10 reps  Engaging core and hip fall outs 10 reps bilat  Bridging +2 x10 reps  Single leg bridge 10 reps bilat  Ext in standing with slight end range pain, no worse, added to HEP at work after sitting as long as not worse  +clams RTB 2 x 10      [facility] 2/24/2022   Visit Number 4   VAS Pain Rating 0   Treadmill Time (in min.) 8   Speed 1.6   Extension in Standing 10   Lumbar Extension Seat Pad -   Femur Restraint -   Top Dead Center -   Counterweight -   Lumbar Flexion -   Lumbar Extension -   Lumbar Peak Torque -   Min Torque -   Test Percent Below Normative Data -   Lumbar Weight 44   Repetitions 15   Rating of Perceived Exertion 4         Peripheral muscle strengthening which included 1 set of 15-20 repetitions at a slow, controlled 10-13 second per rep pace focused on strengthening supporting musculature for improved body mechanics and functional mobility.  Pt and therapist focused on proper form during treatment to ensure optimal strengthening of each targeted muscle group.  Machines were utilized including torso rotation, chest press, rowing, biceps, and triceps. Leg extension, leg curl, hip abd, hip add, and leg press added visit 3       Shanae received the following manual therapy techniques: nil      Home Exercises Provided and Patient Education Provided   Stretching: glute bridge, slouch correct, supine alternating leg lifts, ext in standing  Strengthening: not yet started  Cardio program (V5): not yet started  Lifting education (V11):not yet started  Using Lumbar Roll: she got 2/24/22    Education provided:   - reviewed progressive resistance strengthening  - including 1 set of 15-20 repetitions at a slow, controlled 10-13 second per rep pace focused on strengthening supporting musculature for improved body mechanics and functional mobility and feeling exertion not pain  -patient told to inform us of any joint problems  -praised her for getting lumbar roll and encouraged use in office and car  -added ext in standing to HEP     Written Home Exercises Provided: Patient instructed to cont prior HEP.  Exercises were reviewed and Shanae was able to demonstrate them prior to the end of the session.  Shanae demonstrated good  understanding of the education provided.     See EMR under Patient Instructions for exercises provided prior visit.    Assessment   Shanae tolerated session well. She reports to session without c/o pain. Continued previously prescribed mat exercises with some progression of glut strengthening without issue. Added ext in standing to HEP and she tolerated it with slight end range pain, no worse.  She bought lumbar roll but hasn't started using it yet.  MedX was performed at 44ft lbs with 15 reps performed at an exertion rating of 4/10. Maintain next visit.  Continued  LE Matrix strengthening with caution due to pt reports of an old ACL tear.       Pt will continue to benefit from skilled outpatient physical therapy to address the deficits stated in the impairment chart, provide pt/family education and to maximize pt's level of independence in the home and community environment.     Anticipated Barriers for therapy: nil  Pt's spiritual, cultural and educational needs considered and pt agreeable to plan of care and goals as stated below:     Goals:  Pt is in agreement with the following goals.     Short term goals: 6 weeks or 10 visits   1.  Pt will demonstrate increased lumbar ROM by at least 3 degrees from the initial ROM value with improvements noted in functional ROM and ability to perform ADLs.  (appropriate & ongoing)  2.  Pt will demonstrate  increased MedX average isometric strength value by 40% from initial test, resulting in improved ability to perform bending, lifting, and carrying activities safely and confidently.  (appropriate & ongoing)  3.  Pt will report a reduction in worst pain score by 1-2 points for improved tolerance for 30 min of standing/walking for work duties.  (appropriate & ongoing)  4.  Pt will be able to perform HEP correctly with minimal cueing or supervision from therapist to encourage independent management of symptoms. (appropriate & ongoing)     Long term goals: 10 weeks or 20 visits   1. Pt will demonstrate increased lumbar ROM by at least 6 degrees from initial ROM value, resulting in improved ability to perform functional fwd bending while standing and sitting. (appropriate & ongoing)  2. Pt will demonstrate increased MedX average isometric strength value by 80% from initial test, resulting in improved ability to perform bending, lifting, and carrying activities safely and confidently.  (appropriate & ongoing)  3. Pt to demonstrate ability to independently control and/or reduce their pain through posture positioning and mechanical movements throughout a typical day. (appropriate & ongoing)  4.  Pt will demonstrate reduced pain and improved functional outcomes as reported on the FOTO by reaching a limitation score of < or = 40% or less in order to demonstrate subjective improvement in pt's condition.  (appropriate & ongoing)  5. Pt will demonstrate independence with HEP at discharge. (appropriate & ongoing)  6.  Pt will report being able to walk/stand for at least 45 minutes without needing a rest break. (appropriate & ongoing)         Plan   Continue with established Plan of Care towards established PT goals.     Renita Arrington, PT   02/24/2022

## 2022-02-24 ENCOUNTER — CLINICAL SUPPORT (OUTPATIENT)
Dept: REHABILITATION | Facility: HOSPITAL | Age: 57
End: 2022-02-24
Attending: INTERNAL MEDICINE
Payer: COMMERCIAL

## 2022-02-24 DIAGNOSIS — R29.898 DECREASED STRENGTH OF TRUNK AND BACK: Primary | ICD-10-CM

## 2022-02-24 PROCEDURE — 97110 THERAPEUTIC EXERCISES: CPT | Mod: PN | Performed by: PHYSICAL MEDICINE & REHABILITATION

## 2022-02-24 NOTE — PATIENT INSTRUCTIONS
Backward Bend (Standing)        Arch backward to make hollow of back deeper. Hold __2__ seconds.  Repeat __10__ times per set.  Do _2___ sessions per day.

## 2022-03-02 ENCOUNTER — PATIENT MESSAGE (OUTPATIENT)
Dept: FAMILY MEDICINE | Facility: CLINIC | Age: 57
End: 2022-03-02
Payer: COMMERCIAL

## 2022-03-03 ENCOUNTER — TELEPHONE (OUTPATIENT)
Dept: CARDIAC REHAB | Facility: CLINIC | Age: 57
End: 2022-03-03
Payer: COMMERCIAL

## 2022-03-07 ENCOUNTER — CLINICAL SUPPORT (OUTPATIENT)
Dept: REHABILITATION | Facility: HOSPITAL | Age: 57
End: 2022-03-07
Attending: INTERNAL MEDICINE
Payer: COMMERCIAL

## 2022-03-07 DIAGNOSIS — R29.898 DECREASED STRENGTH OF TRUNK AND BACK: Primary | ICD-10-CM

## 2022-03-07 PROCEDURE — 97110 THERAPEUTIC EXERCISES: CPT | Mod: PN

## 2022-03-07 NOTE — PROGRESS NOTES
"  [facility]      Name: Shanae Kumari  Clinic Number: [id number]    Therapy Diagnosis:   Encounter Diagnosis   Name Primary?    Decreased strength of trunk and back Yes     Physician: Tima Stanley MD    Visit Date: 3/7/2022    Physician orders: PT Eval and Treat   Medical diagnosis from referral: chronic bilateral low back pain without sciatica  Evaluation date: 22  Authorization period expiration: 23  Plan of care expiration: 22  Reassessment due: 22  Visit # / visits authorized:      Time in: 1515  Time out:  1705  Total billable time: 50 minutes     Precautions: Standard, DM, HTN, currently in cardiac rehab*,      Pattern of pain determined: Undetermined; decreased trunk endurance and trunk instability       Subjective   Shanae reports nothing new. Has had a short lapse in therapy due to scheduling conflicts. Remarks that her issue seems to be moreso endurance/fatigue in low back than it is pain. Has been stretching at home but hasn't been super consistent c/ her actual HEP.       Patient reports tolerating previous visit well. No soreness.   Patient reports their pain to be 0/10 on a 0-10 scale with 0 being no pain and 10 being the worst pain imaginable.  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   FOTO documents entered into [employer] - see " Media section.     Limitation Score: 56%  Goal < 40% limited             Treatment    Pt was instructed in and performed the following:     Shanae received therapeutic exercises to develop/improved posture, cardiovascular endurance, muscular endurance, lumbar  ROM, strength and muscular endurance for 48 minutes including the following exercises:     Treadmill (1.6 mph), x8 min    Standing extensions, 5 sec holds x10  Slouch correct, x10  +Seated abd bracing c/ ball, x20  PPT, x10  PPT + bent knee fall outs, x10 ea   +Alternating leg hovers, 2x10  SL bridge, 2x8 ea   +SL clams c/ GTB, 2x10      [facility] 3/7/2022   Visit Number 5   VAS Pain Rating 0   Treadmill Time (in min.) 8   Speed 1.6   Lumbar Stretches - Slouch Overcorrection 10   Extension in Standing 10   Lumbar Extension Seat Pad -   Femur Restraint -   Top Dead Center -   Counterweight -   Lumbar Flexion 51   Lumbar Extension 0   Lumbar Peak Torque -   Min Torque -   Test Percent Below Normative Data -   Lumbar Weight 44   Repetitions 20   Rating of Perceived Exertion 4       Peripheral muscle strengthening which included 1 set of 15-20 repetitions at a slow, controlled 10-13 second per rep pace focused on strengthening supporting musculature for improved body mechanics and functional mobility.  Pt and therapist focused on proper form during treatment to ensure optimal strengthening of each targeted muscle group.  Machines were utilized including torso rotation, chest press, rowing, biceps, and triceps. Leg extension, leg curl, hip abd, hip add, and leg press added visit 3       Shanae received the following manual therapy techniques: null      Home Exercises Provided and Patient Education Provided   Stretching: glute bridge, slouch correct, supine alternating leg lifts, ext in standing  Strengthening: not yet started  Cardio program (V5): not yet started  Lifting education (V11):not yet started  Using Lumbar Roll: she got 2/24/22    Education  provided:   - Rationale for POC and progressions     Written Home Exercises Provided: Patient instructed to cont prior HEP.  Exercises were reviewed and Shanae was able to demonstrate them prior to the end of the session.  Shanae demonstrated good  understanding of the education provided.     See EMR under Patient Instructions for exercises provided prior visit.    Assessment   Shanae tolerated session well. Brief reassessment performed c/ pt demonstrating improved lumbar ROM (+6 deg) and improved strength. Pt can now tolerate 44 ft lbs on lumbar MedX, performing 20 reps c/ a reported exertion of 4/10. Core stability progressions tolerated without issue, though some cueing was required for pacing and proper form. PT reinforced importance of HEP performance and therapy consistency for optimal therapeutic gain. Will continue to progress exercise as functional implicated and appropriate.    Pt will continue to benefit from skilled outpatient physical therapy to address the deficits stated in the impairment chart, provide pt/family education and to maximize pt's level of independence in the home and community environment.     Anticipated Barriers for therapy: nil  Pt's spiritual, cultural and educational needs considered and pt agreeable to plan of care and goals as stated below:       Goals:  Pt is in agreement with the following goals.     Short term goals: 6 weeks or 10 visits   1.  Pt will demonstrate increased lumbar ROM by at least 3 degrees from the initial ROM value with improvements noted in functional ROM and ability to perform ADLs. GOAL MET 03/07/22  2.  Pt will demonstrate increased MedX average isometric strength value by 40% from initial test, resulting in improved ability to perform bending, lifting, and carrying activities safely and confidently.  (appropriate & ongoing)  3.  Pt will report a reduction in worst pain score by 1-2 points for improved tolerance for 30 min of standing/walking for work  duties.  (appropriate & ongoing)  4.  Pt will be able to perform HEP correctly with minimal cueing or supervision from therapist to encourage independent management of symptoms. (appropriate & ongoing)     Long term goals: 10 weeks or 20 visits   1. Pt will demonstrate increased lumbar ROM by at least 6 degrees from initial ROM value, resulting in improved ability to perform functional fwd bending while standing and sitting.  GOAL MET 03/07/22  2. Pt will demonstrate increased MedX average isometric strength value by 80% from initial test, resulting in improved ability to perform bending, lifting, and carrying activities safely and confidently.  (appropriate & ongoing)  3. Pt to demonstrate ability to independently control and/or reduce their pain through posture positioning and mechanical movements throughout a typical day. (appropriate & ongoing)  4.  Pt will demonstrate reduced pain and improved functional outcomes as reported on the FOTO by reaching a limitation score of < or = 40% or less in order to demonstrate subjective improvement in pt's condition.  (appropriate & ongoing)  5. Pt will demonstrate independence with HEP at discharge. (appropriate & ongoing)  6.  Pt will report being able to walk/stand for at least 45 minutes without needing a rest break. (appropriate & ongoing)         Plan   Continue with established Plan of Care towards established PT goals.     Suri Luna, PT, DPT  03/07/2022

## 2022-03-09 DIAGNOSIS — I25.10 CORONARY ATHEROSCLEROSIS OF NATIVE CORONARY ARTERY: ICD-10-CM

## 2022-03-09 DIAGNOSIS — Z98.61 POSTSURGICAL PERCUTANEOUS TRANSLUMINAL CORONARY ANGIOPLASTY STATUS: Primary | ICD-10-CM

## 2022-03-10 ENCOUNTER — CLINICAL SUPPORT (OUTPATIENT)
Dept: REHABILITATION | Facility: HOSPITAL | Age: 57
End: 2022-03-10
Attending: INTERNAL MEDICINE
Payer: COMMERCIAL

## 2022-03-10 DIAGNOSIS — R29.898 DECREASED STRENGTH OF TRUNK AND BACK: Primary | ICD-10-CM

## 2022-03-10 PROCEDURE — 97110 THERAPEUTIC EXERCISES: CPT | Mod: PN

## 2022-03-10 NOTE — PROGRESS NOTES
"  [facility]      Name: Shanae Kumari  Clinic Number: [id number]    Therapy Diagnosis:   Encounter Diagnosis   Name Primary?    Decreased strength of trunk and back Yes     Physician: Tima Stanley MD    Visit Date: 3/10/2022    Physician orders: PT Eval and Treat   Medical diagnosis from referral: chronic bilateral low back pain without sciatica  Evaluation date: 22  Authorization period expiration: 23  Plan of care expiration: 22  Reassessment due: 22  Visit # / visits authorized:      Time in: 1342  Time out: 1433  Total billable time: 51 minutes     Precautions: Standard, DM, HTN, currently in cardiac rehab*,      Pattern of pain determined: Undetermined; decreased trunk endurance and trunk instability       Subjective   Shanae reports no issues following last session. Remarks that she was a little sore, but denies any exacerbation of pain.       Patient reports tolerating previous visit well.   Patient reports their pain to be 0/10 on a 0-10 scale with 0 being no pain and 10 being the worst pain imaginable.  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   FOTO documents entered into [employer] - see Media section.     Limitation Score: 56%  Goal < 40% limited             Treatment    Pt was instructed in and performed the following:     Shanae " received therapeutic exercises to develop/improved posture, cardiovascular endurance, muscular endurance, lumbar/thoracic ROM, strength and muscular endurance for 48 minutes including the following exercises:     Treadmill (1.6 mph), x8 min    Standing extensions, 5 sec holds x10  Slouch correct, x15  Seated abd bracing c/ ball, x20  PPT, x15  PPT + bent knee fall outs, x10 ea   Alternating leg hovers, 2x10  SL bridge, 2x8 ea   SL clams c/ GTB, 2x10    [facility] 3/10/2022   Visit Number 6   VAS Pain Rating 0   Treadmill Time (in min.) 8   Speed 1.6   Lumbar Stretches - Slouch Overcorrection 15   Extension in Standing 10   Lumbar Extension Seat Pad -   Femur Restraint -   Top Dead Center -   Counterweight -   Lumbar Flexion -   Lumbar Extension -   Lumbar Peak Torque -   Min Torque -   Test Percent Below Normative Data -   Lumbar Weight 47   Repetitions 15   Rating of Perceived Exertion 3       Peripheral muscle strengthening which included 1 set of 15-20 repetitions at a slow, controlled 10-13 second per rep pace focused on strengthening supporting musculature for improved body mechanics and functional mobility.  Pt and therapist focused on proper form during treatment to ensure optimal strengthening of each targeted muscle group.  Machines were utilized including torso rotation, chest press, rowing, biceps, and triceps. Leg extension, leg curl, hip abd, hip add, and leg press added visit 3       Shanae received the following manual therapy techniques: null      Home Exercises Provided and Patient Education Provided   Stretching: glute bridge, slouch correct, supine alternating leg lifts, ext in standing  Strengthening: not yet started  Cardio program (V5): not yet started  Lifting education (V11):not yet started  Using Lumbar Roll: she got 2/24/22    Education provided:   - Rationale for POC and progressions     Written Home Exercises Provided: Patient instructed to cont prior HEP.  Exercises were  reviewed and Shanae was able to demonstrate them prior to the end of the session.  Shanae demonstrated good  understanding of the education provided.     See EMR under Patient Instructions for exercises provided prior visit.    Assessment   Shanae tolerated session well. Tx maintained to that of previous session to assess pt tolerance. No exacerbation of pain noted throughout session. Good form demonstrated. MedX resistance increased to 47 ft lbs c/ pt performing 15 reps c/ a reported exertion of 3/10. Will continue to progress as appropriate and tolerable.       Pt will continue to benefit from skilled outpatient physical therapy to address the deficits stated in the impairment chart, provide pt/family education and to maximize pt's level of independence in the home and community environment.     Anticipated Barriers for therapy: nil  Pt's spiritual, cultural and educational needs considered and pt agreeable to plan of care and goals as stated below:       Goals:  Pt is in agreement with the following goals.     Short term goals: 6 weeks or 10 visits   1.  Pt will demonstrate increased lumbar ROM by at least 3 degrees from the initial ROM value with improvements noted in functional ROM and ability to perform ADLs. GOAL MET 03/07/22  2.  Pt will demonstrate increased MedX average isometric strength value by 40% from initial test, resulting in improved ability to perform bending, lifting, and carrying activities safely and confidently.  (appropriate & ongoing)  3.  Pt will report a reduction in worst pain score by 1-2 points for improved tolerance for 30 min of standing/walking for work duties.  (appropriate & ongoing)  4.  Pt will be able to perform HEP correctly with minimal cueing or supervision from therapist to encourage independent management of symptoms. (appropriate & ongoing)     Long term goals: 10 weeks or 20 visits   1. Pt will demonstrate increased lumbar ROM by at least 6 degrees from initial ROM  value, resulting in improved ability to perform functional fwd bending while standing and sitting.  GOAL MET 03/07/22  2. Pt will demonstrate increased MedX average isometric strength value by 80% from initial test, resulting in improved ability to perform bending, lifting, and carrying activities safely and confidently.  (appropriate & ongoing)  3. Pt to demonstrate ability to independently control and/or reduce their pain through posture positioning and mechanical movements throughout a typical day. (appropriate & ongoing)  4.  Pt will demonstrate reduced pain and improved functional outcomes as reported on the FOTO by reaching a limitation score of < or = 40% or less in order to demonstrate subjective improvement in pt's condition.  (appropriate & ongoing)  5. Pt will demonstrate independence with HEP at discharge. (appropriate & ongoing)  6.  Pt will report being able to walk/stand for at least 45 minutes without needing a rest break. (appropriate & ongoing)         Plan   Continue with established Plan of Care towards established PT goals.     Suri Luna, PT, DPT  03/10/2022

## 2022-03-28 ENCOUNTER — TELEPHONE (OUTPATIENT)
Dept: CARDIAC REHAB | Facility: CLINIC | Age: 57
End: 2022-03-28
Payer: COMMERCIAL

## 2022-03-31 ENCOUNTER — CLINICAL SUPPORT (OUTPATIENT)
Dept: REHABILITATION | Facility: HOSPITAL | Age: 57
End: 2022-03-31
Attending: INTERNAL MEDICINE
Payer: COMMERCIAL

## 2022-03-31 DIAGNOSIS — R29.898 DECREASED STRENGTH OF TRUNK AND BACK: Primary | ICD-10-CM

## 2022-03-31 PROCEDURE — 97110 THERAPEUTIC EXERCISES: CPT | Mod: PN

## 2022-03-31 NOTE — PROGRESS NOTES
"  [facility]      Name: Shanae Kumari  Clinic Number: [id number]    Therapy Diagnosis:   Encounter Diagnosis   Name Primary?    Decreased strength of trunk and back Yes     Physician: Tima Stanley MD    Visit Date: 3/31/2022    Physician orders: PT Eval and Treat   Medical diagnosis from referral: chronic bilateral low back pain without sciatica  Evaluation date: 22  Authorization period expiration: 23  Plan of care expiration: 22  Reassessment due: 22  Visit # / visits authorized:      Time in: 1420  Time out: 1515  Total billable time: 55 minutes     Precautions: Standard, DM, HTN, currently in cardiac rehab*, cannot tolerable quadruped      Pattern of pain determined: Undetermined; decreased trunk endurance and trunk instability       Subjective   Shanae reports nothing new regarding back, stating it continues to feel fatigued. She has been having to cancel Tuesday appts due to schedule conflicts, as well as had to cancel last Thursday's appt due to severe pain after going on a school tour which required 2 hours of walking.       Patient reports tolerating previous visit well.   Patient reports their pain to be 0/10 on a 0-10 scale with 0 being no pain and 10 being the worst pain imaginable.  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   FOTO documents entered " into [employer] - see Media section.     Limitation Score: 56%  Goal < 40% limited             Treatment    Pt was instructed in and performed the following:     Shanae received therapeutic exercises to develop/improved posture, cardiovascular endurance, muscular endurance, lumbar/thoracic ROM, strength and muscular endurance for 53 minutes including the following exercises:     Treadmill (1.6 mph), x8 min    Standing extensions, 5 sec holds x10  PPT, x15  PPT + bent knee fall outs, x10 ea   +Alternating toe taps from table top position, 2x10   SL bridge, 2x8 ea   SL clams c/ GTB, 2x10  +Sit to stand (focus on eccentric lowering), x10  +Palloff press on FM (7#), x15 ea     [facility] 3/31/2022   Visit Number 7   VAS Pain Rating 0   Treadmill Time (in min.) 8   Speed 1.6   Lumbar Stretches - Slouch Overcorrection -   Extension in Standing 10   Lumbar Extension Seat Pad -   Femur Restraint -   Top Dead Center -   Counterweight -   Lumbar Flexion -   Lumbar Extension -   Lumbar Peak Torque -   Min Torque -   Test Percent Below Normative Data -   Lumbar Weight 47   Repetitions 18   Rating of Perceived Exertion 3       Peripheral muscle strengthening which included 1 set of 15-20 repetitions at a slow, controlled 10-13 second per rep pace focused on strengthening supporting musculature for improved body mechanics and functional mobility.  Pt and therapist focused on proper form during treatment to ensure optimal strengthening of each targeted muscle group.  Machines were utilized including torso rotation, chest press, rowing, biceps, and triceps. Leg extension, leg curl, hip abd, hip add, and leg press added visit 3       Shanae received the following manual therapy techniques: null      Home Exercises Provided and Patient Education Provided   Stretching: glute bridge, slouch correct, supine alternating leg lifts, ext in standing  Strengthening: not yet started  Cardio program (V5): not yet started  Lifting  education (V11): not yet started  Using Lumbar Roll: she got 2/24/22    Education provided:   - Rationale for POC and progressions     Written Home Exercises Provided: Patient instructed to cont prior HEP.  Exercises were reviewed and Shanae was able to demonstrate them prior to the end of the session.  Shanae demonstrated good  understanding of the education provided.     See EMR under Patient Instructions for exercises provided prior visit.    Assessment   Shanae tolerated session well. Additional exercises incorporated for purpose of lumbopelvic stability and trunk endurance c/ no provocation of pain noted. Some reminders for paced breathing given throughout treatment. MedX resistance maintained from previous session (47 ft lbs) c/ pt performing 18 reps c/ a reported exertion of 3/10. Will continue to progress as functionally implicated.       Pt will continue to benefit from skilled outpatient physical therapy to address the deficits stated in the impairment chart, provide pt/family education and to maximize pt's level of independence in the home and community environment.     Anticipated Barriers for therapy: nil  Pt's spiritual, cultural and educational needs considered and pt agreeable to plan of care and goals as stated below:       Goals:  Pt is in agreement with the following goals.     Short term goals: 6 weeks or 10 visits   1.  Pt will demonstrate increased lumbar ROM by at least 3 degrees from the initial ROM value with improvements noted in functional ROM and ability to perform ADLs. GOAL MET 03/07/22  2.  Pt will demonstrate increased MedX average isometric strength value by 40% from initial test, resulting in improved ability to perform bending, lifting, and carrying activities safely and confidently.  (appropriate & ongoing)  3.  Pt will report a reduction in worst pain score by 1-2 points for improved tolerance for 30 min of standing/walking for work duties.  (appropriate & ongoing)  4.  Pt  will be able to perform HEP correctly with minimal cueing or supervision from therapist to encourage independent management of symptoms. (appropriate & ongoing)     Long term goals: 10 weeks or 20 visits   1. Pt will demonstrate increased lumbar ROM by at least 6 degrees from initial ROM value, resulting in improved ability to perform functional fwd bending while standing and sitting.  GOAL MET 03/07/22  2. Pt will demonstrate increased MedX average isometric strength value by 80% from initial test, resulting in improved ability to perform bending, lifting, and carrying activities safely and confidently.  (appropriate & ongoing)  3. Pt to demonstrate ability to independently control and/or reduce their pain through posture positioning and mechanical movements throughout a typical day. (appropriate & ongoing)  4.  Pt will demonstrate reduced pain and improved functional outcomes as reported on the FOTO by reaching a limitation score of < or = 40% or less in order to demonstrate subjective improvement in pt's condition.  (appropriate & ongoing)  5. Pt will demonstrate independence with HEP at discharge. (appropriate & ongoing)  6.  Pt will report being able to walk/stand for at least 45 minutes without needing a rest break. (appropriate & ongoing)         Plan   Continue with established Plan of Care towards established PT goals.     Suri Luna, PT, DPT  03/31/22

## 2022-04-02 DIAGNOSIS — E11.29 TYPE 2 DIABETES MELLITUS WITH MICROALBUMINURIA, WITHOUT LONG-TERM CURRENT USE OF INSULIN: Chronic | ICD-10-CM

## 2022-04-02 DIAGNOSIS — R80.9 TYPE 2 DIABETES MELLITUS WITH MICROALBUMINURIA, WITHOUT LONG-TERM CURRENT USE OF INSULIN: Chronic | ICD-10-CM

## 2022-04-02 NOTE — TELEPHONE ENCOUNTER
Care Due:                  Date            Visit Type   Department     Provider  --------------------------------------------------------------------------------                                MYCHART                              ANNUAL       [facility]                              CHECKUP/PHY  MED/ INTERNAL  Last Visit: 11-      S            MED/ PEDS      Tima Stanley                              [facility]                              FOLLOWUP/OF  MED/ INTERNAL  Next Visit: 06-      [facility]ICE VISIT   MED/ PEDS      Tima Stanley                                                            Last  Test          Frequency    Reason                     Performed    Due Date  --------------------------------------------------------------------------------    HBA1C.......  6 months...  dulaglutide..............  11- 05-    Powered by [employer]. Reference number: [id number].   4/02/2022 3:57:53 PM CDT

## 2022-04-04 ENCOUNTER — TELEPHONE (OUTPATIENT)
Dept: CARDIAC REHAB | Facility: CLINIC | Age: 57
End: 2022-04-04
Payer: COMMERCIAL

## 2022-04-04 DIAGNOSIS — M54.50 CHRONIC BILATERAL LOW BACK PAIN WITHOUT SCIATICA: Chronic | ICD-10-CM

## 2022-04-04 DIAGNOSIS — G89.29 CHRONIC BILATERAL LOW BACK PAIN WITHOUT SCIATICA: Chronic | ICD-10-CM

## 2022-04-04 RX ORDER — TIZANIDINE 2 MG/1
TABLET ORAL
Qty: 180 TABLET | Refills: 1 | Status: SHIPPED | OUTPATIENT
Start: 2022-04-04 | End: 2022-10-15 | Stop reason: SDUPTHER

## 2022-04-04 NOTE — TELEPHONE ENCOUNTER
No new care gaps identified.  Powered by [employer]. Reference number: [id number].   4/04/2022 12:35:29 AM CDT

## 2022-04-05 NOTE — TELEPHONE ENCOUNTER
Left voicemail message to inform the Patient, Rx refill has been approved and sent to preferred pharmacy.  Sent a message thru [facility].

## 2022-04-06 NOTE — TELEPHONE ENCOUNTER
Refill Routing Note   Medication(s) are not appropriate for processing by [facility] for the following reason(s):      - Patient has been seen in the Emergency Room/Department since the last PCP visit    [facility] action(s):  Defer       Medication Therapy Plan: FLOS  Medication reconciliation completed: No     Appointments  past 12m or future 3m with PCP    Date Provider   Last Visit   11/19/2021 Tima Stanley MD   Next Visit   4/4/2022 Tima Stanley MD   ED visits in past 90 days: 0        Note composed:5:20 PM 04/06/2022

## 2022-04-07 ENCOUNTER — IMMUNIZATION (OUTPATIENT)
Dept: PHARMACY | Facility: CLINIC | Age: 57
End: 2022-04-07
Payer: COMMERCIAL

## 2022-04-07 DIAGNOSIS — Z23 NEED FOR VACCINATION: Primary | ICD-10-CM

## 2022-04-07 RX ORDER — DULAGLUTIDE 1.5 MG/.5ML
1.5 INJECTION, SOLUTION SUBCUTANEOUS WEEKLY
Qty: 4 PEN | Refills: 0 | Status: SHIPPED | OUTPATIENT
Start: 2022-04-07 | End: 2022-05-16 | Stop reason: SDUPTHER

## 2022-04-07 NOTE — TELEPHONE ENCOUNTER
Refill approved.  Please ensure that she has an A1c with her next lab draw.  I've also added a CMP order if we can please link this to her 4/13 lab appt.     Thank you,  Wagner

## 2022-04-11 DIAGNOSIS — F43.22 ADJUSTMENT DISORDER WITH ANXIOUS MOOD: ICD-10-CM

## 2022-04-11 RX ORDER — SERTRALINE HYDROCHLORIDE 50 MG/1
TABLET, FILM COATED ORAL
Qty: 90 TABLET | Refills: 0 | Status: SHIPPED | OUTPATIENT
Start: 2022-04-11 | End: 2022-06-27

## 2022-04-11 NOTE — TELEPHONE ENCOUNTER
No new care gaps identified.  Powered by [employer]. Reference number: [id number].   4/11/2022 12:33:46 AM CDT

## 2022-04-11 NOTE — TELEPHONE ENCOUNTER
Refill Routing Note   Medication(s) are not appropriate for processing by [facility] for the following reason(s):      - Patient has been seen in the ED/Hospital since the last PCP visit    [facility] action(s):  Defer          Medication reconciliation completed: No     Appointments  past 12m or future 3m with PCP    Date Provider   Last Visit   11/19/2021 Tima Stanley MD   Next Visit   6/10/2022 Tima Stanley MD   ED visits in past 90 days: 0        Note composed:12:14 PM 04/11/2022

## 2022-04-13 ENCOUNTER — HOSPITAL ENCOUNTER (OUTPATIENT)
Dept: CARDIOLOGY | Facility: HOSPITAL | Age: 57
Discharge: HOME | End: 2022-04-13
Attending: INTERNAL MEDICINE
Payer: COMMERCIAL

## 2022-04-13 VITALS
HEIGHT: 64 IN | HEART RATE: 97 BPM | WEIGHT: 202 LBS | DIASTOLIC BLOOD PRESSURE: 79 MMHG | BODY MASS INDEX: 34.49 KG/M2 | SYSTOLIC BLOOD PRESSURE: 129 MMHG

## 2022-04-13 DIAGNOSIS — I25.10 CORONARY ATHEROSCLEROSIS OF NATIVE CORONARY ARTERY: ICD-10-CM

## 2022-04-13 DIAGNOSIS — Z98.61 POSTSURGICAL PERCUTANEOUS TRANSLUMINAL CORONARY ANGIOPLASTY STATUS: ICD-10-CM

## 2022-04-13 LAB
CV STRESS BASE HR: 97 BPM
DIASTOLIC BLOOD PRESSURE: 79 MMHG
OHS CV CPX 1 MINUTE RECOVERY HEART RATE: 129 BPM
OHS CV CPX 85 PERCENT MAX PREDICTED HEART RATE MALE: 135
OHS CV CPX ABDOMINAL GIRTH: 47.1 CM
OHS CV CPX DATA GRADE - PEAK: 10.4
OHS CV CPX DATA O2 SAT - PEAK: 95
OHS CV CPX DATA O2 SAT - REST: 98
OHS CV CPX DATA SPEED - PEAK: 3
OHS CV CPX DATA TIME - PEAK: 5.05
OHS CV CPX DATA VE/VCO2 - PEAK: 33
OHS CV CPX DATA VE/VO2 - PEAK: 29
OHS CV CPX DATA VO2 - PEAK: 16.7
OHS CV CPX DATA VO2 - REST: 4.1
OHS CV CPX ESTIMATED METS: 7
OHS CV CPX FEV1/FVC: 0.85
OHS CV CPX FORCED EXPIRATORY VOLUME: 2.28
OHS CV CPX FORCED VITAL CAPACITY (FVC): 2.67
OHS CV CPX HIGHEST VO: 30.2
OHS CV CPX MAX PREDICTED HEART RATE: 158
OHS CV CPX MAXIMAL VOLUNTARY VENTILATION (MVV) PREDICTED: 91.2
OHS CV CPX MAXIMAL VOLUNTARY VENTILATION (MVV): 95
OHS CV CPX MAXIUMUM EXERCISE VENTILATION (VE MAX): 50.6
OHS CV CPX PATIENT AGE: 54
OHS CV CPX PATIENT HEIGHT IN: 64
OHS CV CPX PATIENT IS FEMALE AGE 11-19: 0
OHS CV CPX PATIENT IS FEMALE AGE GREATER THAN 19: 1
OHS CV CPX PATIENT IS FEMALE AGE LESS THAN 11: 0
OHS CV CPX PATIENT IS FEMALE: 1
OHS CV CPX PATIENT IS MALE AGE 11-25: 0
OHS CV CPX PATIENT IS MALE AGE GREATER THAN 25: 0
OHS CV CPX PATIENT IS MALE AGE LESS THAN 11: 0
OHS CV CPX PATIENT IS MALE GREATER THAN 18: 0
OHS CV CPX PATIENT IS MALE LESS THAN OR EQUAL TO 18: 0
OHS CV CPX PATIENT IS MALE: 0
OHS CV CPX PATIENT WEIGHT RETURNED IN OZ: 3232
OHS CV CPX PEAK DIASTOLIC BLOOD PRESSURE: 63 MMHG
OHS CV CPX PEAK HEAR RATE: 134 BPM
OHS CV CPX PEAK RATE PRESSURE PRODUCT: NORMAL
OHS CV CPX PEAK SYSTOLIC BLOOD PRESSURE: 169 MMHG
OHS CV CPX PERCENT BODY FAT: 31
OHS CV CPX PERCENT MAX PREDICTED HEART RATE ACHIEVED: 85
OHS CV CPX PREDICTED VO2: 30.2 ML/KG/MIN
OHS CV CPX RATE PRESSURE PRODUCT PRESENTING: NORMAL
OHS CV CPX REST PET CO2: 29
OHS CV CPX VE/VCO2 SLOPE: 32.3
STRESS ECHO POST EXERCISE DUR MIN: 5 MINUTES
STRESS ECHO POST EXERCISE DUR SEC: 3 SECONDS
SYSTOLIC BLOOD PRESSURE: 129 MMHG

## 2022-04-13 PROCEDURE — 94621 CARDIOPULM EXERCISE TESTING: CPT

## 2022-04-13 PROCEDURE — 94621 CARDIOPULMONARY EXERCISE TESTING (CUPID ONLY): ICD-10-PCS | Mod: 26,,, | Performed by: INTERNAL MEDICINE

## 2022-04-13 PROCEDURE — 94621 CARDIOPULM EXERCISE TESTING: CPT | Mod: 26,,, | Performed by: INTERNAL MEDICINE

## 2022-04-14 NOTE — PROGRESS NOTES
Session: Orientation   Cardiac Rehab Individual Treatment Plan - Initial Assessment      Patient Name: Shanae Kumari MRN: [id number]   : 1965   Age: 56 y.o.   Primary Diagnosis: PTCA/STENT  Date of Event: 21  EF: 65%  Risk Stratification: high  Referring Physician: GONZALO   Exercise Assessment:     CPX/TM Date: 22 Results   RHR 97   Max    Peak VO2 (CPX only) 16.7   Actual METS (CPX only) 4.8   Estimated METS 7.0     Anthropometrics    Height 64 inches   Weight 202 lbs   BMI 34.7   Abdominal Girth 47.1   Body Composition 31.0%     ST Depression noted on Stress Test?:No  Angina with exercise?: No   Fall Risk: No   Assistive Devices:  independent   Currently exercising? No   There were many limitations noted by the patient.  Shanae stated she has torn left ACL, chronic low back pain, and torn labrum on the right hip pain.  Exercise modalities will be adjusted.      Exercise Plan:   Goals:  CR Exercise Goals: Attend Cardiac Rehab 3 times/week: In Progress  Home Aerobic Exercise: 2 additional days/week for 30-60 minutes: In Progress  Intensity of 12-15 on the Rate of Perceived Exertion (RPE) scale: In Progress  30% increase in entry estimated METS: 9.1 : In Progress  5 days/week for 30-60 minutes: In Progress    Intervention:   Discussed importance of regular attendance to cardiac rehab class    Exercise Prescription:  THR Range 119-129   Mode: Treadmill  Recumbent Bike  Upright Bike  Nustep  Elliptical   Frequency:  3 days/week   Duration:  30 - 60 minutes   Intensity:  12 - 15 RPE   Resistance Training:  Yes: 0 to 5 lb weights with 10-15 reps based on strength and range of motion assessment     Home Prescription:  Mode Aerobic   Frequency: 2- 3 days/week   Duration: 30-60 minutes   Resistance Training: None        Education:  Orientation to Equipment; verbalizes understanding; Date: 22  Exercise Recommendations; verbalizes understanding; Date: 22  Exercise Safety; verbalizes  understanding; Date: 4-26-22  Class Preparation: verbalizes understanding; Date: 4-26-22  Signs and symptoms to report: verbalizes understanding; Date: 4-26-22  Caffeine/Hydration: verbalizes understanding; Date: 4-26-22  Exercise Terminology: verbalizes understanding; Date: 4-26-22  Resistance Training: verbalizes understanding; Date: 4-26-22    Comments:  Shanae was encouraged to begin thinking about some type of aerobic exercise she can participate in at least 2 non-rehab days per week for at least 30 minutes in addition to attending Phase II cardiac rehab classes 3 days per week.  She stated understanding.    All consent forms were signed, proper attire and shoes were discussed.       Shanae will begin Cardiac Rehab on Friday, May 20 at 3:30pm.    The exercise prescription will be adjusted based on tolerance of exercise intensity by patient.    Ashley Posey., CEP

## 2022-04-14 NOTE — PROGRESS NOTES
HISTORY: S/P PTCA/STENT (11-26-21), CAD, HTN, HLP, DM, EF=65%(4-5-21)    ANTHROPOMETRICS:     PRE   Height (in) 64   Weight (lb) 202   BMI 34.7   Abdominal Girth (in) 47.1   % Body Fat 31.0%       LAB RESULTS:    Lab Results   Component Value Date    HGB 14.0 04/13/2022     Lab Results   Component Value Date    HCT 43.8 04/13/2022     Lab Results   Component Value Date    MPV 9.3 04/13/2022       Lab Results   Component Value Date    CHOL 184 04/13/2022     Lab Results   Component Value Date    HDL 42 04/13/2022     Lab Results   Component Value Date    LDLCALC 107.8 04/13/2022     Lab Results   Component Value Date    TRIG 171 (H) 04/13/2022     Lab Results   Component Value Date    CHOLHDL 22.8 04/13/2022       Lab Results   Component Value Date    GLUF 189 (H) 04/13/2022     Lab Results   Component Value Date    HGBA1C 10.9 (H) 04/13/2022        Lab Results   Component Value Date    HSCRP 2.02 04/13/2022         BRITTNY SCORES:     PRE   Anxiety 3   Depression 1   Somatic 4   Hostility 2     SF-36 SCORES:     PRE   Physical Function 19   Social Function 8   Mental Health 25   Pain 6   Change in Health 2   Physical Role Limitation 1   Mental Role Limitation 3   Energy/Fatigue 9   Health Perceptions 12   Total Score 85     PHQ-9:  PHQ-9 Depression Patient Health Questionnaire 4/26/2022   Over the last two weeks how often have you been bothered by little interest or pleasure in doing things 0   Over the last two weeks how often have you been bothered by feeling down, depressed or hopeless 0   Over the last two weeks how often have you been bothered by trouble falling or staying asleep, or sleeping too much 1   Over the last two weeks how often have you been bothered by feeling tired or having little energy 1   Over the last two weeks how often have you been bothered by a poor appetite or overeating 1   Over the last two weeks how often have you been bothered by feeling bad about yourself - or that you are a failure  or have let yourself or your family down 0   Over the last two weeks how often have you been bothered by trouble concentrating on things, such as reading the newspaper or watching television 0   Over the last two weeks how often have you been bothered by moving or speaking so slowly that other people could have noticed. 0   Over the last two weeks how often have you been bothered by thoughts that you would be better off dead, or of hurting yourself 0   If you checked off any problems, how difficult have these problems made it for you to do your work, take care of things at home or get along with other people? Somewhat difficult   Total Score 3             EDUCATION SCORES:     PRE   Education Score 60

## 2022-04-21 ENCOUNTER — CLINICAL SUPPORT (OUTPATIENT)
Dept: REHABILITATION | Facility: HOSPITAL | Age: 57
End: 2022-04-21
Attending: INTERNAL MEDICINE
Payer: COMMERCIAL

## 2022-04-21 ENCOUNTER — PATIENT OUTREACH (OUTPATIENT)
Dept: ADMINISTRATIVE | Facility: OTHER | Age: 57
End: 2022-04-21
Payer: COMMERCIAL

## 2022-04-21 DIAGNOSIS — R29.898 DECREASED STRENGTH OF TRUNK AND BACK: Primary | ICD-10-CM

## 2022-04-21 PROCEDURE — 97110 THERAPEUTIC EXERCISES: CPT | Mod: PN,CQ

## 2022-04-21 PROCEDURE — 97164 PT RE-EVAL EST PLAN CARE: CPT | Mod: PN

## 2022-04-21 NOTE — PROGRESS NOTES
Health Maintenance Due   Topic Date Due    Shingles Vaccine (1 of 2) Never done     Updates were requested from care everywhere.  Chart was reviewed for overdue Proactive [facility] (WILL[facility]) topics (CRS, Breast Cancer Screening, Eye exam)  Health Maintenance has been updated.  [facility] immunization registry triggered.  Immunizations were reconciled.

## 2022-04-21 NOTE — PROGRESS NOTES
"  [facility]      Name: Shanae Kumari  Clinic Number: [id number]    Therapy Diagnosis:   Encounter Diagnosis   Name Primary?    Decreased strength of trunk and back Yes     Physician: Tima Stanley MD    Visit Date: 2022    Physician orders: PT Eval and Treat   Medical diagnosis from referral: chronic bilateral low back pain without sciatica  Evaluation date: 22  Authorization period expiration: 23  Plan of care expiration: 22  Reassessment due: 22  Visit # / visits authorized:      Time in: 1420  Time out: 1515  Total billable time: 55 minutes     Precautions: Standard, DM, HTN, currently in cardiac rehab*, cannot tolerable quadruped      Pattern of pain determined: Undetermined; decreased trunk endurance and trunk instability       Subjective   Shanae reports nothing new regarding back, stating it continues to feel fatigued. She has been having to cancel Tuesday appts due to schedule conflicts, as well as had to cancel last Thursday's appt due to severe pain after going on a school tour which required 2 hours of walking.       Patient reports tolerating previous visit well.   Patient reports their pain to be 0/10 on a 0-10 scale with 0 being no pain and 10 being the worst pain imaginable.  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   FOTO documents entered " into [employer] - see Media section.     Limitation Score: 56%  Goal < 40% limited             Treatment    Pt was instructed in and performed the following:     Shanae received therapeutic exercises to develop/improved posture, cardiovascular endurance, muscular endurance, lumbar/thoracic ROM, strength and muscular endurance for 53 minutes including the following exercises:     Treadmill (1.6 mph), x8 min    Standing extensions, 5 sec holds x10  PPT, x15  PPT + bent knee fall outs, x10 ea   +Alternating toe taps from table top position, 2x10   SL bridge, 2x8 ea   SL clams c/ GTB, 2x10  +Sit to stand (focus on eccentric lowering), x10  +Palloff press on FM (7#), x15 ea     [facility] 3/31/2022   Visit Number 7   VAS Pain Rating 0   Treadmill Time (in min.) 8   Speed 1.6   Lumbar Stretches - Slouch Overcorrection -   Extension in Standing 10   Lumbar Extension Seat Pad -   Femur Restraint -   Top Dead Center -   Counterweight -   Lumbar Flexion -   Lumbar Extension -   Lumbar Peak Torque -   Min Torque -   Test Percent Below Normative Data -   Lumbar Weight 47   Repetitions 18   Rating of Perceived Exertion 3       Peripheral muscle strengthening which included 1 set of 15-20 repetitions at a slow, controlled 10-13 second per rep pace focused on strengthening supporting musculature for improved body mechanics and functional mobility.  Pt and therapist focused on proper form during treatment to ensure optimal strengthening of each targeted muscle group.  Machines were utilized including torso rotation, chest press, rowing, biceps, and triceps. Leg extension, leg curl, hip abd, hip add, and leg press added visit 3       Shanae received the following manual therapy techniques: null      Home Exercises Provided and Patient Education Provided   Stretching: glute bridge, slouch correct, supine alternating leg lifts, ext in standing  Strengthening: not yet started  Cardio program (V5): not yet started  Lifting  education (V11): not yet started  Using Lumbar Roll: she got 2/24/22    Education provided:   - Rationale for POC and progressions     Written Home Exercises Provided: Patient instructed to cont prior HEP.  Exercises were reviewed and Shanae was able to demonstrate them prior to the end of the session.  Shanae demonstrated good  understanding of the education provided.     See EMR under Patient Instructions for exercises provided prior visit.    Assessment   Shanae tolerated session well. Additional exercises incorporated for purpose of lumbopelvic stability and trunk endurance c/ no provocation of pain noted. Some reminders for paced breathing given throughout treatment. MedX resistance maintained from previous session (47 ft lbs) c/ pt performing 18 reps c/ a reported exertion of 3/10. Will continue to progress as functionally implicated.       Pt will continue to benefit from skilled outpatient physical therapy to address the deficits stated in the impairment chart, provide pt/family education and to maximize pt's level of independence in the home and community environment.     Anticipated Barriers for therapy: nil  Pt's spiritual, cultural and educational needs considered and pt agreeable to plan of care and goals as stated below:       Goals:  Pt is in agreement with the following goals.     Short term goals: 6 weeks or 10 visits   1.  Pt will demonstrate increased lumbar ROM by at least 3 degrees from the initial ROM value with improvements noted in functional ROM and ability to perform ADLs. GOAL MET 03/07/22  2.  Pt will demonstrate increased MedX average isometric strength value by 40% from initial test, resulting in improved ability to perform bending, lifting, and carrying activities safely and confidently.  (appropriate & ongoing)  3.  Pt will report a reduction in worst pain score by 1-2 points for improved tolerance for 30 min of standing/walking for work duties.  (appropriate & ongoing)  4.  Pt  will be able to perform HEP correctly with minimal cueing or supervision from therapist to encourage independent management of symptoms. (appropriate & ongoing)     Long term goals: 10 weeks or 20 visits   1. Pt will demonstrate increased lumbar ROM by at least 6 degrees from initial ROM value, resulting in improved ability to perform functional fwd bending while standing and sitting.  GOAL MET 03/07/22  2. Pt will demonstrate increased MedX average isometric strength value by 80% from initial test, resulting in improved ability to perform bending, lifting, and carrying activities safely and confidently.  (appropriate & ongoing)  3. Pt to demonstrate ability to independently control and/or reduce their pain through posture positioning and mechanical movements throughout a typical day. (appropriate & ongoing)  4.  Pt will demonstrate reduced pain and improved functional outcomes as reported on the FOTO by reaching a limitation score of < or = 40% or less in order to demonstrate subjective improvement in pt's condition.  (appropriate & ongoing)  5. Pt will demonstrate independence with HEP at discharge. (appropriate & ongoing)  6.  Pt will report being able to walk/stand for at least 45 minutes without needing a rest break. (appropriate & ongoing)         Plan   Continue with established Plan of Care towards established PT goals.     Chloe Mathur, PTA  03/31/22

## 2022-04-21 NOTE — PROGRESS NOTES
"  [facility]      Name: Shanae Kumari  Clinic Number: [id number]    Therapy Diagnosis:   Encounter Diagnosis   Name Primary?    Decreased strength of trunk and back Yes     Physician: Tima Stanley MD    Visit Date: 2022    Physician orders: PT Eval and Treat   Medical diagnosis from referral: chronic bilateral low back pain without sciatica  Evaluation date: 22  Authorization period expiration: 23  Plan of care expiration: 22  Reassessment due: 22  Visit # / visits authorized:      Time in: 1430PM  Time out: 1530PM  Total billable time: 60 minutes     Precautions: Standard, DM, HTN, currently in cardiac rehab*, cannot tolerable quadruped      Pattern of pain determined: Undetermined; decreased trunk endurance and trunk instability       Subjective   Shanae reports no pain, but fatigue in back 2/2 to prolonged walking and standing while in [place] recently.       Patient reports tolerating previous visit well.   Patient reports their pain to be 0/10 on a 0-10 scale with 0 being no pain and 10 being the worst pain imaginable.  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)  Pts goals: "If I could get through the day of work without stopping/slowing down."       Objective   REASSESSMENT: 22  Lumbar ROM: WNL    Trunk Strength/Endurance:  Modified plank at EOM: pt able to sustain proper form for 22 sec (notable muscular fatigue and declining form at 22 sec)  Prone supermans c/ arms behind head: performed 5 reps c/ RPE 3/10      LE Strength:    RLE hip 4/5, knee 5/5, ankle 5/5   LLE hip 4-/5, knee 4 to 4+5, ankle 5/5       Baseline isometric testing on [employer] equipment:   Testing administered by PT     Date of testin22  ROM 0-45 deg   Max Peak Torque 51   Min Peak Torque 13   Flex/Ext Ratio " 3.9:1   % below normative data 72%            Limitation/restriction for FOTO 02/03/22 Survey     Therapist reviewed FOTO scores for Shanae Kumari on 2/3/2022.   FOTO documents entered into [employer] - see Media section.     Limitation Score: 56%  Goal < 40% limited             Treatment    Pt was instructed in and performed the following:     Shanae received therapeutic exercises to develop/improved posture, cardiovascular endurance, muscular endurance, lumbar/thoracic ROM, strength and muscular endurance for 70 minutes including the following exercises:     Treadmill (1.6 mph), x10 min  +Lat pulldowns RTB 20x  +Paloff press w/ GTB 20x  Standing extensions, 5 sec holds x10  +Dead bugs w/ emphasis on PPT 10x/ea  PPT, x15  SL bridge, 2x8 ea  Sit to stand (focus on eccentric lowering) 7.5#, 2x10    [facility] 4/21/2022   Visit Number 8   VAS Pain Rating 0   Treadmill Time (in min.) 10   Speed 1.6   Lumbar Stretches - Slouch Overcorrection -   Extension in Standing 10   Cervical Weight 47   Repetitions 20   Rating of Perceived Exertion 4   Lumbar Extension Seat Pad -   Femur Restraint -   Top Dead Center -   Counterweight -   Lumbar Flexion -   Lumbar Extension -   Lumbar Peak Torque -   Min Torque -   Test Percent Below Normative Data -   Lumbar Weight -   Repetitions -   Rating of Perceived Exertion -   Ice - Z Lie (in min.) 2       Peripheral muscle strengthening which included 1 set of 15-20 repetitions at a slow, controlled 10-13 second per rep pace focused on strengthening supporting musculature for improved body mechanics and functional mobility.  Pt and therapist focused on proper form during treatment to ensure optimal strengthening of each targeted muscle group.  Machines were utilized including torso rotation, chest press, rowing, biceps, and triceps. Leg extension, leg curl, hip abd, hip add, and leg press added visit 3     NP:  PPT + bent knee fall outs, x10 ea   +Alternating toe taps from table  "top position, 2x10    SL clams c/ GTB, 2x10  +Palloff press on FM (7#), x15 ea     Shanae received the following manual therapy techniques: null      Home Exercises Provided and Patient Education Provided   Stretching: glute bridge, slouch correct, supine alternating leg lifts, ext in standing  Strengthening: not yet started  Cardio program (V5): not yet started  Lifting education (V11): not yet started  Using Lumbar Roll: Using     Education provided:   - Rationale for POC and progressions     Written Home Exercises Provided: Patient instructed to cont prior HEP.  Exercises were reviewed and Shanae was able to demonstrate them prior to the end of the session.  Shanae demonstrated good  understanding of the education provided.     See EMR under Patient Instructions for exercises provided prior visit.    Assessment   Shanae tolerated session well with minimal fatigue, and no c/o pain. Pt presents to therapy after 20 days with reports of acute onset of muscle fatigue in lumbar spine. Incorporated core strengthen and stability this date, and there were no adverse effects. MedX resistance maintained at (47 ft lbs) c/ pt performing 20 reps c/ a reported exertion of 4/10.     PT reassessment: Brief reassessment of pt performing c/ pt exhibiting ongoing hip weakness (L>R) and deficits in trunk strength and endurance. PT implemented performance of modified plank and prone lumbar extension (supermans) to better objectify trunk endurance going forward. Pt's biggest issue continues to be reported "fatigue" in upright postures. Will continue to progress as functionally implicated going forward, increasing time spent in upright postures.       Pt will continue to benefit from skilled outpatient physical therapy to address the deficits stated in the impairment chart, provide pt/family education and to maximize pt's level of independence in the home and community environment.     Anticipated Barriers for therapy: nil  Pt's " spiritual, cultural and educational needs considered and pt agreeable to plan of care and goals as stated below:       Goals:  Pt is in agreement with the following goals.     Short term goals: 6 weeks or 10 visits   1.  Pt will demonstrate increased lumbar ROM by at least 3 degrees from the initial ROM value with improvements noted in functional ROM and ability to perform ADLs. GOAL MET 03/07/22  2.  Pt will demonstrate increased MedX average isometric strength value by 40% from initial test, resulting in improved ability to perform bending, lifting, and carrying activities safely and confidently.  (appropriate & ongoing)  3.  Pt will report a reduction in worst pain score by 1-2 points for improved tolerance for 30 min of standing/walking for work duties.  (appropriate & ongoing)  4.  Pt will be able to perform HEP correctly with minimal cueing or supervision from therapist to encourage independent management of symptoms. (appropriate & ongoing)     Long term goals: 10 weeks or 20 visits   1. Pt will demonstrate increased lumbar ROM by at least 6 degrees from initial ROM value, resulting in improved ability to perform functional fwd bending while standing and sitting.  GOAL MET 03/07/22  2. Pt will demonstrate increased MedX average isometric strength value by 80% from initial test, resulting in improved ability to perform bending, lifting, and carrying activities safely and confidently.  (appropriate & ongoing)  3. Pt to demonstrate ability to independently control and/or reduce their pain through posture positioning and mechanical movements throughout a typical day. (appropriate & ongoing)  4.  Pt will demonstrate reduced pain and improved functional outcomes as reported on the FOTO by reaching a limitation score of < or = 40% or less in order to demonstrate subjective improvement in pt's condition.  (appropriate & ongoing)  5. Pt will demonstrate independence with HEP at discharge. (appropriate & ongoing)  6.   Pt will report being able to walk/stand for at least 45 minutes without needing a rest break. (appropriate & ongoing)         Plan   Continue with established Plan of Care towards established PT goals.     Chloe Mathur, PTA   04/21/2022     Reassessment performed by: Suri Luna, PT, DPT  04/21/22

## 2022-04-25 ENCOUNTER — TELEPHONE (OUTPATIENT)
Dept: CARDIAC REHAB | Facility: CLINIC | Age: 57
End: 2022-04-25
Payer: COMMERCIAL

## 2022-04-25 ENCOUNTER — CLINICAL SUPPORT (OUTPATIENT)
Dept: REHABILITATION | Facility: HOSPITAL | Age: 57
End: 2022-04-25
Attending: INTERNAL MEDICINE
Payer: COMMERCIAL

## 2022-04-25 DIAGNOSIS — R29.898 DECREASED STRENGTH OF TRUNK AND BACK: Primary | ICD-10-CM

## 2022-04-25 PROCEDURE — 97110 THERAPEUTIC EXERCISES: CPT | Mod: PN

## 2022-04-25 NOTE — PROGRESS NOTES
"  [facility]      Name: Shanae Kumari  Clinic Number: [id number]    Therapy Diagnosis:   Encounter Diagnosis   Name Primary?    Decreased strength of trunk and back Yes     Physician: Tima Stanley MD    Visit Date: 4/25/2022    Physician orders: PT Eval and Treat   Medical diagnosis from referral: chronic bilateral low back pain without sciatica  Evaluation date: 02/03/22  Authorization period expiration: 01/04/23  Plan of care expiration: 05/03/22  Reassessment due: 05/25/22  Visit # / visits authorized: 9/20     Time in: 1647  Time out: 1730  Total billable time: 43 minutes     Precautions: Standard, DM, HTN, currently in cardiac rehab*, cannot tolerable quadruped      Pattern of pain determined: Undetermined; decreased trunk endurance and trunk instability       Subjective   Shanae reports no issues nor pain following previous session. Back is doing well, but overall she's been feeling very tired over the last week. She was impressed c/ how long/far she was able to walk over her vacation in [place], stating she didn't feel so bad.       Patient reports tolerating previous visit well.   Patient reports their pain to be 0/10 on a 0-10 scale with 0 being no pain and 10 being the worst pain imaginable.  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)  Pts goals: "If I could get through the day of work without stopping/slowing down."       Objective   REASSESSMENT: 04/21/22  Lumbar ROM: WNL    Trunk Strength/Endurance:  Modified plank at EOM: pt able to sustain proper form for 22 sec (notable muscular fatigue and declining form at 22 sec)  Prone supermans c/ arms behind head: performed 5 reps c/ RPE 3/10      LE Strength:    RLE hip 4/5, knee 5/5, ankle 5/5   LLE hip 4-/5, knee 4 to 4+5, ankle 5/5       Baseline isometric testing on MedX " equipment:   Testing administered by PT     Date of testin22  ROM 0-45 deg   Max Peak Torque 51   Min Peak Torque 13   Flex/Ext Ratio 3.9:1   % below normative data 72%         [facility]point isometric testing on MedX equipment:   Testing administered by PT     Date of testin22  ROM 0-51 deg   Max Peak Torque 140   Min Peak Torque 85   Flex/Ext Ratio    % below normative data 17%        Limitation/restriction for FOTO 22 Survey     Therapist reviewed FOTO scores for Shanae Kumari on 2/3/2022.   FOTO documents entered into [employer] - see Media section.     Limitation Score: 56%  Visit 10 score:  Goal: < 40% limited             Treatment    Pt was instructed in and performed the following:     Shanae received therapeutic exercises to develop/improved posture, cardiovascular endurance, muscular endurance, lumbar/thoracic ROM, strength and muscular endurance for 40 minutes including the following exercises:     Treadmill (1.6 mph), x10 min    [facility] 2022   Visit Number 9   VAS Pain Rating 1   Treadmill Time (in min.) 0   Speed 1.6   Lumbar Stretches - Slouch Overcorrection -   Extension in Standing -   Cervical Weight -   Repetitions -   Rating of Perceived Exertion -   Lumbar Extension Seat Pad 1   Femur Restraint 5   Top Dead Center 24   Counterweight 123   Lumbar Flexion 51   Lumbar Extension 0   Lumbar Peak Torque 140   Min Torque 85   Test Percent Below Normative Data 17   Test Percent Gain in Strength from Initial  243     Peripheral muscle strengthening which included 1 set of 15-20 repetitions at a slow, controlled 10-13 second per rep pace focused on strengthening supporting musculature for improved body mechanics and functional mobility.  Pt and therapist focused on proper form during treatment to ensure optimal strengthening of each targeted muscle group.  Machines were utilized including torso rotation, chest press, rowing, biceps, and triceps. Leg extension, leg curl,  hip abd, hip add, and leg press added visit 3       Not performed 4/25/2022 MedX isometric testing:  Lat pulldowns RTB 20x  Palloff press w/ GTB 20x  Standing extensions, 5 sec holds x10  Dead bugs w/ emphasis on PPT 10x/ea  PPT, x15  SL bridge, 2x8 ea  Sit to stand (focus on eccentric lowering) 7.5#, 2x10  PPT + bent knee fall outs, x10 ea   +Alternating toe taps from table top position, 2x10    SL clams c/ GTB, 2x10  +Palloff press on FM (7#), x15 ea     Shanae received the following manual therapy techniques: null      Home Exercises Provided and Patient Education Provided   Stretching: glute bridge, slouch correct, supine alternating leg lifts, ext in standing  Strengthening: not yet started  Cardio program (V5): not yet started  Lifting education (V11): not yet started  Using Lumbar Roll: Using     Education provided:   - MedX isometric testing: technique and results       Written Home Exercises Provided: Patient instructed to cont prior HEP.  Exercises were reviewed and Shanae was able to demonstrate them prior to the end of the session.  Shanae demonstrated good  understanding of the education provided.     See EMR under Patient Instructions for exercises provided prior visit.    Assessment   [facility]point MedX assessment performed today 2/2 visit c/ PTA next session. Shanae has attended 9 visits of [facility], which included MD evaluation, PT evaluation with isometric testing, and physical therapy tx including HEP instruction, education (including posture and ergonomics), aerobic work, dynamic strengthening on MedX equipment for the spine, and whole body strengthening on MedX equipment with increasing weight loads.  Pt is demonstrating increased ability to reduce symptoms, improved posture, improved ROM, and improved strength as follows:    -Improved attention to posture  -Improved 6 degrees of ROM,  initially 0-45 degrees upon MedX testing and currently 0-51 degrees  -Improved  strength at each test point per MedX isometric testing with 243% average improvement and c/ reduced pain noted by pt    HEP will be progressed to include additional lumbopelvic stability next session.    Pt will continue to benefit from skilled outpatient physical therapy to address the deficits stated in the impairment chart, provide pt/family education and to maximize pt's level of independence in the home and community environment.     Anticipated Barriers for therapy: nil  Pt's spiritual, cultural and educational needs considered and pt agreeable to plan of care and goals as stated below:       Goals:  Pt is in agreement with the following goals.     Short term goals: 6 weeks or 10 visits   1.  Pt will demonstrate increased lumbar ROM by at least 3 degrees from the initial ROM value with improvements noted in functional ROM and ability to perform ADLs. GOAL MET 03/07/22  2.  Pt will demonstrate increased MedX average isometric strength value by 40% from initial test, resulting in improved ability to perform bending, lifting, and carrying activities safely and confidently.  GOAL MET 04/25/22  3.  Pt will report a reduction in worst pain score by 1-2 points for improved tolerance for 30 min of standing/walking for work duties. GOAL MET 04/25/22  4.  Pt will be able to perform HEP correctly with minimal cueing or supervision from therapist to encourage independent management of symptoms. GOAL MET 04/25/22       Long term goals: 10 weeks or 20 visits   1. Pt will demonstrate increased lumbar ROM by at least 6 degrees from initial ROM value, resulting in improved ability to perform functional fwd bending while standing and sitting.  GOAL MET 03/07/22  2. Pt will demonstrate increased MedX average isometric strength value by 80% from initial test, resulting in improved ability to perform bending, lifting, and carrying activities safely and confidently. GOAL MET 04/25/22  3. Pt to demonstrate ability to independently  control and/or reduce their pain through posture positioning and mechanical movements throughout a typical day. (appropriate & ongoing)  4.  Pt will demonstrate reduced pain and improved functional outcomes as reported on the FOTO by reaching a limitation score of < or = 40% or less in order to demonstrate subjective improvement in pt's condition.  (appropriate & ongoing)  5. Pt will demonstrate independence with HEP at discharge. (appropriate & ongoing)  6.  Pt will report being able to walk/stand for at least 45 minutes without needing a rest break. (appropriate & ongoing)     Plan   Continue with established Plan of Care towards established PT goals.     Suri Luna, PT, DPT  04/25/2022

## 2022-04-26 ENCOUNTER — CLINICAL SUPPORT (OUTPATIENT)
Dept: CARDIAC REHAB | Facility: CLINIC | Age: 57
End: 2022-04-26
Payer: COMMERCIAL

## 2022-04-26 ENCOUNTER — DOCUMENTATION ONLY (OUTPATIENT)
Dept: REHABILITATION | Facility: HOSPITAL | Age: 57
End: 2022-04-26
Payer: COMMERCIAL

## 2022-04-26 VITALS
DIASTOLIC BLOOD PRESSURE: 52 MMHG | HEART RATE: 88 BPM | SYSTOLIC BLOOD PRESSURE: 80 MMHG | RESPIRATION RATE: 16 BRPM | OXYGEN SATURATION: 96 %

## 2022-04-26 DIAGNOSIS — I25.10 CORONARY ARTERY DISEASE, UNSPECIFIED VESSEL OR LESION TYPE, UNSPECIFIED WHETHER ANGINA PRESENT, UNSPECIFIED WHETHER NATIVE OR TRANSPLANTED HEART: ICD-10-CM

## 2022-04-26 DIAGNOSIS — Z98.61 POSTSURGICAL PERCUTANEOUS TRANSLUMINAL CORONARY ANGIOPLASTY STATUS: ICD-10-CM

## 2022-04-26 PROCEDURE — 93798 PR CARDIAC REHAB/MONITOR: ICD-10-PCS | Mod: S$GLB,,, | Performed by: INTERNAL MEDICINE

## 2022-04-26 PROCEDURE — 99999 PR PBB SHADOW E&M-EST. PATIENT-LVL IV: ICD-10-PCS | Mod: PBBFAC,,,

## 2022-04-26 PROCEDURE — 99999 PR PBB SHADOW E&M-EST. PATIENT-LVL IV: CPT | Mod: PBBFAC,,,

## 2022-04-26 PROCEDURE — 93798 PHYS/QHP OP CAR RHAB W/ECG: CPT | Mod: S$GLB,,, | Performed by: INTERNAL MEDICINE

## 2022-04-26 RX ORDER — DOXAZOSIN 4 MG/1
4 TABLET ORAL NIGHTLY
COMMUNITY
End: 2022-06-27

## 2022-04-26 NOTE — PROGRESS NOTES
Session: Orientation Cardiac Rehab Individual Treatment Plan - Initial Assessment      Patient Name: Shanae Kumari MRN: [id number]   : 1965   Age: 56 y.o.   Date of Event: 2021   Primary Diagnosis: PTCA/Stent    EF: 65%    Physical Assessment:   BP (!) 80/52 (BP Location: Right arm, Patient Position: Standing, BP Method: Large (Manual))   Pulse 88   Resp 16   SpO2 96%     ASSESSMENT:  Heart Sounds: regular rate and rhythm  Prosthetic Valve: No  Lung Sounds: normal air entry, lungs clear to auscultation  Capillary Refill: normal  Left Radial Pulse: Normal (+2)  Right Radial Pulse: Normal (+2)  Left Pedal Pulse: Normal (+2)  Right Pedal Pulse: Normal (+2)  Right Edema: none  Left Edema none  Strength: good  Range of Motion: full range of motion  Existing Limitations:      Site   [] Arthritis, bursitis    [] Amputation, atrophy    [x] Other: ACL tear right knee, Labrum tear right hip, pt is being seen for PT for low back pain.   []       Diabetic patient's foot examination comments: Normal -  Bilateral  Incisional site: N/A  Special needs: N/A    Psychosocial Assessment:   Outcome Survey Tools:    BRITTNY SCORES:   PRE   Anxiety 3   Depression 1   Somatic 4   Hostility 2     SF-36 SCORES:   PRE   Physical Function 19   Social Function 8   Mental Health 25   Pain 6   Change in Health 2   Physical Role Limitation 1   Mental Role Limitation 3   Energy/Fatigue 9   Health Perceptions 12   Total Score 85     PHQ-9:  PHQ-9 Depression Patient Health Questionnaire 2022   Over the last two weeks how often have you been bothered by little interest or pleasure in doing things 0   Over the last two weeks how often have you been bothered by feeling down, depressed or hopeless 0   Over the last two weeks how often have you been bothered by trouble falling or staying asleep, or sleeping too much 1   Over the last two weeks how often have you been bothered by feeling tired or having little energy 1   Over the  last two weeks how often have you been bothered by a poor appetite or overeating 1   Over the last two weeks how often have you been bothered by feeling bad about yourself - or that you are a failure or have let yourself or your family down 0   Over the last two weeks how often have you been bothered by trouble concentrating on things, such as reading the newspaper or watching television 0   Over the last two weeks how often have you been bothered by moving or speaking so slowly that other people could have noticed. 0   Over the last two weeks how often have you been bothered by thoughts that you would be better off dead, or of hurting yourself 0   If you checked off any problems, how difficult have these problems made it for you to do your work, take care of things at home or get along with other people? Somewhat difficult   Total Score 3              Living Arrangements: Lives alone  Family Support: family  Self Reported: Effective Coping Skills, Stress, Chronic Pain and Sleep Pattern Disturbances  Displays: happiness and smiles often  Medication: Pt was on Zoloft but stopped it herself. Pt states is was not helping. Pt takes melatonin for sleep.    Psychosocial Plan:   Goals:  Improved psychosocial coping strategies  Reduce manifestation of depression  Reduce manifestation of stress  Maintain positive outlook  Improve overall quality of life    Interventions/Recommendations:  Discussed Results of Surveys  Patient to Self Report Emotional Changes at Session Check In  Recommend Physical Activity  Recommend Attending Education Lectures  Notify MD: No  Program Referral: Declined  Pharmaceutical Intervention/Therapy: Declined  Other Needs: not applicable  Stage of Readiness to Change: Preparation    Education:  Signs and symptoms of depression, verbalizes understanding; Date:4/26/2022  Stress, verbalizes understanding; Date:4/26/2022    Comments:  Pt denies overwhelming stress or anxiety.  Patient has been instructed  to notify staff in the event that circumstances worsen.  Patient verbalizes understanding.    Other Core Components/Risk Factors Assessment:   RISK FACTORS:  diabetes, hyperlipidemia, hypertension, obesity, stress    Learning Barriers: None    Education Level:  Post-College Graduate Degree    Pre-test Score: 60    Medication Compliance: has not been compliant with taking medications due to the following: side effects    Other Core Components/Risk Factors Plan:   Goals:  Decrease cholesterol level: In Progress  Increase exercise tolerance: In Progress  Decrease blood pressure: Met  Weight loss: In Progress  Control diabetes by adjusting diet and exercise: In Progress  Learn more about healthy eating: In Progress    Interventions/Recommendations:  Recommend regular attendance for Cardiac Rehab: Exercise and Education Lectures  Encourage medication compliance  Individual Education/ Counseling: Yes  Physician Referral: No    Education:    diabetes, verbalizes understanding; Date: 4/26/2022  physical activity, verbalizes understanding; Date: 4/26/2022  risk factors, verbalizes understanding; Date: 4/26/2022        Education method adapted to patients education level and preferred method of learning.  Method: explanation    Comments:  Pt is pleasant and verbalizes interest in changing her lifestyle to reduce her cardiovascular risks.    Other Core Components/Hypertension Assessment:   Resting BP: 90/56  BP Readings from Last 1 Encounters:   04/26/22 (!) 80/52         BP Diagnosis: Hypertensive  Patient reported symptoms: none    Other Core Components/Hypertension Plan:   Goals:  Blood Pressure <130/80    Interventions/Recommendations:  Med Card Reconciled: Yes  Encourage medication compliance  Encourage sodium reduction  Encourage weight loss  Recommend physical activity  Educate on contributory factors  Reduce stress, anxiety, anger, depression, and/or chronic pain  Encourage home blood pressure monitoring  Recommend  daily weights  MD notified/Physician Referral: No    Education:    Hypertension; verbalizes understanding; Date: 4/26/2022  Risk Factors; verbalizes understanding; Date: 4/26/2022        Comments:  Pt will take her B/P daily at home and record log. Discussed with pt separation of times for taking her two B/P medication and hydration to prevent low pressure in the morning.      Does the patient have Heart Failure? No    Other Core Components/Tobacco Cessation Assessment:   Smoking Status: lifetime non-smoker  Smoking Cessation Barriers: none  Stage of Readiness to Change: Maintenance    Other Core Components/Tobacco Cessation Plan:   Goals:  Maintain non-smoking status    Interventions:  Maintains non-smoking status    Education:    Risk Factors; verbalizes understanding; Date: 4/26/2022  Benefits of Cardiac Rehab; verbalizes understanding; Date: 4/26/2022        Comments:  Pt expresses no desire to smoke.    Discussed Cardiac Rehab program in depth with patient.  Medication list updated per patient & marked as reviewed.  Patient has been instructed to notify staff of any problems while attending rehab (ie: chest pain, shortness of breath, lightheadedness, dizziness).  Patient has been instructed to monitor blood pressure readings outside of rehab & to keep a daily log of the readings.  Patient verbalizes understanding.    Luis Urban RN

## 2022-04-26 NOTE — PROGRESS NOTES
"Physical therapist and physical therapy assistant(s) met face to face to discuss patient's treatment plan and progress towards established goals. Pt will be seen by a physical therapist minimally every 6th visit or every 30 days. PT encouraged pt to get back on track c/ therapy consistency as well as encouraged pt to limit sessions at "[facility]" due to her already prominent hypermobility. Will continue to progress stability as appropriate and tolerable.     Suri Luna, PT  04/26/22  "

## 2022-04-26 NOTE — PROGRESS NOTES
Orientation Cardiac Rehab Individual Treatment Plan - Initial Assessment      Patient Name: Shanae Kumari MRN: [id number]   : 1965   Age: 56 y.o.   Primary Diagnosis: s/p PTCA/stent, CAD, HTN, HLD, DM    Nutrition Assessment:     Anthropometrics    Height 64 inches   Weight 202 lbs   BMI 34.7   Abdominal Girth 47.1   Body Composition 31       Drug Allergies and Intolerances:  Review of patient's allergies indicates:   Allergen Reactions    Allergenic extracts Hives, Itching, Other (See Comments), Rash and Shortness Of Breath    Dog dander Hives, Shortness Of Breath, Itching, Swelling and Rash     Cat's dander, dust,  pollen    Losartan Other (See Comments)     angioedema    Mold Itching and Shortness Of Breath    Metformin Other (See Comments)       Food Allergies and Intolerances:  NA    Past Medical History:  Past Medical History:   Diagnosis Date    Asthma     BRCA negative     Diabetes mellitus     Hypertension     Nausea after anesthesia        Past Surgical History:  Past Surgical History:   Procedure Laterality Date    CORONARY ANGIOGRAPHY N/A 2021    Procedure: ANGIOGRAM, CORONARY ARTERY;  Surgeon: Ashu Horn MD;  Location: [facility] CATH LAB;  Service: Cardiology;  Laterality: N/A;    KNEE ARTHROSCOPY      knee surgery      ULTRASOUND GUIDANCE Right 2021    Procedure: ULTRASOUND GUIDANCE;  Surgeon: Ashu Horn MD;  Location: [facility] CATH LAB;  Service: Cardiology;  Laterality: Right;       Medications:  Current Outpatient Medications   Medication Sig    albuterol (PROAIR HFA) 90 mcg/actuation inhaler Inhale 2 puffs into the lungs every 4 (four) hours as needed for Wheezing.    aspirin 81 MG Chew TAKE 1 TABLET BY MOUTH EVERY DAY    atorvastatin (LIPITOR) 40 MG tablet TAKE 1 TABLET BY MOUTH EVERY DAY IN THE EVENING    blood sugar diagnostic (BLOOD GLUCOSE TEST) Strp 1 strip by Misc.(Non-Drug; Combo Route) route once daily.    blood-glucose meter kit Use as  instructed to test blood sugar daily    clobetasol 0.05% (TEMOVATE) 0.05 % Oint AAA bid    clopidogreL (PLAVIX) 75 mg tablet Take 1 tablet (75 mg total) by mouth once daily.    dulaglutide (TRULICITY) 1.5 mg/0.5 mL pen injector Inject 1.5 mg into the skin once a week.    empagliflozin (JARDIANCE) 25 mg tablet Take 1 tablet (25 mg total) by mouth once daily.    flash glucose scanning reader (FREESTYLE EVELIO 2 READER) Misc 1 kit by Misc.(Non-Drug; Combo Route) route Daily.    flash glucose sensor (FREESTYLE EVELIO 2 SENSOR) Kit 1 each by Misc.(Non-Drug; Combo Route) route every 14 (fourteen) days.    fluticasone propionate (FLONASE) 50 mcg/actuation nasal spray 1 spray (50 mcg total) by Each Nostril route once daily.    glimepiride (AMARYL) 4 MG tablet TAKE 1 TABLET (4 MG TOTAL) BY MOUTH BEFORE BREAKFAST.    lancets 30 gauge Misc 1 lancet by Misc.(Non-Drug; Combo Route) route once daily.    MATZIM  mg 24 hr tablet TAKE 1 TABLET (240 MG TOTAL) BY MOUTH ONCE DAILY.    medroxyPROGESTERone (PROVERA) 10 MG tablet Take 1 tablet (10 mg total) by mouth once daily.    meloxicam (MOBIC) 15 MG tablet TAKE 1 TABLET BY MOUTH EVERY DAY    montelukast (SINGULAIR) 10 mg tablet TAKE 1 TABLET BY MOUTH EVERY DAY    nitroGLYCERIN (NITROSTAT) 0.4 MG SL tablet Place 1 tablet (0.4 mg total) under the tongue every 5 (five) minutes as needed for Chest pain.    sertraline (ZOLOFT) 50 MG tablet TAKE 1 TABLET BY MOUTH EVERY DAY    tiZANidine (ZANAFLEX) 2 MG tablet TAKE 1 TO 2 TABLETS BY MOUTH AT BEDTIME AS NEEDED FOR MUSCLE SPASMS AND PAIN     No current facility-administered medications for this visit.       Vitamins and Supplements:  MVI    Labs:  Patient confirms she is taking lipitor 40mg for cholesterol control.    Lab Results   Component Value Date    CHOL 184 04/13/2022     Lab Results   Component Value Date    HDL 42 04/13/2022     Lab Results   Component Value Date    LDLCALC 107.8 04/13/2022     Lab Results    Component Value Date    TRIG 171 (H) 04/13/2022     Lab Results   Component Value Date    CHOLHDL 22.8 04/13/2022         Lab Results   Component Value Date    GLUF 189 (H) 04/13/2022     Lab Results   Component Value Date    HGBA1C 10.9 (H) 04/13/2022       Nutrition/Diet History:  Patient eats 2-3 meals daily.    Seasons food with pepper/spices.  Patient denies use of a salt shaker at the table on prepared foods.   Dines out 2 per week at restaurants such as [employer].  Gets meal prep from [employer]  Chooses fried foods 1-2 time(s) per weeks.    Chooses fish 1-2 time(s) per week.   Beverages:  water, ICE, diet soda/green tea  Alcohol: none    24 Hour Recall:  · Breakfast: Glucerna  · Lunch:Fried shrimp/catfish, corn, salad with italian  · Dinner:leftovers from lunch  · Other: NA    Difficulty Chewing or Swallowing: no  Current Exercise: See Exercise Physiologist Note  Food Safety/Food Preparation: self  Living Arrangements/Family Support: Lives alone  Cultural/Spiritual/Personal Preferences: not applicable   Barriers to Education: none identified  Stage of Change Related to Diet Habits: Contemplation    Nutrition Diagnosis:  1. Food and nutrition related knowledge deficit related to the lack of prior nutrition education as evidenced by diet history and 24 hour recall    Nutrition Plan:   Goals:  LDL-C < 70 (for high risk patients)  Hgb A1c < 7%  BMI < 25 and abdominal girth < 40M/<35 F  2 gram sodium, Mediterranean diet  Rehab weight goal: 10-15lbs  Fish intake (non-fried varieties) to a goal of 2-3 servings per week.   Increase fruit and vegetable intake    Interventions/Recommendations:  Lab results reviewed and discussed  Nutrition Prescription:  · Total Energy Estimated Needs: 7803-7449 Kcal/d for weight loss  · Method for Estimating Needs: 20-25kcal/kg ABW  · Total Protein Estimated Needs: 51-77 g/d  · Method for Estimating Needs: 0.8-1.2 g/Kg ABW  · Total Fluid Estimated Needs: 1 mL/Kcal  Dietitian  "Consult: No  Patient to participate in Cardiac Rehab sessions three times a week  Weekly Dietitian Weight Check  Encouraged patient to complete 3 day food diary  Follow Up Plan for Ongoing Self-Management Support    Education:  Mediterranean Diet; verbalizes understanding; Date: 4/26/22   Person taught: patient   Preferred Learning Method: Verbal and written   Education Needed/Provided: Nutrition counseling and education related to cardiac rehabilitation   Education Method: Weekly nutrition lectures on the Mediterranean diet, cooking, shopping, and dining out   Written Materials Provided: 3 Day Food Record, Introduction to Mediterranean Diet   Strategies Implemented: Motivational interviewing, Goal setting, Self-Monitoring, and Problem Solving    Comments:   Discussed ways to incorporate healthy snacks, eating on a schedule, and monitoring sodium intake for heart health.  Encouraged increased produce    Diabetes  Is the patient diabetic?   Yes   Other Core Components/Diabetes Assessment:   Labs:  Lab Results   Component Value Date    GLUF 189 (H) 04/13/2022    GLUF 176 (H) 12/20/2014    GLUF 234 (H) 11/25/2014     Lab Results   Component Value Date    HGBA1C 10.9 (H) 04/13/2022    HGBA1C 10.8 (H) 11/18/2021    HGBA1C 11.8 (H) 09/28/2021      Lab Results   Component Value Date    ESTIMATEDAVG 266 (H) 04/13/2022    ESTIMATEDAVG 263 (H) 11/18/2021    ESTIMATEDAVG 292 (H) 09/28/2021       History of diabetes since 2010  Diabetes medications: Glimepiride 4mg daily, Jardiance 25mg daily, Trulicity 1.5mg injection once weekly (pt admits to being noncompliant with this r/t c/o GI issues)  Blood Glucose Checks at Home: Yes: Other: does not check consistently "every few days"  Endocrinologist or PCP following DM: Dr. Marin- endocrinology    Other Core Components/Diabetes Plan:   Goals:  Hgb A1c < 7%  Exercise Blood Glucose: 100-300 mg/dl    Interventions:  Reviewed and Discussed Labs  Medication Compliance  Med Card " Reconciled  Low Sodium, Mediterranean, ADA Diet  Weight Management  Physical Activity  Increase Knowledge of Contributory Factors  Home Monitoring    Education:  Mediterranean Diet; verbalizes understanding; Date: 4/26/22    Comments:   Patient verbalizes understanding to bring home glucometer and check glucose pre and post each exercise session.  Per cardiac rehab protocols, patient's glucose must be between 90 and 270 mg/dL to exercise.  Patient denies any recent glucose levels less than 60 mg/dL or greater than 300 mg/dL. Patient verbalizes importance of notifying rehab staff if symptoms of hypoglycemia occur while at cardiac rehab.  Abnormal labs will be reported to patient's PCP/Endocrinologist by rehab staff.    Noted updated glucose parameters of 100-300     Emphasized importance of regular glucose checking    RD contact information provided.      Elida Mota MS, RDN/LDN

## 2022-04-27 ENCOUNTER — PATIENT MESSAGE (OUTPATIENT)
Dept: FAMILY MEDICINE | Facility: CLINIC | Age: 57
End: 2022-04-27
Payer: COMMERCIAL

## 2022-04-28 NOTE — PROGRESS NOTES
"  [facility]      Name: Shanae Kumari  Clinic Number: [id number]    Therapy Diagnosis:   Encounter Diagnosis   Name Primary?    Decreased strength of trunk and back Yes     Physician: Tima Stanley MD    Visit Date: 2022    Physician orders: PT Eval and Treat   Medical diagnosis from referral: chronic bilateral low back pain without sciatica  Evaluation date: 22  Authorization period expiration: 23  Plan of care expiration: 22  Visit # / visits authorized: 10/20     Time in: 315PM  Time out: 404PM  Total billable time: 49 minutes     Precautions: Standard, DM, HTN, currently in cardiac rehab*, cannot tolerable quadruped      Pattern of pain determined: Undetermined; decreased trunk endurance and trunk instability       Subjective   Shanae reports she has had some issues this week. She had three incidents of low BP and her MD took her off her meds. No LBP just fatigue. The low BP has kept her from doing any of the exercises.     Patient reports tolerating previous visit well.   Patient reports their pain to be 0/10 on a 0-10 scale with 0 being no pain and 10 being the worst pain imaginable.  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)  Pts goals: "If I could get through the day of work without stopping/slowing down."       Objective   REASSESSMENT: 22      Baseline isometric testing on MedX equipment:   Testing administered by PT     Date of testin22  ROM 0-45 deg   Max Peak Torque 51   Min Peak Torque 13   Flex/Ext Ratio 3.9:1   % below normative data 72%         Midpoint isometric testing on MedX equipment:   Testing administered by PT     Date of testin22  ROM 0-51 deg   Max Peak Torque 140   Min Peak Torque 85   Flex/Ext Ratio    % below normative data 17% "        Limitation/restriction for FOTO 02/03/22 Survey     Therapist reviewed FOTO scores for Shanae Kumari on 2/3/2022.   FOTO documents entered into [employer] - see Media section.     Limitation Score: 56%  Visit 10 score:  Goal: < 40% limited             Treatment    Pt was instructed in and performed the following:     Shanae received therapeutic exercises to develop/improved posture, cardiovascular endurance, muscular endurance, lumbar/thoracic ROM, strength and muscular endurance for 49 minutes including the following exercises:     Treadmill (1.6 mph), x10 min    Lat pulldowns GTB + march 20x  Palloff press w/ GTB 20x  Dead bugs w/ emphasis on PPT 10x/ea  PPT + bent knee fall outs, x10 ea   SL bridge, 10 ea    [facility] 4/29/2022   Visit Number 10   VAS Pain Rating 0   Treadmill Time (in min.) 10   Speed 1.6   Lumbar Stretches - Slouch Overcorrection -   Extension in Standing -   Cervical Weight -   Repetitions -   Rating of Perceived Exertion -   Lumbar Extension Seat Pad -   Femur Restraint -   Top Dead Center -   Counterweight -   Lumbar Flexion -   Lumbar Extension -   Lumbar Peak Torque -   Min Torque -   Test Percent Below Normative Data -   Test Percent Gain in Strength from Initial  -   Lumbar Weight 52   Repetitions 15   Rating of Perceived Exertion 3   Ice - Z Lie (in min.) -         Peripheral muscle strengthening which included 1 set of 15-20 repetitions at a slow, controlled 10-13 second per rep pace focused on strengthening supporting musculature for improved body mechanics and functional mobility.  Pt and therapist focused on proper form during treatment to ensure optimal strengthening of each targeted muscle group.  Machines were utilized including torso rotation, chest press, rowing, biceps, and triceps. Leg extension, leg curl, hip abd, hip add, and leg press added visit 3       Not performed 4/25/2022 MedX isometric testing:    Standing extensions, 5 sec holds x10  PPT, x15  Sit  to stand (focus on eccentric lowering) 7.5#, 2x10  +Alternating toe taps from table top position, 2x10    SL clams c/ GTB, 2x10      Shanae received the following manual therapy techniques: null      Home Exercises Provided and Patient Education Provided   Stretching: glute bridge, slouch correct, supine alternating leg lifts, ext in standing  Strengthening: not yet started  Cardio program (V5): not yet started  Lifting education (V11): not yet started  Using Lumbar Roll: Using     Education provided:   - MedX isometric testing: technique and results       Written Home Exercises Provided: Updated HEP and provided with GTB 4/29/22.  Exercises were reviewed and Shanae was able to demonstrate them prior to the end of the session.  Shanae demonstrated good  understanding of the education provided.     See EMR under Patient Instructions for exercises provided prior visit.    Assessment   Shanae tolerated session well. Updated HEP with core stability and provided green theraband. Pt was instructed on HEP with good performance. MedX was performed at 52ft lbs with 15 reps performed at an exertion rating of 3/10. Cont progressing core stability as tolerated.       HEP will be progressed to include additional lumbopelvic stability next session.    Pt will continue to benefit from skilled outpatient physical therapy to address the deficits stated in the impairment chart, provide pt/family education and to maximize pt's level of independence in the home and community environment.     Anticipated Barriers for therapy: nil  Pt's spiritual, cultural and educational needs considered and pt agreeable to plan of care and goals as stated below:       Goals:  Pt is in agreement with the following goals.     Short term goals: 6 weeks or 10 visits   1.  Pt will demonstrate increased lumbar ROM by at least 3 degrees from the initial ROM value with improvements noted in functional ROM and ability to perform ADLs. GOAL MET  03/07/22  2.  Pt will demonstrate increased MedX average isometric strength value by 40% from initial test, resulting in improved ability to perform bending, lifting, and carrying activities safely and confidently.  GOAL MET 04/25/22  3.  Pt will report a reduction in worst pain score by 1-2 points for improved tolerance for 30 min of standing/walking for work duties. GOAL MET 04/25/22  4.  Pt will be able to perform HEP correctly with minimal cueing or supervision from therapist to encourage independent management of symptoms. GOAL MET 04/25/22       Long term goals: 10 weeks or 20 visits   1. Pt will demonstrate increased lumbar ROM by at least 6 degrees from initial ROM value, resulting in improved ability to perform functional fwd bending while standing and sitting.  GOAL MET 03/07/22  2. Pt will demonstrate increased MedX average isometric strength value by 80% from initial test, resulting in improved ability to perform bending, lifting, and carrying activities safely and confidently. GOAL MET 04/25/22  3. Pt to demonstrate ability to independently control and/or reduce their pain through posture positioning and mechanical movements throughout a typical day. (appropriate & ongoing)  4.  Pt will demonstrate reduced pain and improved functional outcomes as reported on the FOTO by reaching a limitation score of < or = 40% or less in order to demonstrate subjective improvement in pt's condition.  (appropriate & ongoing)  5. Pt will demonstrate independence with HEP at discharge. (appropriate & ongoing)  6.  Pt will report being able to walk/stand for at least 45 minutes without needing a rest break. (appropriate & ongoing)     Plan   Continue with established Plan of Care towards established PT goals.     Benny Sosa, PTA,   04/29/2022

## 2022-04-29 ENCOUNTER — CLINICAL SUPPORT (OUTPATIENT)
Dept: REHABILITATION | Facility: HOSPITAL | Age: 57
End: 2022-04-29
Attending: INTERNAL MEDICINE
Payer: COMMERCIAL

## 2022-04-29 DIAGNOSIS — R29.898 DECREASED STRENGTH OF TRUNK AND BACK: Primary | ICD-10-CM

## 2022-04-29 PROCEDURE — 97110 THERAPEUTIC EXERCISES: CPT | Mod: PN,CQ

## 2022-05-14 NOTE — ASSESSMENT & PLAN NOTE
05/14/22 0900   Quick Adds   Type of Visit Initial Occupational Therapy Evaluation   Living Environment   People in Home child(simon), adult  (disabled daughter)   Current Living Arrangements house   Home Accessibility wheelchair accessible   Number of Stairs, Within Home, Primary one   Stair Railings, Within Home, Primary none   Living Environment Comments Patient lives with daughter who is disabled and is her caregiver. She has a walk in shower and one step within home   Self-Care   Usual Activity Tolerance fair   Current Activity Tolerance poor   Equipment Currently Used at Home walker, rolling  (4 wheeled walker)   Fall history within last six months yes   Number of times patient has fallen within last six months 1   Activity/Exercise/Self-Care Comment Patient was mod (I) with I/ADLs and functional mobility using 4WW. 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 She states she has a granddaughter that can assist with IADLS occasionally.   Instrumental Activities of Daily Living (IADL)   Previous Responsibilities meal prep;housekeeping;laundry;shopping;driving   General Information   Onset of Illness/Injury or Date of Surgery 05/11/22   Referring Physician Carmelita Slaughter MD   Additional Occupational Profile Info/Pertinent History of Current Problem 89 year old female with a past medical history of type 2 diabetes, hypertension, CKD stage III, obesity, chronic knee pain and back pain, chronic urinary retention with a indwelling Solorio catheter presents to hospital after fall for knee pain. found to be COVID+, w/u negative for fracture.   Existing Precautions/Restrictions fall   Cognitive Status Examination   Affect/Mental Status (Cognitive) WFL   Follows Commands WFL   Pain Assessment   Patient Currently in Pain Yes, see Vital Sign flowsheet   Range of Motion Comprehensive   General Range of Motion no range of motion deficits identified   Strength Comprehensive (MMT)   General Manual Muscle  Continue glimepiride.  Will change DPP-4 to GLP-1.  Start lower dose and will increase to full dose in 1 month.  May need to increase glimepiride during ramp up period.     Testing (MMT) Assessment no strength deficits identified   Muscle Tone Assessment   Muscle Tone Quick Adds No deficits were identified   Coordination   Upper Extremity Coordination No deficits were identified   Bed Mobility   Bed Mobility supine-sit;sit-supine   Supine-Sit [place] (Bed Mobility) minimum assist (75% patient effort)   Sit-Supine [place] (Bed Mobility) moderate assist (50% patient effort)   Assistive Device (Bed Mobility) bed rails   Comment (Bed Mobility) Patient required increased time for supine to sit with HOB raised   Transfers   Transfers sit-stand transfer   Sit-Stand Transfer   Sit-Stand [place] (Transfers) moderate assist (50% patient effort)   Assistive Device (Sit-Stand Transfers) walker, front-wheeled   Sit/Stand Transfer Comments from raised bed, unable to weight shift, returned to sitting EOB   Clinical Impression   Criteria for Skilled Therapeutic Interventions Met (OT) Yes, treatment indicated   OT Diagnosis below baseline for I/ADLS and functional mobility   OT Problem List-Impairments impacting ADL problems related to;activity tolerance impaired;balance;mobility;pain   Assessment of Occupational Performance 3-5 Performance Deficits   Identified Performance Deficits bathing, dressing, toileting, transfers   Planned Therapy Interventions (OT) ADL retraining;IADL retraining;balance training;transfer training   Clinical Decision Making Complexity (OT) low complexity   Risk & Benefits of therapy have been explained evaluation/treatment results reviewed;care plan/treatment goals reviewed;risks/benefits reviewed;current/potential barriers reviewed;participants voiced agreement with care plan;participants included;patient   OT Discharge Planning   OT Discharge Recommendation (DC Rec) Transitional Care Facility   OT Rationale for DC Rec Patient is significantly below baseline for I/ADLS and functional mobility. Patient currently requiring assist x 2 for all transfers and is  unable to tolerate standing for more than 1 minute. She is requiring assist for all self care tasks at this time. Patient will continue to benefit from therapy at [facility] to increase independence with ADL and mobility.   Total Evaluation Time (Minutes)   Total Evaluation Time (Minutes) 8   OT Goals   Therapy Frequency (OT) 5 times/wk   OT Predicted Duration/Target Date for Goal Attainment 05/28/22   OT Goals Hygiene/Grooming;Transfers;Toilet Transfer/Toileting;Lower Body Dressing   OT: Hygiene/Grooming supervision/stand-by assist;from wheelchair   OT: Lower Body Dressing Supervision/stand-by assist;using adaptive equipment   OT: Transfer Supervision/stand-by assist;with assistive device   OT: Toilet Transfer/Toileting Supervision/stand-by assist;cleaning and garment management

## 2022-05-15 DIAGNOSIS — M25.561 RIGHT KNEE PAIN, UNSPECIFIED CHRONICITY: Primary | ICD-10-CM

## 2022-05-16 ENCOUNTER — OFFICE VISIT (OUTPATIENT)
Dept: ORTHOPEDICS | Facility: CLINIC | Age: 57
End: 2022-05-16
Attending: ORTHOPAEDIC SURGERY
Payer: COMMERCIAL

## 2022-05-16 ENCOUNTER — TELEPHONE (OUTPATIENT)
Dept: SPORTS MEDICINE | Facility: CLINIC | Age: 57
End: 2022-05-16
Payer: COMMERCIAL

## 2022-05-16 VITALS
HEIGHT: 64 IN | HEART RATE: 93 BPM | OXYGEN SATURATION: 99 % | SYSTOLIC BLOOD PRESSURE: 100 MMHG | DIASTOLIC BLOOD PRESSURE: 62 MMHG | BODY MASS INDEX: 34.51 KG/M2 | RESPIRATION RATE: 15 BRPM | WEIGHT: 202.13 LBS

## 2022-05-16 DIAGNOSIS — E11.29 TYPE 2 DIABETES MELLITUS WITH MICROALBUMINURIA, WITHOUT LONG-TERM CURRENT USE OF INSULIN: ICD-10-CM

## 2022-05-16 DIAGNOSIS — E11.29 TYPE 2 DIABETES MELLITUS WITH MICROALBUMINURIA, WITHOUT LONG-TERM CURRENT USE OF INSULIN: Chronic | ICD-10-CM

## 2022-05-16 DIAGNOSIS — S83.512A CHRONIC RUPTURE OF ACL OF LEFT KNEE: Primary | ICD-10-CM

## 2022-05-16 DIAGNOSIS — J45.20 MILD INTERMITTENT ASTHMA WITHOUT COMPLICATION: ICD-10-CM

## 2022-05-16 DIAGNOSIS — R80.9 TYPE 2 DIABETES MELLITUS WITH MICROALBUMINURIA, WITHOUT LONG-TERM CURRENT USE OF INSULIN: Chronic | ICD-10-CM

## 2022-05-16 DIAGNOSIS — R80.9 TYPE 2 DIABETES MELLITUS WITH MICROALBUMINURIA, WITHOUT LONG-TERM CURRENT USE OF INSULIN: ICD-10-CM

## 2022-05-16 PROCEDURE — 99203 OFFICE O/P NEW LOW 30 MIN: CPT | Mod: S$GLB,,, | Performed by: ORTHOPAEDIC SURGERY

## 2022-05-16 PROCEDURE — 3046F HEMOGLOBIN A1C LEVEL >9.0%: CPT | Mod: CPTII,S$GLB,, | Performed by: ORTHOPAEDIC SURGERY

## 2022-05-16 PROCEDURE — 99999 PR PBB SHADOW E&M-EST. PATIENT-LVL V: ICD-10-PCS | Mod: PBBFAC,,, | Performed by: ORTHOPAEDIC SURGERY

## 2022-05-16 PROCEDURE — 3046F PR MOST RECENT HEMOGLOBIN A1C LEVEL > 9.0%: ICD-10-PCS | Mod: CPTII,S$GLB,, | Performed by: ORTHOPAEDIC SURGERY

## 2022-05-16 PROCEDURE — 3078F DIAST BP <80 MM HG: CPT | Mod: CPTII,S$GLB,, | Performed by: ORTHOPAEDIC SURGERY

## 2022-05-16 PROCEDURE — 3078F PR MOST RECENT DIASTOLIC BLOOD PRESSURE < 80 MM HG: ICD-10-PCS | Mod: CPTII,S$GLB,, | Performed by: ORTHOPAEDIC SURGERY

## 2022-05-16 PROCEDURE — 1159F PR MEDICATION LIST DOCUMENTED IN MEDICAL RECORD: ICD-10-PCS | Mod: CPTII,S$GLB,, | Performed by: ORTHOPAEDIC SURGERY

## 2022-05-16 PROCEDURE — 3074F PR MOST RECENT SYSTOLIC BLOOD PRESSURE < 130 MM HG: ICD-10-PCS | Mod: CPTII,S$GLB,, | Performed by: ORTHOPAEDIC SURGERY

## 2022-05-16 PROCEDURE — 3008F PR BODY MASS INDEX (BMI) DOCUMENTED: ICD-10-PCS | Mod: CPTII,S$GLB,, | Performed by: ORTHOPAEDIC SURGERY

## 2022-05-16 PROCEDURE — 3008F BODY MASS INDEX DOCD: CPT | Mod: CPTII,S$GLB,, | Performed by: ORTHOPAEDIC SURGERY

## 2022-05-16 PROCEDURE — 99999 PR PBB SHADOW E&M-EST. PATIENT-LVL V: CPT | Mod: PBBFAC,,, | Performed by: ORTHOPAEDIC SURGERY

## 2022-05-16 PROCEDURE — 3074F SYST BP LT 130 MM HG: CPT | Mod: CPTII,S$GLB,, | Performed by: ORTHOPAEDIC SURGERY

## 2022-05-16 PROCEDURE — 99203 PR OFFICE/OUTPT VISIT, NEW, LEVL III, 30-44 MIN: ICD-10-PCS | Mod: S$GLB,,, | Performed by: ORTHOPAEDIC SURGERY

## 2022-05-16 PROCEDURE — 1159F MED LIST DOCD IN RCRD: CPT | Mod: CPTII,S$GLB,, | Performed by: ORTHOPAEDIC SURGERY

## 2022-05-16 RX ORDER — DULAGLUTIDE 1.5 MG/.5ML
1.5 INJECTION, SOLUTION SUBCUTANEOUS WEEKLY
Qty: 4 PEN | Refills: 0 | Status: SHIPPED | OUTPATIENT
Start: 2022-05-16 | End: 2022-08-06 | Stop reason: SDUPTHER

## 2022-05-16 RX ORDER — EMPAGLIFLOZIN 25 MG/1
25 TABLET, FILM COATED ORAL DAILY
Qty: 90 TABLET | Refills: 0 | Status: SHIPPED | OUTPATIENT
Start: 2022-05-16 | End: 2023-04-20 | Stop reason: SDUPTHER

## 2022-05-16 RX ORDER — ALBUTEROL SULFATE 90 UG/1
2 AEROSOL, METERED RESPIRATORY (INHALATION) EVERY 4 HOURS PRN
Qty: 8.5 G | Refills: 0 | Status: SHIPPED | OUTPATIENT
Start: 2022-05-16 | End: 2022-10-15 | Stop reason: SDUPTHER

## 2022-05-16 NOTE — TELEPHONE ENCOUNTER
----- Message from [facility] sent at 5/16/2022  8:38 AM CDT -----  Regarding: schedule  Good morning,  We seen a previous patient of dr. Lam this morning, with a chronic ACL tear, Dr. Lopez put her a referral back in to see dr. Lam as she wants her acl repaired.  Please call her to schedule.  Thanks,  Hetal

## 2022-05-16 NOTE — PROGRESS NOTES
NEW PATIENT ORTHOPAEDIC: Knee    PRIMARY CARE PHYSICIAN: Tima Stanley MD   REFERRING PROVIDER: Kwaku Lopez MD  [address] [facility]  [place] [address]     ASSESSMENT & PLAN:    Impression:  Left Knee Chronic ACL Tear    Follow Up Plan: CAROLINE Kumari has physical exam evidence of above and wishes to pursue an non-operative care. I am recommending the following: MRI and referral. I discussed that options would be bracing, physical therapy, injections vs updated MR and consideration of surgical intervention. Following that, the patient has elected for MRI and consideration of surgery.  Per patient report she sustained a ACL injury back in 2017. She elected not to undergo surgical intervention at that time as she was taking care of her ill mother.  She did rehab in more brace for a period of time.  She is been doing fair with regard to this knee however does intermittently give out on her.  Yesterday she was in the grocery and the knee completely gave out on her without any significant motion or twisting event.  She worries that this will happen again.  Radiographs obtained today do not have any significant underlying arthritis.  I think she may be somebody that would benefit from ACL reconstruction and she is interested in the surgery.  I will obtain a new MRI to evaluate underlying cartilage health and placed a referral to Dr. Lam for the reconstruction as she was a previous patient of his.      The patient has been ordered:  MRI (Criteria for MRI - xray is equivocal)    CONSULTS:   Internal Orthopaedic Referral     ACTIVE PROBLEM LIST  Patient Active Problem List   Diagnosis    LSIL (low grade squamous intraepithelial lesion) on Pap smear    Asthma, mild intermittent    Essential hypertension    Hyperlipidemia    Right hip pain    Acetabular labrum tear    Type 2 diabetes mellitus with microalbuminuria, without long-term current use of insulin    Non-seasonal allergic rhinitis    Slow  transit constipation    Obesity (BMI 30-39.9)    CAD (coronary artery disease)    Sleep disturbances    Chronic bilateral low back pain without sciatica    Chest pain    Carotid artery disease    Decreased strength of trunk and back           SUBJECTIVE    CHIEF COMPLAINT: Knee Pain    HPI:   Shanae Kumari is a 56 y.o. female here for evaluation and management of left knee pain. There is not a specific incident that brought about this pain. she has had progressive problems with the knee(s) starting 5 years ago but is now progressing to interfere with activities which include: walking, functional household ADL's and enjoying hobbies    Currently the pain in the joint is rated at mild with activity. The pain is intermittent and is located in the knee, located anterior and located posterior. The pain is described as aching. Relieving factors include rest and over the counter medication .     There is associated Catching, Clicking and Popping.     Shanae Kumari has no additional complaints.     PROGRESSIVE SYMPTOMS:  Pain effecting living situation    FUNCTIONAL STATUS:   Climb a flight of stairs or walk up a hill     PREVIOUS TREATMENTS:  Medical: OTC NSAIDS  Physical Therapy: Braces, Activities Modified  and Formal Physical Therapy for 6 weeks  Previous Orthopaedic Surgery: Right knee arthroscopsy    REVIEW OF SYSTEMS:  PAIN ASSESSMENT:  See HPI.  MUSCULOSKELETAL: See HPI.  OTHER 10 point review of systems is negative except as stated in HPI above    PAST MEDICAL HISTORY   has a past medical history of Asthma, BRCA negative, Diabetes mellitus, Hypertension, and Nausea after anesthesia (1989).     PAST SURGICAL HISTORY   has a past surgical history that includes knee surgery; Knee arthroscopy; Coronary angiography (N/A, 11/26/2021); and Ultrasound guidance (Right, 11/26/2021).     FAMILY HISTORY  family history includes Blindness in her maternal grandmother; Breast cancer in her other; COPD in her  mother; Cancer in her mother and sister; Cataracts in her mother; Diabetes in her brother, father, maternal aunt, maternal grandmother, maternal uncle, and mother; Heart disease in her father; Hyperlipidemia in her father; Hypertension in her brother and father; Kidney disease in her brother and father; No Known Problems in her maternal grandfather, paternal aunt, paternal grandfather, paternal grandmother, and paternal uncle.     SOCIAL HISTORY   reports that she has never smoked. She has never used smokeless tobacco. She reports that she does not drink alcohol and does not use drugs.     ALLERGIES   Review of patient's allergies indicates:   Allergen Reactions    Allergenic extracts Hives, Itching, Other (See Comments), Rash and Shortness Of Breath    Dog dander Hives, Shortness Of Breath, Itching, Swelling and Rash     Cat's dander, dust,  pollen    Losartan Other (See Comments)     angioedema    Mold Itching and Shortness Of Breath    Metformin Other (See Comments)        MEDICATIONS  Current Outpatient Medications on File Prior to Visit   Medication Sig Dispense Refill    albuterol (PROAIR HFA) 90 mcg/actuation inhaler Inhale 2 puffs into the lungs every 4 (four) hours as needed for Wheezing. 18 g 0    aspirin 81 MG Chew TAKE 1 TABLET BY MOUTH EVERY DAY 90 tablet 3    atorvastatin (LIPITOR) 40 MG tablet TAKE 1 TABLET BY MOUTH EVERY DAY IN THE EVENING 90 tablet 3    blood sugar diagnostic (BLOOD GLUCOSE TEST) Strp 1 strip by Misc.(Non-Drug; Combo Route) route once daily. 100 strip 3    blood-glucose meter kit Use as instructed to test blood sugar daily 1 each 0    clobetasol 0.05% (TEMOVATE) 0.05 % Oint AAA bid 60 g 3    clopidogreL (PLAVIX) 75 mg tablet Take 1 tablet (75 mg total) by mouth once daily. 30 tablet 11    doxazosin (CARDURA) 4 MG tablet Take 4 mg by mouth every evening.      dulaglutide (TRULICITY) 1.5 mg/0.5 mL pen injector Inject 1.5 mg into the skin once a week. 4 pen 0     "empagliflozin (JARDIANCE) 25 mg tablet Take 1 tablet (25 mg total) by mouth once daily. 90 tablet 1    flash glucose scanning reader (FREESTYLE EVELIO 2 READER) Misc 1 kit by Misc.(Non-Drug; Combo Route) route Daily. 1 each 0    flash glucose sensor (FREESTYLE EVELIO 2 SENSOR) Kit 1 each by Misc.(Non-Drug; Combo Route) route every 14 (fourteen) days. 2 kit 11    fluticasone propionate (FLONASE) 50 mcg/actuation nasal spray 1 spray (50 mcg total) by Each Nostril route once daily. 3 Bottle 3    glimepiride (AMARYL) 4 MG tablet TAKE 1 TABLET (4 MG TOTAL) BY MOUTH BEFORE BREAKFAST. 90 tablet 3    lancets 30 gauge Misc 1 lancet by Misc.(Non-Drug; Combo Route) route once daily. 100 each 3    MATZIM  mg 24 hr tablet TAKE 1 TABLET (240 MG TOTAL) BY MOUTH ONCE DAILY. 90 tablet 1    medroxyPROGESTERone (PROVERA) 10 MG tablet Take 1 tablet (10 mg total) by mouth once daily. 10 tablet 12    meloxicam (MOBIC) 15 MG tablet TAKE 1 TABLET BY MOUTH EVERY DAY 90 tablet 1    montelukast (SINGULAIR) 10 mg tablet TAKE 1 TABLET BY MOUTH EVERY DAY 90 tablet 1    sertraline (ZOLOFT) 50 MG tablet TAKE 1 TABLET BY MOUTH EVERY DAY 90 tablet 0    tiZANidine (ZANAFLEX) 2 MG tablet TAKE 1 TO 2 TABLETS BY MOUTH AT BEDTIME AS NEEDED FOR MUSCLE SPASMS AND PAIN 180 tablet 1    nitroGLYCERIN (NITROSTAT) 0.4 MG SL tablet Place 1 tablet (0.4 mg total) under the tongue every 5 (five) minutes as needed for Chest pain. 90 tablet 3     No current facility-administered medications on file prior to visit.          PHYSICAL EXAM   height is 5' 4" (1.626 m) and weight is 91.7 kg (202 lb 1.6 oz). Her blood pressure is 100/62 and her pulse is 93. Her respiration is 15 and oxygen saturation is 99%.   Body mass index is 34.69 kg/m².      All other systems deferred.  GENERAL:  No acute distress  HABITUS: Obese  GAIT: Non-antalgic  SKIN: Normal     KNEE EXAM:    left:   Effusion: Small joint effusion  TTP: no over Medial/Lateral joint line, MCL, LCL, " IT band, Pes bursa   no crepitus with passive knee ROM  Passive ROM: Extension 0, Flexion 130  No pain with manipulation of patella  Stable to varus/valgus stress. Subtle increased laxity to anterior/posterior drawer testing compared to contralateral side  negative Canelo's test  No pain with IR/ER rotation of the hip  5/5 strength in knee flexion and extension, ankle plantarflexion and dorsiflexion  Neurovascular Status: Sensation intact to light touch in Sural, Saphenous, SPN, DPN, Tibial nerve distribution  2+ pulse DP/PT, normal capillary refill, foot has normal warmth    DATA:  Diagnostic tests reviewed for today's visit:     4v of the knee reveal Mild degenerative changes of the Medial compartment. There is evidence of advanced osteoarthritis changes with Joint space narrowing. The limb is in netural alignment. The patella is tracking midline.

## 2022-05-16 NOTE — TELEPHONE ENCOUNTER
Refill Routing Note   Medication(s) are not appropriate for processing by [facility] for the following reason(s):      - Patient has been seen in the ED/Hospital since the last PCP visit    [facility] action(s):  Defer          Medication reconciliation completed: No     Appointments  past 12m or future 3m with PCP    Date Provider   Last Visit   11/19/2021 Tima Stanley MD   Next Visit   5/16/2022 Tima Stanley MD   ED visits in past 90 days: 0        Note composed:3:38 PM 05/16/2022

## 2022-05-16 NOTE — TELEPHONE ENCOUNTER
No new care gaps identified.  [facility] Embedded Care Gaps. Reference number: [id number]. 5/16/2022   1:14:01 PM CDT

## 2022-05-16 NOTE — TELEPHONE ENCOUNTER
No new care gaps identified.  [facility] Embedded Care Gaps. Reference number: [id number]. 5/16/2022   1:15:09 PM CDT

## 2022-05-17 RX ORDER — ATORVASTATIN CALCIUM 40 MG/1
40 TABLET, FILM COATED ORAL NIGHTLY
Qty: 90 TABLET | Refills: 3 | Status: SHIPPED | OUTPATIENT
Start: 2022-05-17 | End: 2023-07-12 | Stop reason: SDUPTHER

## 2022-05-17 RX ORDER — NITROGLYCERIN 0.4 MG/1
0.4 TABLET SUBLINGUAL EVERY 5 MIN PRN
Qty: 90 TABLET | Refills: 3 | Status: SHIPPED | OUTPATIENT
Start: 2022-05-17 | End: 2023-07-12 | Stop reason: SDUPTHER

## 2022-05-17 RX ORDER — NAPROXEN SODIUM 220 MG/1
81 TABLET, FILM COATED ORAL DAILY
Qty: 90 TABLET | Refills: 3 | Status: SHIPPED | OUTPATIENT
Start: 2022-05-17 | End: 2023-07-12 | Stop reason: SDUPTHER

## 2022-05-19 ENCOUNTER — PATIENT MESSAGE (OUTPATIENT)
Dept: REHABILITATION | Facility: HOSPITAL | Age: 57
End: 2022-05-19
Payer: COMMERCIAL

## 2022-05-19 ENCOUNTER — DOCUMENTATION ONLY (OUTPATIENT)
Dept: CARDIAC REHAB | Facility: CLINIC | Age: 57
End: 2022-05-19
Payer: COMMERCIAL

## 2022-05-19 NOTE — PROGRESS NOTES
Shanae arrived for her orientation to the Phase II cardiac rehab program.  She has not started the exercise classes as of yet.    Session: Orientation   Cardiac Rehab Individual Treatment Plan - Initial Assessment      Patient Name: Shanae Kumari MRN: [id number]   : 1965   Age: 56 y.o.   Primary Diagnosis: PTCA/STENT  Date of Event: 21  EF: 65%  Risk Stratification: high  Referring Physician: GONZALO   Exercise Assessment:     CPX/TM Date: 22 Results   RHR 97   Max    Peak VO2 (CPX only) 16.7   Actual METS (CPX only) 4.8   Estimated METS 7.0     Anthropometrics    Height 64 inches   Weight 202 lbs   BMI 34.7   Abdominal Girth 47.1   Body Composition 31.0%     ST Depression noted on Stress Test?:No  Angina with exercise?: No   Fall Risk: No   Assistive Devices:  independent   Currently exercising? No   There were many limitations noted by the patient.  Shanae stated she has torn left ACL, chronic low back pain, and torn labrum on the right hip pain.  Exercise modalities will be adjusted.      Exercise Plan:   Goals:  CR Exercise Goals: Attend Cardiac Rehab 3 times/week: In Progress  Home Aerobic Exercise: 2 additional days/week for 30-60 minutes: In Progress  Intensity of 12-15 on the Rate of Perceived Exertion (RPE) scale: In Progress  30% increase in entry estimated METS: 9.1 : In Progress  5 days/week for 30-60 minutes: In Progress    Intervention:   Discussed importance of regular attendance to cardiac rehab class    Exercise Prescription:  THR Range 119-129   Mode: Treadmill  Recumbent Bike  Upright Bike  Nustep  Elliptical   Frequency:  3 days/week   Duration:  30 - 60 minutes   Intensity:  12 - 15 RPE   Resistance Training:  Yes: 0 to 5 lb weights with 10-15 reps based on strength and range of motion assessment     Home Prescription:  Mode Aerobic   Frequency: 2- 3 days/week   Duration: 30-60 minutes   Resistance Training: None        Education:  Orientation to Equipment; verbalizes  understanding; Date: 22  Exercise Recommendations; verbalizes understanding; Date: 22  Exercise Safety; verbalizes understanding; Date: 22  Class Preparation: verbalizes understanding; Date: 22  Signs and symptoms to report: verbalizes understanding; Date: 22  Caffeine/Hydration: verbalizes understanding; Date: 22  Exercise Terminology: verbalizes understanding; Date: 22  Resistance Training: verbalizes understanding; Date: 22    Comments:  Shanae was encouraged to begin thinking about some type of aerobic exercise she can participate in at least 2 non-rehab days per week for at least 30 minutes in addition to attending Phase II cardiac rehab classes 3 days per week.  She stated understanding.    All consent forms were signed, proper attire and shoes were discussed.       Shanae will begin Cardiac Rehab on Friday, May 20 at 3:30pm.    The exercise prescription will be adjusted based on tolerance of exercise intensity by patient.    Tamara Posey, CEP    Orientation Cardiac Rehab Individual Treatment Plan - Initial Assessment      Patient Name: Shanae Kumari MRN: [id number]   : 1965   Age: 56 y.o.   Primary Diagnosis: s/p PTCA/stent, CAD, HTN, HLD, DM    Nutrition Assessment:     Anthropometrics    Height 64 inches   Weight 202 lbs   BMI 34.7   Abdominal Girth 47.1   Body Composition 31       Drug Allergies and Intolerances:  Review of patient's allergies indicates:   Allergen Reactions    Allergenic extracts Hives, Itching, Other (See Comments), Rash and Shortness Of Breath    Dog dander Hives, Shortness Of Breath, Itching, Swelling and Rash     Cat's dander, dust,  pollen    Losartan Other (See Comments)     angioedema    Mold Itching and Shortness Of Breath    Metformin Other (See Comments)       Food Allergies and Intolerances:  NA    Past Medical History:  Past Medical History:   Diagnosis Date    Asthma     BRCA negative     Diabetes mellitus      Hypertension     Nausea after anesthesia 1989       Past Surgical History:  Past Surgical History:   Procedure Laterality Date    CORONARY ANGIOGRAPHY N/A 11/26/2021    Procedure: ANGIOGRAM, CORONARY ARTERY;  Surgeon: Ashu Horn MD;  Location: [facility] CATH LAB;  Service: Cardiology;  Laterality: N/A;    KNEE ARTHROSCOPY      knee surgery      ULTRASOUND GUIDANCE Right 11/26/2021    Procedure: ULTRASOUND GUIDANCE;  Surgeon: Ashu Horn MD;  Location: [facility] CATH LAB;  Service: Cardiology;  Laterality: Right;       Medications:  Current Outpatient Medications   Medication Sig    albuterol (PROAIR HFA) 90 mcg/actuation inhaler Inhale 2 puffs into the lungs every 4 (four) hours as needed for Wheezing.    aspirin 81 MG Chew Take 1 tablet (81 mg total) by mouth once daily.    atorvastatin (LIPITOR) 40 MG tablet Take 1 tablet (40 mg total) by mouth every evening.    blood sugar diagnostic (BLOOD GLUCOSE TEST) Strp 1 strip by Misc.(Non-Drug; Combo Route) route once daily.    blood-glucose meter kit Use as instructed to test blood sugar daily    clobetasol 0.05% (TEMOVATE) 0.05 % Oint AAA bid    clopidogreL (PLAVIX) 75 mg tablet Take 1 tablet (75 mg total) by mouth once daily.    doxazosin (CARDURA) 4 MG tablet Take 4 mg by mouth every evening.    dulaglutide (TRULICITY) 1.5 mg/0.5 mL pen injector Inject 1.5 mg into the skin once a week.    empagliflozin (JARDIANCE) 25 mg tablet Take 1 tablet (25 mg total) by mouth once daily.    flash glucose scanning reader (FREESTYLE EVELIO 2 READER) Misc 1 kit by Misc.(Non-Drug; Combo Route) route Daily.    flash glucose sensor (FREESTYLE EVELIO 2 SENSOR) Kit 1 each by Misc.(Non-Drug; Combo Route) route every 14 (fourteen) days.    fluticasone propionate (FLONASE) 50 mcg/actuation nasal spray 1 spray (50 mcg total) by Each Nostril route once daily.    glimepiride (AMARYL) 4 MG tablet TAKE 1 TABLET (4 MG TOTAL) BY MOUTH BEFORE BREAKFAST.    lancets 30 gauge Misc  1 lancet by Misc.(Non-Drug; Combo Route) route once daily.    MATZIM  mg 24 hr tablet TAKE 1 TABLET (240 MG TOTAL) BY MOUTH ONCE DAILY.    medroxyPROGESTERone (PROVERA) 10 MG tablet Take 1 tablet (10 mg total) by mouth once daily.    meloxicam (MOBIC) 15 MG tablet TAKE 1 TABLET BY MOUTH EVERY DAY    montelukast (SINGULAIR) 10 mg tablet TAKE 1 TABLET BY MOUTH EVERY DAY    nitroGLYCERIN (NITROSTAT) 0.4 MG SL tablet Place 1 tablet (0.4 mg total) under the tongue every 5 (five) minutes as needed for Chest pain.    sertraline (ZOLOFT) 50 MG tablet TAKE 1 TABLET BY MOUTH EVERY DAY    tiZANidine (ZANAFLEX) 2 MG tablet TAKE 1 TO 2 TABLETS BY MOUTH AT BEDTIME AS NEEDED FOR MUSCLE SPASMS AND PAIN     No current facility-administered medications for this visit.       Vitamins and Supplements:  MVI    Labs:  Patient confirms she is taking lipitor 40mg for cholesterol control.    Lab Results   Component Value Date    CHOL 184 04/13/2022     Lab Results   Component Value Date    HDL 42 04/13/2022     Lab Results   Component Value Date    LDLCALC 107.8 04/13/2022     Lab Results   Component Value Date    TRIG 171 (H) 04/13/2022     Lab Results   Component Value Date    CHOLHDL 22.8 04/13/2022         Lab Results   Component Value Date    GLUF 189 (H) 04/13/2022     Lab Results   Component Value Date    HGBA1C 10.9 (H) 04/13/2022       Nutrition/Diet History:  Patient eats 2-3 meals daily.    Seasons food with pepper/spices.  Patient denies use of a salt shaker at the table on prepared foods.   Dines out 2 per week at restaurants such as "[employer].  Gets meal prep from [employer]  Chooses fried foods 1-2 time(s) per weeks.    Chooses fish 1-2 time(s) per week.   Beverages:  water, ICE, diet soda/green tea  Alcohol: none    24 Hour Recall:  · Breakfast: Glucerna  · Lunch:Fried shrimp/catfish, corn, salad with italian  · Dinner:leftovers from lunch  · Other: NA    Difficulty Chewing or Swallowing: no  Current Exercise:  See Exercise Physiologist Note  Food Safety/Food Preparation: self  Living Arrangements/Family Support: Lives alone  Cultural/Spiritual/Personal Preferences: not applicable   Barriers to Education: none identified  Stage of Change Related to Diet Habits: Contemplation    Nutrition Diagnosis:  1. Food and nutrition related knowledge deficit related to the lack of prior nutrition education as evidenced by diet history and 24 hour recall    Nutrition Plan:   Goals:  LDL-C < 70 (for high risk patients)  Hgb A1c < 7%  BMI < 25 and abdominal girth < 40M/<35 F  2 gram sodium, Mediterranean diet  Rehab weight goal: 10-15lbs  Fish intake (non-fried varieties) to a goal of 2-3 servings per week.   Increase fruit and vegetable intake    Interventions/Recommendations:  Lab results reviewed and discussed  Nutrition Prescription:  · Total Energy Estimated Needs: 6752-3611 Kcal/d for weight loss  · Method for Estimating Needs: 20-25kcal/kg ABW  · Total Protein Estimated Needs: 51-77 g/d  · Method for Estimating Needs: 0.8-1.2 g/Kg ABW  · Total Fluid Estimated Needs: 1 mL/Kcal  Dietitian Consult: No  Patient to participate in Cardiac Rehab sessions three times a week  Weekly Dietitian Weight Check  Encouraged patient to complete 3 day food diary  Follow Up Plan for Ongoing Self-Management Support    Education:  Mediterranean Diet; verbalizes understanding; Date: 4/26/22   Person taught: patient   Preferred Learning Method: Verbal and written   Education Needed/Provided: Nutrition counseling and education related to cardiac rehabilitation   Education Method: Weekly nutrition lectures on the Mediterranean diet, cooking, shopping, and dining out   Written Materials Provided: 3 Day Food Record, Introduction to Mediterranean Diet   Strategies Implemented: Motivational interviewing, Goal setting, Self-Monitoring, and Problem Solving    Comments:   Discussed ways to incorporate healthy snacks, eating on a schedule, and monitoring  "sodium intake for heart health.  Encouraged increased produce    Diabetes  Is the patient diabetic?   Yes   Other Core Components/Diabetes Assessment:   Labs:  Lab Results   Component Value Date    GLUF 189 (H) 04/13/2022    GLUF 176 (H) 12/20/2014    GLUF 234 (H) 11/25/2014     Lab Results   Component Value Date    HGBA1C 10.9 (H) 04/13/2022    HGBA1C 10.8 (H) 11/18/2021    HGBA1C 11.8 (H) 09/28/2021      Lab Results   Component Value Date    ESTIMATEDAVG 266 (H) 04/13/2022    ESTIMATEDAVG 263 (H) 11/18/2021    ESTIMATEDAVG 292 (H) 09/28/2021       History of diabetes since 2010  Diabetes medications: Glimepiride 4mg daily, Jardiance 25mg daily, Trulicity 1.5mg injection once weekly (pt admits to being noncompliant with this r/t c/o GI issues)  Blood Glucose Checks at Home: Yes: Other: does not check consistently "every few days"  Endocrinologist or PCP following DM: Dr. Marin- endocrinology    Other Core Components/Diabetes Plan:   Goals:  Hgb A1c < 7%  Exercise Blood Glucose: 100-300 mg/dl    Interventions:  Reviewed and Discussed Labs  Medication Compliance  Med Card Reconciled  Low Sodium, Mediterranean, ADA Diet  Weight Management  Physical Activity  Increase Knowledge of Contributory Factors  Home Monitoring    Education:  Mediterranean Diet; verbalizes understanding; Date: 4/26/22    Comments:   Patient verbalizes understanding to bring home glucometer and check glucose pre and post each exercise session.  Per cardiac rehab protocols, patient's glucose must be between 90 and 270 mg/dL to exercise.  Patient denies any recent glucose levels less than 60 mg/dL or greater than 300 mg/dL. Patient verbalizes importance of notifying rehab staff if symptoms of hypoglycemia occur while at cardiac rehab.  Abnormal labs will be reported to patient's PCP/Endocrinologist by rehab staff.    Noted updated glucose parameters of 100-300     Emphasized importance of regular glucose checking    RD contact information " provided.      Elida Mota MS, RDN/LDN    Session: Orientation Cardiac Rehab Individual Treatment Plan - Initial Assessment      Patient Name: Shanae Kumari MRN: [id number]   : 1965   Age: 56 y.o.   Date of Event: 2021   Primary Diagnosis: PTCA/Stent    EF: 65%    Physical Assessment:   There were no vitals taken for this visit.    ASSESSMENT:  Heart Sounds: regular rate and rhythm  Prosthetic Valve: No  Lung Sounds: normal air entry, lungs clear to auscultation  Capillary Refill: normal  Left Radial Pulse: Normal (+2)  Right Radial Pulse: Normal (+2)  Left Pedal Pulse: Normal (+2)  Right Pedal Pulse: Normal (+2)  Right Edema: none  Left Edema none  Strength: good  Range of Motion: full range of motion  Existing Limitations:      Site   [] Arthritis, bursitis    [] Amputation, atrophy    [x] Other: ACL tear right knee, Labrum tear right hip, pt is being seen for PT for low back pain.   []       Diabetic patient's foot examination comments: Normal -  Bilateral  Incisional site: N/A  Special needs: N/A    Psychosocial Assessment:   Outcome Survey Tools:    BRITTNY SCORES:   PRE   Anxiety 3   Depression 1   Somatic 4   Hostility 2     SF-36 SCORES:   PRE   Physical Function 19   Social Function 8   Mental Health 25   Pain 6   Change in Health 2   Physical Role Limitation 1   Mental Role Limitation 3   Energy/Fatigue 9   Health Perceptions 12   Total Score 85     PHQ-9:  PHQ-9 Depression Patient Health Questionnaire 2022   Over the last two weeks how often have you been bothered by little interest or pleasure in doing things 0   Over the last two weeks how often have you been bothered by feeling down, depressed or hopeless 0   Over the last two weeks how often have you been bothered by trouble falling or staying asleep, or sleeping too much 1   Over the last two weeks how often have you been bothered by feeling tired or having little energy 1   Over the last two weeks how often have you been  bothered by a poor appetite or overeating 1   Over the last two weeks how often have you been bothered by feeling bad about yourself - or that you are a failure or have let yourself or your family down 0   Over the last two weeks how often have you been bothered by trouble concentrating on things, such as reading the newspaper or watching television 0   Over the last two weeks how often have you been bothered by moving or speaking so slowly that other people could have noticed. 0   Over the last two weeks how often have you been bothered by thoughts that you would be better off dead, or of hurting yourself 0   If you checked off any problems, how difficult have these problems made it for you to do your work, take care of things at home or get along with other people? Somewhat difficult   Total Score 3              Living Arrangements: Lives alone  Family Support: family  Self Reported: Effective Coping Skills, Stress, Chronic Pain and Sleep Pattern Disturbances  Displays: happiness and smiles often  Medication: Pt was on Zoloft but stopped it herself. Pt states is was not helping. Pt takes melatonin for sleep.    Psychosocial Plan:   Goals:  Improved psychosocial coping strategies  Reduce manifestation of depression  Reduce manifestation of stress  Maintain positive outlook  Improve overall quality of life    Interventions/Recommendations:  Discussed Results of Surveys  Patient to Self Report Emotional Changes at Session Check In  Recommend Physical Activity  Recommend Attending Education Lectures  Notify MD: No  Program Referral: Declined  Pharmaceutical Intervention/Therapy: Declined  Other Needs: not applicable  Stage of Readiness to Change: Preparation    Education:  Signs and symptoms of depression, verbalizes understanding; Date:4/26/2022  Stress, verbalizes understanding; Date:4/26/2022    Comments:  Pt denies overwhelming stress or anxiety.  Patient has been instructed to notify staff in the event that  circumstances worsen.  Patient verbalizes understanding.    Other Core Components/Risk Factors Assessment:   RISK FACTORS:  diabetes, hyperlipidemia, hypertension, obesity, stress    Learning Barriers: None    Education Level:  Post-College Graduate Degree    Pre-test Score: 60    Medication Compliance: has not been compliant with taking medications due to the following: side effects    Other Core Components/Risk Factors Plan:   Goals:  Decrease cholesterol level: In Progress  Increase exercise tolerance: In Progress  Decrease blood pressure: Met  Weight loss: In Progress  Control diabetes by adjusting diet and exercise: In Progress  Learn more about healthy eating: In Progress    Interventions/Recommendations:  Recommend regular attendance for Cardiac Rehab: Exercise and Education Lectures  Encourage medication compliance  Individual Education/ Counseling: Yes  Physician Referral: No    Education:    diabetes, verbalizes understanding; Date: 4/26/2022  physical activity, verbalizes understanding; Date: 4/26/2022  risk factors, verbalizes understanding; Date: 4/26/2022        Education method adapted to patients education level and preferred method of learning.  Method: explanation    Comments:  Pt is pleasant and verbalizes interest in changing her lifestyle to reduce her cardiovascular risks.    Other Core Components/Hypertension Assessment:   Resting BP: 90/56  BP Readings from Last 1 Encounters:   05/16/22 100/62         BP Diagnosis: Hypertensive  Patient reported symptoms: none    Other Core Components/Hypertension Plan:   Goals:  Blood Pressure <130/80    Interventions/Recommendations:  Med Card Reconciled: Yes  Encourage medication compliance  Encourage sodium reduction  Encourage weight loss  Recommend physical activity  Educate on contributory factors  Reduce stress, anxiety, anger, depression, and/or chronic pain  Encourage home blood pressure monitoring  Recommend daily weights  MD notified/Physician  Referral: No    Education:    Hypertension; verbalizes understanding; Date: 4/26/2022  Risk Factors; verbalizes understanding; Date: 4/26/2022        Comments:  Pt will take her B/P daily at home and record log. Discussed with pt separation of times for taking her two B/P medication and hydration to prevent low pressure in the morning.      Does the patient have Heart Failure? No    Other Core Components/Tobacco Cessation Assessment:   Smoking Status: lifetime non-smoker  Smoking Cessation Barriers: none  Stage of Readiness to Change: Maintenance    Other Core Components/Tobacco Cessation Plan:   Goals:  Maintain non-smoking status    Interventions:  Maintains non-smoking status    Education:    Risk Factors; verbalizes understanding; Date: 4/26/2022  Benefits of Cardiac Rehab; verbalizes understanding; Date: 4/26/2022        Comments:  Pt expresses no desire to smoke.    Discussed Cardiac Rehab program in depth with patient.  Medication list updated per patient & marked as reviewed.  Patient has been instructed to notify staff of any problems while attending rehab (ie: chest pain, shortness of breath, lightheadedness, dizziness).  Patient has been instructed to monitor blood pressure readings outside of rehab & to keep a daily log of the readings.  Patient verbalizes understanding.    Luis Urban RN

## 2022-05-20 ENCOUNTER — PATIENT MESSAGE (OUTPATIENT)
Dept: SPORTS MEDICINE | Facility: CLINIC | Age: 57
End: 2022-05-20
Payer: COMMERCIAL

## 2022-05-20 ENCOUNTER — TELEPHONE (OUTPATIENT)
Dept: CARDIAC REHAB | Facility: CLINIC | Age: 57
End: 2022-05-20
Payer: COMMERCIAL

## 2022-05-20 ENCOUNTER — DOCUMENTATION ONLY (OUTPATIENT)
Dept: CARDIAC REHAB | Facility: CLINIC | Age: 57
End: 2022-05-20
Payer: COMMERCIAL

## 2022-05-20 NOTE — PROGRESS NOTES
"Spoke to patient, she "blown out her ACL and will have to get that taken care of first". Informed patient we would remove her from the Cardiac Rehab class and to call when she is ready to come to Cardiac Rehab. Patient verbalized understanding.     Lyle Giles RN  Cardiac Rehab Nurse    "

## 2022-05-31 ENCOUNTER — HOSPITAL ENCOUNTER (OUTPATIENT)
Dept: RADIOLOGY | Facility: HOSPITAL | Age: 57
Discharge: HOME | End: 2022-05-31
Attending: ORTHOPAEDIC SURGERY
Payer: COMMERCIAL

## 2022-05-31 DIAGNOSIS — S83.512A CHRONIC RUPTURE OF ACL OF LEFT KNEE: ICD-10-CM

## 2022-05-31 PROCEDURE — 73721 MRI JNT OF LWR EXTRE W/O DYE: CPT | Mod: TC,LT

## 2022-05-31 PROCEDURE — 73721 MRI JNT OF LWR EXTRE W/O DYE: CPT | Mod: 26,LT,, | Performed by: RADIOLOGY

## 2022-05-31 PROCEDURE — 73721 MRI KNEE WITHOUT CONTRAST LEFT: ICD-10-PCS | Mod: 26,LT,, | Performed by: RADIOLOGY

## 2022-06-08 ENCOUNTER — OFFICE VISIT (OUTPATIENT)
Dept: SPORTS MEDICINE | Facility: CLINIC | Age: 57
End: 2022-06-08
Payer: COMMERCIAL

## 2022-06-08 VITALS
BODY MASS INDEX: 34.49 KG/M2 | WEIGHT: 202 LBS | HEART RATE: 117 BPM | DIASTOLIC BLOOD PRESSURE: 95 MMHG | HEIGHT: 64 IN | SYSTOLIC BLOOD PRESSURE: 153 MMHG

## 2022-06-08 DIAGNOSIS — S83.512A CHRONIC RUPTURE OF ACL OF LEFT KNEE: ICD-10-CM

## 2022-06-08 PROCEDURE — 99214 PR OFFICE/OUTPT VISIT, EST, LEVL IV, 30-39 MIN: ICD-10-PCS | Mod: S$GLB,,, | Performed by: ORTHOPAEDIC SURGERY

## 2022-06-08 PROCEDURE — 3077F PR MOST RECENT SYSTOLIC BLOOD PRESSURE >= 140 MM HG: ICD-10-PCS | Mod: CPTII,S$GLB,, | Performed by: ORTHOPAEDIC SURGERY

## 2022-06-08 PROCEDURE — 3046F PR MOST RECENT HEMOGLOBIN A1C LEVEL > 9.0%: ICD-10-PCS | Mod: CPTII,S$GLB,, | Performed by: ORTHOPAEDIC SURGERY

## 2022-06-08 PROCEDURE — 3077F SYST BP >= 140 MM HG: CPT | Mod: CPTII,S$GLB,, | Performed by: ORTHOPAEDIC SURGERY

## 2022-06-08 PROCEDURE — 3080F DIAST BP >= 90 MM HG: CPT | Mod: CPTII,S$GLB,, | Performed by: ORTHOPAEDIC SURGERY

## 2022-06-08 PROCEDURE — 99999 PR PBB SHADOW E&M-EST. PATIENT-LVL IV: ICD-10-PCS | Mod: PBBFAC,,, | Performed by: ORTHOPAEDIC SURGERY

## 2022-06-08 PROCEDURE — 99999 PR PBB SHADOW E&M-EST. PATIENT-LVL IV: CPT | Mod: PBBFAC,,, | Performed by: ORTHOPAEDIC SURGERY

## 2022-06-08 PROCEDURE — 3080F PR MOST RECENT DIASTOLIC BLOOD PRESSURE >= 90 MM HG: ICD-10-PCS | Mod: CPTII,S$GLB,, | Performed by: ORTHOPAEDIC SURGERY

## 2022-06-08 PROCEDURE — 3046F HEMOGLOBIN A1C LEVEL >9.0%: CPT | Mod: CPTII,S$GLB,, | Performed by: ORTHOPAEDIC SURGERY

## 2022-06-08 PROCEDURE — 3008F PR BODY MASS INDEX (BMI) DOCUMENTED: ICD-10-PCS | Mod: CPTII,S$GLB,, | Performed by: ORTHOPAEDIC SURGERY

## 2022-06-08 PROCEDURE — 1159F MED LIST DOCD IN RCRD: CPT | Mod: CPTII,S$GLB,, | Performed by: ORTHOPAEDIC SURGERY

## 2022-06-08 PROCEDURE — 99214 OFFICE O/P EST MOD 30 MIN: CPT | Mod: S$GLB,,, | Performed by: ORTHOPAEDIC SURGERY

## 2022-06-08 PROCEDURE — 3008F BODY MASS INDEX DOCD: CPT | Mod: CPTII,S$GLB,, | Performed by: ORTHOPAEDIC SURGERY

## 2022-06-08 PROCEDURE — 1159F PR MEDICATION LIST DOCUMENTED IN MEDICAL RECORD: ICD-10-PCS | Mod: CPTII,S$GLB,, | Performed by: ORTHOPAEDIC SURGERY

## 2022-06-09 ENCOUNTER — DOCUMENTATION ONLY (OUTPATIENT)
Dept: CARDIAC REHAB | Facility: CLINIC | Age: 57
End: 2022-06-09
Payer: COMMERCIAL

## 2022-06-09 NOTE — PROGRESS NOTES
Shanae arrived for her orientation to the Phase II cardiac rehab program.  She has not started the exercise classes as of yet due to an ACL tear.  She will contact us when she is ready to start the program.    Session: Orientation   Cardiac Rehab Individual Treatment Plan - Initial Assessment      Patient Name: Shanae Kumari MRN: [id number]   : 1965   Age: 56 y.o.   Primary Diagnosis: PTCA/STENT  Date of Event: 21  EF: 65%  Risk Stratification: high  Referring Physician: GONZALO   Exercise Assessment:     CPX/TM Date: 22 Results   RHR 97   Max    Peak VO2 (CPX only) 16.7   Actual METS (CPX only) 4.8   Estimated METS 7.0     Anthropometrics    Height 64 inches   Weight 202 lbs   BMI 34.7   Abdominal Girth 47.1   Body Composition 31.0%     ST Depression noted on Stress Test?:No  Angina with exercise?: No   Fall Risk: No   Assistive Devices:  independent   Currently exercising? No   There were many limitations noted by the patient.  Shanae stated she has torn left ACL, chronic low back pain, and torn labrum on the right hip pain.  Exercise modalities will be adjusted.      Exercise Plan:   Goals:  CR Exercise Goals: Attend Cardiac Rehab 3 times/week: In Progress  Home Aerobic Exercise: 2 additional days/week for 30-60 minutes: In Progress  Intensity of 12-15 on the Rate of Perceived Exertion (RPE) scale: In Progress  30% increase in entry estimated METS: 9.1 : In Progress  5 days/week for 30-60 minutes: In Progress    Intervention:   Discussed importance of regular attendance to cardiac rehab class    Exercise Prescription:  THR Range 119-129   Mode: Treadmill  Recumbent Bike  Upright Bike  Nustep  Elliptical   Frequency:  3 days/week   Duration:  30 - 60 minutes   Intensity:  12 - 15 RPE   Resistance Training:  Yes: 0 to 5 lb weights with 10-15 reps based on strength and range of motion assessment     Home Prescription:  Mode Aerobic   Frequency: 2- 3 days/week   Duration: 30-60 minutes    Resistance Training: None        Education:  Orientation to Equipment; verbalizes understanding; Date: 22  Exercise Recommendations; verbalizes understanding; Date: 22  Exercise Safety; verbalizes understanding; Date: 22  Class Preparation: verbalizes understanding; Date: 22  Signs and symptoms to report: verbalizes understanding; Date: 22  Caffeine/Hydration: verbalizes understanding; Date: 22  Exercise Terminology: verbalizes understanding; Date: 22  Resistance Training: verbalizes understanding; Date: 22    Comments:  Shanae was encouraged to begin thinking about some type of aerobic exercise she can participate in at least 2 non-rehab days per week for at least 30 minutes in addition to attending Phase II cardiac rehab classes 3 days per week.  She stated understanding.    All consent forms were signed, proper attire and shoes were discussed.       Shanae will begin Cardiac Rehab on Friday, May 20 at 3:30pm.    The exercise prescription will be adjusted based on tolerance of exercise intensity by patient.    Tamara Posey, CEP    Orientation Cardiac Rehab Individual Treatment Plan - Initial Assessment      Patient Name: Shanae Kumari MRN: [id number]   : 1965   Age: 56 y.o.   Primary Diagnosis: s/p PTCA/stent, CAD, HTN, HLD, DM    Nutrition Assessment:     Anthropometrics    Height 64 inches   Weight 202 lbs   BMI 34.7   Abdominal Girth 47.1   Body Composition 31       Drug Allergies and Intolerances:  Review of patient's allergies indicates:   Allergen Reactions    Allergenic extracts Hives, Itching, Other (See Comments), Rash and Shortness Of Breath    Dog dander Hives, Shortness Of Breath, Itching, Swelling and Rash     Cat's dander, dust,  pollen    Losartan Other (See Comments)     angioedema    Mold Itching and Shortness Of Breath    Metformin Other (See Comments)       Food Allergies and Intolerances:  NA    Past Medical History:  Past  Medical History:   Diagnosis Date    Asthma     BRCA negative     Diabetes mellitus     Hypertension     Nausea after anesthesia 1989       Past Surgical History:  Past Surgical History:   Procedure Laterality Date    CORONARY ANGIOGRAPHY N/A 11/26/2021    Procedure: ANGIOGRAM, CORONARY ARTERY;  Surgeon: Ashu Horn MD;  Location: [facility] CATH LAB;  Service: Cardiology;  Laterality: N/A;    KNEE ARTHROSCOPY      knee surgery      ULTRASOUND GUIDANCE Right 11/26/2021    Procedure: ULTRASOUND GUIDANCE;  Surgeon: Ashu Horn MD;  Location: [facility] CATH LAB;  Service: Cardiology;  Laterality: Right;       Medications:  Current Outpatient Medications   Medication Sig    albuterol (PROAIR HFA) 90 mcg/actuation inhaler Inhale 2 puffs into the lungs every 4 (four) hours as needed for Wheezing.    aspirin 81 MG Chew Take 1 tablet (81 mg total) by mouth once daily.    atorvastatin (LIPITOR) 40 MG tablet Take 1 tablet (40 mg total) by mouth every evening.    blood sugar diagnostic (BLOOD GLUCOSE TEST) Strp 1 strip by Misc.(Non-Drug; Combo Route) route once daily.    blood-glucose meter kit Use as instructed to test blood sugar daily    clobetasol 0.05% (TEMOVATE) 0.05 % Oint AAA bid    clopidogreL (PLAVIX) 75 mg tablet Take 1 tablet (75 mg total) by mouth once daily.    doxazosin (CARDURA) 4 MG tablet Take 4 mg by mouth every evening.    dulaglutide (TRULICITY) 1.5 mg/0.5 mL pen injector Inject 1.5 mg into the skin once a week.    empagliflozin (JARDIANCE) 25 mg tablet Take 1 tablet (25 mg total) by mouth once daily.    flash glucose scanning reader (FREESTYLE EVELIO 2 READER) Misc 1 kit by Misc.(Non-Drug; Combo Route) route Daily.    flash glucose sensor (FREESTYLE EVELIO 2 SENSOR) Kit 1 each by Misc.(Non-Drug; Combo Route) route every 14 (fourteen) days.    fluticasone propionate (FLONASE) 50 mcg/actuation nasal spray 1 spray (50 mcg total) by Each Nostril route once daily.    glimepiride (AMARYL) 4 MG  tablet TAKE 1 TABLET (4 MG TOTAL) BY MOUTH BEFORE BREAKFAST.    lancets 30 gauge Misc 1 lancet by Misc.(Non-Drug; Combo Route) route once daily.    MATZIM  mg 24 hr tablet TAKE 1 TABLET (240 MG TOTAL) BY MOUTH ONCE DAILY.    medroxyPROGESTERone (PROVERA) 10 MG tablet Take 1 tablet (10 mg total) by mouth once daily.    meloxicam (MOBIC) 15 MG tablet TAKE 1 TABLET BY MOUTH EVERY DAY    montelukast (SINGULAIR) 10 mg tablet TAKE 1 TABLET BY MOUTH EVERY DAY    nitroGLYCERIN (NITROSTAT) 0.4 MG SL tablet Place 1 tablet (0.4 mg total) under the tongue every 5 (five) minutes as needed for Chest pain.    sertraline (ZOLOFT) 50 MG tablet TAKE 1 TABLET BY MOUTH EVERY DAY    tiZANidine (ZANAFLEX) 2 MG tablet TAKE 1 TO 2 TABLETS BY MOUTH AT BEDTIME AS NEEDED FOR MUSCLE SPASMS AND PAIN     No current facility-administered medications for this visit.       Vitamins and Supplements:  MVI    Labs:  Patient confirms she is taking lipitor 40mg for cholesterol control.    Lab Results   Component Value Date    CHOL 184 04/13/2022     Lab Results   Component Value Date    HDL 42 04/13/2022     Lab Results   Component Value Date    LDLCALC 107.8 04/13/2022     Lab Results   Component Value Date    TRIG 171 (H) 04/13/2022     Lab Results   Component Value Date    CHOLHDL 22.8 04/13/2022         Lab Results   Component Value Date    GLUF 189 (H) 04/13/2022     Lab Results   Component Value Date    HGBA1C 10.9 (H) 04/13/2022       Nutrition/Diet History:  Patient eats 2-3 meals daily.    Seasons food with pepper/spices.  Patient denies use of a salt shaker at the table on prepared foods.   Dines out 2 per week at restaurants such as [employer].  Gets meal prep from [employer]  Chooses fried foods 1-2 time(s) per weeks.    Chooses fish 1-2 time(s) per week.   Beverages:  water, ICE, diet soda/green tea  Alcohol: none    24 Hour Recall:  · Breakfast: Glucerna  · Lunch:Fried shrimp/catfish, corn, salad with  [place]  · Dinner:leftovers from lunch  · Other: NA    Difficulty Chewing or Swallowing: no  Current Exercise: See Exercise Physiologist Note  Food Safety/Food Preparation: self  Living Arrangements/Family Support: Lives alone  Cultural/Spiritual/Personal Preferences: not applicable   Barriers to Education: none identified  Stage of Change Related to Diet Habits: Contemplation    Nutrition Diagnosis:  1. Food and nutrition related knowledge deficit related to the lack of prior nutrition education as evidenced by diet history and 24 hour recall    Nutrition Plan:   Goals:  LDL-C < 70 (for high risk patients)  Hgb A1c < 7%  BMI < 25 and abdominal girth < 40M/<35 F  2 gram sodium, Mediterranean diet  Rehab weight goal: 10-15lbs  Fish intake (non-fried varieties) to a goal of 2-3 servings per week.   Increase fruit and vegetable intake    Interventions/Recommendations:  Lab results reviewed and discussed  Nutrition Prescription:  · Total Energy Estimated Needs: 8853-6726 Kcal/d for weight loss  · Method for Estimating Needs: 20-25kcal/kg ABW  · Total Protein Estimated Needs: 51-77 g/d  · Method for Estimating Needs: 0.8-1.2 g/Kg ABW  · Total Fluid Estimated Needs: 1 mL/Kcal  Dietitian Consult: No  Patient to participate in Cardiac Rehab sessions three times a week  Weekly Dietitian Weight Check  Encouraged patient to complete 3 day food diary  Follow Up Plan for Ongoing Self-Management Support    Education:  Mediterranean Diet; verbalizes understanding; Date: 4/26/22   Person taught: patient   Preferred Learning Method: Verbal and written   Education Needed/Provided: Nutrition counseling and education related to cardiac rehabilitation   Education Method: Weekly nutrition lectures on the Mediterranean diet, cooking, shopping, and dining out   Written Materials Provided: 3 Day Food Record, Introduction to Mediterranean Diet   Strategies Implemented: Motivational interviewing, Goal setting, Self-Monitoring, and  "Problem Solving    Comments:   Discussed ways to incorporate healthy snacks, eating on a schedule, and monitoring sodium intake for heart health.  Encouraged increased produce    Diabetes  Is the patient diabetic?   Yes   Other Core Components/Diabetes Assessment:   Labs:  Lab Results   Component Value Date    GLUF 189 (H) 04/13/2022    GLUF 176 (H) 12/20/2014    GLUF 234 (H) 11/25/2014     Lab Results   Component Value Date    HGBA1C 10.9 (H) 04/13/2022    HGBA1C 10.8 (H) 11/18/2021    HGBA1C 11.8 (H) 09/28/2021      Lab Results   Component Value Date    ESTIMATEDAVG 266 (H) 04/13/2022    ESTIMATEDAVG 263 (H) 11/18/2021    ESTIMATEDAVG 292 (H) 09/28/2021       History of diabetes since 2010  Diabetes medications: Glimepiride 4mg daily, Jardiance 25mg daily, Trulicity 1.5mg injection once weekly (pt admits to being noncompliant with this r/t c/o GI issues)  Blood Glucose Checks at Home: Yes: Other: does not check consistently "every few days"  Endocrinologist or PCP following DM: Dr. Marin- endocrinology    Other Core Components/Diabetes Plan:   Goals:  Hgb A1c < 7%  Exercise Blood Glucose: 100-300 mg/dl    Interventions:  Reviewed and Discussed Labs  Medication Compliance  Med Card Reconciled  Low Sodium, Mediterranean, ADA Diet  Weight Management  Physical Activity  Increase Knowledge of Contributory Factors  Home Monitoring    Education:  Mediterranean Diet; verbalizes understanding; Date: 4/26/22    Comments:   Patient verbalizes understanding to bring home glucometer and check glucose pre and post each exercise session.  Per cardiac rehab protocols, patient's glucose must be between 90 and 270 mg/dL to exercise.  Patient denies any recent glucose levels less than 60 mg/dL or greater than 300 mg/dL. Patient verbalizes importance of notifying rehab staff if symptoms of hypoglycemia occur while at cardiac rehab.  Abnormal labs will be reported to patient's PCP/Endocrinologist by rehab staff.    Noted updated " glucose parameters of 100-300     Emphasized importance of regular glucose checking    RD contact information provided.      Elida Mota MS, RDN/LDN    Session: Orientation Cardiac Rehab Individual Treatment Plan - Initial Assessment      Patient Name: Shanae Kumari MRN: [id number]   : 1965   Age: 56 y.o.   Date of Event: 2021   Primary Diagnosis: PTCA/Stent    EF: 65%    Physical Assessment:   There were no vitals taken for this visit.    ASSESSMENT:  Heart Sounds: regular rate and rhythm  Prosthetic Valve: No  Lung Sounds: normal air entry, lungs clear to auscultation  Capillary Refill: normal  Left Radial Pulse: Normal (+2)  Right Radial Pulse: Normal (+2)  Left Pedal Pulse: Normal (+2)  Right Pedal Pulse: Normal (+2)  Right Edema: none  Left Edema none  Strength: good  Range of Motion: full range of motion  Existing Limitations:      Site   [] Arthritis, bursitis    [] Amputation, atrophy    [x] Other: ACL tear right knee, Labrum tear right hip, pt is being seen for PT for low back pain.   []       Diabetic patient's foot examination comments: Normal -  Bilateral  Incisional site: N/A  Special needs: N/A    Psychosocial Assessment:   Outcome Survey Tools:    BRITTNY SCORES:   PRE   Anxiety 3   Depression 1   Somatic 4   Hostility 2     SF-36 SCORES:   PRE   Physical Function 19   Social Function 8   Mental Health 25   Pain 6   Change in Health 2   Physical Role Limitation 1   Mental Role Limitation 3   Energy/Fatigue 9   Health Perceptions 12   Total Score 85     PHQ-9:  PHQ-9 Depression Patient Health Questionnaire 2022   Over the last two weeks how often have you been bothered by little interest or pleasure in doing things 0   Over the last two weeks how often have you been bothered by feeling down, depressed or hopeless 0   Over the last two weeks how often have you been bothered by trouble falling or staying asleep, or sleeping too much 1   Over the last two weeks how often have you  been bothered by feeling tired or having little energy 1   Over the last two weeks how often have you been bothered by a poor appetite or overeating 1   Over the last two weeks how often have you been bothered by feeling bad about yourself - or that you are a failure or have let yourself or your family down 0   Over the last two weeks how often have you been bothered by trouble concentrating on things, such as reading the newspaper or watching television 0   Over the last two weeks how often have you been bothered by moving or speaking so slowly that other people could have noticed. 0   Over the last two weeks how often have you been bothered by thoughts that you would be better off dead, or of hurting yourself 0   If you checked off any problems, how difficult have these problems made it for you to do your work, take care of things at home or get along with other people? Somewhat difficult   Total Score 3              Living Arrangements: Lives alone  Family Support: family  Self Reported: Effective Coping Skills, Stress, Chronic Pain and Sleep Pattern Disturbances  Displays: happiness and smiles often  Medication: Pt was on Zoloft but stopped it herself. Pt states is was not helping. Pt takes melatonin for sleep.    Psychosocial Plan:   Goals:  Improved psychosocial coping strategies  Reduce manifestation of depression  Reduce manifestation of stress  Maintain positive outlook  Improve overall quality of life    Interventions/Recommendations:  Discussed Results of Surveys  Patient to Self Report Emotional Changes at Session Check In  Recommend Physical Activity  Recommend Attending Education Lectures  Notify MD: No  Program Referral: Declined  Pharmaceutical Intervention/Therapy: Declined  Other Needs: not applicable  Stage of Readiness to Change: Preparation    Education:  Signs and symptoms of depression, verbalizes understanding; Date:4/26/2022  Stress, verbalizes understanding; Date:4/26/2022    Comments:  Pt  denies overwhelming stress or anxiety.  Patient has been instructed to notify staff in the event that circumstances worsen.  Patient verbalizes understanding.    Other Core Components/Risk Factors Assessment:   RISK FACTORS:  diabetes, hyperlipidemia, hypertension, obesity, stress    Learning Barriers: None    Education Level:  Post-College Graduate Degree    Pre-test Score: 60    Medication Compliance: has not been compliant with taking medications due to the following: side effects    Other Core Components/Risk Factors Plan:   Goals:  Decrease cholesterol level: In Progress  Increase exercise tolerance: In Progress  Decrease blood pressure: Met  Weight loss: In Progress  Control diabetes by adjusting diet and exercise: In Progress  Learn more about healthy eating: In Progress    Interventions/Recommendations:  Recommend regular attendance for Cardiac Rehab: Exercise and Education Lectures  Encourage medication compliance  Individual Education/ Counseling: Yes  Physician Referral: No    Education:    diabetes, verbalizes understanding; Date: 4/26/2022  physical activity, verbalizes understanding; Date: 4/26/2022  risk factors, verbalizes understanding; Date: 4/26/2022        Education method adapted to patients education level and preferred method of learning.  Method: explanation    Comments:  Pt is pleasant and verbalizes interest in changing her lifestyle to reduce her cardiovascular risks.    Other Core Components/Hypertension Assessment:   Resting BP: 90/56  BP Readings from Last 1 Encounters:   06/08/22 (!) 153/95         BP Diagnosis: Hypertensive  Patient reported symptoms: none    Other Core Components/Hypertension Plan:   Goals:  Blood Pressure <130/80    Interventions/Recommendations:  Med Card Reconciled: Yes  Encourage medication compliance  Encourage sodium reduction  Encourage weight loss  Recommend physical activity  Educate on contributory factors  Reduce stress, anxiety, anger, depression, and/or  chronic pain  Encourage home blood pressure monitoring  Recommend daily weights  MD notified/Physician Referral: No    Education:    Hypertension; verbalizes understanding; Date: 4/26/2022  Risk Factors; verbalizes understanding; Date: 4/26/2022        Comments:  Pt will take her B/P daily at home and record log. Discussed with pt separation of times for taking her two B/P medication and hydration to prevent low pressure in the morning.      Does the patient have Heart Failure? No    Other Core Components/Tobacco Cessation Assessment:   Smoking Status: lifetime non-smoker  Smoking Cessation Barriers: none  Stage of Readiness to Change: Maintenance    Other Core Components/Tobacco Cessation Plan:   Goals:  Maintain non-smoking status    Interventions:  Maintains non-smoking status    Education:    Risk Factors; verbalizes understanding; Date: 4/26/2022  Benefits of Cardiac Rehab; verbalizes understanding; Date: 4/26/2022        Comments:  Pt expresses no desire to smoke.    Discussed Cardiac Rehab program in depth with patient.  Medication list updated per patient & marked as reviewed.  Patient has been instructed to notify staff of any problems while attending rehab (ie: chest pain, shortness of breath, lightheadedness, dizziness).  Patient has been instructed to monitor blood pressure readings outside of rehab & to keep a daily log of the readings.  Patient verbalizes understanding.    Luis Urban RN

## 2022-06-14 DIAGNOSIS — M94.269 CHONDROMALACIA OF KNEE, UNSPECIFIED LATERALITY: ICD-10-CM

## 2022-06-14 DIAGNOSIS — M23.201 OLD TEAR OF LATERAL MENISCUS OF LEFT KNEE, UNSPECIFIED TEAR TYPE: ICD-10-CM

## 2022-06-14 DIAGNOSIS — M23.204 OLD TEAR OF MEDIAL MENISCUS OF LEFT KNEE, UNSPECIFIED TEAR TYPE: ICD-10-CM

## 2022-06-14 DIAGNOSIS — S83.512A RUPTURE OF ANTERIOR CRUCIATE LIGAMENT OF LEFT KNEE, INITIAL ENCOUNTER: Primary | ICD-10-CM

## 2022-06-14 DIAGNOSIS — M67.50 PLICA SYNDROME: ICD-10-CM

## 2022-06-16 ENCOUNTER — PATIENT MESSAGE (OUTPATIENT)
Dept: FAMILY MEDICINE | Facility: CLINIC | Age: 57
End: 2022-06-16
Payer: COMMERCIAL

## 2022-06-16 ENCOUNTER — PATIENT MESSAGE (OUTPATIENT)
Dept: CARDIOLOGY | Facility: CLINIC | Age: 57
End: 2022-06-16
Payer: COMMERCIAL

## 2022-06-19 ENCOUNTER — PATIENT MESSAGE (OUTPATIENT)
Dept: FAMILY MEDICINE | Facility: CLINIC | Age: 57
End: 2022-06-19
Payer: COMMERCIAL

## 2022-06-21 ENCOUNTER — PATIENT MESSAGE (OUTPATIENT)
Dept: SURGERY | Facility: HOSPITAL | Age: 57
End: 2022-06-21
Payer: COMMERCIAL

## 2022-06-23 ENCOUNTER — PATIENT MESSAGE (OUTPATIENT)
Dept: FAMILY MEDICINE | Facility: CLINIC | Age: 57
End: 2022-06-23
Payer: COMMERCIAL

## 2022-06-24 ENCOUNTER — PATIENT MESSAGE (OUTPATIENT)
Dept: SPORTS MEDICINE | Facility: CLINIC | Age: 57
End: 2022-06-24
Payer: COMMERCIAL

## 2022-06-25 ENCOUNTER — PATIENT MESSAGE (OUTPATIENT)
Dept: FAMILY MEDICINE | Facility: CLINIC | Age: 57
End: 2022-06-25
Payer: COMMERCIAL

## 2022-06-27 ENCOUNTER — OFFICE VISIT (OUTPATIENT)
Dept: FAMILY MEDICINE | Facility: CLINIC | Age: 57
End: 2022-06-27
Payer: COMMERCIAL

## 2022-06-27 ENCOUNTER — PATIENT MESSAGE (OUTPATIENT)
Dept: FAMILY MEDICINE | Facility: CLINIC | Age: 57
End: 2022-06-27

## 2022-06-27 DIAGNOSIS — U07.1 COVID-19: Primary | ICD-10-CM

## 2022-06-27 DIAGNOSIS — J45.20 MILD INTERMITTENT ASTHMA WITHOUT COMPLICATION: Chronic | ICD-10-CM

## 2022-06-27 DIAGNOSIS — I10 ESSENTIAL HYPERTENSION: Chronic | ICD-10-CM

## 2022-06-27 DIAGNOSIS — R80.9 TYPE 2 DIABETES MELLITUS WITH MICROALBUMINURIA, WITHOUT LONG-TERM CURRENT USE OF INSULIN: Chronic | ICD-10-CM

## 2022-06-27 DIAGNOSIS — E11.29 TYPE 2 DIABETES MELLITUS WITH MICROALBUMINURIA, WITHOUT LONG-TERM CURRENT USE OF INSULIN: Chronic | ICD-10-CM

## 2022-06-27 PROCEDURE — 99214 PR OFFICE/OUTPT VISIT, EST, LEVL IV, 30-39 MIN: ICD-10-PCS | Mod: 95,,, | Performed by: INTERNAL MEDICINE

## 2022-06-27 PROCEDURE — 99214 OFFICE O/P EST MOD 30 MIN: CPT | Mod: 95,,, | Performed by: INTERNAL MEDICINE

## 2022-06-27 PROCEDURE — 1160F RVW MEDS BY RX/DR IN RCRD: CPT | Mod: CPTII,95,, | Performed by: INTERNAL MEDICINE

## 2022-06-27 PROCEDURE — 3046F HEMOGLOBIN A1C LEVEL >9.0%: CPT | Mod: CPTII,95,, | Performed by: INTERNAL MEDICINE

## 2022-06-27 PROCEDURE — 3046F PR MOST RECENT HEMOGLOBIN A1C LEVEL > 9.0%: ICD-10-PCS | Mod: CPTII,95,, | Performed by: INTERNAL MEDICINE

## 2022-06-27 PROCEDURE — 1159F MED LIST DOCD IN RCRD: CPT | Mod: CPTII,95,, | Performed by: INTERNAL MEDICINE

## 2022-06-27 PROCEDURE — 1160F PR REVIEW ALL MEDS BY PRESCRIBER/CLIN PHARMACIST DOCUMENTED: ICD-10-PCS | Mod: CPTII,95,, | Performed by: INTERNAL MEDICINE

## 2022-06-27 PROCEDURE — 1159F PR MEDICATION LIST DOCUMENTED IN MEDICAL RECORD: ICD-10-PCS | Mod: CPTII,95,, | Performed by: INTERNAL MEDICINE

## 2022-06-27 RX ORDER — DILTIAZEM HYDROCHLORIDE 240 MG/1
TABLET, EXTENDED RELEASE ORAL
Qty: 90 TABLET | Refills: 1 | Status: SHIPPED | OUTPATIENT
Start: 2022-06-27 | End: 2022-07-11 | Stop reason: SDUPTHER

## 2022-06-27 NOTE — TELEPHONE ENCOUNTER
Refill Routing Note   Medication(s) are not appropriate for processing by [facility] for the following reason(s):      - Required vitals are abnormal    [facility] action(s):  Defer          Medication reconciliation completed: No     Appointments  past 12m or future 3m with PCP    Date Provider   Last Visit   11/19/2021 Tima Stanley MD   Next Visit   6/27/2022 Tima Stanley MD   ED visits in past 90 days: 0        Note composed:2:18 PM 06/27/2022

## 2022-06-27 NOTE — ASSESSMENT & PLAN NOTE
Poor control.  Discussed that she is a poor surgical candidate until her sugars are in better control.  Refer to Endocrine

## 2022-06-27 NOTE — PROGRESS NOTES
Assessment & Plan  Problem List Items Addressed This Visit        Pulmonary    Asthma, mild intermittent (Chronic)    Overview     Cat dander, pollen bloom           Current Assessment & Plan     The current medical regimen is effective;  continue present plan and medications. Discussed POs teroids if needed              Endocrine    Type 2 diabetes mellitus with microalbuminuria, without long-term current use of insulin (Chronic)    Overview     Angioedema to ARB.  Persistent metabolic acidosis when on metformin that resolved with stopping.  ?GI losses           Current Assessment & Plan     Poor control.  Discussed that she is a poor surgical candidate until her sugars are in better control.  Refer to Endocrine           Relevant Orders    Ambulatory referral/consult to Endocrinology      Other Visit Diagnoses     COVID-19    -  Primary  -    Generally doing well.  Discussed Paxlovid.  At this time symptoms mild.  She will reach out if she has any worsening of her symptoms and we will start the medication.  I have discussed the common side effects of this(these) medication(s) and black box warnings (if applicable) with the patient and answered all of the questions they had at the time of this visit regarding this(these) medication(s). I counseled the patient on the information that was available from the [facility] and [facility] at the time of this visit.  Secifically we discussed monitoring for fever, hand hygiene, cough hygiene, social distancing, and when additional medical attention would be warranted.                Health Maintenance reviewed, deferred.    The patient location is:  Patient Home   The chief complaint leading to consultation is: noted below  Visit type: Virtual visit with synchronous audio and video  Total time spent with patient: 20  Each patient to whom he or she provides medical services by telemedicine is:  (1) informed of the relationship between the physician and patient and the respective role of  any other health care provider with respect to management of the patient; and (2) notified that he or she may decline to receive medical services by telemedicine and may withdraw from such care at any time.    Follow-up: Follow up if symptoms worsen or fail to improve.    ______________________________________________________________________    Chief Complaint  Chief Complaint   Patient presents with    COVID-19 Concerns       HPI  Shanae Kumari is a 56 y.o. female with multiple medical diagnoses as listed in the medical history and problem list that presents for COVID via virtual visit.  Pt is known to me with their last appointment Visit date not found.      Answers for HPI/ROS submitted by the patient on 6/25/2022  Chronicity: new  Onset: today  Progression since onset: gradually worsening  Frequency: every few minutes  Cough characteristics: productive of sputum  ear congestion: No  heartburn: No  hemoptysis: No  nasal congestion: Yes  sweats: No  weight loss: No  Aggravated by: nothing  asthma: Yes  bronchiectasis: No  bronchitis: Yes  COPD: No  emphysema: No  pneumonia: Yes  Treatments tried: a beta-agonist inhaler  Improvement on treatment: mild      5.  Caught COVID on a trip.  Generally doing well.  Having to use her inhaler which she normally doesn't need.  Some cough and sore throat.  Mild fever.  No SOB.      Has Knee surgery planned in 3 days.      Sugars not in good control.  Reports that she is not adherent to her med regimen.          PAST MEDICAL HISTORY:  Past Medical History:   Diagnosis Date    Asthma     BRCA negative     Diabetes mellitus     Hypertension     Nausea after anesthesia 1989       PAST SURGICAL HISTORY:  Past Surgical History:   Procedure Laterality Date    CORONARY ANGIOGRAPHY N/A 11/26/2021    Procedure: ANGIOGRAM, CORONARY ARTERY;  Surgeon: Ashu Horn MD;  Location: [facility] CATH LAB;  Service: Cardiology;  Laterality: N/A;    KNEE ARTHROSCOPY      knee surgery       ULTRASOUND GUIDANCE Right 11/26/2021    Procedure: ULTRASOUND GUIDANCE;  Surgeon: Ashu Horn MD;  Location: [facility] CATH LAB;  Service: Cardiology;  Laterality: Right;       SOCIAL HISTORY:  Social History     Socioeconomic History    Marital status: Unknown   Tobacco Use    Smoking status: Never Smoker    Smokeless tobacco: Never Used   Substance and Sexual Activity    Alcohol use: No    Drug use: No    Sexual activity: Not Currently     Social Determinants of Health     Financial Resource Strain: Unknown    Difficulty of Paying Living Expenses: Patient refused   Food Insecurity: Unknown    Worried About Running Out of Food in the Last Year: Patient refused    Ran Out of Food in the Last Year: Patient refused   Transportation Needs: Unknown    Lack of Transportation (Medical): Patient refused    Lack of Transportation (Non-Medical): Patient refused   Physical Activity: Unknown    Days of Exercise per Week: Patient refused   Stress: Unknown    Feeling of Stress : Patient refused   Social Connections: Unknown    Frequency of Communication with Friends and Family: Patient refused    Frequency of Social Gatherings with Friends and Family: Patient refused    Active Member of Clubs or Organizations: Patient refused    Attends Club or Organization Meetings: Patient refused    Marital Status: Patient refused   Housing Stability: Unknown    Unable to Pay for Housing in the Last Year: Patient refused    Unstable Housing in the Last Year: Patient refused       FAMILY HISTORY:  Family History   Problem Relation Age of Onset    Breast cancer Other     Diabetes Mother     COPD Mother     Cancer Mother         lung    Cataracts Mother     Diabetes Father     Heart disease Father     Hypertension Father     Hyperlipidemia Father     Kidney disease Father     Cancer Sister     Diabetes Brother     Hypertension Brother     Kidney disease Brother         diabetic    Diabetes Maternal Aunt      Diabetes Maternal Uncle     Diabetes Maternal Grandmother     Blindness Maternal Grandmother     No Known Problems Paternal Aunt     No Known Problems Paternal Uncle     No Known Problems Maternal Grandfather     No Known Problems Paternal Grandmother     No Known Problems Paternal Grandfather     Colon cancer Neg Hx     Ovarian cancer Neg Hx     Stroke Neg Hx        ALLERGIES AND MEDICATIONS: updated and reviewed.  Review of patient's allergies indicates:   Allergen Reactions    Allergenic extracts Hives, Itching, Other (See Comments), Rash and Shortness Of Breath    Dog dander Hives, Shortness Of Breath, Itching, Swelling and Rash     Cat's dander, dust,  pollen    Losartan Other (See Comments)     angioedema    Mold Itching and Shortness Of Breath    Metformin Other (See Comments)     Current Outpatient Medications   Medication Sig Dispense Refill    albuterol (PROAIR HFA) 90 mcg/actuation inhaler Inhale 2 puffs into the lungs every 4 (four) hours as needed for Wheezing. 8.5 g 0    aspirin 81 MG Chew Take 1 tablet (81 mg total) by mouth once daily. 90 tablet 3    atorvastatin (LIPITOR) 40 MG tablet Take 1 tablet (40 mg total) by mouth every evening. 90 tablet 3    blood sugar diagnostic (BLOOD GLUCOSE TEST) Strp 1 strip by Misc.(Non-Drug; Combo Route) route once daily. 100 strip 3    blood-glucose meter kit Use as instructed to test blood sugar daily 1 each 0    clobetasol 0.05% (TEMOVATE) 0.05 % Oint AAA bid 60 g 3    clopidogreL (PLAVIX) 75 mg tablet Take 1 tablet (75 mg total) by mouth once daily. 30 tablet 11    doxazosin (CARDURA) 4 MG tablet Take 4 mg by mouth every evening.      dulaglutide (TRULICITY) 1.5 mg/0.5 mL pen injector Inject 1.5 mg into the skin once a week. 4 pen 0    empagliflozin (JARDIANCE) 25 mg tablet Take 1 tablet (25 mg total) by mouth once daily. 90 tablet 0    flash glucose scanning reader (FREESTYLE EVELIO 2 READER) Misc 1 kit by Misc.(Non-Drug; Combo Route)  route Daily. 1 each 0    flash glucose sensor (FREESTYLE EVELIO 2 SENSOR) Kit 1 each by Misc.(Non-Drug; Combo Route) route every 14 (fourteen) days. 2 kit 11    fluticasone propionate (FLONASE) 50 mcg/actuation nasal spray 1 spray (50 mcg total) by Each Nostril route once daily. 3 Bottle 3    glimepiride (AMARYL) 4 MG tablet TAKE 1 TABLET (4 MG TOTAL) BY MOUTH BEFORE BREAKFAST. 90 tablet 3    lancets 30 gauge Misc 1 lancet by Misc.(Non-Drug; Combo Route) route once daily. 100 each 3    MATZIM  mg 24 hr tablet TAKE 1 TABLET (240 MG TOTAL) BY MOUTH ONCE DAILY. 90 tablet 1    medroxyPROGESTERone (PROVERA) 10 MG tablet Take 1 tablet (10 mg total) by mouth once daily. 10 tablet 12    meloxicam (MOBIC) 15 MG tablet TAKE 1 TABLET BY MOUTH EVERY DAY 90 tablet 1    montelukast (SINGULAIR) 10 mg tablet TAKE 1 TABLET BY MOUTH EVERY DAY 90 tablet 1    nitroGLYCERIN (NITROSTAT) 0.4 MG SL tablet Place 1 tablet (0.4 mg total) under the tongue every 5 (five) minutes as needed for Chest pain. 90 tablet 3    sertraline (ZOLOFT) 50 MG tablet TAKE 1 TABLET BY MOUTH EVERY DAY 90 tablet 0    tiZANidine (ZANAFLEX) 2 MG tablet TAKE 1 TO 2 TABLETS BY MOUTH AT BEDTIME AS NEEDED FOR MUSCLE SPASMS AND PAIN 180 tablet 1     No current facility-administered medications for this visit.         ROS  Review of Systems   Constitutional: Negative for chills and fever.   HENT: Positive for rhinorrhea and sore throat. Negative for ear pain and postnasal drip.    Respiratory: Positive for cough, shortness of breath and wheezing.    Cardiovascular: Positive for chest pain.   Musculoskeletal: Positive for myalgias.   Allergic/Immunologic: Positive for environmental allergies.   Neurological: Positive for headaches.           Physical Exam  Physical Exam  Constitutional:       General: She is not in acute distress.     Appearance: She is well-developed.   HENT:      Head: Normocephalic and atraumatic.   Eyes:      Conjunctiva/sclera:  Conjunctivae normal.   Pulmonary:      Effort: Pulmonary effort is normal. No respiratory distress.   Neurological:      Mental Status: She is alert and oriented to person, place, and time.      Comments: Symmetric facial movements   Psychiatric:         Behavior: Behavior normal.         Thought Content: Thought content normal.           Health Maintenance       Date Due Completion Date    Shingles Vaccine (1 of 2) Never done ---    Mammogram 08/16/2022 8/16/2021    Hemoglobin A1c 07/13/2022 4/13/2022    COVID-19 Vaccine (5 - Booster for Pfizer series) 08/07/2022 4/7/2022    Colorectal Cancer Screening 08/16/2022 8/16/2021    Diabetes Urine Screening 09/28/2022 9/28/2021    Foot Exam 11/18/2022 11/18/2021    Override on 3/7/2019: Done    Override on 2/19/2018: Done    Eye Exam 02/09/2023 2/9/2022    Lipid Panel 04/13/2023 4/13/2022    High Dose Statin 06/08/2023 6/8/2022    Cervical Cancer Screening 08/16/2024 8/16/2021    TETANUS VACCINE 03/09/2029 3/9/2019

## 2022-06-27 NOTE — Clinical Note
Good morning,  Just letting you know that this pt has COVID and won't be able to have her surgery on 6/30  Also her sugars seem wildly out of control.  Not adherent to her med regimen.  I have referred to Endocrine.   Sincerely, Wagner

## 2022-06-27 NOTE — TELEPHONE ENCOUNTER
No new care gaps identified.  [facility] Embedded Care Gaps. Reference number: [id number]. 6/27/2022   4:57:06 AM ALEXT

## 2022-06-27 NOTE — ASSESSMENT & PLAN NOTE
The current medical regimen is effective;  continue present plan and medications. Discussed POs teroids if needed

## 2022-06-30 ENCOUNTER — DOCUMENTATION ONLY (OUTPATIENT)
Dept: CARDIAC REHAB | Facility: CLINIC | Age: 57
End: 2022-06-30
Payer: COMMERCIAL

## 2022-06-30 NOTE — PROGRESS NOTES
Shanae arrived for her orientation to the Phase II cardiac rehab program.  She has not started the exercise classes as of yet due to an ACL tear.  She will contact us when she is ready to start the program.    Session: Orientation   Cardiac Rehab Individual Treatment Plan - Initial Assessment      Patient Name: Shanae Kumari MRN: [id number]   : 1965   Age: 56 y.o.   Primary Diagnosis: PTCA/STENT  Date of Event: 21  EF: 65%  Risk Stratification: high  Referring Physician: GONZALO   Exercise Assessment:     CPX/TM Date: 22 Results   RHR 97   Max    Peak VO2 (CPX only) 16.7   Actual METS (CPX only) 4.8   Estimated METS 7.0     Anthropometrics    Height 64 inches   Weight 202 lbs   BMI 34.7   Abdominal Girth 47.1   Body Composition 31.0%     ST Depression noted on Stress Test?:No  Angina with exercise?: No   Fall Risk: No   Assistive Devices:  independent   Currently exercising? No   There were many limitations noted by the patient.  Shanae stated she has torn left ACL, chronic low back pain, and torn labrum on the right hip pain.  Exercise modalities will be adjusted.      Exercise Plan:   Goals:  CR Exercise Goals: Attend Cardiac Rehab 3 times/week: In Progress  Home Aerobic Exercise: 2 additional days/week for 30-60 minutes: In Progress  Intensity of 12-15 on the Rate of Perceived Exertion (RPE) scale: In Progress  30% increase in entry estimated METS: 9.1 : In Progress  5 days/week for 30-60 minutes: In Progress    Intervention:   Discussed importance of regular attendance to cardiac rehab class    Exercise Prescription:  THR Range 119-129   Mode: Treadmill  Recumbent Bike  Upright Bike  Nustep  Elliptical   Frequency:  3 days/week   Duration:  30 - 60 minutes   Intensity:  12 - 15 RPE   Resistance Training:  Yes: 0 to 5 lb weights with 10-15 reps based on strength and range of motion assessment     Home Prescription:  Mode Aerobic   Frequency: 2- 3 days/week   Duration: 30-60 minutes    Resistance Training: None        Education:  Orientation to Equipment; verbalizes understanding; Date: 22  Exercise Recommendations; verbalizes understanding; Date: 22  Exercise Safety; verbalizes understanding; Date: 22  Class Preparation: verbalizes understanding; Date: 22  Signs and symptoms to report: verbalizes understanding; Date: 22  Caffeine/Hydration: verbalizes understanding; Date: 22  Exercise Terminology: verbalizes understanding; Date: 22  Resistance Training: verbalizes understanding; Date: 22    Comments:  Shanae was encouraged to begin thinking about some type of aerobic exercise she can participate in at least 2 non-rehab days per week for at least 30 minutes in addition to attending Phase II cardiac rehab classes 3 days per week.  She stated understanding.    All consent forms were signed, proper attire and shoes were discussed.       Shanae will begin Cardiac Rehab on Friday, May 20 at 3:30pm.    The exercise prescription will be adjusted based on tolerance of exercise intensity by patient.    Tamara Posey, CEP    Orientation Cardiac Rehab Individual Treatment Plan - Initial Assessment      Patient Name: Shanae Kumari MRN: [id number]   : 1965   Age: 56 y.o.   Primary Diagnosis: s/p PTCA/stent, CAD, HTN, HLD, DM    Nutrition Assessment:     Anthropometrics    Height 64 inches   Weight 202 lbs   BMI 34.7   Abdominal Girth 47.1   Body Composition 31       Drug Allergies and Intolerances:  Review of patient's allergies indicates:   Allergen Reactions    Allergenic extracts Hives, Itching, Other (See Comments), Rash and Shortness Of Breath    Dog dander Hives, Shortness Of Breath, Itching, Swelling and Rash     Cat's dander, dust,  pollen    Losartan Other (See Comments)     angioedema    Mold Itching and Shortness Of Breath    Metformin Other (See Comments)       Food Allergies and Intolerances:  NA    Past Medical History:  Past  Medical History:   Diagnosis Date    Asthma     BRCA negative     Diabetes mellitus     Hypertension     Nausea after anesthesia 1989       Past Surgical History:  Past Surgical History:   Procedure Laterality Date    CORONARY ANGIOGRAPHY N/A 11/26/2021    Procedure: ANGIOGRAM, CORONARY ARTERY;  Surgeon: Ashu Horn MD;  Location: [facility] CATH LAB;  Service: Cardiology;  Laterality: N/A;    KNEE ARTHROSCOPY      knee surgery      ULTRASOUND GUIDANCE Right 11/26/2021    Procedure: ULTRASOUND GUIDANCE;  Surgeon: Ashu Horn MD;  Location: [facility] CATH LAB;  Service: Cardiology;  Laterality: Right;       Medications:  Current Outpatient Medications   Medication Sig    albuterol (PROAIR HFA) 90 mcg/actuation inhaler Inhale 2 puffs into the lungs every 4 (four) hours as needed for Wheezing.    aspirin 81 MG Chew Take 1 tablet (81 mg total) by mouth once daily.    atorvastatin (LIPITOR) 40 MG tablet Take 1 tablet (40 mg total) by mouth every evening.    blood sugar diagnostic (BLOOD GLUCOSE TEST) Strp 1 strip by Misc.(Non-Drug; Combo Route) route once daily.    blood-glucose meter kit Use as instructed to test blood sugar daily    clobetasol 0.05% (TEMOVATE) 0.05 % Oint AAA bid    clopidogreL (PLAVIX) 75 mg tablet Take 1 tablet (75 mg total) by mouth once daily.    dulaglutide (TRULICITY) 1.5 mg/0.5 mL pen injector Inject 1.5 mg into the skin once a week.    empagliflozin (JARDIANCE) 25 mg tablet Take 1 tablet (25 mg total) by mouth once daily.    flash glucose scanning reader (FREESTYLE EVELIO 2 READER) Misc 1 kit by Misc.(Non-Drug; Combo Route) route Daily.    flash glucose sensor (FREESTYLE EVELIO 2 SENSOR) Kit 1 each by Misc.(Non-Drug; Combo Route) route every 14 (fourteen) days.    fluticasone propionate (FLONASE) 50 mcg/actuation nasal spray 1 spray (50 mcg total) by Each Nostril route once daily.    glimepiride (AMARYL) 4 MG tablet TAKE 1 TABLET (4 MG TOTAL) BY MOUTH BEFORE BREAKFAST.     lancets 30 gauge Misc 1 lancet by Misc.(Non-Drug; Combo Route) route once daily.    MATZIM  mg 24 hr tablet TAKE 1 TABLET BY MOUTH EVERY DAY    medroxyPROGESTERone (PROVERA) 10 MG tablet Take 1 tablet (10 mg total) by mouth once daily.    montelukast (SINGULAIR) 10 mg tablet TAKE 1 TABLET BY MOUTH EVERY DAY    nirmatrelvir-ritonavir 150 mg x 2- 100 mg copackaged tablets (EUA) Take 3 tablets by mouth 2 (two) times daily for 5 days. Each dose contains 2 nirmatrelvir (pink tablets) and 1 ritonavir (white tablet). Take all 3 tablets together    nitroGLYCERIN (NITROSTAT) 0.4 MG SL tablet Place 1 tablet (0.4 mg total) under the tongue every 5 (five) minutes as needed for Chest pain.    tiZANidine (ZANAFLEX) 2 MG tablet TAKE 1 TO 2 TABLETS BY MOUTH AT BEDTIME AS NEEDED FOR MUSCLE SPASMS AND PAIN     No current facility-administered medications for this visit.       Vitamins and Supplements:  MVI    Labs:  Patient confirms she is taking lipitor 40mg for cholesterol control.    Lab Results   Component Value Date    CHOL 184 04/13/2022     Lab Results   Component Value Date    HDL 42 04/13/2022     Lab Results   Component Value Date    LDLCALC 107.8 04/13/2022     Lab Results   Component Value Date    TRIG 171 (H) 04/13/2022     Lab Results   Component Value Date    CHOLHDL 22.8 04/13/2022         Lab Results   Component Value Date    GLUF 189 (H) 04/13/2022     Lab Results   Component Value Date    HGBA1C 10.9 (H) 04/13/2022       Nutrition/Diet History:  Patient eats 2-3 meals daily.    Seasons food with pepper/spices.  Patient denies use of a salt shaker at the table on prepared foods.   Dines out 2 per week at restaurants such as [employer].  Gets meal prep from [employer]  Chooses fried foods 1-2 time(s) per weeks.    Chooses fish 1-2 time(s) per week.   Beverages:  water, ICE, diet soda/green tea  Alcohol: none    24 Hour Recall:  · Breakfast: Glucerna  · Lunch:Fried shrimp/catfish, corn, salad with  Wallis[place]  · Dinner:leftovers from lunch  · Other: NA    Difficulty Chewing or Swallowing: no  Current Exercise: See Exercise Physiologist Note  Food Safety/Food Preparation: self  Living Arrangements/Family Support: Lives alone  Cultural/Spiritual/Personal Preferences: not applicable   Barriers to Education: none identified  Stage of Change Related to Diet Habits: Contemplation    Nutrition Diagnosis:  1. Food and nutrition related knowledge deficit related to the lack of prior nutrition education as evidenced by diet history and 24 hour recall    Nutrition Plan:   Goals:  LDL-C < 70 (for high risk patients)  Hgb A1c < 7%  BMI < 25 and abdominal girth < 40M/<35 F  2 gram sodium, Mediterranean diet  Rehab weight goal: 10-15lbs  Fish intake (non-fried varieties) to a goal of 2-3 servings per week.   Increase fruit and vegetable intake    Interventions/Recommendations:  Lab results reviewed and discussed  Nutrition Prescription:  · Total Energy Estimated Needs: 3792-8390 Kcal/d for weight loss  · Method for Estimating Needs: 20-25kcal/kg ABW  · Total Protein Estimated Needs: 51-77 g/d  · Method for Estimating Needs: 0.8-1.2 g/Kg ABW  · Total Fluid Estimated Needs: 1 mL/Kcal  Dietitian Consult: No  Patient to participate in Cardiac Rehab sessions three times a week  Weekly Dietitian Weight Check  Encouraged patient to complete 3 day food diary  Follow Up Plan for Ongoing Self-Management Support    Education:  Mediterranean Diet; verbalizes understanding; Date: 4/26/22   Person taught: patient   Preferred Learning Method: Verbal and written   Education Needed/Provided: Nutrition counseling and education related to cardiac rehabilitation   Education Method: Weekly nutrition lectures on the Mediterranean diet, cooking, shopping, and dining out   Written Materials Provided: 3 Day Food Record, Introduction to Mediterranean Diet   Strategies Implemented: Motivational interviewing, Goal setting, Self-Monitoring, and  "Problem Solving    Comments:   Discussed ways to incorporate healthy snacks, eating on a schedule, and monitoring sodium intake for heart health.  Encouraged increased produce    Diabetes  Is the patient diabetic?   Yes   Other Core Components/Diabetes Assessment:   Labs:  Lab Results   Component Value Date    GLUF 189 (H) 04/13/2022    GLUF 176 (H) 12/20/2014    GLUF 234 (H) 11/25/2014     Lab Results   Component Value Date    HGBA1C 10.9 (H) 04/13/2022    HGBA1C 10.8 (H) 11/18/2021    HGBA1C 11.8 (H) 09/28/2021      Lab Results   Component Value Date    ESTIMATEDAVG 266 (H) 04/13/2022    ESTIMATEDAVG 263 (H) 11/18/2021    ESTIMATEDAVG 292 (H) 09/28/2021       History of diabetes since 2010  Diabetes medications: Glimepiride 4mg daily, Jardiance 25mg daily, Trulicity 1.5mg injection once weekly (pt admits to being noncompliant with this r/t c/o GI issues)  Blood Glucose Checks at Home: Yes: Other: does not check consistently "every few days"  Endocrinologist or PCP following DM: Dr. Marin- endocrinology    Other Core Components/Diabetes Plan:   Goals:  Hgb A1c < 7%  Exercise Blood Glucose: 100-300 mg/dl    Interventions:  Reviewed and Discussed Labs  Medication Compliance  Med Card Reconciled  Low Sodium, Mediterranean, ADA Diet  Weight Management  Physical Activity  Increase Knowledge of Contributory Factors  Home Monitoring    Education:  Mediterranean Diet; verbalizes understanding; Date: 4/26/22    Comments:   Patient verbalizes understanding to bring home glucometer and check glucose pre and post each exercise session.  Per cardiac rehab protocols, patient's glucose must be between 90 and 270 mg/dL to exercise.  Patient denies any recent glucose levels less than 60 mg/dL or greater than 300 mg/dL. Patient verbalizes importance of notifying rehab staff if symptoms of hypoglycemia occur while at cardiac rehab.  Abnormal labs will be reported to patient's PCP/Endocrinologist by rehab staff.    Noted updated " glucose parameters of 100-300     Emphasized importance of regular glucose checking    RD contact information provided.      Elida Mota MS, RDN/LDN    Session: Orientation Cardiac Rehab Individual Treatment Plan - Initial Assessment      Patient Name: Shanae Kumari MRN: [id number]   : 1965   Age: 56 y.o.   Date of Event: 2021   Primary Diagnosis: PTCA/Stent    EF: 65%    Physical Assessment:   There were no vitals taken for this visit.    ASSESSMENT:  Heart Sounds: regular rate and rhythm  Prosthetic Valve: No  Lung Sounds: normal air entry, lungs clear to auscultation  Capillary Refill: normal  Left Radial Pulse: Normal (+2)  Right Radial Pulse: Normal (+2)  Left Pedal Pulse: Normal (+2)  Right Pedal Pulse: Normal (+2)  Right Edema: none  Left Edema none  Strength: good  Range of Motion: full range of motion  Existing Limitations:      Site   [] Arthritis, bursitis    [] Amputation, atrophy    [x] Other: ACL tear right knee, Labrum tear right hip, pt is being seen for PT for low back pain.   []       Diabetic patient's foot examination comments: Normal -  Bilateral  Incisional site: N/A  Special needs: N/A    Psychosocial Assessment:   Outcome Survey Tools:    BRITTNY SCORES:   PRE   Anxiety 3   Depression 1   Somatic 4   Hostility 2     SF-36 SCORES:   PRE   Physical Function 19   Social Function 8   Mental Health 25   Pain 6   Change in Health 2   Physical Role Limitation 1   Mental Role Limitation 3   Energy/Fatigue 9   Health Perceptions 12   Total Score 85     PHQ-9:  PHQ-9 Depression Patient Health Questionnaire 2022   Over the last two weeks how often have you been bothered by little interest or pleasure in doing things 0   Over the last two weeks how often have you been bothered by feeling down, depressed or hopeless 0   Over the last two weeks how often have you been bothered by trouble falling or staying asleep, or sleeping too much 1   Over the last two weeks how often have you  been bothered by feeling tired or having little energy 1   Over the last two weeks how often have you been bothered by a poor appetite or overeating 1   Over the last two weeks how often have you been bothered by feeling bad about yourself - or that you are a failure or have let yourself or your family down 0   Over the last two weeks how often have you been bothered by trouble concentrating on things, such as reading the newspaper or watching television 0   Over the last two weeks how often have you been bothered by moving or speaking so slowly that other people could have noticed. 0   Over the last two weeks how often have you been bothered by thoughts that you would be better off dead, or of hurting yourself 0   If you checked off any problems, how difficult have these problems made it for you to do your work, take care of things at home or get along with other people? Somewhat difficult   Total Score 3              Living Arrangements: Lives alone  Family Support: family  Self Reported: Effective Coping Skills, Stress, Chronic Pain and Sleep Pattern Disturbances  Displays: happiness and smiles often  Medication: Pt was on Zoloft but stopped it herself. Pt states is was not helping. Pt takes melatonin for sleep.    Psychosocial Plan:   Goals:  Improved psychosocial coping strategies  Reduce manifestation of depression  Reduce manifestation of stress  Maintain positive outlook  Improve overall quality of life    Interventions/Recommendations:  Discussed Results of Surveys  Patient to Self Report Emotional Changes at Session Check In  Recommend Physical Activity  Recommend Attending Education Lectures  Notify MD: No  Program Referral: Declined  Pharmaceutical Intervention/Therapy: Declined  Other Needs: not applicable  Stage of Readiness to Change: Preparation    Education:  Signs and symptoms of depression, verbalizes understanding; Date:4/26/2022  Stress, verbalizes understanding; Date:4/26/2022    Comments:  Pt  denies overwhelming stress or anxiety.  Patient has been instructed to notify staff in the event that circumstances worsen.  Patient verbalizes understanding.    Other Core Components/Risk Factors Assessment:   RISK FACTORS:  diabetes, hyperlipidemia, hypertension, obesity, stress    Learning Barriers: None    Education Level:  Post-College Graduate Degree    Pre-test Score: 60    Medication Compliance: has not been compliant with taking medications due to the following: side effects    Other Core Components/Risk Factors Plan:   Goals:  Decrease cholesterol level: In Progress  Increase exercise tolerance: In Progress  Decrease blood pressure: Met  Weight loss: In Progress  Control diabetes by adjusting diet and exercise: In Progress  Learn more about healthy eating: In Progress    Interventions/Recommendations:  Recommend regular attendance for Cardiac Rehab: Exercise and Education Lectures  Encourage medication compliance  Individual Education/ Counseling: Yes  Physician Referral: No    Education:    diabetes, verbalizes understanding; Date: 4/26/2022  physical activity, verbalizes understanding; Date: 4/26/2022  risk factors, verbalizes understanding; Date: 4/26/2022        Education method adapted to patients education level and preferred method of learning.  Method: explanation    Comments:  Pt is pleasant and verbalizes interest in changing her lifestyle to reduce her cardiovascular risks.    Other Core Components/Hypertension Assessment:   Resting BP: 90/56  BP Readings from Last 1 Encounters:   06/08/22 (!) 153/95         BP Diagnosis: Hypertensive  Patient reported symptoms: none    Other Core Components/Hypertension Plan:   Goals:  Blood Pressure <130/80    Interventions/Recommendations:  Med Card Reconciled: Yes  Encourage medication compliance  Encourage sodium reduction  Encourage weight loss  Recommend physical activity  Educate on contributory factors  Reduce stress, anxiety, anger, depression, and/or  chronic pain  Encourage home blood pressure monitoring  Recommend daily weights  MD notified/Physician Referral: No    Education:    Hypertension; verbalizes understanding; Date: 4/26/2022  Risk Factors; verbalizes understanding; Date: 4/26/2022        Comments:  Pt will take her B/P daily at home and record log. Discussed with pt separation of times for taking her two B/P medication and hydration to prevent low pressure in the morning.      Does the patient have Heart Failure? No    Other Core Components/Tobacco Cessation Assessment:   Smoking Status: lifetime non-smoker  Smoking Cessation Barriers: none  Stage of Readiness to Change: Maintenance    Other Core Components/Tobacco Cessation Plan:   Goals:  Maintain non-smoking status    Interventions:  Maintains non-smoking status    Education:    Risk Factors; verbalizes understanding; Date: 4/26/2022  Benefits of Cardiac Rehab; verbalizes understanding; Date: 4/26/2022        Comments:  Pt expresses no desire to smoke.    Discussed Cardiac Rehab program in depth with patient.  Medication list updated per patient & marked as reviewed.  Patient has been instructed to notify staff of any problems while attending rehab (ie: chest pain, shortness of breath, lightheadedness, dizziness).  Patient has been instructed to monitor blood pressure readings outside of rehab & to keep a daily log of the readings.  Patient verbalizes understanding.    Luis Urban RN

## 2022-07-06 ENCOUNTER — OFFICE VISIT (OUTPATIENT)
Dept: FAMILY MEDICINE | Facility: CLINIC | Age: 57
End: 2022-07-06
Payer: COMMERCIAL

## 2022-07-06 DIAGNOSIS — U07.1 COVID-19: Primary | ICD-10-CM

## 2022-07-06 DIAGNOSIS — E78.5 HYPERLIPIDEMIA, UNSPECIFIED HYPERLIPIDEMIA TYPE: Chronic | ICD-10-CM

## 2022-07-06 DIAGNOSIS — I77.9 CAROTID ARTERY DISEASE, UNSPECIFIED LATERALITY, UNSPECIFIED TYPE: ICD-10-CM

## 2022-07-06 DIAGNOSIS — R80.9 TYPE 2 DIABETES MELLITUS WITH MICROALBUMINURIA, WITHOUT LONG-TERM CURRENT USE OF INSULIN: Chronic | ICD-10-CM

## 2022-07-06 DIAGNOSIS — E11.29 TYPE 2 DIABETES MELLITUS WITH MICROALBUMINURIA, WITHOUT LONG-TERM CURRENT USE OF INSULIN: Chronic | ICD-10-CM

## 2022-07-06 PROCEDURE — 3046F HEMOGLOBIN A1C LEVEL >9.0%: CPT | Mod: CPTII,95,, | Performed by: INTERNAL MEDICINE

## 2022-07-06 PROCEDURE — 1159F PR MEDICATION LIST DOCUMENTED IN MEDICAL RECORD: ICD-10-PCS | Mod: CPTII,95,, | Performed by: INTERNAL MEDICINE

## 2022-07-06 PROCEDURE — 1160F PR REVIEW ALL MEDS BY PRESCRIBER/CLIN PHARMACIST DOCUMENTED: ICD-10-PCS | Mod: CPTII,95,, | Performed by: INTERNAL MEDICINE

## 2022-07-06 PROCEDURE — 1160F RVW MEDS BY RX/DR IN RCRD: CPT | Mod: CPTII,95,, | Performed by: INTERNAL MEDICINE

## 2022-07-06 PROCEDURE — 99214 PR OFFICE/OUTPT VISIT, EST, LEVL IV, 30-39 MIN: ICD-10-PCS | Mod: 95,,, | Performed by: INTERNAL MEDICINE

## 2022-07-06 PROCEDURE — 99214 OFFICE O/P EST MOD 30 MIN: CPT | Mod: 95,,, | Performed by: INTERNAL MEDICINE

## 2022-07-06 PROCEDURE — 3046F PR MOST RECENT HEMOGLOBIN A1C LEVEL > 9.0%: ICD-10-PCS | Mod: CPTII,95,, | Performed by: INTERNAL MEDICINE

## 2022-07-06 PROCEDURE — 1159F MED LIST DOCD IN RCRD: CPT | Mod: CPTII,95,, | Performed by: INTERNAL MEDICINE

## 2022-07-06 NOTE — ASSESSMENT & PLAN NOTE
Restart plavix as paxlovid can reduce the bioavailability of clopidogrel and not up-regulate its effects per literature review

## 2022-07-06 NOTE — PROGRESS NOTES
Assessment & Plan  Problem List Items Addressed This Visit        Cardiac/Vascular    Carotid artery disease    Current Assessment & Plan     Restart plavix as paxlovid can reduce the bioavailability of clopidogrel and not up-regulate its effects per literature review           Hyperlipidemia (Chronic)    Current Assessment & Plan     Will wait until this weekend to restart statin.               Endocrine    Type 2 diabetes mellitus with microalbuminuria, without long-term current use of insulin (Chronic)    Overview     Angioedema to ARB.  Persistent metabolic acidosis when on metformin that resolved with stopping.  ?GI losses           Current Assessment & Plan     New orders placed for prior to her Endocrine appt.            Relevant Orders    Comprehensive Metabolic Panel    Hemoglobin A1C    Microalbumin/Creatinine Ratio, Urine      Other Visit Diagnoses     COVID-19    -  Primary  -    Doing much better.  Today is day 11.  Ok to finish quarantine.             Health Maintenance reviewed, as above.    The patient location is:  Patient Home   The chief complaint leading to consultation is: noted below  Visit type: Virtual visit with synchronous audio and video  Total time spent with patient: 15  Each patient to whom he or she provides medical services by telemedicine is:  (1) informed of the relationship between the physician and patient and the respective role of any other health care provider with respect to management of the patient; and (2) notified that he or she may decline to receive medical services by telemedicine and may withdraw from such care at any time.    Follow-up: No follow-ups on file.    ______________________________________________________________________    Chief Complaint  Chief Complaint   Patient presents with    COVID-19 Concerns       HPI  Shanae FRANKLIN Quoc is a 56 y.o. female with multiple medical diagnoses as listed in the medical history and problem list that presents for COVID  follow up via virtual visit.  Pt is known to me with their last appointment 6/27/2022.      S/p Paxlovid.  She feels she had resolution of her runny nose but this has returned.  She had ome low grade fever but this stopped 3-4 days ago.  Now mostly lots of fatigue.  Cough improving.  No SOB, palpitations, CP.  Has been able to reduce her albuterol use considerably.  Last used yesterday.     Referred to Endo to get sugars under tighter control to allow for TKR      PAST MEDICAL HISTORY:  Past Medical History:   Diagnosis Date    Asthma     BRCA negative     Diabetes mellitus     Hypertension     Nausea after anesthesia 1989       PAST SURGICAL HISTORY:  Past Surgical History:   Procedure Laterality Date    CORONARY ANGIOGRAPHY N/A 11/26/2021    Procedure: ANGIOGRAM, CORONARY ARTERY;  Surgeon: Ashu Horn MD;  Location: [facility] CATH LAB;  Service: Cardiology;  Laterality: N/A;    KNEE ARTHROSCOPY      knee surgery      ULTRASOUND GUIDANCE Right 11/26/2021    Procedure: ULTRASOUND GUIDANCE;  Surgeon: Ashu Horn MD;  Location: [facility] CATH LAB;  Service: Cardiology;  Laterality: Right;       SOCIAL HISTORY:  Social History     Socioeconomic History    Marital status: Unknown   Tobacco Use    Smoking status: Never Smoker    Smokeless tobacco: Never Used   Substance and Sexual Activity    Alcohol use: No    Drug use: No    Sexual activity: Not Currently     Social Determinants of Health     Financial Resource Strain: Unknown    Difficulty of Paying Living Expenses: Patient refused   Food Insecurity: Unknown    Worried About Running Out of Food in the Last Year: Patient refused    Ran Out of Food in the Last Year: Patient refused   Transportation Needs: Unknown    Lack of Transportation (Medical): Patient refused    Lack of Transportation (Non-Medical): Patient refused   Physical Activity: Unknown    Days of Exercise per Week: Patient refused   Stress: Unknown    Feeling of Stress : Patient refused    Social Connections: Unknown    Frequency of Communication with Friends and Family: Patient refused    Frequency of Social Gatherings with Friends and Family: Patient refused    Active Member of Clubs or Organizations: Patient refused    Attends Club or Organization Meetings: Patient refused    Marital Status: Patient refused   Housing Stability: Unknown    Unable to Pay for Housing in the Last Year: Patient refused    Unstable Housing in the Last Year: Patient refused       FAMILY HISTORY:  Family History   Problem Relation Age of Onset    Breast cancer Other     Diabetes Mother     COPD Mother     Cancer Mother         lung    Cataracts Mother     Diabetes Father     Heart disease Father     Hypertension Father     Hyperlipidemia Father     Kidney disease Father     Cancer Sister     Diabetes Brother     Hypertension Brother     Kidney disease Brother         diabetic    Diabetes Maternal Aunt     Diabetes Maternal Uncle     Diabetes Maternal Grandmother     Blindness Maternal Grandmother     No Known Problems Paternal Aunt     No Known Problems Paternal Uncle     No Known Problems Maternal Grandfather     No Known Problems Paternal Grandmother     No Known Problems Paternal Grandfather     Colon cancer Neg Hx     Ovarian cancer Neg Hx     Stroke Neg Hx        ALLERGIES AND MEDICATIONS: updated and reviewed.  Review of patient's allergies indicates:   Allergen Reactions    Allergenic extracts Hives, Itching, Other (See Comments), Rash and Shortness Of Breath    Dog dander Hives, Shortness Of Breath, Itching, Swelling and Rash     Cat's dander, dust,  pollen    Losartan Other (See Comments)     angioedema    Mold Itching and Shortness Of Breath    Metformin Other (See Comments)     Current Outpatient Medications   Medication Sig Dispense Refill    albuterol (PROAIR HFA) 90 mcg/actuation inhaler Inhale 2 puffs into the lungs every 4 (four) hours as needed for Wheezing. 8.5 g 0     aspirin 81 MG Chew Take 1 tablet (81 mg total) by mouth once daily. 90 tablet 3    atorvastatin (LIPITOR) 40 MG tablet Take 1 tablet (40 mg total) by mouth every evening. 90 tablet 3    blood sugar diagnostic (BLOOD GLUCOSE TEST) Strp 1 strip by Misc.(Non-Drug; Combo Route) route once daily. 100 strip 3    blood-glucose meter kit Use as instructed to test blood sugar daily 1 each 0    clobetasol 0.05% (TEMOVATE) 0.05 % Oint AAA bid 60 g 3    clopidogreL (PLAVIX) 75 mg tablet Take 1 tablet (75 mg total) by mouth once daily. 30 tablet 11    dulaglutide (TRULICITY) 1.5 mg/0.5 mL pen injector Inject 1.5 mg into the skin once a week. 4 pen 0    empagliflozin (JARDIANCE) 25 mg tablet Take 1 tablet (25 mg total) by mouth once daily. 90 tablet 0    flash glucose scanning reader (FREESTYLE EVELIO 2 READER) Misc 1 kit by Misc.(Non-Drug; Combo Route) route Daily. 1 each 0    flash glucose sensor (FREESTYLE EVELIO 2 SENSOR) Kit 1 each by Misc.(Non-Drug; Combo Route) route every 14 (fourteen) days. 2 kit 11    fluticasone propionate (FLONASE) 50 mcg/actuation nasal spray 1 spray (50 mcg total) by Each Nostril route once daily. 3 Bottle 3    glimepiride (AMARYL) 4 MG tablet TAKE 1 TABLET (4 MG TOTAL) BY MOUTH BEFORE BREAKFAST. 90 tablet 3    lancets 30 gauge Misc 1 lancet by Misc.(Non-Drug; Combo Route) route once daily. 100 each 3    MATZIM  mg 24 hr tablet TAKE 1 TABLET BY MOUTH EVERY DAY 90 tablet 1    medroxyPROGESTERone (PROVERA) 10 MG tablet Take 1 tablet (10 mg total) by mouth once daily. 10 tablet 12    montelukast (SINGULAIR) 10 mg tablet TAKE 1 TABLET BY MOUTH EVERY DAY 90 tablet 1    nitroGLYCERIN (NITROSTAT) 0.4 MG SL tablet Place 1 tablet (0.4 mg total) under the tongue every 5 (five) minutes as needed for Chest pain. 90 tablet 3    tiZANidine (ZANAFLEX) 2 MG tablet TAKE 1 TO 2 TABLETS BY MOUTH AT BEDTIME AS NEEDED FOR MUSCLE SPASMS AND PAIN 180 tablet 1     No current facility-administered  medications for this visit.         ROS  Review of Systems   Constitutional: Positive for activity change. Negative for unexpected weight change.   HENT: Positive for rhinorrhea. Negative for hearing loss and trouble swallowing.    Eyes: Negative for discharge and visual disturbance.   Respiratory: Positive for wheezing. Negative for chest tightness.    Cardiovascular: Negative for chest pain and palpitations.   Gastrointestinal: Negative for blood in stool, constipation, diarrhea and vomiting.   Endocrine: Positive for polyuria. Negative for polydipsia.   Genitourinary: Negative for difficulty urinating, dysuria, hematuria and menstrual problem.   Musculoskeletal: Positive for arthralgias and joint swelling. Negative for neck pain.   Neurological: Positive for weakness and headaches.   Psychiatric/Behavioral: Negative for confusion and dysphoric mood.           Physical Exam  Physical Exam  Constitutional:       General: She is not in acute distress.     Appearance: She is well-developed.   HENT:      Head: Normocephalic and atraumatic.   Eyes:      Conjunctiva/sclera: Conjunctivae normal.   Pulmonary:      Effort: Pulmonary effort is normal. No respiratory distress.   Neurological:      Mental Status: She is alert and oriented to person, place, and time.      Comments: Symmetric facial movements   Psychiatric:         Behavior: Behavior normal.         Thought Content: Thought content normal.           Health Maintenance       Date Due Completion Date    Shingles Vaccine (1 of 2) Never done ---    Pneumococcal Vaccines (Age 0-64) (2 - PCV) 08/24/2018 8/24/2017    Hemoglobin A1c 07/13/2022 4/13/2022    Mammogram 08/16/2022 8/16/2021    COVID-19 Vaccine (5 - Booster for Pfizer series) 08/07/2022 4/7/2022    Colorectal Cancer Screening 08/16/2022 8/16/2021    Influenza Vaccine (1) 09/01/2022 10/9/2021    Override on 8/21/2016: Done    Override on 9/12/2015: Done    Diabetes Urine Screening 09/28/2022 9/28/2021     Foot Exam 11/18/2022 11/18/2021    Override on 3/7/2019: Done    Override on 2/19/2018: Done    Eye Exam 02/09/2023 2/9/2022    Lipid Panel 04/13/2023 4/13/2022    High Dose Statin 06/08/2023 6/8/2022    Cervical Cancer Screening 08/16/2024 8/16/2021    TETANUS VACCINE 03/09/2029 3/9/2019

## 2022-07-08 DIAGNOSIS — F43.22 ADJUSTMENT DISORDER WITH ANXIOUS MOOD: ICD-10-CM

## 2022-07-08 RX ORDER — SERTRALINE HYDROCHLORIDE 50 MG/1
TABLET, FILM COATED ORAL
Qty: 90 TABLET | Refills: 0 | OUTPATIENT
Start: 2022-07-08

## 2022-07-08 NOTE — TELEPHONE ENCOUNTER
Refill Decision Note   Shanae Kumari  is requesting a refill authorization.  Brief Assessment and Rationale for Refill:  Quick Discontinue     Medication Therapy Plan:       Medication Reconciliation Completed: No   Comments:     No Care Gaps recommended.     Note composed:11:01 AM 07/08/2022

## 2022-07-08 NOTE — TELEPHONE ENCOUNTER
No new care gaps identified.  [facility] Embedded Care Gaps. Reference number: [id number]. 7/08/2022   1:51:04 AM ALEXT

## 2022-07-10 ENCOUNTER — PATIENT MESSAGE (OUTPATIENT)
Dept: FAMILY MEDICINE | Facility: CLINIC | Age: 57
End: 2022-07-10
Payer: COMMERCIAL

## 2022-07-11 DIAGNOSIS — I10 ESSENTIAL HYPERTENSION: Chronic | ICD-10-CM

## 2022-07-11 DIAGNOSIS — J45.20 MILD INTERMITTENT ASTHMA WITHOUT COMPLICATION: ICD-10-CM

## 2022-07-12 DIAGNOSIS — I10 ESSENTIAL HYPERTENSION: Chronic | ICD-10-CM

## 2022-07-12 DIAGNOSIS — J45.20 MILD INTERMITTENT ASTHMA WITHOUT COMPLICATION: ICD-10-CM

## 2022-07-12 RX ORDER — MONTELUKAST SODIUM 10 MG/1
10 TABLET ORAL DAILY
Qty: 90 TABLET | Refills: 3 | Status: SHIPPED | OUTPATIENT
Start: 2022-07-12 | End: 2023-07-31 | Stop reason: SDUPTHER

## 2022-07-12 RX ORDER — DILTIAZEM HYDROCHLORIDE EXTENDED-RELEASE TABLETS 240 MG/1
240 TABLET, EXTENDED RELEASE ORAL DAILY
Qty: 90 TABLET | Refills: 1 | OUTPATIENT
Start: 2022-07-12

## 2022-07-12 RX ORDER — MONTELUKAST SODIUM 10 MG/1
10 TABLET ORAL DAILY
Qty: 90 TABLET | Refills: 1 | OUTPATIENT
Start: 2022-07-12

## 2022-07-12 NOTE — TELEPHONE ENCOUNTER
No new care gaps identified.  [facility] Embedded Care Gaps. Reference number: [id number]. 7/11/2022   8:51:07 PM CDT

## 2022-07-12 NOTE — TELEPHONE ENCOUNTER
Refill Routing Note   Medication(s) are not appropriate for processing by [facility] for the following reason(s):      - Required vitals are abnormal    [facility] action(s):  Defer  Approve       Medication Therapy Plan: Defer matzim - abnormal vitals. Approve singulair - rx sent to requested pharmacy.  Medication reconciliation completed: No     Appointments  past 12m or future 3m with PCP    Date Provider   Last Visit   7/6/2022 Tima Stanley MD   Next Visit   7/11/2022 Tima Stanley MD   ED visits in past 90 days: 0        Note composed:5:52 PM 07/12/2022

## 2022-07-12 NOTE — TELEPHONE ENCOUNTER
Refill Decision Note   Shanae Kumari  is requesting a refill authorization.  Brief Assessment and Rationale for Refill:  Quick Discontinue     Medication Therapy Plan:       Medication Reconciliation Completed: No   Comments:     No Care Gaps recommended.     Note composed:5:52 PM 07/12/2022

## 2022-07-12 NOTE — TELEPHONE ENCOUNTER
Care Due:                  Date            Visit Type   Department     Provider  --------------------------------------------------------------------------------                                ESTABLISHED   [facility]                              PATIENT -    MED/ INTERNAL  Last Visit: 07-      VIRTUAL      MED/ PEDS      Tima Stanley  Next Visit: None Scheduled  None         None Found                                                            Last  Test          Frequency    Reason                     Performed    Due Date  --------------------------------------------------------------------------------    HBA1C.......  6 months...  dulaglutide..............  04-   10-    [facility] Embedded Care Gaps. Reference number: [id number]. 7/12/2022   2:05:48 PM CDT

## 2022-07-12 NOTE — TELEPHONE ENCOUNTER
No new care gaps identified.  [facility] Embedded Care Gaps. Reference number: [id number]. 7/11/2022   8:53:19 PM CDT

## 2022-07-13 RX ORDER — DILTIAZEM HYDROCHLORIDE EXTENDED-RELEASE TABLETS 240 MG/1
240 TABLET, EXTENDED RELEASE ORAL DAILY
Qty: 90 TABLET | Refills: 3 | Status: SHIPPED | OUTPATIENT
Start: 2022-07-13 | End: 2023-08-02 | Stop reason: DRUGHIGH

## 2022-07-21 ENCOUNTER — DOCUMENTATION ONLY (OUTPATIENT)
Dept: CARDIAC REHAB | Facility: CLINIC | Age: 57
End: 2022-07-21
Payer: COMMERCIAL

## 2022-07-21 NOTE — PROGRESS NOTES
Shanae arrived for her orientation to the Phase II cardiac rehab program.  She has not started the exercise classes as of yet due to an ACL tear.  She will contact us when she is ready to start the program.    Session: Orientation   Cardiac Rehab Individual Treatment Plan - Initial Assessment      Patient Name: Shanae Kumari MRN: [id number]   : 1965   Age: 56 y.o.   Primary Diagnosis: PTCA/STENT  Date of Event: 21  EF: 65%  Risk Stratification: high  Referring Physician: GONZALO   Exercise Assessment:     CPX/TM Date: 22 Results   RHR 97   Max    Peak VO2 (CPX only) 16.7   Actual METS (CPX only) 4.8   Estimated METS 7.0     Anthropometrics    Height 64 inches   Weight 202 lbs   BMI 34.7   Abdominal Girth 47.1   Body Composition 31.0%     ST Depression noted on Stress Test?:No  Angina with exercise?: No   Fall Risk: No   Assistive Devices:  independent   Currently exercising? No   There were many limitations noted by the patient.  Shanae stated she has torn left ACL, chronic low back pain, and torn labrum on the right hip pain.  Exercise modalities will be adjusted.      Exercise Plan:   Goals:  CR Exercise Goals: Attend Cardiac Rehab 3 times/week: In Progress  Home Aerobic Exercise: 2 additional days/week for 30-60 minutes: In Progress  Intensity of 12-15 on the Rate of Perceived Exertion (RPE) scale: In Progress  30% increase in entry estimated METS: 9.1 : In Progress  5 days/week for 30-60 minutes: In Progress    Intervention:   Discussed importance of regular attendance to cardiac rehab class    Exercise Prescription:  THR Range 119-129   Mode: Treadmill  Recumbent Bike  Upright Bike  Nustep  Elliptical   Frequency:  3 days/week   Duration:  30 - 60 minutes   Intensity:  12 - 15 RPE   Resistance Training:  Yes: 0 to 5 lb weights with 10-15 reps based on strength and range of motion assessment     Home Prescription:  Mode Aerobic   Frequency: 2- 3 days/week   Duration: 30-60 minutes    Resistance Training: None        Education:  Orientation to Equipment; verbalizes understanding; Date: 22  Exercise Recommendations; verbalizes understanding; Date: 22  Exercise Safety; verbalizes understanding; Date: 22  Class Preparation: verbalizes understanding; Date: 22  Signs and symptoms to report: verbalizes understanding; Date: 22  Caffeine/Hydration: verbalizes understanding; Date: 22  Exercise Terminology: verbalizes understanding; Date: 22  Resistance Training: verbalizes understanding; Date: 22    Comments:  Shanae was encouraged to begin thinking about some type of aerobic exercise she can participate in at least 2 non-rehab days per week for at least 30 minutes in addition to attending Phase II cardiac rehab classes 3 days per week.  She stated understanding.    All consent forms were signed, proper attire and shoes were discussed.       Shanae will begin Cardiac Rehab on Friday, May 20 at 3:30pm.    The exercise prescription will be adjusted based on tolerance of exercise intensity by patient.    Tamara Posey, CEP    Orientation Cardiac Rehab Individual Treatment Plan - Initial Assessment      Patient Name: Shanae Kumari MRN: [id number]   : 1965   Age: 56 y.o.   Primary Diagnosis: s/p PTCA/stent, CAD, HTN, HLD, DM    Nutrition Assessment:     Anthropometrics    Height 64 inches   Weight 202 lbs   BMI 34.7   Abdominal Girth 47.1   Body Composition 31       Drug Allergies and Intolerances:  Review of patient's allergies indicates:   Allergen Reactions    Allergenic extracts Hives, Itching, Other (See Comments), Rash and Shortness Of Breath    Dog dander Hives, Shortness Of Breath, Itching, Swelling and Rash     Cat's dander, dust,  pollen    Losartan Other (See Comments)     angioedema    Mold Itching and Shortness Of Breath    Metformin Other (See Comments)       Food Allergies and Intolerances:  NA    Past Medical History:  Past  Medical History:   Diagnosis Date    Asthma     BRCA negative     Diabetes mellitus     Hypertension     Nausea after anesthesia 1989       Past Surgical History:  Past Surgical History:   Procedure Laterality Date    CORONARY ANGIOGRAPHY N/A 11/26/2021    Procedure: ANGIOGRAM, CORONARY ARTERY;  Surgeon: Ashu Horn MD;  Location: [facility] CATH LAB;  Service: Cardiology;  Laterality: N/A;    KNEE ARTHROSCOPY      knee surgery      ULTRASOUND GUIDANCE Right 11/26/2021    Procedure: ULTRASOUND GUIDANCE;  Surgeon: Ashu Horn MD;  Location: [facility] CATH LAB;  Service: Cardiology;  Laterality: Right;       Medications:  Current Outpatient Medications   Medication Sig    albuterol (PROAIR HFA) 90 mcg/actuation inhaler Inhale 2 puffs into the lungs every 4 (four) hours as needed for Wheezing.    aspirin 81 MG Chew Take 1 tablet (81 mg total) by mouth once daily.    atorvastatin (LIPITOR) 40 MG tablet Take 1 tablet (40 mg total) by mouth every evening.    blood sugar diagnostic (BLOOD GLUCOSE TEST) Strp 1 strip by Misc.(Non-Drug; Combo Route) route once daily.    blood-glucose meter kit Use as instructed to test blood sugar daily    clobetasol 0.05% (TEMOVATE) 0.05 % Oint AAA bid    clopidogreL (PLAVIX) 75 mg tablet Take 1 tablet (75 mg total) by mouth once daily.    diltiaZEM HCl (MATZIM LA) 240 mg 24 hr tablet Take 1 tablet (240 mg total) by mouth once daily.    dulaglutide (TRULICITY) 1.5 mg/0.5 mL pen injector Inject 1.5 mg into the skin once a week.    empagliflozin (JARDIANCE) 25 mg tablet Take 1 tablet (25 mg total) by mouth once daily.    flash glucose scanning reader (FREESTYLE EVELIO 2 READER) Misc 1 kit by Misc.(Non-Drug; Combo Route) route Daily.    flash glucose sensor (FREESTYLE EVELIO 2 SENSOR) Kit 1 each by Misc.(Non-Drug; Combo Route) route every 14 (fourteen) days.    fluticasone propionate (FLONASE) 50 mcg/actuation nasal spray 1 spray (50 mcg total) by Each Nostril route once daily.     glimepiride (AMARYL) 4 MG tablet Take 1 tablet (4 mg total) by mouth before breakfast.    lancets 30 gauge Misc 1 lancet by Misc.(Non-Drug; Combo Route) route once daily.    medroxyPROGESTERone (PROVERA) 10 MG tablet Take 1 tablet (10 mg total) by mouth once daily.    montelukast (SINGULAIR) 10 mg tablet Take 1 tablet (10 mg total) by mouth once daily.    nitroGLYCERIN (NITROSTAT) 0.4 MG SL tablet Place 1 tablet (0.4 mg total) under the tongue every 5 (five) minutes as needed for Chest pain.    tiZANidine (ZANAFLEX) 2 MG tablet TAKE 1 TO 2 TABLETS BY MOUTH AT BEDTIME AS NEEDED FOR MUSCLE SPASMS AND PAIN     No current facility-administered medications for this visit.       Vitamins and Supplements:  MVI    Labs:  Patient confirms she is taking lipitor 40mg for cholesterol control.    Lab Results   Component Value Date    CHOL 184 04/13/2022     Lab Results   Component Value Date    HDL 42 04/13/2022     Lab Results   Component Value Date    LDLCALC 107.8 04/13/2022     Lab Results   Component Value Date    TRIG 171 (H) 04/13/2022     Lab Results   Component Value Date    CHOLHDL 22.8 04/13/2022         Lab Results   Component Value Date    GLUF 189 (H) 04/13/2022     Lab Results   Component Value Date    HGBA1C 10.9 (H) 04/13/2022       Nutrition/Diet History:  Patient eats 2-3 meals daily.    Seasons food with pepper/spices.  Patient denies use of a salt shaker at the table on prepared foods.   Dines out 2 per week at restaurants such as [employer].  Gets meal prep from [employer]  Chooses fried foods 1-2 time(s) per weeks.    Chooses fish 1-2 time(s) per week.   Beverages:  water, ICE, diet soda/green tea  Alcohol: none    24 Hour Recall:  · Breakfast: Glucerna  · Lunch:Fried shrimp/catfish, corn, salad with italian  · Dinner:leftovers from lunch  · Other: NA    Difficulty Chewing or Swallowing: no  Current Exercise: See Exercise Physiologist Note  Food Safety/Food Preparation: self  Living  Arrangements/Family Support: Lives alone  Cultural/Spiritual/Personal Preferences: not applicable   Barriers to Education: none identified  Stage of Change Related to Diet Habits: Contemplation    Nutrition Diagnosis:  1. Food and nutrition related knowledge deficit related to the lack of prior nutrition education as evidenced by diet history and 24 hour recall    Nutrition Plan:   Goals:  LDL-C < 70 (for high risk patients)  Hgb A1c < 7%  BMI < 25 and abdominal girth < 40M/<35 F  2 gram sodium, Mediterranean diet  Rehab weight goal: 10-15lbs  Fish intake (non-fried varieties) to a goal of 2-3 servings per week.   Increase fruit and vegetable intake    Interventions/Recommendations:  Lab results reviewed and discussed  Nutrition Prescription:  · Total Energy Estimated Needs: 6359-9404 Kcal/d for weight loss  · Method for Estimating Needs: 20-25kcal/kg ABW  · Total Protein Estimated Needs: 51-77 g/d  · Method for Estimating Needs: 0.8-1.2 g/Kg ABW  · Total Fluid Estimated Needs: 1 mL/Kcal  Dietitian Consult: No  Patient to participate in Cardiac Rehab sessions three times a week  Weekly Dietitian Weight Check  Encouraged patient to complete 3 day food diary  Follow Up Plan for Ongoing Self-Management Support    Education:  Mediterranean Diet; verbalizes understanding; Date: 4/26/22   Person taught: patient   Preferred Learning Method: Verbal and written   Education Needed/Provided: Nutrition counseling and education related to cardiac rehabilitation   Education Method: Weekly nutrition lectures on the Mediterranean diet, cooking, shopping, and dining out   Written Materials Provided: 3 Day Food Record, Introduction to Mediterranean Diet   Strategies Implemented: Motivational interviewing, Goal setting, Self-Monitoring, and Problem Solving    Comments:   Discussed ways to incorporate healthy snacks, eating on a schedule, and monitoring sodium intake for heart health.  Encouraged increased produce    Diabetes  Is  "the patient diabetic?   Yes   Other Core Components/Diabetes Assessment:   Labs:  Lab Results   Component Value Date    GLUF 189 (H) 04/13/2022    GLUF 176 (H) 12/20/2014    GLUF 234 (H) 11/25/2014     Lab Results   Component Value Date    HGBA1C 10.9 (H) 04/13/2022    HGBA1C 10.8 (H) 11/18/2021    HGBA1C 11.8 (H) 09/28/2021      Lab Results   Component Value Date    ESTIMATEDAVG 266 (H) 04/13/2022    ESTIMATEDAVG 263 (H) 11/18/2021    ESTIMATEDAVG 292 (H) 09/28/2021       History of diabetes since 2010  Diabetes medications: Glimepiride 4mg daily, Jardiance 25mg daily, Trulicity 1.5mg injection once weekly (pt admits to being noncompliant with this r/t c/o GI issues)  Blood Glucose Checks at Home: Yes: Other: does not check consistently "every few days"  Endocrinologist or PCP following DM: Dr. Marin- endocrinology    Other Core Components/Diabetes Plan:   Goals:  Hgb A1c < 7%  Exercise Blood Glucose: 100-300 mg/dl    Interventions:  Reviewed and Discussed Labs  Medication Compliance  Med Card Reconciled  Low Sodium, Mediterranean, ADA Diet  Weight Management  Physical Activity  Increase Knowledge of Contributory Factors  Home Monitoring    Education:  Mediterranean Diet; verbalizes understanding; Date: 4/26/22    Comments:   Patient verbalizes understanding to bring home glucometer and check glucose pre and post each exercise session.  Per cardiac rehab protocols, patient's glucose must be between 90 and 270 mg/dL to exercise.  Patient denies any recent glucose levels less than 60 mg/dL or greater than 300 mg/dL. Patient verbalizes importance of notifying rehab staff if symptoms of hypoglycemia occur while at cardiac rehab.  Abnormal labs will be reported to patient's PCP/Endocrinologist by rehab staff.    Noted updated glucose parameters of 100-300     Emphasized importance of regular glucose checking    RD contact information provided.      Elida Mota MS, RDN/LDN    Session: Orientation Cardiac Rehab " Individual Treatment Plan - Initial Assessment      Patient Name: Shanae Kumari MRN: [id number]   : 1965   Age: 56 y.o.   Date of Event: 2021   Primary Diagnosis: PTCA/Stent    EF: 65%    Physical Assessment:   There were no vitals taken for this visit.    ASSESSMENT:  Heart Sounds: regular rate and rhythm  Prosthetic Valve: No  Lung Sounds: normal air entry, lungs clear to auscultation  Capillary Refill: normal  Left Radial Pulse: Normal (+2)  Right Radial Pulse: Normal (+2)  Left Pedal Pulse: Normal (+2)  Right Pedal Pulse: Normal (+2)  Right Edema: none  Left Edema none  Strength: good  Range of Motion: full range of motion  Existing Limitations:      Site   [] Arthritis, bursitis    [] Amputation, atrophy    [x] Other: ACL tear right knee, Labrum tear right hip, pt is being seen for PT for low back pain.   []       Diabetic patient's foot examination comments: Normal -  Bilateral  Incisional site: N/A  Special needs: N/A    Psychosocial Assessment:   Outcome Survey Tools:    BRITTNY SCORES:   PRE   Anxiety 3   Depression 1   Somatic 4   Hostility 2     SF-36 SCORES:   PRE   Physical Function 19   Social Function 8   Mental Health 25   Pain 6   Change in Health 2   Physical Role Limitation 1   Mental Role Limitation 3   Energy/Fatigue 9   Health Perceptions 12   Total Score 85     PHQ-9:  PHQ-9 Depression Patient Health Questionnaire 2022   Over the last two weeks how often have you been bothered by little interest or pleasure in doing things 0   Over the last two weeks how often have you been bothered by feeling down, depressed or hopeless 0   Over the last two weeks how often have you been bothered by trouble falling or staying asleep, or sleeping too much 1   Over the last two weeks how often have you been bothered by feeling tired or having little energy 1   Over the last two weeks how often have you been bothered by a poor appetite or overeating 1   Over the last two weeks how often have  you been bothered by feeling bad about yourself - or that you are a failure or have let yourself or your family down 0   Over the last two weeks how often have you been bothered by trouble concentrating on things, such as reading the newspaper or watching television 0   Over the last two weeks how often have you been bothered by moving or speaking so slowly that other people could have noticed. 0   Over the last two weeks how often have you been bothered by thoughts that you would be better off dead, or of hurting yourself 0   If you checked off any problems, how difficult have these problems made it for you to do your work, take care of things at home or get along with other people? Somewhat difficult   Total Score 3              Living Arrangements: Lives alone  Family Support: family  Self Reported: Effective Coping Skills, Stress, Chronic Pain and Sleep Pattern Disturbances  Displays: happiness and smiles often  Medication: Pt was on Zoloft but stopped it herself. Pt states is was not helping. Pt takes melatonin for sleep.    Psychosocial Plan:   Goals:  Improved psychosocial coping strategies  Reduce manifestation of depression  Reduce manifestation of stress  Maintain positive outlook  Improve overall quality of life    Interventions/Recommendations:  Discussed Results of Surveys  Patient to Self Report Emotional Changes at Session Check In  Recommend Physical Activity  Recommend Attending Education Lectures  Notify MD: No  Program Referral: Declined  Pharmaceutical Intervention/Therapy: Declined  Other Needs: not applicable  Stage of Readiness to Change: Preparation    Education:  Signs and symptoms of depression, verbalizes understanding; Date:4/26/2022  Stress, verbalizes understanding; Date:4/26/2022    Comments:  Pt denies overwhelming stress or anxiety.  Patient has been instructed to notify staff in the event that circumstances worsen.  Patient verbalizes understanding.    Other Core Components/Risk  Factors Assessment:   RISK FACTORS:  diabetes, hyperlipidemia, hypertension, obesity, stress    Learning Barriers: None    Education Level:  Post-College Graduate Degree    Pre-test Score: 60    Medication Compliance: has not been compliant with taking medications due to the following: side effects    Other Core Components/Risk Factors Plan:   Goals:  Decrease cholesterol level: In Progress  Increase exercise tolerance: In Progress  Decrease blood pressure: Met  Weight loss: In Progress  Control diabetes by adjusting diet and exercise: In Progress  Learn more about healthy eating: In Progress    Interventions/Recommendations:  Recommend regular attendance for Cardiac Rehab: Exercise and Education Lectures  Encourage medication compliance  Individual Education/ Counseling: Yes  Physician Referral: No    Education:    diabetes, verbalizes understanding; Date: 4/26/2022  physical activity, verbalizes understanding; Date: 4/26/2022  risk factors, verbalizes understanding; Date: 4/26/2022        Education method adapted to patients education level and preferred method of learning.  Method: explanation    Comments:  Pt is pleasant and verbalizes interest in changing her lifestyle to reduce her cardiovascular risks.    Other Core Components/Hypertension Assessment:   Resting BP: 90/56  BP Readings from Last 1 Encounters:   06/08/22 (!) 153/95         BP Diagnosis: Hypertensive  Patient reported symptoms: none    Other Core Components/Hypertension Plan:   Goals:  Blood Pressure <130/80    Interventions/Recommendations:  Med Card Reconciled: Yes  Encourage medication compliance  Encourage sodium reduction  Encourage weight loss  Recommend physical activity  Educate on contributory factors  Reduce stress, anxiety, anger, depression, and/or chronic pain  Encourage home blood pressure monitoring  Recommend daily weights  MD notified/Physician Referral: No    Education:    Hypertension; verbalizes understanding; Date:  4/26/2022  Risk Factors; verbalizes understanding; Date: 4/26/2022        Comments:  Pt will take her B/P daily at home and record log. Discussed with pt separation of times for taking her two B/P medication and hydration to prevent low pressure in the morning.      Does the patient have Heart Failure? No    Other Core Components/Tobacco Cessation Assessment:   Smoking Status: lifetime non-smoker  Smoking Cessation Barriers: none  Stage of Readiness to Change: Maintenance    Other Core Components/Tobacco Cessation Plan:   Goals:  Maintain non-smoking status    Interventions:  Maintains non-smoking status    Education:    Risk Factors; verbalizes understanding; Date: 4/26/2022  Benefits of Cardiac Rehab; verbalizes understanding; Date: 4/26/2022        Comments:  Pt expresses no desire to smoke.    Discussed Cardiac Rehab program in depth with patient.  Medication list updated per patient & marked as reviewed.  Patient has been instructed to notify staff of any problems while attending rehab (ie: chest pain, shortness of breath, lightheadedness, dizziness).  Patient has been instructed to monitor blood pressure readings outside of rehab & to keep a daily log of the readings.  Patient verbalizes understanding.    Luis UrbanRN

## 2022-08-06 DIAGNOSIS — E11.29 TYPE 2 DIABETES MELLITUS WITH MICROALBUMINURIA, WITHOUT LONG-TERM CURRENT USE OF INSULIN: Chronic | ICD-10-CM

## 2022-08-06 DIAGNOSIS — R80.9 TYPE 2 DIABETES MELLITUS WITH MICROALBUMINURIA, WITHOUT LONG-TERM CURRENT USE OF INSULIN: Chronic | ICD-10-CM

## 2022-08-07 RX ORDER — DULAGLUTIDE 1.5 MG/.5ML
1.5 INJECTION, SOLUTION SUBCUTANEOUS WEEKLY
Qty: 12 PEN | Refills: 0 | Status: SHIPPED | OUTPATIENT
Start: 2022-08-07 | End: 2022-10-15 | Stop reason: SDUPTHER

## 2022-08-07 NOTE — TELEPHONE ENCOUNTER
No new care gaps identified.  [facility] Embedded Care Gaps. Reference number: [id number]. 8/06/2022   8:46:51 PM CDT

## 2022-08-11 ENCOUNTER — DOCUMENTATION ONLY (OUTPATIENT)
Dept: CARDIAC REHAB | Facility: CLINIC | Age: 57
End: 2022-08-11
Payer: COMMERCIAL

## 2022-08-11 NOTE — PROGRESS NOTES
Shanae arrived for her orientation to the Phase II cardiac rehab program.  She has not started the exercise classes as of yet due to an ACL tear.  She will contact us when she is ready to start the program.    Session: Orientation   Cardiac Rehab Individual Treatment Plan - Initial Assessment      Patient Name: Shanae Kumari MRN: [id number]   : 1965   Age: 56 y.o.   Primary Diagnosis: PTCA/STENT  Date of Event: 21  EF: 65%  Risk Stratification: high  Referring Physician: GONZALO   Exercise Assessment:     CPX/TM Date: 22 Results   RHR 97   Max    Peak VO2 (CPX only) 16.7   Actual METS (CPX only) 4.8   Estimated METS 7.0     Anthropometrics    Height 64 inches   Weight 202 lbs   BMI 34.7   Abdominal Girth 47.1   Body Composition 31.0%     ST Depression noted on Stress Test?:No  Angina with exercise?: No   Fall Risk: No   Assistive Devices:  independent   Currently exercising? No   There were many limitations noted by the patient.  Shanae stated she has torn left ACL, chronic low back pain, and torn labrum on the right hip pain.  Exercise modalities will be adjusted.      Exercise Plan:   Goals:  CR Exercise Goals: Attend Cardiac Rehab 3 times/week: In Progress  Home Aerobic Exercise: 2 additional days/week for 30-60 minutes: In Progress  Intensity of 12-15 on the Rate of Perceived Exertion (RPE) scale: In Progress  30% increase in entry estimated METS: 9.1 : In Progress  5 days/week for 30-60 minutes: In Progress    Intervention:   Discussed importance of regular attendance to cardiac rehab class    Exercise Prescription:  THR Range 119-129   Mode: Treadmill  Recumbent Bike  Upright Bike  Nustep  Elliptical   Frequency:  3 days/week   Duration:  30 - 60 minutes   Intensity:  12 - 15 RPE   Resistance Training:  Yes: 0 to 5 lb weights with 10-15 reps based on strength and range of motion assessment     Home Prescription:  Mode Aerobic   Frequency: 2- 3 days/week   Duration: 30-60 minutes    Resistance Training: None        Education:  Orientation to Equipment; verbalizes understanding; Date: 22  Exercise Recommendations; verbalizes understanding; Date: 22  Exercise Safety; verbalizes understanding; Date: 22  Class Preparation: verbalizes understanding; Date: 22  Signs and symptoms to report: verbalizes understanding; Date: 22  Caffeine/Hydration: verbalizes understanding; Date: 22  Exercise Terminology: verbalizes understanding; Date: 22  Resistance Training: verbalizes understanding; Date: 22    Comments:  Shanae was encouraged to begin thinking about some type of aerobic exercise she can participate in at least 2 non-rehab days per week for at least 30 minutes in addition to attending Phase II cardiac rehab classes 3 days per week.  She stated understanding.    All consent forms were signed, proper attire and shoes were discussed.       Shanae will begin Cardiac Rehab on Friday, May 20 at 3:30pm.    The exercise prescription will be adjusted based on tolerance of exercise intensity by patient.    Tamara Posey, CEP    Orientation Cardiac Rehab Individual Treatment Plan - Initial Assessment      Patient Name: Shanae Kumari MRN: [id number]   : 1965   Age: 56 y.o.   Primary Diagnosis: s/p PTCA/stent, CAD, HTN, HLD, DM    Nutrition Assessment:     Anthropometrics    Height 64 inches   Weight 202 lbs   BMI 34.7   Abdominal Girth 47.1   Body Composition 31       Drug Allergies and Intolerances:  Review of patient's allergies indicates:   Allergen Reactions    Allergenic extracts Hives, Itching, Other (See Comments), Rash and Shortness Of Breath    Dog dander Hives, Shortness Of Breath, Itching, Swelling and Rash     Cat's dander, dust,  pollen    Losartan Other (See Comments)     angioedema    Mold Itching and Shortness Of Breath    Metformin Other (See Comments)       Food Allergies and Intolerances:  NA    Past Medical History:  Past  Medical History:   Diagnosis Date    Asthma     BRCA negative     Diabetes mellitus     Hypertension     Nausea after anesthesia 1989       Past Surgical History:  Past Surgical History:   Procedure Laterality Date    CORONARY ANGIOGRAPHY N/A 11/26/2021    Procedure: ANGIOGRAM, CORONARY ARTERY;  Surgeon: Ashu Horn MD;  Location: [facility] CATH LAB;  Service: Cardiology;  Laterality: N/A;    KNEE ARTHROSCOPY      knee surgery      ULTRASOUND GUIDANCE Right 11/26/2021    Procedure: ULTRASOUND GUIDANCE;  Surgeon: Ashu Horn MD;  Location: [facility] CATH LAB;  Service: Cardiology;  Laterality: Right;       Medications:  Current Outpatient Medications   Medication Sig    albuterol (PROAIR HFA) 90 mcg/actuation inhaler Inhale 2 puffs into the lungs every 4 (four) hours as needed for Wheezing.    aspirin 81 MG Chew Take 1 tablet (81 mg total) by mouth once daily.    atorvastatin (LIPITOR) 40 MG tablet Take 1 tablet (40 mg total) by mouth every evening.    blood sugar diagnostic (BLOOD GLUCOSE TEST) Strp 1 strip by Misc.(Non-Drug; Combo Route) route once daily.    blood-glucose meter kit Use as instructed to test blood sugar daily    clobetasol 0.05% (TEMOVATE) 0.05 % Oint AAA bid    clopidogreL (PLAVIX) 75 mg tablet Take 1 tablet (75 mg total) by mouth once daily.    diltiaZEM HCl (MATZIM LA) 240 mg 24 hr tablet Take 1 tablet (240 mg total) by mouth once daily.    dulaglutide (TRULICITY) 1.5 mg/0.5 mL pen injector Inject 1.5 mg into the skin once a week.    empagliflozin (JARDIANCE) 25 mg tablet Take 1 tablet (25 mg total) by mouth once daily.    flash glucose scanning reader (FREESTYLE EVELIO 2 READER) Misc 1 kit by Misc.(Non-Drug; Combo Route) route Daily.    flash glucose sensor (FREESTYLE EVELIO 2 SENSOR) Kit 1 each by Misc.(Non-Drug; Combo Route) route every 14 (fourteen) days.    fluticasone propionate (FLONASE) 50 mcg/actuation nasal spray 1 spray (50 mcg total) by Each Nostril route once daily.     glimepiride (AMARYL) 4 MG tablet Take 1 tablet (4 mg total) by mouth before breakfast.    lancets 30 gauge Misc 1 lancet by Misc.(Non-Drug; Combo Route) route once daily.    medroxyPROGESTERone (PROVERA) 10 MG tablet Take 1 tablet (10 mg total) by mouth once daily.    montelukast (SINGULAIR) 10 mg tablet Take 1 tablet (10 mg total) by mouth once daily.    nitroGLYCERIN (NITROSTAT) 0.4 MG SL tablet Place 1 tablet (0.4 mg total) under the tongue every 5 (five) minutes as needed for Chest pain.    tiZANidine (ZANAFLEX) 2 MG tablet TAKE 1 TO 2 TABLETS BY MOUTH AT BEDTIME AS NEEDED FOR MUSCLE SPASMS AND PAIN     No current facility-administered medications for this visit.       Vitamins and Supplements:  MVI    Labs:  Patient confirms she is taking lipitor 40mg for cholesterol control.    Lab Results   Component Value Date    CHOL 184 04/13/2022     Lab Results   Component Value Date    HDL 42 04/13/2022     Lab Results   Component Value Date    LDLCALC 107.8 04/13/2022     Lab Results   Component Value Date    TRIG 171 (H) 04/13/2022     Lab Results   Component Value Date    CHOLHDL 22.8 04/13/2022         Lab Results   Component Value Date    GLUF 189 (H) 04/13/2022     Lab Results   Component Value Date    HGBA1C 10.9 (H) 04/13/2022       Nutrition/Diet History:  Patient eats 2-3 meals daily.    Seasons food with pepper/spices.  Patient denies use of a salt shaker at the table on prepared foods.   Dines out 2 per week at restaurants such as [employer].  Gets meal prep from [employer]  Chooses fried foods 1-2 time(s) per weeks.    Chooses fish 1-2 time(s) per week.   Beverages:  water, ICE, diet soda/green tea  Alcohol: none    24 Hour Recall:  · Breakfast: Glucerna  · Lunch:Fried shrimp/catfish, corn, salad with italian  · Dinner:leftovers from lunch  · Other: NA    Difficulty Chewing or Swallowing: no  Current Exercise: See Exercise Physiologist Note  Food Safety/Food Preparation: self  Living  Arrangements/Family Support: Lives alone  Cultural/Spiritual/Personal Preferences: not applicable   Barriers to Education: none identified  Stage of Change Related to Diet Habits: Contemplation    Nutrition Diagnosis:  1. Food and nutrition related knowledge deficit related to the lack of prior nutrition education as evidenced by diet history and 24 hour recall    Nutrition Plan:   Goals:  LDL-C < 70 (for high risk patients)  Hgb A1c < 7%  BMI < 25 and abdominal girth < 40M/<35 F  2 gram sodium, Mediterranean diet  Rehab weight goal: 10-15lbs  Fish intake (non-fried varieties) to a goal of 2-3 servings per week.   Increase fruit and vegetable intake    Interventions/Recommendations:  Lab results reviewed and discussed  Nutrition Prescription:  · Total Energy Estimated Needs: 4338-5182 Kcal/d for weight loss  · Method for Estimating Needs: 20-25kcal/kg ABW  · Total Protein Estimated Needs: 51-77 g/d  · Method for Estimating Needs: 0.8-1.2 g/Kg ABW  · Total Fluid Estimated Needs: 1 mL/Kcal  Dietitian Consult: No  Patient to participate in Cardiac Rehab sessions three times a week  Weekly Dietitian Weight Check  Encouraged patient to complete 3 day food diary  Follow Up Plan for Ongoing Self-Management Support    Education:  Mediterranean Diet; verbalizes understanding; Date: 4/26/22   Person taught: patient   Preferred Learning Method: Verbal and written   Education Needed/Provided: Nutrition counseling and education related to cardiac rehabilitation   Education Method: Weekly nutrition lectures on the Mediterranean diet, cooking, shopping, and dining out   Written Materials Provided: 3 Day Food Record, Introduction to Mediterranean Diet   Strategies Implemented: Motivational interviewing, Goal setting, Self-Monitoring, and Problem Solving    Comments:   Discussed ways to incorporate healthy snacks, eating on a schedule, and monitoring sodium intake for heart health.  Encouraged increased produce    Diabetes  Is  "the patient diabetic?   Yes   Other Core Components/Diabetes Assessment:   Labs:  Lab Results   Component Value Date    GLUF 189 (H) 04/13/2022    GLUF 176 (H) 12/20/2014    GLUF 234 (H) 11/25/2014     Lab Results   Component Value Date    HGBA1C 10.9 (H) 04/13/2022    HGBA1C 10.8 (H) 11/18/2021    HGBA1C 11.8 (H) 09/28/2021      Lab Results   Component Value Date    ESTIMATEDAVG 266 (H) 04/13/2022    ESTIMATEDAVG 263 (H) 11/18/2021    ESTIMATEDAVG 292 (H) 09/28/2021       History of diabetes since 2010  Diabetes medications: Glimepiride 4mg daily, Jardiance 25mg daily, Trulicity 1.5mg injection once weekly (pt admits to being noncompliant with this r/t c/o GI issues)  Blood Glucose Checks at Home: Yes: Other: does not check consistently "every few days"  Endocrinologist or PCP following DM: Dr. Marin- endocrinology    Other Core Components/Diabetes Plan:   Goals:  Hgb A1c < 7%  Exercise Blood Glucose: 100-300 mg/dl    Interventions:  Reviewed and Discussed Labs  Medication Compliance  Med Card Reconciled  Low Sodium, Mediterranean, ADA Diet  Weight Management  Physical Activity  Increase Knowledge of Contributory Factors  Home Monitoring    Education:  Mediterranean Diet; verbalizes understanding; Date: 4/26/22    Comments:   Patient verbalizes understanding to bring home glucometer and check glucose pre and post each exercise session.  Per cardiac rehab protocols, patient's glucose must be between 90 and 270 mg/dL to exercise.  Patient denies any recent glucose levels less than 60 mg/dL or greater than 300 mg/dL. Patient verbalizes importance of notifying rehab staff if symptoms of hypoglycemia occur while at cardiac rehab.  Abnormal labs will be reported to patient's PCP/Endocrinologist by rehab staff.    Noted updated glucose parameters of 100-300     Emphasized importance of regular glucose checking    RD contact information provided.      Elida Mota MS, RDN/LDN    Session: Orientation Cardiac Rehab " Individual Treatment Plan - Initial Assessment      Patient Name: Shanae Kumari MRN: [id number]   : 1965   Age: 56 y.o.   Date of Event: 2021   Primary Diagnosis: PTCA/Stent    EF: 65%    Physical Assessment:   There were no vitals taken for this visit.    ASSESSMENT:  Heart Sounds: regular rate and rhythm  Prosthetic Valve: No  Lung Sounds: normal air entry, lungs clear to auscultation  Capillary Refill: normal  Left Radial Pulse: Normal (+2)  Right Radial Pulse: Normal (+2)  Left Pedal Pulse: Normal (+2)  Right Pedal Pulse: Normal (+2)  Right Edema: none  Left Edema none  Strength: good  Range of Motion: full range of motion  Existing Limitations:      Site   [] Arthritis, bursitis    [] Amputation, atrophy    [x] Other: ACL tear right knee, Labrum tear right hip, pt is being seen for PT for low back pain.   []       Diabetic patient's foot examination comments: Normal -  Bilateral  Incisional site: N/A  Special needs: N/A    Psychosocial Assessment:   Outcome Survey Tools:    BRITTNY SCORES:   PRE   Anxiety 3   Depression 1   Somatic 4   Hostility 2     SF-36 SCORES:   PRE   Physical Function 19   Social Function 8   Mental Health 25   Pain 6   Change in Health 2   Physical Role Limitation 1   Mental Role Limitation 3   Energy/Fatigue 9   Health Perceptions 12   Total Score 85     PHQ-9:  PHQ-9 Depression Patient Health Questionnaire 2022   Over the last two weeks how often have you been bothered by little interest or pleasure in doing things 0   Over the last two weeks how often have you been bothered by feeling down, depressed or hopeless 0   Over the last two weeks how often have you been bothered by trouble falling or staying asleep, or sleeping too much 1   Over the last two weeks how often have you been bothered by feeling tired or having little energy 1   Over the last two weeks how often have you been bothered by a poor appetite or overeating 1   Over the last two weeks how often have  you been bothered by feeling bad about yourself - or that you are a failure or have let yourself or your family down 0   Over the last two weeks how often have you been bothered by trouble concentrating on things, such as reading the newspaper or watching television 0   Over the last two weeks how often have you been bothered by moving or speaking so slowly that other people could have noticed. 0   Over the last two weeks how often have you been bothered by thoughts that you would be better off dead, or of hurting yourself 0   If you checked off any problems, how difficult have these problems made it for you to do your work, take care of things at home or get along with other people? Somewhat difficult   Total Score 3              Living Arrangements: Lives alone  Family Support: family  Self Reported: Effective Coping Skills, Stress, Chronic Pain and Sleep Pattern Disturbances  Displays: happiness and smiles often  Medication: Pt was on Zoloft but stopped it herself. Pt states is was not helping. Pt takes melatonin for sleep.    Psychosocial Plan:   Goals:  Improved psychosocial coping strategies  Reduce manifestation of depression  Reduce manifestation of stress  Maintain positive outlook  Improve overall quality of life    Interventions/Recommendations:  Discussed Results of Surveys  Patient to Self Report Emotional Changes at Session Check In  Recommend Physical Activity  Recommend Attending Education Lectures  Notify MD: No  Program Referral: Declined  Pharmaceutical Intervention/Therapy: Declined  Other Needs: not applicable  Stage of Readiness to Change: Preparation    Education:  Signs and symptoms of depression, verbalizes understanding; Date:4/26/2022  Stress, verbalizes understanding; Date:4/26/2022    Comments:  Pt denies overwhelming stress or anxiety.  Patient has been instructed to notify staff in the event that circumstances worsen.  Patient verbalizes understanding.    Other Core Components/Risk  Factors Assessment:   RISK FACTORS:  diabetes, hyperlipidemia, hypertension, obesity, stress    Learning Barriers: None    Education Level:  Post-College Graduate Degree    Pre-test Score: 60    Medication Compliance: has not been compliant with taking medications due to the following: side effects    Other Core Components/Risk Factors Plan:   Goals:  Decrease cholesterol level: In Progress  Increase exercise tolerance: In Progress  Decrease blood pressure: Met  Weight loss: In Progress  Control diabetes by adjusting diet and exercise: In Progress  Learn more about healthy eating: In Progress    Interventions/Recommendations:  Recommend regular attendance for Cardiac Rehab: Exercise and Education Lectures  Encourage medication compliance  Individual Education/ Counseling: Yes  Physician Referral: No    Education:    diabetes, verbalizes understanding; Date: 4/26/2022  physical activity, verbalizes understanding; Date: 4/26/2022  risk factors, verbalizes understanding; Date: 4/26/2022        Education method adapted to patients education level and preferred method of learning.  Method: explanation    Comments:  Pt is pleasant and verbalizes interest in changing her lifestyle to reduce her cardiovascular risks.    Other Core Components/Hypertension Assessment:   Resting BP: 90/56  BP Readings from Last 1 Encounters:   06/08/22 (!) 153/95         BP Diagnosis: Hypertensive  Patient reported symptoms: none    Other Core Components/Hypertension Plan:   Goals:  Blood Pressure <130/80    Interventions/Recommendations:  Med Card Reconciled: Yes  Encourage medication compliance  Encourage sodium reduction  Encourage weight loss  Recommend physical activity  Educate on contributory factors  Reduce stress, anxiety, anger, depression, and/or chronic pain  Encourage home blood pressure monitoring  Recommend daily weights  MD notified/Physician Referral: No    Education:    Hypertension; verbalizes understanding; Date:  4/26/2022  Risk Factors; verbalizes understanding; Date: 4/26/2022        Comments:  Pt will take her B/P daily at home and record log. Discussed with pt separation of times for taking her two B/P medication and hydration to prevent low pressure in the morning.      Does the patient have Heart Failure? No    Other Core Components/Tobacco Cessation Assessment:   Smoking Status: lifetime non-smoker  Smoking Cessation Barriers: none  Stage of Readiness to Change: Maintenance    Other Core Components/Tobacco Cessation Plan:   Goals:  Maintain non-smoking status    Interventions:  Maintains non-smoking status    Education:    Risk Factors; verbalizes understanding; Date: 4/26/2022  Benefits of Cardiac Rehab; verbalizes understanding; Date: 4/26/2022        Comments:  Pt expresses no desire to smoke.    Discussed Cardiac Rehab program in depth with patient.  Medication list updated per patient & marked as reviewed.  Patient has been instructed to notify staff of any problems while attending rehab (ie: chest pain, shortness of breath, lightheadedness, dizziness).  Patient has been instructed to monitor blood pressure readings outside of rehab & to keep a daily log of the readings.  Patient verbalizes understanding.    Luis UrbanRN

## 2022-08-24 ENCOUNTER — PATIENT MESSAGE (OUTPATIENT)
Dept: ADMINISTRATIVE | Facility: HOSPITAL | Age: 57
End: 2022-08-24
Payer: COMMERCIAL

## 2022-08-29 ENCOUNTER — PATIENT MESSAGE (OUTPATIENT)
Dept: SPORTS MEDICINE | Facility: CLINIC | Age: 57
End: 2022-08-29
Payer: COMMERCIAL

## 2022-08-30 ENCOUNTER — TELEPHONE (OUTPATIENT)
Dept: SPORTS MEDICINE | Facility: CLINIC | Age: 57
End: 2022-08-30
Payer: COMMERCIAL

## 2022-08-30 ENCOUNTER — PATIENT MESSAGE (OUTPATIENT)
Dept: FAMILY MEDICINE | Facility: CLINIC | Age: 57
End: 2022-08-30
Payer: COMMERCIAL

## 2022-08-31 DIAGNOSIS — M23.201 OLD TEAR OF LATERAL MENISCUS OF LEFT KNEE, UNSPECIFIED TEAR TYPE: ICD-10-CM

## 2022-08-31 DIAGNOSIS — M94.262 CHONDROMALACIA OF LEFT KNEE: ICD-10-CM

## 2022-08-31 DIAGNOSIS — M67.50 PLICA SYNDROME: ICD-10-CM

## 2022-08-31 DIAGNOSIS — S83.512A RUPTURE OF ANTERIOR CRUCIATE LIGAMENT OF LEFT KNEE, INITIAL ENCOUNTER: Primary | ICD-10-CM

## 2022-08-31 DIAGNOSIS — M23.204 OLD TEAR OF MEDIAL MENISCUS OF LEFT KNEE, UNSPECIFIED TEAR TYPE: ICD-10-CM

## 2022-09-01 ENCOUNTER — DOCUMENTATION ONLY (OUTPATIENT)
Dept: CARDIAC REHAB | Facility: CLINIC | Age: 57
End: 2022-09-01
Payer: COMMERCIAL

## 2022-09-01 NOTE — PROGRESS NOTES
Shanae arrived for her orientation to the Phase II cardiac rehab program.  She has not started the exercise classes as of yet due to an ACL tear.  She will contact us when she is ready to start the program.    Session: Orientation   Cardiac Rehab Individual Treatment Plan - Initial Assessment      Patient Name: Shanae Kumari MRN: [id number]   : 1965   Age: 56 y.o.   Primary Diagnosis: PTCA/STENT  Date of Event: 21  EF: 65%  Risk Stratification: high  Referring Physician: GONZALO   Exercise Assessment:     CPX/TM Date: 22 Results   RHR 97   Max    Peak VO2 (CPX only) 16.7   Actual METS (CPX only) 4.8   Estimated METS 7.0     Anthropometrics    Height 64 inches   Weight 202 lbs   BMI 34.7   Abdominal Girth 47.1   Body Composition 31.0%     ST Depression noted on Stress Test?:No  Angina with exercise?: No   Fall Risk: No   Assistive Devices:  independent   Currently exercising? No   There were many limitations noted by the patient.  Shanae stated she has torn left ACL, chronic low back pain, and torn labrum on the right hip pain.  Exercise modalities will be adjusted.      Exercise Plan:   Goals:  CR Exercise Goals: Attend Cardiac Rehab 3 times/week: In Progress  Home Aerobic Exercise: 2 additional days/week for 30-60 minutes: In Progress  Intensity of 12-15 on the Rate of Perceived Exertion (RPE) scale: In Progress  30% increase in entry estimated METS: 9.1 : In Progress  5 days/week for 30-60 minutes: In Progress    Intervention:   Discussed importance of regular attendance to cardiac rehab class    Exercise Prescription:  THR Range 119-129   Mode: Treadmill  Recumbent Bike  Upright Bike  Nustep  Elliptical   Frequency:  3 days/week   Duration:  30 - 60 minutes   Intensity:  12 - 15 RPE   Resistance Training:  Yes: 0 to 5 lb weights with 10-15 reps based on strength and range of motion assessment     Home Prescription:  Mode Aerobic   Frequency: 2- 3 days/week   Duration: 30-60 minutes    Resistance Training: None        Education:  Orientation to Equipment; verbalizes understanding; Date: 22  Exercise Recommendations; verbalizes understanding; Date: 22  Exercise Safety; verbalizes understanding; Date: 22  Class Preparation: verbalizes understanding; Date: 22  Signs and symptoms to report: verbalizes understanding; Date: 22  Caffeine/Hydration: verbalizes understanding; Date: 22  Exercise Terminology: verbalizes understanding; Date: 22  Resistance Training: verbalizes understanding; Date: 22    Comments:  Shanae was encouraged to begin thinking about some type of aerobic exercise she can participate in at least 2 non-rehab days per week for at least 30 minutes in addition to attending Phase II cardiac rehab classes 3 days per week.  She stated understanding.    All consent forms were signed, proper attire and shoes were discussed.       Shanae will begin Cardiac Rehab on Friday, May 20 at 3:30pm.    The exercise prescription will be adjusted based on tolerance of exercise intensity by patient.    Tamara Posey, CEP    Orientation Cardiac Rehab Individual Treatment Plan - Initial Assessment      Patient Name: Shanae Kumari MRN: [id number]   : 1965   Age: 56 y.o.   Primary Diagnosis: s/p PTCA/stent, CAD, HTN, HLD, DM    Nutrition Assessment:     Anthropometrics    Height 64 inches   Weight 202 lbs   BMI 34.7   Abdominal Girth 47.1   Body Composition 31       Drug Allergies and Intolerances:  Review of patient's allergies indicates:   Allergen Reactions    Allergenic extracts Hives, Itching, Other (See Comments), Rash and Shortness Of Breath    Dog dander Hives, Shortness Of Breath, Itching, Swelling and Rash     Cat's dander, dust,  pollen    Losartan Other (See Comments)     angioedema    Mold Itching and Shortness Of Breath    Metformin Other (See Comments)       Food Allergies and Intolerances:  NA    Past Medical History:  Past Medical  History:   Diagnosis Date    Asthma     BRCA negative     Diabetes mellitus     Hypertension     Nausea after anesthesia 1989       Past Surgical History:  Past Surgical History:   Procedure Laterality Date    CORONARY ANGIOGRAPHY N/A 11/26/2021    Procedure: ANGIOGRAM, CORONARY ARTERY;  Surgeon: Ashu Horn MD;  Location: [facility] CATH LAB;  Service: Cardiology;  Laterality: N/A;    KNEE ARTHROSCOPY      knee surgery      ULTRASOUND GUIDANCE Right 11/26/2021    Procedure: ULTRASOUND GUIDANCE;  Surgeon: Ashu Horn MD;  Location: [facility] CATH LAB;  Service: Cardiology;  Laterality: Right;       Medications:  Current Outpatient Medications   Medication Sig    albuterol (PROAIR HFA) 90 mcg/actuation inhaler Inhale 2 puffs into the lungs every 4 (four) hours as needed for Wheezing.    aspirin 81 MG Chew Take 1 tablet (81 mg total) by mouth once daily.    atorvastatin (LIPITOR) 40 MG tablet Take 1 tablet (40 mg total) by mouth every evening.    blood sugar diagnostic (BLOOD GLUCOSE TEST) Strp 1 strip by Misc.(Non-Drug; Combo Route) route once daily.    blood-glucose meter kit Use as instructed to test blood sugar daily    clobetasol 0.05% (TEMOVATE) 0.05 % Oint AAA bid    clopidogreL (PLAVIX) 75 mg tablet Take 1 tablet (75 mg total) by mouth once daily.    diltiaZEM HCl (MATZIM LA) 240 mg 24 hr tablet Take 1 tablet (240 mg total) by mouth once daily.    dulaglutide (TRULICITY) 1.5 mg/0.5 mL pen injector Inject 1.5 mg into the skin once a week.    empagliflozin (JARDIANCE) 25 mg tablet Take 1 tablet (25 mg total) by mouth once daily.    flash glucose scanning reader (FREESTYLE EVELIO 2 READER) Misc 1 kit by Misc.(Non-Drug; Combo Route) route Daily.    flash glucose sensor (FREESTYLE EVELIO 2 SENSOR) Kit 1 each by Misc.(Non-Drug; Combo Route) route every 14 (fourteen) days.    fluticasone propionate (FLONASE) 50 mcg/actuation nasal spray 1 spray (50 mcg total) by Each Nostril route once daily.    glimepiride (AMARYL) 4 MG  tablet Take 1 tablet (4 mg total) by mouth before breakfast.    lancets 30 gauge Misc 1 lancet by Misc.(Non-Drug; Combo Route) route once daily.    medroxyPROGESTERone (PROVERA) 10 MG tablet Take 1 tablet (10 mg total) by mouth once daily.    montelukast (SINGULAIR) 10 mg tablet Take 1 tablet (10 mg total) by mouth once daily.    nitroGLYCERIN (NITROSTAT) 0.4 MG SL tablet Place 1 tablet (0.4 mg total) under the tongue every 5 (five) minutes as needed for Chest pain.    tiZANidine (ZANAFLEX) 2 MG tablet TAKE 1 TO 2 TABLETS BY MOUTH AT BEDTIME AS NEEDED FOR MUSCLE SPASMS AND PAIN     No current facility-administered medications for this visit.       Vitamins and Supplements:  MVI    Labs:  Patient confirms she is taking lipitor 40mg for cholesterol control.    Lab Results   Component Value Date    CHOL 184 04/13/2022     Lab Results   Component Value Date    HDL 42 04/13/2022     Lab Results   Component Value Date    LDLCALC 107.8 04/13/2022     Lab Results   Component Value Date    TRIG 171 (H) 04/13/2022     Lab Results   Component Value Date    CHOLHDL 22.8 04/13/2022         Lab Results   Component Value Date    GLUF 189 (H) 04/13/2022     Lab Results   Component Value Date    HGBA1C 10.9 (H) 04/13/2022       Nutrition/Diet History:  Patient eats 2-3 meals daily.    Seasons food with pepper/spices.  Patient denies use of a salt shaker at the table on prepared foods.   Dines out 2 per week at restaurants such as [employer].  Gets meal prep from [employer]  Chooses fried foods 1-2 time(s) per weeks.    Chooses fish 1-2 time(s) per week.   Beverages:  water, ICE, diet soda/green tea  Alcohol: none    24 Hour Recall:  Breakfast: Glucerna  Lunch:Fried shrimp/catfish, corn, salad with italian  Dinner:leftovers from lunch  Other: NA    Difficulty Chewing or Swallowing: no  Current Exercise: See Exercise Physiologist Note  Food Safety/Food Preparation: self  Living Arrangements/Family Support: Lives  alone  Cultural/Spiritual/Personal Preferences: not applicable   Barriers to Education: none identified  Stage of Change Related to Diet Habits: Contemplation    Nutrition Diagnosis:  Food and nutrition related knowledge deficit related to the lack of prior nutrition education as evidenced by diet history and 24 hour recall    Nutrition Plan:   Goals:  LDL-C < 70 (for high risk patients)  Hgb A1c < 7%  BMI < 25 and abdominal girth < 40M/<35 F  2 gram sodium, Mediterranean diet  Rehab weight goal: 10-15lbs  Fish intake (non-fried varieties) to a goal of 2-3 servings per week.   Increase fruit and vegetable intake    Interventions/Recommendations:  Lab results reviewed and discussed  Nutrition Prescription:  Total Energy Estimated Needs: 9254-7987 Kcal/d for weight loss  Method for Estimating Needs: 20-25kcal/kg ABW  Total Protein Estimated Needs: 51-77 g/d  Method for Estimating Needs: 0.8-1.2 g/Kg ABW  Total Fluid Estimated Needs: 1 mL/Kcal  Dietitian Consult: No  Patient to participate in Cardiac Rehab sessions three times a week  Weekly Dietitian Weight Check  Encouraged patient to complete 3 day food diary  Follow Up Plan for Ongoing Self-Management Support    Education:  Mediterranean Diet; verbalizes understanding; Date: 4/26/22  Person taught: patient  Preferred Learning Method: Verbal and written  Education Needed/Provided: Nutrition counseling and education related to cardiac rehabilitation  Education Method: Weekly nutrition lectures on the Mediterranean diet, cooking, shopping, and dining out  Written Materials Provided: 3 Day Food Record, Introduction to Mediterranean Diet  Strategies Implemented: Motivational interviewing, Goal setting, Self-Monitoring, and Problem Solving    Comments:   Discussed ways to incorporate healthy snacks, eating on a schedule, and monitoring sodium intake for heart health.  Encouraged increased produce    Diabetes  Is the patient diabetic?   Yes   Other Core  "Components/Diabetes Assessment:   Labs:  Lab Results   Component Value Date    GLUF 189 (H) 04/13/2022    GLUF 176 (H) 12/20/2014    GLUF 234 (H) 11/25/2014     Lab Results   Component Value Date    HGBA1C 10.9 (H) 04/13/2022    HGBA1C 10.8 (H) 11/18/2021    HGBA1C 11.8 (H) 09/28/2021      Lab Results   Component Value Date    ESTIMATEDAVG 266 (H) 04/13/2022    ESTIMATEDAVG 263 (H) 11/18/2021    ESTIMATEDAVG 292 (H) 09/28/2021       History of diabetes since 2010  Diabetes medications: Glimepiride 4mg daily, Jardiance 25mg daily, Trulicity 1.5mg injection once weekly (pt admits to being noncompliant with this r/t c/o GI issues)  Blood Glucose Checks at Home: Yes: Other: does not check consistently "every few days"  Endocrinologist or PCP following DM: Dr. Marin- endocrinology    Other Core Components/Diabetes Plan:   Goals:  Hgb A1c < 7%  Exercise Blood Glucose: 100-300 mg/dl    Interventions:  Reviewed and Discussed Labs  Medication Compliance  Med Card Reconciled  Low Sodium, Mediterranean, ADA Diet  Weight Management  Physical Activity  Increase Knowledge of Contributory Factors  Home Monitoring    Education:  Mediterranean Diet; verbalizes understanding; Date: 4/26/22    Comments:   Patient verbalizes understanding to bring home glucometer and check glucose pre and post each exercise session.  Per cardiac rehab protocols, patient's glucose must be between 90 and 270 mg/dL to exercise.  Patient denies any recent glucose levels less than 60 mg/dL or greater than 300 mg/dL. Patient verbalizes importance of notifying rehab staff if symptoms of hypoglycemia occur while at cardiac rehab.  Abnormal labs will be reported to patient's PCP/Endocrinologist by rehab staff.    Noted updated glucose parameters of 100-300     Emphasized importance of regular glucose checking    RD contact information provided.      Elida Mota MS, RDN/LDN    Session: Orientation Cardiac Rehab Individual Treatment Plan - Initial " Assessment      Patient Name: Shanae Kumari MRN: [id number]   : 1965   Age: 56 y.o.   Date of Event: 2021   Primary Diagnosis: PTCA/Stent    EF: 65%    Physical Assessment:   There were no vitals taken for this visit.    ASSESSMENT:  Heart Sounds: regular rate and rhythm  Prosthetic Valve: No  Lung Sounds: normal air entry, lungs clear to auscultation  Capillary Refill: normal  Left Radial Pulse: Normal (+2)  Right Radial Pulse: Normal (+2)  Left Pedal Pulse: Normal (+2)  Right Pedal Pulse: Normal (+2)  Right Edema: none  Left Edema none  Strength: good  Range of Motion: full range of motion  Existing Limitations:      Site   [] Arthritis, bursitis    [] Amputation, atrophy    [x] Other: ACL tear right knee, Labrum tear right hip, pt is being seen for PT for low back pain.   []       Diabetic patient's foot examination comments: Normal -  Bilateral  Incisional site: N/A  Special needs: N/A    Psychosocial Assessment:   Outcome Survey Tools:    BRITTNY SCORES:   PRE   Anxiety 3   Depression 1   Somatic 4   Hostility 2     SF-36 SCORES:   PRE   Physical Function 19   Social Function 8   Mental Health 25   Pain 6   Change in Health 2   Physical Role Limitation 1   Mental Role Limitation 3   Energy/Fatigue 9   Health Perceptions 12   Total Score 85     PHQ-9:  PHQ-9 Depression Patient Health Questionnaire 2022   Over the last two weeks how often have you been bothered by little interest or pleasure in doing things 0   Over the last two weeks how often have you been bothered by feeling down, depressed or hopeless 0   Over the last two weeks how often have you been bothered by trouble falling or staying asleep, or sleeping too much 1   Over the last two weeks how often have you been bothered by feeling tired or having little energy 1   Over the last two weeks how often have you been bothered by a poor appetite or overeating 1   Over the last two weeks how often have you been bothered by feeling bad  about yourself - or that you are a failure or have let yourself or your family down 0   Over the last two weeks how often have you been bothered by trouble concentrating on things, such as reading the newspaper or watching television 0   Over the last two weeks how often have you been bothered by moving or speaking so slowly that other people could have noticed. 0   Over the last two weeks how often have you been bothered by thoughts that you would be better off dead, or of hurting yourself 0   If you checked off any problems, how difficult have these problems made it for you to do your work, take care of things at home or get along with other people? Somewhat difficult   Total Score 3              Living Arrangements: Lives alone  Family Support: family  Self Reported: Effective Coping Skills, Stress, Chronic Pain and Sleep Pattern Disturbances  Displays: happiness and smiles often  Medication:  Pt was on Zoloft but stopped it herself.  Pt states is was not helping. Pt takes melatonin for sleep.    Psychosocial Plan:   Goals:  Improved psychosocial coping strategies  Reduce manifestation of depression  Reduce manifestation of stress  Maintain positive outlook  Improve overall quality of life    Interventions/Recommendations:  Discussed Results of Surveys  Patient to Self Report Emotional Changes at Session Check In  Recommend Physical Activity  Recommend Attending Education Lectures  Notify MD: No  Program Referral: Declined  Pharmaceutical Intervention/Therapy: Declined  Other Needs: not applicable  Stage of Readiness to Change: Preparation    Education:  Signs and symptoms of depression, verbalizes understanding; Date:4/26/2022  Stress, verbalizes understanding; Date:4/26/2022    Comments:  Pt denies overwhelming stress or anxiety.  Patient has been instructed to notify staff in the event that circumstances worsen.  Patient verbalizes understanding.    Other Core Components/Risk Factors Assessment:   RISK  FACTORS:  diabetes, hyperlipidemia, hypertension, obesity, stress    Learning Barriers: None    Education Level:  Post-College Graduate Degree    Pre-test Score: 60    Medication Compliance: has not been compliant with taking medications due to the following: side effects    Other Core Components/Risk Factors Plan:   Goals:  Decrease cholesterol level: In Progress  Increase exercise tolerance: In Progress  Decrease blood pressure: Met  Weight loss: In Progress  Control diabetes by adjusting diet and exercise: In Progress  Learn more about healthy eating: In Progress    Interventions/Recommendations:  Recommend regular attendance for Cardiac Rehab: Exercise and Education Lectures  Encourage medication compliance  Individual Education/ Counseling: Yes  Physician Referral: No    Education:    diabetes, verbalizes understanding; Date: 4/26/2022  physical activity, verbalizes understanding; Date: 4/26/2022  risk factors, verbalizes understanding; Date: 4/26/2022         Education method adapted to patients education level and preferred method of learning.  Method: explanation    Comments:  Pt is pleasant and verbalizes interest in changing her lifestyle to reduce her cardiovascular risks.    Other Core Components/Hypertension Assessment:   Resting BP: 90/56  BP Readings from Last 1 Encounters:   06/08/22 (!) 153/95         BP Diagnosis: Hypertensive  Patient reported symptoms: none    Other Core Components/Hypertension Plan:   Goals:  Blood Pressure <130/80    Interventions/Recommendations:  Med Card Reconciled: Yes  Encourage medication compliance  Encourage sodium reduction  Encourage weight loss  Recommend physical activity  Educate on contributory factors  Reduce stress, anxiety, anger, depression, and/or chronic pain  Encourage home blood pressure monitoring  Recommend daily weights  MD notified/Physician Referral: No    Education:    Hypertension; verbalizes understanding; Date: 4/26/2022  Risk Factors; verbalizes  understanding; Date: 4/26/2022         Comments:  Pt will take her B/P daily at home and record log. Discussed with pt separation of times for taking her two B/P medication and hydration to prevent low pressure in the morning.      Does the patient have Heart Failure? No    Other Core Components/Tobacco Cessation Assessment:   Smoking Status: lifetime non-smoker  Smoking Cessation Barriers:  none  Stage of Readiness to Change: Maintenance    Other Core Components/Tobacco Cessation Plan:   Goals:  Maintain non-smoking status    Interventions:  Maintains non-smoking status    Education:    Risk Factors; verbalizes understanding; Date: 4/26/2022  Benefits of Cardiac Rehab; verbalizes understanding; Date: 4/26/2022         Comments:  Pt expresses no desire to smoke.    Discussed Cardiac Rehab program in depth with patient.  Medication list updated per patient & marked as reviewed.  Patient has been instructed to notify staff of any problems while attending rehab (ie: chest pain, shortness of breath, lightheadedness, dizziness).  Patient has been instructed to monitor blood pressure readings outside of rehab & to keep a daily log of the readings.  Patient verbalizes understanding.    Luis Urban RN

## 2022-09-11 ENCOUNTER — PATIENT MESSAGE (OUTPATIENT)
Dept: FAMILY MEDICINE | Facility: CLINIC | Age: 57
End: 2022-09-11
Payer: COMMERCIAL

## 2022-09-16 ENCOUNTER — IMMUNIZATION (OUTPATIENT)
Dept: FAMILY MEDICINE | Facility: CLINIC | Age: 57
End: 2022-09-16
Payer: COMMERCIAL

## 2022-09-16 DIAGNOSIS — Z23 INFLUENZA VACCINE ADMINISTERED: Primary | ICD-10-CM

## 2022-09-16 PROCEDURE — 90471 FLU VACCINE (QUAD) GREATER THAN OR EQUAL TO 3YO PRESERVATIVE FREE IM: ICD-10-PCS | Mod: S$GLB,,, | Performed by: INTERNAL MEDICINE

## 2022-09-16 PROCEDURE — 90686 IIV4 VACC NO PRSV 0.5 ML IM: CPT | Mod: S$GLB,,, | Performed by: INTERNAL MEDICINE

## 2022-09-16 PROCEDURE — 90686 FLU VACCINE (QUAD) GREATER THAN OR EQUAL TO 3YO PRESERVATIVE FREE IM: ICD-10-PCS | Mod: S$GLB,,, | Performed by: INTERNAL MEDICINE

## 2022-09-16 PROCEDURE — 90471 IMMUNIZATION ADMIN: CPT | Mod: S$GLB,,, | Performed by: INTERNAL MEDICINE

## 2022-09-19 ENCOUNTER — TELEPHONE (OUTPATIENT)
Dept: FAMILY MEDICINE | Facility: CLINIC | Age: 57
End: 2022-09-19
Payer: COMMERCIAL

## 2022-09-19 NOTE — TELEPHONE ENCOUNTER
Spoke  with aubree at [facility] TO GIVE FAX NUMBER TO SEND NEEDED PAPERWORK FOR COMPLETION

## 2022-09-19 NOTE — TELEPHONE ENCOUNTER
----- Message from Carole Minor sent at 9/19/2022 10:19 AM CDT -----  .Type: Patient Call Back    Who called: Tomasz with [employer]     What is the request in detail: needs current treatment plan and clinics on member - please call     Can the clinic reply by [facility]?    Would the patient rather a call back or a response via [facility]?  Call     Best call back number: [phone]   Fax: [phone]

## 2022-09-22 ENCOUNTER — DOCUMENTATION ONLY (OUTPATIENT)
Dept: CARDIAC REHAB | Facility: CLINIC | Age: 57
End: 2022-09-22
Payer: COMMERCIAL

## 2022-09-22 ENCOUNTER — IMMUNIZATION (OUTPATIENT)
Dept: OBSTETRICS AND GYNECOLOGY | Facility: CLINIC | Age: 57
End: 2022-09-22
Payer: COMMERCIAL

## 2022-09-22 DIAGNOSIS — Z23 NEED FOR VACCINATION: Primary | ICD-10-CM

## 2022-09-22 PROCEDURE — 91312 COVID-19, MRNA, LNP-S, BIVALENT BOOSTER, PF, 30 MCG/0.3 ML DOSE: CPT | Mod: S$GLB,,, | Performed by: FAMILY MEDICINE

## 2022-09-22 PROCEDURE — 0124A COVID-19, MRNA, LNP-S, BIVALENT BOOSTER, PF, 30 MCG/0.3 ML DOSE: CPT | Mod: PBBFAC | Performed by: FAMILY MEDICINE

## 2022-09-22 PROCEDURE — 91312 COVID-19, MRNA, LNP-S, BIVALENT BOOSTER, PF, 30 MCG/0.3 ML DOSE: ICD-10-PCS | Mod: S$GLB,,, | Performed by: FAMILY MEDICINE

## 2022-09-22 NOTE — PROGRESS NOTES
Shanae arrived for her orientation to the Phase II cardiac rehab program.  She has not started the exercise classes as of yet due to an ACL tear.  She will contact us when she is ready to start the program.    Session: Orientation   Cardiac Rehab Individual Treatment Plan - Initial Assessment      Patient Name: Shanae Kumari MRN: [id number]   : 1965   Age: 57 y.o.   Primary Diagnosis: PTCA/STENT  Date of Event: 21  EF: 65%  Risk Stratification: high  Referring Physician: GONZALO   Exercise Assessment:     CPX/TM Date: 22 Results   RHR 97   Max    Peak VO2 (CPX only) 16.7   Actual METS (CPX only) 4.8   Estimated METS 7.0     Anthropometrics    Height 64 inches   Weight 202 lbs   BMI 34.7   Abdominal Girth 47.1   Body Composition 31.0%     ST Depression noted on Stress Test?:No  Angina with exercise?: No   Fall Risk: No   Assistive Devices:  independent   Currently exercising? No   There were many limitations noted by the patient.  Shanae stated she has torn left ACL, chronic low back pain, and torn labrum on the right hip pain.  Exercise modalities will be adjusted.      Exercise Plan:   Goals:  CR Exercise Goals: Attend Cardiac Rehab 3 times/week: In Progress  Home Aerobic Exercise: 2 additional days/week for 30-60 minutes: In Progress  Intensity of 12-15 on the Rate of Perceived Exertion (RPE) scale: In Progress  30% increase in entry estimated METS: 9.1 : In Progress  5 days/week for 30-60 minutes: In Progress    Intervention:   Discussed importance of regular attendance to cardiac rehab class    Exercise Prescription:  THR Range 119-129   Mode: Treadmill  Recumbent Bike  Upright Bike  Nustep  Elliptical   Frequency:  3 days/week   Duration:  30 - 60 minutes   Intensity:  12 - 15 RPE   Resistance Training:  Yes: 0 to 5 lb weights with 10-15 reps based on strength and range of motion assessment     Home Prescription:  Mode Aerobic   Frequency: 2- 3 days/week   Duration: 30-60 minutes    Resistance Training: None        Education:  Orientation to Equipment; verbalizes understanding; Date: 22  Exercise Recommendations; verbalizes understanding; Date: 22  Exercise Safety; verbalizes understanding; Date: 22  Class Preparation: verbalizes understanding; Date: 22  Signs and symptoms to report: verbalizes understanding; Date: 22  Caffeine/Hydration: verbalizes understanding; Date: 22  Exercise Terminology: verbalizes understanding; Date: 22  Resistance Training: verbalizes understanding; Date: 22    Comments:  Shanae was encouraged to begin thinking about some type of aerobic exercise she can participate in at least 2 non-rehab days per week for at least 30 minutes in addition to attending Phase II cardiac rehab classes 3 days per week.  She stated understanding.    All consent forms were signed, proper attire and shoes were discussed.       Shanae will begin Cardiac Rehab on Friday, May 20 at 3:30pm.    The exercise prescription will be adjusted based on tolerance of exercise intensity by patient.    Tamara Posey, CEP    Orientation Cardiac Rehab Individual Treatment Plan - Initial Assessment      Patient Name: Shanae Kumari MRN: [id number]   : 1965   Age: 57 y.o.   Primary Diagnosis: s/p PTCA/stent, CAD, HTN, HLD, DM    Nutrition Assessment:     Anthropometrics    Height 64 inches   Weight 202 lbs   BMI 34.7   Abdominal Girth 47.1   Body Composition 31       Drug Allergies and Intolerances:  Review of patient's allergies indicates:   Allergen Reactions    Allergenic extracts Hives, Itching, Other (See Comments), Rash and Shortness Of Breath    Dog dander Hives, Shortness Of Breath, Itching, Swelling and Rash     Cat's dander, dust,  pollen    Losartan Other (See Comments)     angioedema    Mold Itching and Shortness Of Breath    Metformin Other (See Comments)       Food Allergies and Intolerances:  NA    Past Medical History:  Past Medical  History:   Diagnosis Date    Asthma     BRCA negative     Diabetes mellitus     Hypertension     Nausea after anesthesia 1989       Past Surgical History:  Past Surgical History:   Procedure Laterality Date    CORONARY ANGIOGRAPHY N/A 11/26/2021    Procedure: ANGIOGRAM, CORONARY ARTERY;  Surgeon: Ashu Horn MD;  Location: [facility] CATH LAB;  Service: Cardiology;  Laterality: N/A;    KNEE ARTHROSCOPY      knee surgery      ULTRASOUND GUIDANCE Right 11/26/2021    Procedure: ULTRASOUND GUIDANCE;  Surgeon: Ashu Horn MD;  Location: [facility] CATH LAB;  Service: Cardiology;  Laterality: Right;       Medications:  Current Outpatient Medications   Medication Sig    albuterol (PROAIR HFA) 90 mcg/actuation inhaler Inhale 2 puffs into the lungs every 4 (four) hours as needed for Wheezing.    aspirin 81 MG Chew Take 1 tablet (81 mg total) by mouth once daily.    atorvastatin (LIPITOR) 40 MG tablet Take 1 tablet (40 mg total) by mouth every evening.    blood sugar diagnostic (BLOOD GLUCOSE TEST) Strp 1 strip by Misc.(Non-Drug; Combo Route) route once daily.    blood-glucose meter kit Use as instructed to test blood sugar daily    clobetasol 0.05% (TEMOVATE) 0.05 % Oint AAA bid    clopidogreL (PLAVIX) 75 mg tablet Take 1 tablet (75 mg total) by mouth once daily.    diltiaZEM HCl (MATZIM LA) 240 mg 24 hr tablet Take 1 tablet (240 mg total) by mouth once daily.    dulaglutide (TRULICITY) 1.5 mg/0.5 mL pen injector Inject 1.5 mg into the skin once a week.    empagliflozin (JARDIANCE) 25 mg tablet Take 1 tablet (25 mg total) by mouth once daily.    flash glucose scanning reader (FREESTYLE EVELIO 2 READER) Misc 1 kit by Misc.(Non-Drug; Combo Route) route Daily.    flash glucose sensor (FREESTYLE EVELIO 2 SENSOR) Kit 1 each by Misc.(Non-Drug; Combo Route) route every 14 (fourteen) days.    fluticasone propionate (FLONASE) 50 mcg/actuation nasal spray 1 spray (50 mcg total) by Each Nostril route once daily.    glimepiride (AMARYL) 4 MG  tablet Take 1 tablet (4 mg total) by mouth before breakfast.    lancets 30 gauge Misc 1 lancet by Misc.(Non-Drug; Combo Route) route once daily.    medroxyPROGESTERone (PROVERA) 10 MG tablet Take 1 tablet (10 mg total) by mouth once daily.    montelukast (SINGULAIR) 10 mg tablet Take 1 tablet (10 mg total) by mouth once daily.    nitroGLYCERIN (NITROSTAT) 0.4 MG SL tablet Place 1 tablet (0.4 mg total) under the tongue every 5 (five) minutes as needed for Chest pain.    tiZANidine (ZANAFLEX) 2 MG tablet TAKE 1 TO 2 TABLETS BY MOUTH AT BEDTIME AS NEEDED FOR MUSCLE SPASMS AND PAIN     No current facility-administered medications for this visit.       Vitamins and Supplements:  MVI    Labs:  Patient confirms she is taking lipitor 40mg for cholesterol control.    Lab Results   Component Value Date    CHOL 184 04/13/2022     Lab Results   Component Value Date    HDL 42 04/13/2022     Lab Results   Component Value Date    LDLCALC 107.8 04/13/2022     Lab Results   Component Value Date    TRIG 171 (H) 04/13/2022     Lab Results   Component Value Date    CHOLHDL 22.8 04/13/2022         Lab Results   Component Value Date    GLUF 189 (H) 04/13/2022     Lab Results   Component Value Date    HGBA1C 10.9 (H) 04/13/2022       Nutrition/Diet History:  Patient eats 2-3 meals daily.    Seasons food with pepper/spices.  Patient denies use of a salt shaker at the table on prepared foods.   Dines out 2 per week at restaurants such as [employer].  Gets meal prep from [employer]  Chooses fried foods 1-2 time(s) per weeks.    Chooses fish 1-2 time(s) per week.   Beverages:  water, ICE, diet soda/green tea  Alcohol: none    24 Hour Recall:  Breakfast: Glucerna  Lunch:Fried shrimp/catfish, corn, salad with italian  Dinner:leftovers from lunch  Other: NA    Difficulty Chewing or Swallowing: no  Current Exercise: See Exercise Physiologist Note  Food Safety/Food Preparation: self  Living Arrangements/Family Support: Lives  alone  Cultural/Spiritual/Personal Preferences: not applicable   Barriers to Education: none identified  Stage of Change Related to Diet Habits: Contemplation    Nutrition Diagnosis:  Food and nutrition related knowledge deficit related to the lack of prior nutrition education as evidenced by diet history and 24 hour recall    Nutrition Plan:   Goals:  LDL-C < 70 (for high risk patients)  Hgb A1c < 7%  BMI < 25 and abdominal girth < 40M/<35 F  2 gram sodium, Mediterranean diet  Rehab weight goal: 10-15lbs  Fish intake (non-fried varieties) to a goal of 2-3 servings per week.   Increase fruit and vegetable intake    Interventions/Recommendations:  Lab results reviewed and discussed  Nutrition Prescription:  Total Energy Estimated Needs: 4028-7610 Kcal/d for weight loss  Method for Estimating Needs: 20-25kcal/kg ABW  Total Protein Estimated Needs: 51-77 g/d  Method for Estimating Needs: 0.8-1.2 g/Kg ABW  Total Fluid Estimated Needs: 1 mL/Kcal  Dietitian Consult: No  Patient to participate in Cardiac Rehab sessions three times a week  Weekly Dietitian Weight Check  Encouraged patient to complete 3 day food diary  Follow Up Plan for Ongoing Self-Management Support    Education:  Mediterranean Diet; verbalizes understanding; Date: 4/26/22  Person taught: patient  Preferred Learning Method: Verbal and written  Education Needed/Provided: Nutrition counseling and education related to cardiac rehabilitation  Education Method: Weekly nutrition lectures on the Mediterranean diet, cooking, shopping, and dining out  Written Materials Provided: 3 Day Food Record, Introduction to Mediterranean Diet  Strategies Implemented: Motivational interviewing, Goal setting, Self-Monitoring, and Problem Solving    Comments:   Discussed ways to incorporate healthy snacks, eating on a schedule, and monitoring sodium intake for heart health.  Encouraged increased produce    Diabetes  Is the patient diabetic?   Yes   Other Core  "Components/Diabetes Assessment:   Labs:  Lab Results   Component Value Date    GLUF 189 (H) 04/13/2022    GLUF 176 (H) 12/20/2014    GLUF 234 (H) 11/25/2014     Lab Results   Component Value Date    HGBA1C 10.9 (H) 04/13/2022    HGBA1C 10.8 (H) 11/18/2021    HGBA1C 11.8 (H) 09/28/2021      Lab Results   Component Value Date    ESTIMATEDAVG 266 (H) 04/13/2022    ESTIMATEDAVG 263 (H) 11/18/2021    ESTIMATEDAVG 292 (H) 09/28/2021       History of diabetes since 2010  Diabetes medications: Glimepiride 4mg daily, Jardiance 25mg daily, Trulicity 1.5mg injection once weekly (pt admits to being noncompliant with this r/t c/o GI issues)  Blood Glucose Checks at Home: Yes: Other: does not check consistently "every few days"  Endocrinologist or PCP following DM: Dr. Marin- endocrinology    Other Core Components/Diabetes Plan:   Goals:  Hgb A1c < 7%  Exercise Blood Glucose: 100-300 mg/dl    Interventions:  Reviewed and Discussed Labs  Medication Compliance  Med Card Reconciled  Low Sodium, Mediterranean, ADA Diet  Weight Management  Physical Activity  Increase Knowledge of Contributory Factors  Home Monitoring    Education:  Mediterranean Diet; verbalizes understanding; Date: 4/26/22    Comments:   Patient verbalizes understanding to bring home glucometer and check glucose pre and post each exercise session.  Per cardiac rehab protocols, patient's glucose must be between 90 and 270 mg/dL to exercise.  Patient denies any recent glucose levels less than 60 mg/dL or greater than 300 mg/dL. Patient verbalizes importance of notifying rehab staff if symptoms of hypoglycemia occur while at cardiac rehab.  Abnormal labs will be reported to patient's PCP/Endocrinologist by rehab staff.    Noted updated glucose parameters of 100-300     Emphasized importance of regular glucose checking    RD contact information provided.      Elida Mota MS, RDN/LDN    Session: Orientation Cardiac Rehab Individual Treatment Plan - Initial " Assessment      Patient Name: Shanae Kumari MRN: [id number]   : 1965   Age: 57 y.o.   Date of Event: 2021   Primary Diagnosis: PTCA/Stent    EF: 65%    Physical Assessment:   There were no vitals taken for this visit.    ASSESSMENT:  Heart Sounds: regular rate and rhythm  Prosthetic Valve: No  Lung Sounds: normal air entry, lungs clear to auscultation  Capillary Refill: normal  Left Radial Pulse: Normal (+2)  Right Radial Pulse: Normal (+2)  Left Pedal Pulse: Normal (+2)  Right Pedal Pulse: Normal (+2)  Right Edema: none  Left Edema none  Strength: good  Range of Motion: full range of motion  Existing Limitations:      Site   [] Arthritis, bursitis    [] Amputation, atrophy    [x] Other: ACL tear right knee, Labrum tear right hip, pt is being seen for PT for low back pain.   []       Diabetic patient's foot examination comments: Normal -  Bilateral  Incisional site: N/A  Special needs: N/A    Psychosocial Assessment:   Outcome Survey Tools:    BRITTNY SCORES:   PRE   Anxiety 3   Depression 1   Somatic 4   Hostility 2     SF-36 SCORES:   PRE   Physical Function 19   Social Function 8   Mental Health 25   Pain 6   Change in Health 2   Physical Role Limitation 1   Mental Role Limitation 3   Energy/Fatigue 9   Health Perceptions 12   Total Score 85     PHQ-9:  PHQ-9 Depression Patient Health Questionnaire 2022   Over the last two weeks how often have you been bothered by little interest or pleasure in doing things 0   Over the last two weeks how often have you been bothered by feeling down, depressed or hopeless 0   Over the last two weeks how often have you been bothered by trouble falling or staying asleep, or sleeping too much 1   Over the last two weeks how often have you been bothered by feeling tired or having little energy 1   Over the last two weeks how often have you been bothered by a poor appetite or overeating 1   Over the last two weeks how often have you been bothered by feeling bad  about yourself - or that you are a failure or have let yourself or your family down 0   Over the last two weeks how often have you been bothered by trouble concentrating on things, such as reading the newspaper or watching television 0   Over the last two weeks how often have you been bothered by moving or speaking so slowly that other people could have noticed. 0   Over the last two weeks how often have you been bothered by thoughts that you would be better off dead, or of hurting yourself 0   If you checked off any problems, how difficult have these problems made it for you to do your work, take care of things at home or get along with other people? Somewhat difficult   Total Score 3              Living Arrangements: Lives alone  Family Support: family  Self Reported: Effective Coping Skills, Stress, Chronic Pain and Sleep Pattern Disturbances  Displays: happiness and smiles often  Medication:  Pt was on Zoloft but stopped it herself.  Pt states is was not helping. Pt takes melatonin for sleep.    Psychosocial Plan:   Goals:  Improved psychosocial coping strategies  Reduce manifestation of depression  Reduce manifestation of stress  Maintain positive outlook  Improve overall quality of life    Interventions/Recommendations:  Discussed Results of Surveys  Patient to Self Report Emotional Changes at Session Check In  Recommend Physical Activity  Recommend Attending Education Lectures  Notify MD: No  Program Referral: Declined  Pharmaceutical Intervention/Therapy: Declined  Other Needs: not applicable  Stage of Readiness to Change: Preparation    Education:  Signs and symptoms of depression, verbalizes understanding; Date:4/26/2022  Stress, verbalizes understanding; Date:4/26/2022    Comments:  Pt denies overwhelming stress or anxiety.  Patient has been instructed to notify staff in the event that circumstances worsen.  Patient verbalizes understanding.    Other Core Components/Risk Factors Assessment:   RISK  FACTORS:  diabetes, hyperlipidemia, hypertension, obesity, stress    Learning Barriers: None    Education Level:  Post-College Graduate Degree    Pre-test Score: 60    Medication Compliance: has not been compliant with taking medications due to the following: side effects    Other Core Components/Risk Factors Plan:   Goals:  Decrease cholesterol level: In Progress  Increase exercise tolerance: In Progress  Decrease blood pressure: Met  Weight loss: In Progress  Control diabetes by adjusting diet and exercise: In Progress  Learn more about healthy eating: In Progress    Interventions/Recommendations:  Recommend regular attendance for Cardiac Rehab: Exercise and Education Lectures  Encourage medication compliance  Individual Education/ Counseling: Yes  Physician Referral: No    Education:    diabetes, verbalizes understanding; Date: 4/26/2022  physical activity, verbalizes understanding; Date: 4/26/2022  risk factors, verbalizes understanding; Date: 4/26/2022         Education method adapted to patients education level and preferred method of learning.  Method: explanation    Comments:  Pt is pleasant and verbalizes interest in changing her lifestyle to reduce her cardiovascular risks.    Other Core Components/Hypertension Assessment:   Resting BP: 90/56  BP Readings from Last 1 Encounters:   06/08/22 (!) 153/95         BP Diagnosis: Hypertensive  Patient reported symptoms: none    Other Core Components/Hypertension Plan:   Goals:  Blood Pressure <130/80    Interventions/Recommendations:  Med Card Reconciled: Yes  Encourage medication compliance  Encourage sodium reduction  Encourage weight loss  Recommend physical activity  Educate on contributory factors  Reduce stress, anxiety, anger, depression, and/or chronic pain  Encourage home blood pressure monitoring  Recommend daily weights  MD notified/Physician Referral: No    Education:    Hypertension; verbalizes understanding; Date: 4/26/2022  Risk Factors; verbalizes  understanding; Date: 4/26/2022         Comments:  Pt will take her B/P daily at home and record log. Discussed with pt separation of times for taking her two B/P medication and hydration to prevent low pressure in the morning.      Does the patient have Heart Failure? No    Other Core Components/Tobacco Cessation Assessment:   Smoking Status: lifetime non-smoker  Smoking Cessation Barriers:  none  Stage of Readiness to Change: Maintenance    Other Core Components/Tobacco Cessation Plan:   Goals:  Maintain non-smoking status    Interventions:  Maintains non-smoking status    Education:    Risk Factors; verbalizes understanding; Date: 4/26/2022  Benefits of Cardiac Rehab; verbalizes understanding; Date: 4/26/2022         Comments:  Pt expresses no desire to smoke.    Discussed Cardiac Rehab program in depth with patient.  Medication list updated per patient & marked as reviewed.  Patient has been instructed to notify staff of any problems while attending rehab (ie: chest pain, shortness of breath, lightheadedness, dizziness).  Patient has been instructed to monitor blood pressure readings outside of rehab & to keep a daily log of the readings.  Patient verbalizes understanding.    Luis Urban RN

## 2022-09-28 ENCOUNTER — PATIENT MESSAGE (OUTPATIENT)
Dept: SPORTS MEDICINE | Facility: CLINIC | Age: 57
End: 2022-09-28
Payer: COMMERCIAL

## 2022-09-28 ENCOUNTER — TELEPHONE (OUTPATIENT)
Dept: SPORTS MEDICINE | Facility: CLINIC | Age: 57
End: 2022-09-28
Payer: COMMERCIAL

## 2022-10-07 ENCOUNTER — OFFICE VISIT (OUTPATIENT)
Dept: CARDIOLOGY | Facility: CLINIC | Age: 57
End: 2022-10-07
Payer: COMMERCIAL

## 2022-10-07 VITALS
SYSTOLIC BLOOD PRESSURE: 134 MMHG | HEIGHT: 64 IN | BODY MASS INDEX: 34.05 KG/M2 | OXYGEN SATURATION: 98 % | WEIGHT: 199.44 LBS | RESPIRATION RATE: 15 BRPM | DIASTOLIC BLOOD PRESSURE: 78 MMHG | HEART RATE: 118 BPM

## 2022-10-07 DIAGNOSIS — I25.10 CORONARY ARTERY DISEASE, UNSPECIFIED VESSEL OR LESION TYPE, UNSPECIFIED WHETHER ANGINA PRESENT, UNSPECIFIED WHETHER NATIVE OR TRANSPLANTED HEART: ICD-10-CM

## 2022-10-07 DIAGNOSIS — E66.01 MORBID OBESITY: ICD-10-CM

## 2022-10-07 DIAGNOSIS — I10 ESSENTIAL HYPERTENSION: Primary | Chronic | ICD-10-CM

## 2022-10-07 DIAGNOSIS — E78.5 HYPERLIPIDEMIA, UNSPECIFIED HYPERLIPIDEMIA TYPE: Chronic | ICD-10-CM

## 2022-10-07 DIAGNOSIS — R07.9 CHEST PAIN, UNSPECIFIED TYPE: ICD-10-CM

## 2022-10-07 DIAGNOSIS — J45.20 MILD INTERMITTENT ASTHMA WITHOUT COMPLICATION: Chronic | ICD-10-CM

## 2022-10-07 PROCEDURE — 1159F MED LIST DOCD IN RCRD: CPT | Mod: CPTII,S$GLB,, | Performed by: INTERNAL MEDICINE

## 2022-10-07 PROCEDURE — 1159F PR MEDICATION LIST DOCUMENTED IN MEDICAL RECORD: ICD-10-PCS | Mod: CPTII,S$GLB,, | Performed by: INTERNAL MEDICINE

## 2022-10-07 PROCEDURE — 3075F SYST BP GE 130 - 139MM HG: CPT | Mod: CPTII,S$GLB,, | Performed by: INTERNAL MEDICINE

## 2022-10-07 PROCEDURE — 99999 PR PBB SHADOW E&M-EST. PATIENT-LVL V: CPT | Mod: PBBFAC,,, | Performed by: INTERNAL MEDICINE

## 2022-10-07 PROCEDURE — 3046F PR MOST RECENT HEMOGLOBIN A1C LEVEL > 9.0%: ICD-10-PCS | Mod: CPTII,S$GLB,, | Performed by: INTERNAL MEDICINE

## 2022-10-07 PROCEDURE — 3078F DIAST BP <80 MM HG: CPT | Mod: CPTII,S$GLB,, | Performed by: INTERNAL MEDICINE

## 2022-10-07 PROCEDURE — 3078F PR MOST RECENT DIASTOLIC BLOOD PRESSURE < 80 MM HG: ICD-10-PCS | Mod: CPTII,S$GLB,, | Performed by: INTERNAL MEDICINE

## 2022-10-07 PROCEDURE — 3008F BODY MASS INDEX DOCD: CPT | Mod: CPTII,S$GLB,, | Performed by: INTERNAL MEDICINE

## 2022-10-07 PROCEDURE — 3008F PR BODY MASS INDEX (BMI) DOCUMENTED: ICD-10-PCS | Mod: CPTII,S$GLB,, | Performed by: INTERNAL MEDICINE

## 2022-10-07 PROCEDURE — 93000 ELECTROCARDIOGRAM COMPLETE: CPT | Mod: S$GLB,,, | Performed by: INTERNAL MEDICINE

## 2022-10-07 PROCEDURE — 93000 EKG 12-LEAD: ICD-10-PCS | Mod: S$GLB,,, | Performed by: INTERNAL MEDICINE

## 2022-10-07 PROCEDURE — 99214 PR OFFICE/OUTPT VISIT, EST, LEVL IV, 30-39 MIN: ICD-10-PCS | Mod: S$GLB,,, | Performed by: INTERNAL MEDICINE

## 2022-10-07 PROCEDURE — 3046F HEMOGLOBIN A1C LEVEL >9.0%: CPT | Mod: CPTII,S$GLB,, | Performed by: INTERNAL MEDICINE

## 2022-10-07 PROCEDURE — 99214 OFFICE O/P EST MOD 30 MIN: CPT | Mod: S$GLB,,, | Performed by: INTERNAL MEDICINE

## 2022-10-07 PROCEDURE — 1160F RVW MEDS BY RX/DR IN RCRD: CPT | Mod: CPTII,S$GLB,, | Performed by: INTERNAL MEDICINE

## 2022-10-07 PROCEDURE — 1160F PR REVIEW ALL MEDS BY PRESCRIBER/CLIN PHARMACIST DOCUMENTED: ICD-10-PCS | Mod: CPTII,S$GLB,, | Performed by: INTERNAL MEDICINE

## 2022-10-07 PROCEDURE — 3075F PR MOST RECENT SYSTOLIC BLOOD PRESS GE 130-139MM HG: ICD-10-PCS | Mod: CPTII,S$GLB,, | Performed by: INTERNAL MEDICINE

## 2022-10-07 PROCEDURE — 99999 PR PBB SHADOW E&M-EST. PATIENT-LVL V: ICD-10-PCS | Mod: PBBFAC,,, | Performed by: INTERNAL MEDICINE

## 2022-10-07 NOTE — PROGRESS NOTES
REASON FOR CONSULT:   Shanae Kumari is a 56 y.o. female who presents for evaluation of chest tightness     HISTORY OF PRESENT ILLNESS:      Patient is a pleasant 55-year-old lady.  Medical history significant for diabetes, hypertension, obesity.  Had an episode of chest tightness.  Couple of times.  Mostly at rest.  Self limiting.  No radiation.  EKG done.  Personally reviewed.  Normal sinus rhythm without acute ischemic changes.  Denies orthopnea, PND, swelling of feet.     Notes from April 2021:  Follow-up.  Mildly abnormal stress test.  Has been chest pain-free.     The left ventricle is normal in size with mild concentric hypertrophy and normal systolic function.  The estimated ejection fraction is 65%.  Normal right ventricular size with normal right ventricular systolic function.  The estimated PA systolic pressure is 14 mmHg.          Abnormal myocardial perfusion scan.    There is a mild intensity, small sized, reversible defect that is consistent with ischemia in the distal anterior wall(s).    The visually estimated ejection fraction is normal during stress.    There is normal wall motion post stress.        Notes from July 2021:  Patient here for follow-up.  No chest pains.  Dyspnea on exertion unchanged.  EKG done personally reviewed and demonstrates normal sinus rhythm, cannot rule out anterior infarct with poor R-wave progression.     Notes from September 21:  Patient here for follow-up.  CTA demonstrated mid LAD 70% lesion.  Patient states she has been mostly sedentary and no chest pain at rest.  She does not exercise, however does walk around a lot for her job.    Dec 21: fu after recent pci  No complications. No cp, orthopnea, pnd      The Prox LAD to Mid LAD lesion was 80% stenosed with 0% stenosis post-intervention.  A stent was successfully placed at 12 [address]N for 10 sec.  The estimated blood loss was <50 mL.  There was single vessel coronary artery disease.     Successful IVUS guided PCI of  prox-mid LAD with CARMEN with excellent angiographic results        Coronary angiogram:     Left main: no significant stenosis     LAD: prox-mid LAD 80% stenosis     LCX: Luminal irregularities     RCA: Luminal irregularities     Access:     We accessed the right radial artery using ultrasound guidance. The vessel appeared widely patent, suitable for endovascular access. The images were interpreted by me and saved.  Access was closed with radial band.        Notes from October 2022: Patient here for follow-up.  Can walk a block without any chest pains or tightness.  Going for knee surgery           PAST MEDICAL HISTORY:           Past Medical History:   Diagnosis Date    Asthma      BRCA negative      Diabetes mellitus      Hypertension           PAST SURGICAL HISTORY:            Past Surgical History:   Procedure Laterality Date    KNEE ARTHROSCOPY        knee surgery             ALLERGIES AND MEDICATION:            Review of patient's allergies indicates:   Allergen Reactions    Allergenic extracts Hives, Itching, Other (See Comments), Rash and Shortness Of Breath    Dog dander Hives, Shortness Of Breath, Itching, Swelling and Rash       Cat's dander, dust,  pollen    Losartan Other (See Comments)       angioedema    Mold Itching and Shortness Of Breath    Metformin Other (See Comments)          Medication List            Accurate as of September 29, 2021  3:21 PM. If you have any questions, ask your nurse or doctor.              CONTINUE taking these medications    albuterol 90 mcg/actuation inhaler  Commonly known as: PROAIR HFA  Inhale 2 puffs into the lungs every 4 (four) hours as needed for Wheezing.      aspirin 81 MG Chew  Take 1 tablet (81 mg total) by mouth once daily.      atorvastatin 40 MG tablet  Commonly known as: LIPITOR  Take 1 tablet (40 mg total) by mouth every evening.      clotrimazole-betamethasone 1-0.05% cream  Commonly known as: LOTRISONE  Apply topically 2 (two) times daily. Apply to foot       diltiaZEM HCl 240 mg 24 hr tablet  Commonly known as: MATZIM LA  TAKE 1 TABLET (240 MG TOTAL) BY MOUTH ONCE DAILY.      doxazosin 4 MG tablet  Commonly known as: CARDURA  TAKE 1 TABLET BY MOUTH ONCE NIGHTLY      fluticasone propionate 50 mcg/actuation nasal spray  Commonly known as: FLONASE  1 spray (50 mcg total) by Each Nostril route once daily.      glimepiride 4 MG tablet  Commonly known as: AMARYL  TAKE 1 TABLET (4 MG TOTAL) BY MOUTH BEFORE BREAKFAST.      medroxyPROGESTERone 10 MG tablet  Commonly known as: PROVERA  Take 1 tablet (10 mg total) by mouth once daily.      meloxicam 15 MG tablet  Commonly known as: MOBIC  TAKE 1 TABLET BY MOUTH EVERY DAY      metoprolol tartrate 50 MG tablet  Commonly known as: LOPRESSOR  TAKE 1 TABLET (50 MG TOTAL) BY MOUTH AS NEEDED (USE IF NEEDED PRIOR TO CTA).      montelukast 10 mg tablet  Commonly known as: SINGULAIR  Take 1 tablet (10 mg total) by mouth once daily.      nitroGLYCERIN 0.4 MG SL tablet  Commonly known as: NITROSTAT  Place 1 tablet (0.4 mg total) under the tongue every 5 (five) minutes as needed for Chest pain.      sertraline 50 MG tablet  Commonly known as: ZOLOFT  TAKE 1 TABLET BY MOUTH EVERY DAY      TRULICITY 1.5 mg/0.5 mL pen injector  Generic drug: dulaglutide  Inject 1.5 mg into the skin once a week.                SOCIAL HISTORY:      Social History               Socioeconomic History    Marital status: Unknown       Spouse name: Not on file    Number of children: Not on file    Years of education: Not on file    Highest education level: Not on file   Occupational History    Not on file   Tobacco Use    Smoking status: Never Smoker    Smokeless tobacco: Never Used   Substance and Sexual Activity    Alcohol use: No    Drug use: No    Sexual activity: Not Currently   Other Topics Concern    Not on file   Social History Narrative    Not on file      Social Determinants of Health          Financial Resource Strain: Low Risk     Difficulty of Paying  Living Expenses: Not very hard   Food Insecurity: No Food Insecurity    Worried About Running Out of Food in the Last Year: Never true    Ran Out of Food in the Last Year: Never true   Transportation Needs: No Transportation Needs    Lack of Transportation (Medical): No    Lack of Transportation (Non-Medical): No   Physical Activity: Inactive    Days of Exercise per Week: 0 days    Minutes of Exercise per Session: 0 min   Stress: No Stress Concern Present    Feeling of Stress : Not at all   Social Connections: Unknown    Frequency of Communication with Friends and Family: Three times a week    Frequency of Social Gatherings with Friends and Family: Never    Attends [employer] Services: Not on file    Active Member of Clubs or Organizations: Yes    Attends Club or Organization Meetings: Never    Marital Status: Patient refused            FAMILY HISTORY:            Family History   Problem Relation Age of Onset    Breast cancer Other      Diabetes Mother      COPD Mother      Cancer Mother           lung    Diabetes Father      Heart disease Father      Hypertension Father      Hyperlipidemia Father      Kidney disease Father      Cancer Sister      Diabetes Brother      Hypertension Brother      Kidney disease Brother           diabetic    Diabetes Maternal Aunt      Diabetes Maternal Uncle      Diabetes Maternal Grandmother      Colon cancer Neg Hx      Ovarian cancer Neg Hx      Stroke Neg Hx           REVIEW OF SYSTEMS:   Review of Systems   Constitutional: Negative.   HENT: Negative.    Eyes: Negative.    Cardiovascular: Positive for dyspnea on exertion.   Respiratory: Negative.    Endocrine: Negative.    Hematologic/Lymphatic: Negative.    Skin: Negative.    Musculoskeletal: Negative.    Gastrointestinal: Negative.    Genitourinary: Negative.    Neurological: Negative.    Psychiatric/Behavioral: Negative.    Allergic/Immunologic: Negative.          A 10 point review of systems was performed and all the pertinent  "positives have been mentioned. Rest of review of systems was negative.           PHYSICAL EXAM:          Vitals:     09/29/21 1509   BP: 132/74   Pulse: 91    Body mass index is 34.83 kg/m².  Weight: 90.6 kg (199 lb 11.8 oz)   Height: 5' 3.5" (161.3 cm)      Physical Exam  Constitutional:       Appearance: Normal appearance. She is well-developed.   HENT:      Head: Normocephalic.   Eyes:      Pupils: Pupils are equal, round, and reactive to light.   Cardiovascular:      Rate and Rhythm: Normal rate and regular rhythm.   Pulmonary:      Effort: Pulmonary effort is normal.      Breath sounds: Normal breath sounds.   Abdominal:      General: Bowel sounds are normal.      Palpations: Abdomen is soft.      Tenderness: There is no abdominal tenderness.   Musculoskeletal:         General: Normal range of motion.      Cervical back: Normal range of motion and neck supple.   Skin:     General: Skin is warm.   Neurological:      Mental Status: She is alert and oriented to person, place, and time.             DATA:      Laboratory:  CBC:       Recent Labs   Lab 08/26/20  1151 09/28/21  0859   WBC 8.64 8.53   Hemoglobin 14.0 14.5   Hematocrit 43.7 42.7   Platelets 296 336         CHEMISTRIES:        Recent Labs   Lab 08/26/20  1151 12/17/20  1220 09/28/21  0859   Glucose 183 H 318 H 137 H   Sodium 129 L 138 133 L   Potassium 3.9 4.2 4.3   BUN 12 20 15   Creatinine 0.7 0.9 0.7   eGFR if African American >60.0 >60.0 >60.0   eGFR if non African American >60.0 >60.0 >60.0   Calcium 8.9 9.8 9.1         CARDIAC BIOMARKERS:         COAGS:         LIPIDS/LFTS:         Recent Labs   Lab 03/04/19  0803 06/25/19  1352 08/26/20  1151 09/28/21  0859   Cholesterol 169  --  169 163   Triglycerides 302 H  --  215 H 237 H   HDL 42  --  47 39 L   LDL Cholesterol 66.6  --  79.0 76.6   Non-HDL Cholesterol 127  --  122 124   AST  --  19 23 24   ALT  --  21 26 23                 Hemoglobin A1C   Date Value Ref Range Status   09/28/2021 11.8 (H) 4.0 " - 5.6 % Final       Comment:       ADA Screening Guidelines:  5.7-6.4%  Consistent with prediabetes  >or=6.5%  Consistent with diabetes     High levels of fetal hemoglobin interfere with the HbA1C  assay. Heterozygous hemoglobin variants (HbS, HgC, etc)do  not significantly interfere with this assay.   However, presence of multiple variants may affect accuracy.      12/17/2020 9.4 (H) 4.0 - 5.6 % Final       Comment:       ADA Screening Guidelines:  5.7-6.4%  Consistent with prediabetes  >or=6.5%  Consistent with diabetes  High levels of fetal hemoglobin interfere with the HbA1C  assay. Heterozygous hemoglobin variants (HbS, HgC, etc)do  not significantly interfere with this assay.   However, presence of multiple variants may affect accuracy.      08/26/2020 11.3 (H) 4.0 - 5.6 % Final       Comment:       ADA Screening Guidelines:  5.7-6.4%  Consistent with prediabetes  >or=6.5%  Consistent with diabetes  High levels of fetal hemoglobin interfere with the HbA1C  assay. Heterozygous hemoglobin variants (HbS, HgC, etc)do  not significantly interfere with this assay.   However, presence of multiple variants may affect accuracy.            TSH         The 10-year ASCVD risk score (Domingoronel GIBBONS Jr., et al., 2013) is: 5.6%    Values used to calculate the score:      Age: 56 years      Sex: Female      Is Non- : No      Diabetic: Yes      Tobacco smoker: No      Systolic Blood Pressure: 132 mmHg      Is BP treated: Yes      HDL Cholesterol: 39 mg/dL      Total Cholesterol: 163 mg/dL                  ASSESSMENT AND PLAN          Patient Active Problem List   Diagnosis    LSIL (low grade squamous intraepithelial lesion) on Pap smear    Asthma, mild intermittent    Essential hypertension    Hyperlipidemia    Right hip pain    Acetabular labrum tear    Type 2 diabetes mellitus with microalbuminuria, without long-term current use of insulin    Non-seasonal allergic rhinitis    Slow transit constipation     Obesity, Class I, BMI 30-34.9         Patient with multiple risk factors for coronary artery disease. Now s/p pci. Lad. Doing fine.    Exercise tolerance greater than 4 Mets.  May proceed for knee surgery.  No further cardiac workup needed prior to surgery.  May hold Plavix as needed for knee surgery.  Please to not hold aspirin unless absolutely necessary.    Aspirin 81 mg qd indefinitely, plavix 75 mg qd x 12 m from date of PCI.    Statins      Follow-up in 3 months.

## 2022-10-10 ENCOUNTER — PATIENT MESSAGE (OUTPATIENT)
Dept: ADMINISTRATIVE | Facility: HOSPITAL | Age: 57
End: 2022-10-10
Payer: COMMERCIAL

## 2022-10-13 ENCOUNTER — PATIENT MESSAGE (OUTPATIENT)
Dept: SPORTS MEDICINE | Facility: CLINIC | Age: 57
End: 2022-10-13
Payer: COMMERCIAL

## 2022-10-13 ENCOUNTER — DOCUMENTATION ONLY (OUTPATIENT)
Dept: CARDIAC REHAB | Facility: CLINIC | Age: 57
End: 2022-10-13
Payer: COMMERCIAL

## 2022-10-13 ENCOUNTER — TELEPHONE (OUTPATIENT)
Dept: SPORTS MEDICINE | Facility: CLINIC | Age: 57
End: 2022-10-13
Payer: COMMERCIAL

## 2022-10-13 NOTE — TELEPHONE ENCOUNTER
I called the patient to reschedule her pre op appointment. No answer I left a message to please call back.

## 2022-10-13 NOTE — PROGRESS NOTES
Shanae arrived for her orientation to the Phase II cardiac rehab program.  She has not started the exercise classes as of yet due to an ACL tear.  She will contact us when she is ready to start the program.    Session: Orientation   Cardiac Rehab Individual Treatment Plan - Initial Assessment      Patient Name: Shanae Kumari MRN: [id number]   : 1965   Age: 57 y.o.   Primary Diagnosis: PTCA/STENT  Date of Event: 21  EF: 65%  Risk Stratification: high  Referring Physician: GONZALO   Exercise Assessment:     CPX/TM Date: 22 Results   RHR 97   Max    Peak VO2 (CPX only) 16.7   Actual METS (CPX only) 4.8   Estimated METS 7.0     Anthropometrics    Height 64 inches   Weight 202 lbs   BMI 34.7   Abdominal Girth 47.1   Body Composition 31.0%     ST Depression noted on Stress Test?:No  Angina with exercise?: No   Fall Risk: No   Assistive Devices:  independent   Currently exercising? No   There were many limitations noted by the patient.  Shanae stated she has torn left ACL, chronic low back pain, and torn labrum on the right hip pain.  Exercise modalities will be adjusted.      Exercise Plan:   Goals:  CR Exercise Goals: Attend Cardiac Rehab 3 times/week: In Progress  Home Aerobic Exercise: 2 additional days/week for 30-60 minutes: In Progress  Intensity of 12-15 on the Rate of Perceived Exertion (RPE) scale: In Progress  30% increase in entry estimated METS: 9.1 : In Progress  5 days/week for 30-60 minutes: In Progress    Intervention:   Discussed importance of regular attendance to cardiac rehab class    Exercise Prescription:  THR Range 119-129   Mode: Treadmill  Recumbent Bike  Upright Bike  Nustep  Elliptical   Frequency:  3 days/week   Duration:  30 - 60 minutes   Intensity:  12 - 15 RPE   Resistance Training:  Yes: 0 to 5 lb weights with 10-15 reps based on strength and range of motion assessment     Home Prescription:  Mode Aerobic   Frequency: 2- 3 days/week   Duration: 30-60 minutes    Resistance Training: None        Education:  Orientation to Equipment; verbalizes understanding; Date: 22  Exercise Recommendations; verbalizes understanding; Date: 22  Exercise Safety; verbalizes understanding; Date: 22  Class Preparation: verbalizes understanding; Date: 22  Signs and symptoms to report: verbalizes understanding; Date: 22  Caffeine/Hydration: verbalizes understanding; Date: 22  Exercise Terminology: verbalizes understanding; Date: 22  Resistance Training: verbalizes understanding; Date: 22    Comments:  Shanae was encouraged to begin thinking about some type of aerobic exercise she can participate in at least 2 non-rehab days per week for at least 30 minutes in addition to attending Phase II cardiac rehab classes 3 days per week.  She stated understanding.    All consent forms were signed, proper attire and shoes were discussed.       Shanae will begin Cardiac Rehab on Friday, May 20 at 3:30pm.    The exercise prescription will be adjusted based on tolerance of exercise intensity by patient.    Tamara Posey, CEP    Orientation Cardiac Rehab Individual Treatment Plan - Initial Assessment      Patient Name: Shanae Kumari MRN: [id number]   : 1965   Age: 57 y.o.   Primary Diagnosis: s/p PTCA/stent, CAD, HTN, HLD, DM    Nutrition Assessment:     Anthropometrics    Height 64 inches   Weight 202 lbs   BMI 34.7   Abdominal Girth 47.1   Body Composition 31       Drug Allergies and Intolerances:  Review of patient's allergies indicates:   Allergen Reactions    Allergenic extracts Hives, Itching, Other (See Comments), Rash and Shortness Of Breath    Dog dander Hives, Shortness Of Breath, Itching, Swelling and Rash     Cat's dander, dust,  pollen    Losartan Other (See Comments)     angioedema    Mold Itching and Shortness Of Breath    Metformin Other (See Comments)       Food Allergies and Intolerances:  NA    Past Medical History:  Past Medical  History:   Diagnosis Date    Asthma     BRCA negative     Diabetes mellitus     Hypertension     Nausea after anesthesia 1989       Past Surgical History:  Past Surgical History:   Procedure Laterality Date    CORONARY ANGIOGRAPHY N/A 11/26/2021    Procedure: ANGIOGRAM, CORONARY ARTERY;  Surgeon: Ashu Horn MD;  Location: [facility] CATH LAB;  Service: Cardiology;  Laterality: N/A;    KNEE ARTHROSCOPY      knee surgery      ULTRASOUND GUIDANCE Right 11/26/2021    Procedure: ULTRASOUND GUIDANCE;  Surgeon: Ashu Horn MD;  Location: [facility] CATH LAB;  Service: Cardiology;  Laterality: Right;       Medications:  Current Outpatient Medications   Medication Sig    albuterol (PROAIR HFA) 90 mcg/actuation inhaler Inhale 2 puffs into the lungs every 4 (four) hours as needed for Wheezing.    aspirin 81 MG Chew Take 1 tablet (81 mg total) by mouth once daily.    atorvastatin (LIPITOR) 40 MG tablet Take 1 tablet (40 mg total) by mouth every evening.    blood sugar diagnostic (BLOOD GLUCOSE TEST) Strp 1 strip by Misc.(Non-Drug; Combo Route) route once daily.    blood-glucose meter kit Use as instructed to test blood sugar daily    clobetasol 0.05% (TEMOVATE) 0.05 % Oint AAA bid    clopidogreL (PLAVIX) 75 mg tablet Take 1 tablet (75 mg total) by mouth once daily.    diltiaZEM HCl (MATZIM LA) 240 mg 24 hr tablet Take 1 tablet (240 mg total) by mouth once daily.    dulaglutide (TRULICITY) 1.5 mg/0.5 mL pen injector Inject 1.5 mg into the skin once a week.    empagliflozin (JARDIANCE) 25 mg tablet Take 1 tablet (25 mg total) by mouth once daily.    flash glucose scanning reader (FREESTYLE EVELIO 2 READER) Misc 1 kit by Misc.(Non-Drug; Combo Route) route Daily.    flash glucose sensor (FREESTYLE EVELIO 2 SENSOR) Kit 1 each by Misc.(Non-Drug; Combo Route) route every 14 (fourteen) days.    fluticasone propionate (FLONASE) 50 mcg/actuation nasal spray 1 spray (50 mcg total) by Each Nostril route once daily.    glimepiride (AMARYL) 4 MG  tablet Take 1 tablet (4 mg total) by mouth before breakfast.    lancets 30 gauge Misc 1 lancet by Misc.(Non-Drug; Combo Route) route once daily.    medroxyPROGESTERone (PROVERA) 10 MG tablet Take 1 tablet (10 mg total) by mouth once daily.    montelukast (SINGULAIR) 10 mg tablet Take 1 tablet (10 mg total) by mouth once daily.    nitroGLYCERIN (NITROSTAT) 0.4 MG SL tablet Place 1 tablet (0.4 mg total) under the tongue every 5 (five) minutes as needed for Chest pain.    tiZANidine (ZANAFLEX) 2 MG tablet TAKE 1 TO 2 TABLETS BY MOUTH AT BEDTIME AS NEEDED FOR MUSCLE SPASMS AND PAIN     No current facility-administered medications for this visit.       Vitamins and Supplements:  MVI    Labs:  Patient confirms she is taking lipitor 40mg for cholesterol control.    Lab Results   Component Value Date    CHOL 184 04/13/2022     Lab Results   Component Value Date    HDL 42 04/13/2022     Lab Results   Component Value Date    LDLCALC 107.8 04/13/2022     Lab Results   Component Value Date    TRIG 171 (H) 04/13/2022     Lab Results   Component Value Date    CHOLHDL 22.8 04/13/2022         Lab Results   Component Value Date    GLUF 189 (H) 04/13/2022     Lab Results   Component Value Date    HGBA1C 10.9 (H) 04/13/2022       Nutrition/Diet History:  Patient eats 2-3 meals daily.    Seasons food with pepper/spices.  Patient denies use of a salt shaker at the table on prepared foods.   Dines out 2 per week at restaurants such as [employer].  Gets meal prep from [employer]  Chooses fried foods 1-2 time(s) per weeks.    Chooses fish 1-2 time(s) per week.   Beverages:  water, ICE, diet soda/green tea  Alcohol: none    24 Hour Recall:  Breakfast: Glucerna  Lunch:Fried shrimp/catfish, corn, salad with italian  Dinner:leftovers from lunch  Other: NA    Difficulty Chewing or Swallowing: no  Current Exercise: See Exercise Physiologist Note  Food Safety/Food Preparation: self  Living Arrangements/Family Support: Lives  alone  Cultural/Spiritual/Personal Preferences: not applicable   Barriers to Education: none identified  Stage of Change Related to Diet Habits: Contemplation    Nutrition Diagnosis:  Food and nutrition related knowledge deficit related to the lack of prior nutrition education as evidenced by diet history and 24 hour recall    Nutrition Plan:   Goals:  LDL-C < 70 (for high risk patients)  Hgb A1c < 7%  BMI < 25 and abdominal girth < 40M/<35 F  2 gram sodium, Mediterranean diet  Rehab weight goal: 10-15lbs  Fish intake (non-fried varieties) to a goal of 2-3 servings per week.   Increase fruit and vegetable intake    Interventions/Recommendations:  Lab results reviewed and discussed  Nutrition Prescription:  Total Energy Estimated Needs: 9435-9828 Kcal/d for weight loss  Method for Estimating Needs: 20-25kcal/kg ABW  Total Protein Estimated Needs: 51-77 g/d  Method for Estimating Needs: 0.8-1.2 g/Kg ABW  Total Fluid Estimated Needs: 1 mL/Kcal  Dietitian Consult: No  Patient to participate in Cardiac Rehab sessions three times a week  Weekly Dietitian Weight Check  Encouraged patient to complete 3 day food diary  Follow Up Plan for Ongoing Self-Management Support    Education:  Mediterranean Diet; verbalizes understanding; Date: 4/26/22  Person taught: patient  Preferred Learning Method: Verbal and written  Education Needed/Provided: Nutrition counseling and education related to cardiac rehabilitation  Education Method: Weekly nutrition lectures on the Mediterranean diet, cooking, shopping, and dining out  Written Materials Provided: 3 Day Food Record, Introduction to Mediterranean Diet  Strategies Implemented: Motivational interviewing, Goal setting, Self-Monitoring, and Problem Solving    Comments:   Discussed ways to incorporate healthy snacks, eating on a schedule, and monitoring sodium intake for heart health.  Encouraged increased produce    Diabetes  Is the patient diabetic?   Yes   Other Core  "Components/Diabetes Assessment:   Labs:  Lab Results   Component Value Date    GLUF 189 (H) 04/13/2022    GLUF 176 (H) 12/20/2014    GLUF 234 (H) 11/25/2014     Lab Results   Component Value Date    HGBA1C 10.9 (H) 04/13/2022    HGBA1C 10.8 (H) 11/18/2021    HGBA1C 11.8 (H) 09/28/2021      Lab Results   Component Value Date    ESTIMATEDAVG 266 (H) 04/13/2022    ESTIMATEDAVG 263 (H) 11/18/2021    ESTIMATEDAVG 292 (H) 09/28/2021       History of diabetes since 2010  Diabetes medications: Glimepiride 4mg daily, Jardiance 25mg daily, Trulicity 1.5mg injection once weekly (pt admits to being noncompliant with this r/t c/o GI issues)  Blood Glucose Checks at Home: Yes: Other: does not check consistently "every few days"  Endocrinologist or PCP following DM: Dr. Marin- endocrinology    Other Core Components/Diabetes Plan:   Goals:  Hgb A1c < 7%  Exercise Blood Glucose: 100-300 mg/dl    Interventions:  Reviewed and Discussed Labs  Medication Compliance  Med Card Reconciled  Low Sodium, Mediterranean, ADA Diet  Weight Management  Physical Activity  Increase Knowledge of Contributory Factors  Home Monitoring    Education:  Mediterranean Diet; verbalizes understanding; Date: 4/26/22    Comments:   Patient verbalizes understanding to bring home glucometer and check glucose pre and post each exercise session.  Per cardiac rehab protocols, patient's glucose must be between 90 and 270 mg/dL to exercise.  Patient denies any recent glucose levels less than 60 mg/dL or greater than 300 mg/dL. Patient verbalizes importance of notifying rehab staff if symptoms of hypoglycemia occur while at cardiac rehab.  Abnormal labs will be reported to patient's PCP/Endocrinologist by rehab staff.    Noted updated glucose parameters of 100-300     Emphasized importance of regular glucose checking    RD contact information provided.      Elida Mota MS, RDN/LDN    Session: Orientation Cardiac Rehab Individual Treatment Plan - Initial " Assessment      Patient Name: Shanae Kumari MRN: [id number]   : 1965   Age: 57 y.o.   Date of Event: 2021   Primary Diagnosis: PTCA/Stent    EF: 65%    Physical Assessment:   There were no vitals taken for this visit.    ASSESSMENT:  Heart Sounds: regular rate and rhythm  Prosthetic Valve: No  Lung Sounds: normal air entry, lungs clear to auscultation  Capillary Refill: normal  Left Radial Pulse: Normal (+2)  Right Radial Pulse: Normal (+2)  Left Pedal Pulse: Normal (+2)  Right Pedal Pulse: Normal (+2)  Right Edema: none  Left Edema none  Strength: good  Range of Motion: full range of motion  Existing Limitations:      Site   [] Arthritis, bursitis    [] Amputation, atrophy    [x] Other: ACL tear right knee, Labrum tear right hip, pt is being seen for PT for low back pain.   []       Diabetic patient's foot examination comments: Normal -  Bilateral  Incisional site: N/A  Special needs: N/A    Psychosocial Assessment:   Outcome Survey Tools:    BRITTNY SCORES:   PRE   Anxiety 3   Depression 1   Somatic 4   Hostility 2     SF-36 SCORES:   PRE   Physical Function 19   Social Function 8   Mental Health 25   Pain 6   Change in Health 2   Physical Role Limitation 1   Mental Role Limitation 3   Energy/Fatigue 9   Health Perceptions 12   Total Score 85     PHQ-9:  PHQ-9 Depression Patient Health Questionnaire 2022   Over the last two weeks how often have you been bothered by little interest or pleasure in doing things 0   Over the last two weeks how often have you been bothered by feeling down, depressed or hopeless 0   Over the last two weeks how often have you been bothered by trouble falling or staying asleep, or sleeping too much 1   Over the last two weeks how often have you been bothered by feeling tired or having little energy 1   Over the last two weeks how often have you been bothered by a poor appetite or overeating 1   Over the last two weeks how often have you been bothered by feeling bad  about yourself - or that you are a failure or have let yourself or your family down 0   Over the last two weeks how often have you been bothered by trouble concentrating on things, such as reading the newspaper or watching television 0   Over the last two weeks how often have you been bothered by moving or speaking so slowly that other people could have noticed. 0   Over the last two weeks how often have you been bothered by thoughts that you would be better off dead, or of hurting yourself 0   If you checked off any problems, how difficult have these problems made it for you to do your work, take care of things at home or get along with other people? Somewhat difficult   Total Score 3              Living Arrangements: Lives alone  Family Support: family  Self Reported: Effective Coping Skills, Stress, Chronic Pain and Sleep Pattern Disturbances  Displays: happiness and smiles often  Medication:  Pt was on Zoloft but stopped it herself.  Pt states is was not helping. Pt takes melatonin for sleep.    Psychosocial Plan:   Goals:  Improved psychosocial coping strategies  Reduce manifestation of depression  Reduce manifestation of stress  Maintain positive outlook  Improve overall quality of life    Interventions/Recommendations:  Discussed Results of Surveys  Patient to Self Report Emotional Changes at Session Check In  Recommend Physical Activity  Recommend Attending Education Lectures  Notify MD: No  Program Referral: Declined  Pharmaceutical Intervention/Therapy: Declined  Other Needs: not applicable  Stage of Readiness to Change: Preparation    Education:  Signs and symptoms of depression, verbalizes understanding; Date:4/26/2022  Stress, verbalizes understanding; Date:4/26/2022    Comments:  Pt denies overwhelming stress or anxiety.  Patient has been instructed to notify staff in the event that circumstances worsen.  Patient verbalizes understanding.    Other Core Components/Risk Factors Assessment:   RISK  FACTORS:  diabetes, hyperlipidemia, hypertension, obesity, stress    Learning Barriers: None    Education Level:  Post-College Graduate Degree    Pre-test Score: 60    Medication Compliance: has not been compliant with taking medications due to the following: side effects    Other Core Components/Risk Factors Plan:   Goals:  Decrease cholesterol level: In Progress  Increase exercise tolerance: In Progress  Decrease blood pressure: Met  Weight loss: In Progress  Control diabetes by adjusting diet and exercise: In Progress  Learn more about healthy eating: In Progress    Interventions/Recommendations:  Recommend regular attendance for Cardiac Rehab: Exercise and Education Lectures  Encourage medication compliance  Individual Education/ Counseling: Yes  Physician Referral: No    Education:    diabetes, verbalizes understanding; Date: 4/26/2022  physical activity, verbalizes understanding; Date: 4/26/2022  risk factors, verbalizes understanding; Date: 4/26/2022         Education method adapted to patients education level and preferred method of learning.  Method: explanation    Comments:  Pt is pleasant and verbalizes interest in changing her lifestyle to reduce her cardiovascular risks.    Other Core Components/Hypertension Assessment:   Resting BP: 90/56  BP Readings from Last 1 Encounters:   10/07/22 134/78         BP Diagnosis: Hypertensive  Patient reported symptoms: none    Other Core Components/Hypertension Plan:   Goals:  Blood Pressure <130/80    Interventions/Recommendations:  Med Card Reconciled: Yes  Encourage medication compliance  Encourage sodium reduction  Encourage weight loss  Recommend physical activity  Educate on contributory factors  Reduce stress, anxiety, anger, depression, and/or chronic pain  Encourage home blood pressure monitoring  Recommend daily weights  MD notified/Physician Referral: No    Education:    Hypertension; verbalizes understanding; Date: 4/26/2022  Risk Factors; verbalizes  understanding; Date: 4/26/2022         Comments:  Pt will take her B/P daily at home and record log. Discussed with pt separation of times for taking her two B/P medication and hydration to prevent low pressure in the morning.      Does the patient have Heart Failure? No    Other Core Components/Tobacco Cessation Assessment:   Smoking Status: lifetime non-smoker  Smoking Cessation Barriers:  none  Stage of Readiness to Change: Maintenance    Other Core Components/Tobacco Cessation Plan:   Goals:  Maintain non-smoking status    Interventions:  Maintains non-smoking status    Education:    Risk Factors; verbalizes understanding; Date: 4/26/2022  Benefits of Cardiac Rehab; verbalizes understanding; Date: 4/26/2022         Comments:  Pt expresses no desire to smoke.    Discussed Cardiac Rehab program in depth with patient.  Medication list updated per patient & marked as reviewed.  Patient has been instructed to notify staff of any problems while attending rehab (ie: chest pain, shortness of breath, lightheadedness, dizziness).  Patient has been instructed to monitor blood pressure readings outside of rehab & to keep a daily log of the readings.  Patient verbalizes understanding.    Luis Urban RN

## 2022-10-15 DIAGNOSIS — E11.29 TYPE 2 DIABETES MELLITUS WITH MICROALBUMINURIA, WITHOUT LONG-TERM CURRENT USE OF INSULIN: Chronic | ICD-10-CM

## 2022-10-15 DIAGNOSIS — R80.9 TYPE 2 DIABETES MELLITUS WITH MICROALBUMINURIA, WITHOUT LONG-TERM CURRENT USE OF INSULIN: Chronic | ICD-10-CM

## 2022-10-15 DIAGNOSIS — J45.20 MILD INTERMITTENT ASTHMA WITHOUT COMPLICATION: ICD-10-CM

## 2022-10-15 NOTE — TELEPHONE ENCOUNTER
Care Due:                  Date            Visit Type   Department     Provider  --------------------------------------------------------------------------------                                ESTABLISHED   [facility]                              PATIENT -    MED/ INTERNAL  Last Visit: 07-      VIRTUAL      MED/ PEDS      Tima Stanley                              MYCHART                              ANNUAL       [facility]                              CHECKUP/PHY  MED/ INTERNAL  Next Visit: 11-      S            MED/ PEDS      Tima Stanley                                                            Last  Test          Frequency    Reason                     Performed    Due Date  --------------------------------------------------------------------------------    HBA1C.......  6 months...  dulaglutide..............  04-   10-    [employer] Embedded Care Gaps. Reference number: [id number]. 10/15/2022   1:33:54 AM CDT

## 2022-10-16 NOTE — TELEPHONE ENCOUNTER
Refill Routing Note   Medication(s) are not appropriate for processing by [facility] for the following reason(s):      - Required laboratory values are outdated  - Required vitals are abnormal    [facility] action(s):  Defer Medication-related problems identified: Requires labs        Medication reconciliation completed: No     Appointments  past 12m or future 3m with PCP    Date Provider   Last Visit   7/6/2022 Tima Stanley MD   Next Visit   10/15/2022 Tima Stanley MD   ED visits in past 90 days: 0        Note composed:9:44 PM 10/15/2022

## 2022-10-17 RX ORDER — DULAGLUTIDE 1.5 MG/.5ML
1.5 INJECTION, SOLUTION SUBCUTANEOUS WEEKLY
Qty: 12 PEN | Refills: 0 | Status: SHIPPED | OUTPATIENT
Start: 2022-10-17 | End: 2023-02-20 | Stop reason: SDUPTHER

## 2022-10-17 RX ORDER — ALBUTEROL SULFATE 90 UG/1
2 AEROSOL, METERED RESPIRATORY (INHALATION) EVERY 4 HOURS PRN
Qty: 8.5 G | Refills: 0 | Status: SHIPPED | OUTPATIENT
Start: 2022-10-17 | End: 2023-05-25 | Stop reason: SDUPTHER

## 2022-10-20 DIAGNOSIS — L23.9 ALLERGIC CONTACT DERMATITIS, UNSPECIFIED TRIGGER: ICD-10-CM

## 2022-10-20 DIAGNOSIS — L40.0 PSORIASIS VULGARIS: ICD-10-CM

## 2022-10-20 LAB
BCS RECOMMENDATION EXT: NORMAL
PAP RECOMMENDATION EXT: ABNORMAL
PAP SMEAR: ABNORMAL

## 2022-10-21 RX ORDER — CLOBETASOL PROPIONATE 0.5 MG/G
OINTMENT TOPICAL
Qty: 60 G | Refills: 0 | Status: SHIPPED | OUTPATIENT
Start: 2022-10-21 | End: 2023-08-02

## 2022-11-03 ENCOUNTER — DOCUMENTATION ONLY (OUTPATIENT)
Dept: CARDIAC REHAB | Facility: CLINIC | Age: 57
End: 2022-11-03
Payer: COMMERCIAL

## 2022-11-03 NOTE — PROGRESS NOTES
Shanae arrived for her orientation to the Phase II cardiac rehab program.  She has not started the exercise classes as of yet due to an ACL tear.  She will contact us when she is ready to start the program.    Session: Orientation   Cardiac Rehab Individual Treatment Plan - Initial Assessment      Patient Name: Shanae Kumari MRN: [id number]   : 1965   Age: 57 y.o.   Primary Diagnosis: PTCA/STENT  Date of Event: 21  EF: 65%  Risk Stratification: high  Referring Physician: GONZALO   Exercise Assessment:     CPX/TM Date: 22 Results   RHR 97   Max    Peak VO2 (CPX only) 16.7   Actual METS (CPX only) 4.8   Estimated METS 7.0     Anthropometrics    Height 64 inches   Weight 202 lbs   BMI 34.7   Abdominal Girth 47.1   Body Composition 31.0%     ST Depression noted on Stress Test?:No  Angina with exercise?: No   Fall Risk: No   Assistive Devices:  independent   Currently exercising? No   There were many limitations noted by the patient.  Shanae stated she has torn left ACL, chronic low back pain, and torn labrum on the right hip pain.  Exercise modalities will be adjusted.      Exercise Plan:   Goals:  CR Exercise Goals: Attend Cardiac Rehab 3 times/week: In Progress  Home Aerobic Exercise: 2 additional days/week for 30-60 minutes: In Progress  Intensity of 12-15 on the Rate of Perceived Exertion (RPE) scale: In Progress  30% increase in entry estimated METS: 9.1 : In Progress  5 days/week for 30-60 minutes: In Progress    Intervention:   Discussed importance of regular attendance to cardiac rehab class    Exercise Prescription:  THR Range 119-129   Mode: Treadmill  Recumbent Bike  Upright Bike  Nustep  Elliptical   Frequency:  3 days/week   Duration:  30 - 60 minutes   Intensity:  12 - 15 RPE   Resistance Training:  Yes: 0 to 5 lb weights with 10-15 reps based on strength and range of motion assessment     Home Prescription:  Mode Aerobic   Frequency: 2- 3 days/week   Duration: 30-60 minutes    Resistance Training: None        Education:  Orientation to Equipment; verbalizes understanding; Date: 22  Exercise Recommendations; verbalizes understanding; Date: 22  Exercise Safety; verbalizes understanding; Date: 22  Class Preparation: verbalizes understanding; Date: 22  Signs and symptoms to report: verbalizes understanding; Date: 22  Caffeine/Hydration: verbalizes understanding; Date: 22  Exercise Terminology: verbalizes understanding; Date: 22  Resistance Training: verbalizes understanding; Date: 22    Comments:  Shanae was encouraged to begin thinking about some type of aerobic exercise she can participate in at least 2 non-rehab days per week for at least 30 minutes in addition to attending Phase II cardiac rehab classes 3 days per week.  She stated understanding.    All consent forms were signed, proper attire and shoes were discussed.       Shanae will begin Cardiac Rehab on Friday, May 20 at 3:30pm.    The exercise prescription will be adjusted based on tolerance of exercise intensity by patient.    Tamara Posey, CEP    Orientation Cardiac Rehab Individual Treatment Plan - Initial Assessment      Patient Name: Shanae Kumari MRN: [id number]   : 1965   Age: 57 y.o.   Primary Diagnosis: s/p PTCA/stent, CAD, HTN, HLD, DM    Nutrition Assessment:     Anthropometrics    Height 64 inches   Weight 202 lbs   BMI 34.7   Abdominal Girth 47.1   Body Composition 31       Drug Allergies and Intolerances:  Review of patient's allergies indicates:   Allergen Reactions    Allergenic extracts Hives, Itching, Other (See Comments), Rash and Shortness Of Breath    Dog dander Hives, Shortness Of Breath, Itching, Swelling and Rash     Cat's dander, dust,  pollen    Losartan Other (See Comments)     angioedema    Mold Itching and Shortness Of Breath    Metformin Other (See Comments)       Food Allergies and Intolerances:  NA    Past Medical History:  Past Medical  History:   Diagnosis Date    Asthma     BRCA negative     Diabetes mellitus     Hypertension     Nausea after anesthesia 1989       Past Surgical History:  Past Surgical History:   Procedure Laterality Date    CORONARY ANGIOGRAPHY N/A 11/26/2021    Procedure: ANGIOGRAM, CORONARY ARTERY;  Surgeon: Ashu Horn MD;  Location: [facility] CATH LAB;  Service: Cardiology;  Laterality: N/A;    KNEE ARTHROSCOPY      knee surgery      ULTRASOUND GUIDANCE Right 11/26/2021    Procedure: ULTRASOUND GUIDANCE;  Surgeon: Ashu Horn MD;  Location: [facility] CATH LAB;  Service: Cardiology;  Laterality: Right;       Medications:  Current Outpatient Medications   Medication Sig    albuterol (PROAIR HFA) 90 mcg/actuation inhaler Inhale 2 puffs into the lungs every 4 (four) hours as needed for Wheezing.    aspirin 81 MG Chew Take 1 tablet (81 mg total) by mouth once daily.    atorvastatin (LIPITOR) 40 MG tablet Take 1 tablet (40 mg total) by mouth every evening.    blood sugar diagnostic (BLOOD GLUCOSE TEST) Strp 1 strip by Misc.(Non-Drug; Combo Route) route once daily.    blood-glucose meter kit Use as instructed to test blood sugar daily    clobetasol 0.05% (TEMOVATE) 0.05 % Oint AAA bid  x 1-2 wks then prn flares only    clopidogreL (PLAVIX) 75 mg tablet Take 1 tablet (75 mg total) by mouth once daily.    diltiaZEM HCl (MATZIM LA) 240 mg 24 hr tablet Take 1 tablet (240 mg total) by mouth once daily.    dulaglutide (TRULICITY) 1.5 mg/0.5 mL pen injector Inject 1.5 mg into the skin once a week.    empagliflozin (JARDIANCE) 25 mg tablet Take 1 tablet (25 mg total) by mouth once daily.    flash glucose scanning reader (FREESTYLE EVELIO 2 READER) Misc 1 kit by Misc.(Non-Drug; Combo Route) route Daily.    flash glucose sensor (FREESTYLE EVELIO 2 SENSOR) Kit 1 each by Misc.(Non-Drug; Combo Route) route every 14 (fourteen) days.    fluticasone propionate (FLONASE) 50 mcg/actuation nasal spray 1 spray (50 mcg total) by Each Nostril route once  daily.    glimepiride (AMARYL) 4 MG tablet Take 1 tablet (4 mg total) by mouth before breakfast.    lancets 30 gauge Misc 1 lancet by Misc.(Non-Drug; Combo Route) route once daily.    medroxyPROGESTERone (PROVERA) 10 MG tablet Take 1 tablet (10 mg total) by mouth once daily.    montelukast (SINGULAIR) 10 mg tablet Take 1 tablet (10 mg total) by mouth once daily.    nitroGLYCERIN (NITROSTAT) 0.4 MG SL tablet Place 1 tablet (0.4 mg total) under the tongue every 5 (five) minutes as needed for Chest pain.    tiZANidine (ZANAFLEX) 2 MG tablet TAKE 1 TO 2 TABLETS BY MOUTH AT BEDTIME AS NEEDED FOR MUSCLE SPASMS AND PAIN     No current facility-administered medications for this visit.       Vitamins and Supplements:  MVI    Labs:  Patient confirms she is taking lipitor 40mg for cholesterol control.    Lab Results   Component Value Date    CHOL 184 04/13/2022     Lab Results   Component Value Date    HDL 42 04/13/2022     Lab Results   Component Value Date    LDLCALC 107.8 04/13/2022     Lab Results   Component Value Date    TRIG 171 (H) 04/13/2022     Lab Results   Component Value Date    CHOLHDL 22.8 04/13/2022         Lab Results   Component Value Date    GLUF 189 (H) 04/13/2022     Lab Results   Component Value Date    HGBA1C 10.9 (H) 04/13/2022       Nutrition/Diet History:  Patient eats 2-3 meals daily.    Seasons food with pepper/spices.  Patient denies use of a salt shaker at the table on prepared foods.   Dines out 2 per week at restaurants such as [email or web address].  Gets meal prep from [employer]  Chooses fried foods 1-2 time(s) per weeks.    Chooses fish 1-2 time(s) per week.   Beverages:  water, ICE, diet soda/green tea  Alcohol: none    24 Hour Recall:  Breakfast: Glucerna  Lunch:Fried shrimp/catfish, corn, salad with italian  Dinner:leftovers from lunch  Other: NA    Difficulty Chewing or Swallowing: no  Current Exercise: See Exercise Physiologist Note  Food Safety/Food Preparation: self  Living Arrangements/Family  Support: Lives alone  Cultural/Spiritual/Personal Preferences: not applicable   Barriers to Education: none identified  Stage of Change Related to Diet Habits: Contemplation    Nutrition Diagnosis:  Food and nutrition related knowledge deficit related to the lack of prior nutrition education as evidenced by diet history and 24 hour recall    Nutrition Plan:   Goals:  LDL-C < 70 (for high risk patients)  Hgb A1c < 7%  BMI < 25 and abdominal girth < 40M/<35 F  2 gram sodium, Mediterranean diet  Rehab weight goal: 10-15lbs  Fish intake (non-fried varieties) to a goal of 2-3 servings per week.   Increase fruit and vegetable intake    Interventions/Recommendations:  Lab results reviewed and discussed  Nutrition Prescription:  Total Energy Estimated Needs: 7567-6675 Kcal/d for weight loss  Method for Estimating Needs: 20-25kcal/kg ABW  Total Protein Estimated Needs: 51-77 g/d  Method for Estimating Needs: 0.8-1.2 g/Kg ABW  Total Fluid Estimated Needs: 1 mL/Kcal  Dietitian Consult: No  Patient to participate in Cardiac Rehab sessions three times a week  Weekly Dietitian Weight Check  Encouraged patient to complete 3 day food diary  Follow Up Plan for Ongoing Self-Management Support    Education:  Mediterranean Diet; verbalizes understanding; Date: 4/26/22  Person taught: patient  Preferred Learning Method: Verbal and written  Education Needed/Provided: Nutrition counseling and education related to cardiac rehabilitation  Education Method: Weekly nutrition lectures on the Mediterranean diet, cooking, shopping, and dining out  Written Materials Provided: 3 Day Food Record, Introduction to Mediterranean Diet  Strategies Implemented: Motivational interviewing, Goal setting, Self-Monitoring, and Problem Solving    Comments:   Discussed ways to incorporate healthy snacks, eating on a schedule, and monitoring sodium intake for heart health.  Encouraged increased produce    Diabetes  Is the patient diabetic?   Yes   Other Core  "Components/Diabetes Assessment:   Labs:  Lab Results   Component Value Date    GLUF 189 (H) 04/13/2022    GLUF 176 (H) 12/20/2014    GLUF 234 (H) 11/25/2014     Lab Results   Component Value Date    HGBA1C 10.9 (H) 04/13/2022    HGBA1C 10.8 (H) 11/18/2021    HGBA1C 11.8 (H) 09/28/2021      Lab Results   Component Value Date    ESTIMATEDAVG 266 (H) 04/13/2022    ESTIMATEDAVG 263 (H) 11/18/2021    ESTIMATEDAVG 292 (H) 09/28/2021       History of diabetes since 2010  Diabetes medications: Glimepiride 4mg daily, Jardiance 25mg daily, Trulicity 1.5mg injection once weekly (pt admits to being noncompliant with this r/t c/o GI issues)  Blood Glucose Checks at Home: Yes: Other: does not check consistently "every few days"  Endocrinologist or PCP following DM: Dr. Marin- endocrinology    Other Core Components/Diabetes Plan:   Goals:  Hgb A1c < 7%  Exercise Blood Glucose: 100-300 mg/dl    Interventions:  Reviewed and Discussed Labs  Medication Compliance  Med Card Reconciled  Low Sodium, Mediterranean, ADA Diet  Weight Management  Physical Activity  Increase Knowledge of Contributory Factors  Home Monitoring    Education:  Mediterranean Diet; verbalizes understanding; Date: 4/26/22    Comments:   Patient verbalizes understanding to bring home glucometer and check glucose pre and post each exercise session.  Per cardiac rehab protocols, patient's glucose must be between 90 and 270 mg/dL to exercise.  Patient denies any recent glucose levels less than 60 mg/dL or greater than 300 mg/dL. Patient verbalizes importance of notifying rehab staff if symptoms of hypoglycemia occur while at cardiac rehab.  Abnormal labs will be reported to patient's PCP/Endocrinologist by rehab staff.    Noted updated glucose parameters of 100-300     Emphasized importance of regular glucose checking    RD contact information provided.      Elida Mota MS, RDN/LDN    Session: Orientation Cardiac Rehab Individual Treatment Plan - Initial " Assessment      Patient Name: Shanae Kumari MRN: [id number]   : 1965   Age: 57 y.o.   Date of Event: 2021   Primary Diagnosis: PTCA/Stent    EF: 65%    Physical Assessment:   There were no vitals taken for this visit.    ASSESSMENT:  Heart Sounds: regular rate and rhythm  Prosthetic Valve: No  Lung Sounds: normal air entry, lungs clear to auscultation  Capillary Refill: normal  Left Radial Pulse: Normal (+2)  Right Radial Pulse: Normal (+2)  Left Pedal Pulse: Normal (+2)  Right Pedal Pulse: Normal (+2)  Right Edema: none  Left Edema none  Strength: good  Range of Motion: full range of motion  Existing Limitations:      Site   [] Arthritis, bursitis    [] Amputation, atrophy    [x] Other: ACL tear right knee, Labrum tear right hip, pt is being seen for PT for low back pain.   []       Diabetic patient's foot examination comments: Normal -  Bilateral  Incisional site: N/A  Special needs: N/A    Psychosocial Assessment:   Outcome Survey Tools:    BRITTNY SCORES:   PRE   Anxiety 3   Depression 1   Somatic 4   Hostility 2     SF-36 SCORES:   PRE   Physical Function 19   Social Function 8   Mental Health 25   Pain 6   Change in Health 2   Physical Role Limitation 1   Mental Role Limitation 3   Energy/Fatigue 9   Health Perceptions 12   Total Score 85     PHQ-9:  PHQ-9 Depression Patient Health Questionnaire 2022   Over the last two weeks how often have you been bothered by little interest or pleasure in doing things 0   Over the last two weeks how often have you been bothered by feeling down, depressed or hopeless 0   Over the last two weeks how often have you been bothered by trouble falling or staying asleep, or sleeping too much 1   Over the last two weeks how often have you been bothered by feeling tired or having little energy 1   Over the last two weeks how often have you been bothered by a poor appetite or overeating 1   Over the last two weeks how often have you been bothered by feeling bad  about yourself - or that you are a failure or have let yourself or your family down 0   Over the last two weeks how often have you been bothered by trouble concentrating on things, such as reading the newspaper or watching television 0   Over the last two weeks how often have you been bothered by moving or speaking so slowly that other people could have noticed. 0   Over the last two weeks how often have you been bothered by thoughts that you would be better off dead, or of hurting yourself 0   If you checked off any problems, how difficult have these problems made it for you to do your work, take care of things at home or get along with other people? Somewhat difficult   Total Score 3              Living Arrangements: Lives alone  Family Support: family  Self Reported: Effective Coping Skills, Stress, Chronic Pain and Sleep Pattern Disturbances  Displays: happiness and smiles often  Medication:  Pt was on Zoloft but stopped it herself.  Pt states is was not helping. Pt takes melatonin for sleep.    Psychosocial Plan:   Goals:  Improved psychosocial coping strategies  Reduce manifestation of depression  Reduce manifestation of stress  Maintain positive outlook  Improve overall quality of life    Interventions/Recommendations:  Discussed Results of Surveys  Patient to Self Report Emotional Changes at Session Check In  Recommend Physical Activity  Recommend Attending Education Lectures  Notify MD: No  Program Referral: Declined  Pharmaceutical Intervention/Therapy: Declined  Other Needs: not applicable  Stage of Readiness to Change: Preparation    Education:  Signs and symptoms of depression, verbalizes understanding; Date:4/26/2022  Stress, verbalizes understanding; Date:4/26/2022    Comments:  Pt denies overwhelming stress or anxiety.  Patient has been instructed to notify staff in the event that circumstances worsen.  Patient verbalizes understanding.    Other Core Components/Risk Factors Assessment:   RISK  FACTORS:  diabetes, hyperlipidemia, hypertension, obesity, stress    Learning Barriers: None    Education Level:  Post-College Graduate Degree    Pre-test Score: 60    Medication Compliance: has not been compliant with taking medications due to the following: side effects    Other Core Components/Risk Factors Plan:   Goals:  Decrease cholesterol level: In Progress  Increase exercise tolerance: In Progress  Decrease blood pressure: Met  Weight loss: In Progress  Control diabetes by adjusting diet and exercise: In Progress  Learn more about healthy eating: In Progress    Interventions/Recommendations:  Recommend regular attendance for Cardiac Rehab: Exercise and Education Lectures  Encourage medication compliance  Individual Education/ Counseling: Yes  Physician Referral: No    Education:    diabetes, verbalizes understanding; Date: 4/26/2022  physical activity, verbalizes understanding; Date: 4/26/2022  risk factors, verbalizes understanding; Date: 4/26/2022         Education method adapted to patients education level and preferred method of learning.  Method: explanation    Comments:  Pt is pleasant and verbalizes interest in changing her lifestyle to reduce her cardiovascular risks.    Other Core Components/Hypertension Assessment:   Resting BP: 90/56  BP Readings from Last 1 Encounters:   10/07/22 134/78         BP Diagnosis: Hypertensive  Patient reported symptoms: none    Other Core Components/Hypertension Plan:   Goals:  Blood Pressure <130/80    Interventions/Recommendations:  Med Card Reconciled: Yes  Encourage medication compliance  Encourage sodium reduction  Encourage weight loss  Recommend physical activity  Educate on contributory factors  Reduce stress, anxiety, anger, depression, and/or chronic pain  Encourage home blood pressure monitoring  Recommend daily weights  MD notified/Physician Referral: No    Education:    Hypertension; verbalizes understanding; Date: 4/26/2022  Risk Factors; verbalizes  understanding; Date: 4/26/2022         Comments:  Pt will take her B/P daily at home and record log. Discussed with pt separation of times for taking her two B/P medication and hydration to prevent low pressure in the morning.      Does the patient have Heart Failure? No    Other Core Components/Tobacco Cessation Assessment:   Smoking Status: lifetime non-smoker  Smoking Cessation Barriers:  none  Stage of Readiness to Change: Maintenance    Other Core Components/Tobacco Cessation Plan:   Goals:  Maintain non-smoking status    Interventions:  Maintains non-smoking status    Education:    Risk Factors; verbalizes understanding; Date: 4/26/2022  Benefits of Cardiac Rehab; verbalizes understanding; Date: 4/26/2022         Comments:  Pt expresses no desire to smoke.    Discussed Cardiac Rehab program in depth with patient.  Medication list updated per patient & marked as reviewed.  Patient has been instructed to notify staff of any problems while attending rehab (ie: chest pain, shortness of breath, lightheadedness, dizziness).  Patient has been instructed to monitor blood pressure readings outside of rehab & to keep a daily log of the readings.  Patient verbalizes understanding.    Luis Urban RN

## 2022-11-20 ENCOUNTER — PATIENT MESSAGE (OUTPATIENT)
Dept: FAMILY MEDICINE | Facility: CLINIC | Age: 57
End: 2022-11-20
Payer: COMMERCIAL

## 2022-11-25 ENCOUNTER — DOCUMENTATION ONLY (OUTPATIENT)
Dept: CARDIAC REHAB | Facility: CLINIC | Age: 57
End: 2022-11-25
Payer: COMMERCIAL

## 2022-11-25 NOTE — PROGRESS NOTES
Shanae arrived for her orientation to the Phase II cardiac rehab program.  She has not started the exercise classes as of yet due to an ACL tear.  She will contact us when she is ready to start the program.    Session: Orientation   Cardiac Rehab Individual Treatment Plan - Initial Assessment      Patient Name: Shanae Kumari MRN: [id number]   : 1965   Age: 57 y.o.   Primary Diagnosis: PTCA/STENT  Date of Event: 21  EF: 65%  Risk Stratification: high  Referring Physician: GONZALO   Exercise Assessment:     CPX/TM Date: 22 Results   RHR 97   Max    Peak VO2 (CPX only) 16.7   Actual METS (CPX only) 4.8   Estimated METS 7.0     Anthropometrics    Height 64 inches   Weight 202 lbs   BMI 34.7   Abdominal Girth 47.1   Body Composition 31.0%     ST Depression noted on Stress Test?:No  Angina with exercise?: No   Fall Risk: No   Assistive Devices:  independent   Currently exercising? No   There were many limitations noted by the patient.  Shanae stated she has torn left ACL, chronic low back pain, and torn labrum on the right hip pain.  Exercise modalities will be adjusted.      Exercise Plan:   Goals:  CR Exercise Goals: Attend Cardiac Rehab 3 times/week: In Progress  Home Aerobic Exercise: 2 additional days/week for 30-60 minutes: In Progress  Intensity of 12-15 on the Rate of Perceived Exertion (RPE) scale: In Progress  30% increase in entry estimated METS: 9.1 : In Progress  5 days/week for 30-60 minutes: In Progress    Intervention:   Discussed importance of regular attendance to cardiac rehab class    Exercise Prescription:  THR Range 119-129   Mode: Treadmill  Recumbent Bike  Upright Bike  Nustep  Elliptical   Frequency:  3 days/week   Duration:  30 - 60 minutes   Intensity:  12 - 15 RPE   Resistance Training:  Yes: 0 to 5 lb weights with 10-15 reps based on strength and range of motion assessment     Home Prescription:  Mode Aerobic   Frequency: 2- 3 days/week   Duration: 30-60 minutes    Resistance Training: None        Education:  Orientation to Equipment; verbalizes understanding; Date: 22  Exercise Recommendations; verbalizes understanding; Date: 22  Exercise Safety; verbalizes understanding; Date: 22  Class Preparation: verbalizes understanding; Date: 22  Signs and symptoms to report: verbalizes understanding; Date: 22  Caffeine/Hydration: verbalizes understanding; Date: 22  Exercise Terminology: verbalizes understanding; Date: 22  Resistance Training: verbalizes understanding; Date: 22    Comments:  Shanae was encouraged to begin thinking about some type of aerobic exercise she can participate in at least 2 non-rehab days per week for at least 30 minutes in addition to attending Phase II cardiac rehab classes 3 days per week.  She stated understanding.    All consent forms were signed, proper attire and shoes were discussed.       Shanae will begin Cardiac Rehab on Friday, May 20 at 3:30pm.    The exercise prescription will be adjusted based on tolerance of exercise intensity by patient.    Tamara Posey, CEP    Orientation Cardiac Rehab Individual Treatment Plan - Initial Assessment      Patient Name: Shanae Kumari MRN: [id number]   : 1965   Age: 57 y.o.   Primary Diagnosis: s/p PTCA/stent, CAD, HTN, HLD, DM    Nutrition Assessment:     Anthropometrics    Height 64 inches   Weight 202 lbs   BMI 34.7   Abdominal Girth 47.1   Body Composition 31       Drug Allergies and Intolerances:  Review of patient's allergies indicates:   Allergen Reactions    Allergenic extracts Hives, Itching, Other (See Comments), Rash and Shortness Of Breath    Dog dander Hives, Shortness Of Breath, Itching, Swelling and Rash     Cat's dander, dust,  pollen    Losartan Other (See Comments)     angioedema    Mold Itching and Shortness Of Breath    Metformin Other (See Comments)       Food Allergies and Intolerances:  NA    Past Medical History:  Past Medical  History:   Diagnosis Date    Asthma     BRCA negative     Diabetes mellitus     Hypertension     Nausea after anesthesia 1989       Past Surgical History:  Past Surgical History:   Procedure Laterality Date    CORONARY ANGIOGRAPHY N/A 11/26/2021    Procedure: ANGIOGRAM, CORONARY ARTERY;  Surgeon: Ashu Horn MD;  Location: [facility] CATH LAB;  Service: Cardiology;  Laterality: N/A;    KNEE ARTHROSCOPY      knee surgery      ULTRASOUND GUIDANCE Right 11/26/2021    Procedure: ULTRASOUND GUIDANCE;  Surgeon: Ashu Horn MD;  Location: [facility] CATH LAB;  Service: Cardiology;  Laterality: Right;       Medications:  Current Outpatient Medications   Medication Sig    albuterol (PROAIR HFA) 90 mcg/actuation inhaler Inhale 2 puffs into the lungs every 4 (four) hours as needed for Wheezing.    aspirin 81 MG Chew Take 1 tablet (81 mg total) by mouth once daily.    atorvastatin (LIPITOR) 40 MG tablet Take 1 tablet (40 mg total) by mouth every evening.    blood sugar diagnostic (BLOOD GLUCOSE TEST) Strp 1 strip by Misc.(Non-Drug; Combo Route) route once daily.    blood-glucose meter kit Use as instructed to test blood sugar daily    clobetasol 0.05% (TEMOVATE) 0.05 % Oint AAA bid  x 1-2 wks then prn flares only    clopidogreL (PLAVIX) 75 mg tablet Take 1 tablet (75 mg total) by mouth once daily.    diltiaZEM HCl (MATZIM LA) 240 mg 24 hr tablet Take 1 tablet (240 mg total) by mouth once daily.    dulaglutide (TRULICITY) 1.5 mg/0.5 mL pen injector Inject 1.5 mg into the skin once a week.    empagliflozin (JARDIANCE) 25 mg tablet Take 1 tablet (25 mg total) by mouth once daily.    flash glucose scanning reader (FREESTYLE EVELIO 2 READER) Misc 1 kit by Misc.(Non-Drug; Combo Route) route Daily.    flash glucose sensor (FREESTYLE EVELIO 2 SENSOR) Kit 1 each by Misc.(Non-Drug; Combo Route) route every 14 (fourteen) days.    fluticasone propionate (FLONASE) 50 mcg/actuation nasal spray 1 spray (50 mcg total) by Each Nostril route once  daily.    glimepiride (AMARYL) 4 MG tablet Take 1 tablet (4 mg total) by mouth before breakfast.    lancets 30 gauge Misc 1 lancet by Misc.(Non-Drug; Combo Route) route once daily.    medroxyPROGESTERone (PROVERA) 10 MG tablet Take 1 tablet (10 mg total) by mouth once daily.    montelukast (SINGULAIR) 10 mg tablet Take 1 tablet (10 mg total) by mouth once daily.    nitroGLYCERIN (NITROSTAT) 0.4 MG SL tablet Place 1 tablet (0.4 mg total) under the tongue every 5 (five) minutes as needed for Chest pain.    tiZANidine (ZANAFLEX) 2 MG tablet TAKE 1 TO 2 TABLETS BY MOUTH AT BEDTIME AS NEEDED FOR MUSCLE SPASMS AND PAIN     No current facility-administered medications for this visit.       Vitamins and Supplements:  MVI    Labs:  Patient confirms she is taking lipitor 40mg for cholesterol control.    Lab Results   Component Value Date    CHOL 184 04/13/2022     Lab Results   Component Value Date    HDL 42 04/13/2022     Lab Results   Component Value Date    LDLCALC 107.8 04/13/2022     Lab Results   Component Value Date    TRIG 171 (H) 04/13/2022     Lab Results   Component Value Date    CHOLHDL 22.8 04/13/2022         Lab Results   Component Value Date    GLUF 189 (H) 04/13/2022     Lab Results   Component Value Date    HGBA1C 10.9 (H) 04/13/2022       Nutrition/Diet History:  Patient eats 2-3 meals daily.    Seasons food with pepper/spices.  Patient denies use of a salt shaker at the table on prepared foods.   Dines out 2 per week at restaurants such as [employer].  Gets meal prep from [employer]  Chooses fried foods 1-2 time(s) per weeks.    Chooses fish 1-2 time(s) per week.   Beverages:  water, ICE, diet soda/green tea  Alcohol: none    24 Hour Recall:  Breakfast: Glucerna  Lunch:Fried shrimp/catfish, corn, salad with italian  Dinner:leftovers from lunch  Other: NA    Difficulty Chewing or Swallowing: no  Current Exercise: See Exercise Physiologist Note  Food Safety/Food Preparation: self  Living Arrangements/Family  Support: Lives alone  Cultural/Spiritual/Personal Preferences: not applicable   Barriers to Education: none identified  Stage of Change Related to Diet Habits: Contemplation    Nutrition Diagnosis:  Food and nutrition related knowledge deficit related to the lack of prior nutrition education as evidenced by diet history and 24 hour recall    Nutrition Plan:   Goals:  LDL-C < 70 (for high risk patients)  Hgb A1c < 7%  BMI < 25 and abdominal girth < 40M/<35 F  2 gram sodium, Mediterranean diet  Rehab weight goal: 10-15lbs  Fish intake (non-fried varieties) to a goal of 2-3 servings per week.   Increase fruit and vegetable intake    Interventions/Recommendations:  Lab results reviewed and discussed  Nutrition Prescription:  Total Energy Estimated Needs: 3366-9756 Kcal/d for weight loss  Method for Estimating Needs: 20-25kcal/kg ABW  Total Protein Estimated Needs: 51-77 g/d  Method for Estimating Needs: 0.8-1.2 g/Kg ABW  Total Fluid Estimated Needs: 1 mL/Kcal  Dietitian Consult: No  Patient to participate in Cardiac Rehab sessions three times a week  Weekly Dietitian Weight Check  Encouraged patient to complete 3 day food diary  Follow Up Plan for Ongoing Self-Management Support    Education:  Mediterranean Diet; verbalizes understanding; Date: 4/26/22  Person taught: patient  Preferred Learning Method: Verbal and written  Education Needed/Provided: Nutrition counseling and education related to cardiac rehabilitation  Education Method: Weekly nutrition lectures on the Mediterranean diet, cooking, shopping, and dining out  Written Materials Provided: 3 Day Food Record, Introduction to Mediterranean Diet  Strategies Implemented: Motivational interviewing, Goal setting, Self-Monitoring, and Problem Solving    Comments:   Discussed ways to incorporate healthy snacks, eating on a schedule, and monitoring sodium intake for heart health.  Encouraged increased produce    Diabetes  Is the patient diabetic?   Yes   Other Core  "Components/Diabetes Assessment:   Labs:  Lab Results   Component Value Date    GLUF 189 (H) 04/13/2022    GLUF 176 (H) 12/20/2014    GLUF 234 (H) 11/25/2014     Lab Results   Component Value Date    HGBA1C 10.9 (H) 04/13/2022    HGBA1C 10.8 (H) 11/18/2021    HGBA1C 11.8 (H) 09/28/2021      Lab Results   Component Value Date    ESTIMATEDAVG 266 (H) 04/13/2022    ESTIMATEDAVG 263 (H) 11/18/2021    ESTIMATEDAVG 292 (H) 09/28/2021       History of diabetes since 2010  Diabetes medications: Glimepiride 4mg daily, Jardiance 25mg daily, Trulicity 1.5mg injection once weekly (pt admits to being noncompliant with this r/t c/o GI issues)  Blood Glucose Checks at Home: Yes: Other: does not check consistently "every few days"  Endocrinologist or PCP following DM: Dr. Marin- endocrinology    Other Core Components/Diabetes Plan:   Goals:  Hgb A1c < 7%  Exercise Blood Glucose: 100-300 mg/dl    Interventions:  Reviewed and Discussed Labs  Medication Compliance  Med Card Reconciled  Low Sodium, Mediterranean, ADA Diet  Weight Management  Physical Activity  Increase Knowledge of Contributory Factors  Home Monitoring    Education:  Mediterranean Diet; verbalizes understanding; Date: 4/26/22    Comments:   Patient verbalizes understanding to bring home glucometer and check glucose pre and post each exercise session.  Per cardiac rehab protocols, patient's glucose must be between 90 and 270 mg/dL to exercise.  Patient denies any recent glucose levels less than 60 mg/dL or greater than 300 mg/dL. Patient verbalizes importance of notifying rehab staff if symptoms of hypoglycemia occur while at cardiac rehab.  Abnormal labs will be reported to patient's PCP/Endocrinologist by rehab staff.    Noted updated glucose parameters of 100-300     Emphasized importance of regular glucose checking    RD contact information provided.      Elida Mota MS, RDN/LDN    Session: Orientation Cardiac Rehab Individual Treatment Plan - Initial " Assessment      Patient Name: Shanae Kumari MRN: [id number]   : 1965   Age: 57 y.o.   Date of Event: 2021   Primary Diagnosis: PTCA/Stent    EF: 65%    Physical Assessment:   There were no vitals taken for this visit.    ASSESSMENT:  Heart Sounds: regular rate and rhythm  Prosthetic Valve: No  Lung Sounds: normal air entry, lungs clear to auscultation  Capillary Refill: normal  Left Radial Pulse: Normal (+2)  Right Radial Pulse: Normal (+2)  Left Pedal Pulse: Normal (+2)  Right Pedal Pulse: Normal (+2)  Right Edema: none  Left Edema none  Strength: good  Range of Motion: full range of motion  Existing Limitations:      Site   [] Arthritis, bursitis    [] Amputation, atrophy    [x] Other: ACL tear right knee, Labrum tear right hip, pt is being seen for PT for low back pain.   []       Diabetic patient's foot examination comments: Normal -  Bilateral  Incisional site: N/A  Special needs: N/A    Psychosocial Assessment:   Outcome Survey Tools:    BRITTNY SCORES:   PRE   Anxiety 3   Depression 1   Somatic 4   Hostility 2     SF-36 SCORES:   PRE   Physical Function 19   Social Function 8   Mental Health 25   Pain 6   Change in Health 2   Physical Role Limitation 1   Mental Role Limitation 3   Energy/Fatigue 9   Health Perceptions 12   Total Score 85     PHQ-9:  PHQ-9 Depression Patient Health Questionnaire 2022   Over the last two weeks how often have you been bothered by little interest or pleasure in doing things 0   Over the last two weeks how often have you been bothered by feeling down, depressed or hopeless 0   Over the last two weeks how often have you been bothered by trouble falling or staying asleep, or sleeping too much 1   Over the last two weeks how often have you been bothered by feeling tired or having little energy 1   Over the last two weeks how often have you been bothered by a poor appetite or overeating 1   Over the last two weeks how often have you been bothered by feeling bad  about yourself - or that you are a failure or have let yourself or your family down 0   Over the last two weeks how often have you been bothered by trouble concentrating on things, such as reading the newspaper or watching television 0   Over the last two weeks how often have you been bothered by moving or speaking so slowly that other people could have noticed. 0   Over the last two weeks how often have you been bothered by thoughts that you would be better off dead, or of hurting yourself 0   If you checked off any problems, how difficult have these problems made it for you to do your work, take care of things at home or get along with other people? Somewhat difficult   Total Score 3              Living Arrangements: Lives alone  Family Support: family  Self Reported: Effective Coping Skills, Stress, Chronic Pain and Sleep Pattern Disturbances  Displays: happiness and smiles often  Medication:  Pt was on Zoloft but stopped it herself.  Pt states is was not helping. Pt takes melatonin for sleep.    Psychosocial Plan:   Goals:  Improved psychosocial coping strategies  Reduce manifestation of depression  Reduce manifestation of stress  Maintain positive outlook  Improve overall quality of life    Interventions/Recommendations:  Discussed Results of Surveys  Patient to Self Report Emotional Changes at Session Check In  Recommend Physical Activity  Recommend Attending Education Lectures  Notify MD: No  Program Referral: Declined  Pharmaceutical Intervention/Therapy: Declined  Other Needs: not applicable  Stage of Readiness to Change: Preparation    Education:  Signs and symptoms of depression, verbalizes understanding; Date:4/26/2022  Stress, verbalizes understanding; Date:4/26/2022    Comments:  Pt denies overwhelming stress or anxiety.  Patient has been instructed to notify staff in the event that circumstances worsen.  Patient verbalizes understanding.    Other Core Components/Risk Factors Assessment:   RISK  FACTORS:  diabetes, hyperlipidemia, hypertension, obesity, stress    Learning Barriers: None    Education Level:  Post-College Graduate Degree    Pre-test Score: 60    Medication Compliance: has not been compliant with taking medications due to the following: side effects    Other Core Components/Risk Factors Plan:   Goals:  Decrease cholesterol level: In Progress  Increase exercise tolerance: In Progress  Decrease blood pressure: Met  Weight loss: In Progress  Control diabetes by adjusting diet and exercise: In Progress  Learn more about healthy eating: In Progress    Interventions/Recommendations:  Recommend regular attendance for Cardiac Rehab: Exercise and Education Lectures  Encourage medication compliance  Individual Education/ Counseling: Yes  Physician Referral: No    Education:    diabetes, verbalizes understanding; Date: 4/26/2022  physical activity, verbalizes understanding; Date: 4/26/2022  risk factors, verbalizes understanding; Date: 4/26/2022         Education method adapted to patients education level and preferred method of learning.  Method: explanation    Comments:  Pt is pleasant and verbalizes interest in changing her lifestyle to reduce her cardiovascular risks.    Other Core Components/Hypertension Assessment:   Resting BP: 90/56  BP Readings from Last 1 Encounters:   10/07/22 134/78         BP Diagnosis: Hypertensive  Patient reported symptoms: none    Other Core Components/Hypertension Plan:   Goals:  Blood Pressure <130/80    Interventions/Recommendations:  Med Card Reconciled: Yes  Encourage medication compliance  Encourage sodium reduction  Encourage weight loss  Recommend physical activity  Educate on contributory factors  Reduce stress, anxiety, anger, depression, and/or chronic pain  Encourage home blood pressure monitoring  Recommend daily weights  MD notified/Physician Referral: No    Education:    Hypertension; verbalizes understanding; Date: 4/26/2022  Risk Factors; verbalizes  understanding; Date: 4/26/2022         Comments:  Pt will take her B/P daily at home and record log. Discussed with pt separation of times for taking her two B/P medication and hydration to prevent low pressure in the morning.      Does the patient have Heart Failure? No    Other Core Components/Tobacco Cessation Assessment:   Smoking Status: lifetime non-smoker  Smoking Cessation Barriers:  none  Stage of Readiness to Change: Maintenance    Other Core Components/Tobacco Cessation Plan:   Goals:  Maintain non-smoking status    Interventions:  Maintains non-smoking status    Education:    Risk Factors; verbalizes understanding; Date: 4/26/2022  Benefits of Cardiac Rehab; verbalizes understanding; Date: 4/26/2022         Comments:  Pt expresses no desire to smoke.    Discussed Cardiac Rehab program in depth with patient.  Medication list updated per patient & marked as reviewed.  Patient has been instructed to notify staff of any problems while attending rehab (ie: chest pain, shortness of breath, lightheadedness, dizziness).  Patient has been instructed to monitor blood pressure readings outside of rehab & to keep a daily log of the readings.  Patient verbalizes understanding.    Luis Urban RN

## 2022-12-03 ENCOUNTER — PATIENT MESSAGE (OUTPATIENT)
Dept: FAMILY MEDICINE | Facility: CLINIC | Age: 57
End: 2022-12-03
Payer: COMMERCIAL

## 2022-12-12 ENCOUNTER — PATIENT MESSAGE (OUTPATIENT)
Dept: PREADMISSION TESTING | Facility: HOSPITAL | Age: 57
End: 2022-12-12
Payer: COMMERCIAL

## 2022-12-15 ENCOUNTER — DOCUMENTATION ONLY (OUTPATIENT)
Dept: CARDIAC REHAB | Facility: CLINIC | Age: 57
End: 2022-12-15
Payer: COMMERCIAL

## 2022-12-15 NOTE — PROGRESS NOTES
Shanae arrived for her orientation to the Phase II cardiac rehab program.  She has not started the exercise classes as of yet due to an ACL tear.  She will contact us when she is ready to start the program.    Session: Orientation   Cardiac Rehab Individual Treatment Plan - Initial Assessment      Patient Name: Shanae Kumari MRN: [id number]   : 1965   Age: 57 y.o.   Primary Diagnosis: PTCA/STENT  Date of Event: 21  EF: 65%  Risk Stratification: high  Referring Physician: GONZALO   Exercise Assessment:     CPX/TM Date: 22 Results   RHR 97   Max    Peak VO2 (CPX only) 16.7   Actual METS (CPX only) 4.8   Estimated METS 7.0     Anthropometrics    Height 64 inches   Weight 202 lbs   BMI 34.7   Abdominal Girth 47.1   Body Composition 31.0%     ST Depression noted on Stress Test?:No  Angina with exercise?: No   Fall Risk: No   Assistive Devices:  independent   Currently exercising? No   There were many limitations noted by the patient.  Shanae stated she has torn left ACL, chronic low back pain, and torn labrum on the right hip pain.  Exercise modalities will be adjusted.      Exercise Plan:   Goals:  CR Exercise Goals: Attend Cardiac Rehab 3 times/week: In Progress  Home Aerobic Exercise: 2 additional days/week for 30-60 minutes: In Progress  Intensity of 12-15 on the Rate of Perceived Exertion (RPE) scale: In Progress  30% increase in entry estimated METS: 9.1 : In Progress  5 days/week for 30-60 minutes: In Progress    Intervention:   Discussed importance of regular attendance to cardiac rehab class    Exercise Prescription:  THR Range 119-129   Mode: Treadmill  Recumbent Bike  Upright Bike  Nustep  Elliptical   Frequency:  3 days/week   Duration:  30 - 60 minutes   Intensity:  12 - 15 RPE   Resistance Training:  Yes: 0 to 5 lb weights with 10-15 reps based on strength and range of motion assessment     Home Prescription:  Mode Aerobic   Frequency: 2- 3 days/week   Duration: 30-60 minutes    Resistance Training: None        Education:  Orientation to Equipment; verbalizes understanding; Date: 22  Exercise Recommendations; verbalizes understanding; Date: 22  Exercise Safety; verbalizes understanding; Date: 22  Class Preparation: verbalizes understanding; Date: 22  Signs and symptoms to report: verbalizes understanding; Date: 22  Caffeine/Hydration: verbalizes understanding; Date: 22  Exercise Terminology: verbalizes understanding; Date: 22  Resistance Training: verbalizes understanding; Date: 22    Comments:  Shanae was encouraged to begin thinking about some type of aerobic exercise she can participate in at least 2 non-rehab days per week for at least 30 minutes in addition to attending Phase II cardiac rehab classes 3 days per week.  She stated understanding.    All consent forms were signed, proper attire and shoes were discussed.       Shanae will begin Cardiac Rehab on Friday, May 20 at 3:30pm.    The exercise prescription will be adjusted based on tolerance of exercise intensity by patient.    Tamara Posey, CEP    Orientation Cardiac Rehab Individual Treatment Plan - Initial Assessment      Patient Name: Shanae Kumari MRN: [id number]   : 1965   Age: 57 y.o.   Primary Diagnosis: s/p PTCA/stent, CAD, HTN, HLD, DM    Nutrition Assessment:     Anthropometrics    Height 64 inches   Weight 202 lbs   BMI 34.7   Abdominal Girth 47.1   Body Composition 31       Drug Allergies and Intolerances:  Review of patient's allergies indicates:   Allergen Reactions    Allergenic extracts Hives, Itching, Other (See Comments), Rash and Shortness Of Breath    Dog dander Hives, Shortness Of Breath, Itching, Swelling and Rash     Cat's dander, dust,  pollen    Losartan Other (See Comments)     angioedema    Mold Itching and Shortness Of Breath    Metformin Other (See Comments)       Food Allergies and Intolerances:  NA    Past Medical History:  Past Medical  History:   Diagnosis Date    Asthma     BRCA negative     Diabetes mellitus     Hypertension     Nausea after anesthesia 1989       Past Surgical History:  Past Surgical History:   Procedure Laterality Date    CORONARY ANGIOGRAPHY N/A 11/26/2021    Procedure: ANGIOGRAM, CORONARY ARTERY;  Surgeon: Ashu Horn MD;  Location: [facility] CATH LAB;  Service: Cardiology;  Laterality: N/A;    KNEE ARTHROSCOPY      knee surgery      ULTRASOUND GUIDANCE Right 11/26/2021    Procedure: ULTRASOUND GUIDANCE;  Surgeon: Ashu Horn MD;  Location: [facility] CATH LAB;  Service: Cardiology;  Laterality: Right;       Medications:  Current Outpatient Medications   Medication Sig    albuterol (PROAIR HFA) 90 mcg/actuation inhaler Inhale 2 puffs into the lungs every 4 (four) hours as needed for Wheezing.    aspirin 81 MG Chew Take 1 tablet (81 mg total) by mouth once daily.    atorvastatin (LIPITOR) 40 MG tablet Take 1 tablet (40 mg total) by mouth every evening.    blood sugar diagnostic (BLOOD GLUCOSE TEST) Strp 1 strip by Misc.(Non-Drug; Combo Route) route once daily.    blood-glucose meter kit Use as instructed to test blood sugar daily    clobetasol 0.05% (TEMOVATE) 0.05 % Oint AAA bid  x 1-2 wks then prn flares only    clopidogreL (PLAVIX) 75 mg tablet Take 1 tablet (75 mg total) by mouth once daily.    diltiaZEM HCl (MATZIM LA) 240 mg 24 hr tablet Take 1 tablet (240 mg total) by mouth once daily.    dulaglutide (TRULICITY) 1.5 mg/0.5 mL pen injector Inject 1.5 mg into the skin once a week.    empagliflozin (JARDIANCE) 25 mg tablet Take 1 tablet (25 mg total) by mouth once daily.    flash glucose scanning reader (FREESTYLE EVELIO 2 READER) Misc 1 kit by Misc.(Non-Drug; Combo Route) route Daily.    flash glucose sensor (FREESTYLE EVELIO 2 SENSOR) Kit 1 each by Misc.(Non-Drug; Combo Route) route every 14 (fourteen) days.    fluticasone propionate (FLONASE) 50 mcg/actuation nasal spray 1 spray (50 mcg total) by Each Nostril route once  daily.    glimepiride (AMARYL) 4 MG tablet Take 1 tablet (4 mg total) by mouth before breakfast.    lancets 30 gauge Misc 1 lancet by Misc.(Non-Drug; Combo Route) route once daily.    medroxyPROGESTERone (PROVERA) 10 MG tablet Take 1 tablet (10 mg total) by mouth once daily.    montelukast (SINGULAIR) 10 mg tablet Take 1 tablet (10 mg total) by mouth once daily.    nitroGLYCERIN (NITROSTAT) 0.4 MG SL tablet Place 1 tablet (0.4 mg total) under the tongue every 5 (five) minutes as needed for Chest pain.    tiZANidine (ZANAFLEX) 2 MG tablet TAKE 1 TO 2 TABLETS BY MOUTH AT BEDTIME AS NEEDED FOR MUSCLE SPASMS AND PAIN     No current facility-administered medications for this visit.       Vitamins and Supplements:  MVI    Labs:  Patient confirms she is taking lipitor 40mg for cholesterol control.    Lab Results   Component Value Date    CHOL 184 04/13/2022     Lab Results   Component Value Date    HDL 42 04/13/2022     Lab Results   Component Value Date    LDLCALC 107.8 04/13/2022     Lab Results   Component Value Date    TRIG 171 (H) 04/13/2022     Lab Results   Component Value Date    CHOLHDL 22.8 04/13/2022         Lab Results   Component Value Date    GLUF 189 (H) 04/13/2022     Lab Results   Component Value Date    HGBA1C 10.9 (H) 04/13/2022       Nutrition/Diet History:  Patient eats 2-3 meals daily.    Seasons food with pepper/spices.  Patient denies use of a salt shaker at the table on prepared foods.   Dines out 2 per week at restaurants such as [employer].  Gets meal prep from [employer]  Chooses fried foods 1-2 time(s) per weeks.    Chooses fish 1-2 time(s) per week.   Beverages:  water, ICE, diet soda/green tea  Alcohol: none    24 Hour Recall:  Breakfast: Glucerna  Lunch:Fried shrimp/catfish, corn, salad with italian  Dinner:leftovers from lunch  Other: NA    Difficulty Chewing or Swallowing: no  Current Exercise: See Exercise Physiologist Note  Food Safety/Food Preparation: self  Living Arrangements/Family  Support: Lives alone  Cultural/Spiritual/Personal Preferences: not applicable   Barriers to Education: none identified  Stage of Change Related to Diet Habits: Contemplation    Nutrition Diagnosis:  Food and nutrition related knowledge deficit related to the lack of prior nutrition education as evidenced by diet history and 24 hour recall    Nutrition Plan:   Goals:  LDL-C < 70 (for high risk patients)  Hgb A1c < 7%  BMI < 25 and abdominal girth < 40M/<35 F  2 gram sodium, Mediterranean diet  Rehab weight goal: 10-15lbs  Fish intake (non-fried varieties) to a goal of 2-3 servings per week.   Increase fruit and vegetable intake    Interventions/Recommendations:  Lab results reviewed and discussed  Nutrition Prescription:  Total Energy Estimated Needs: 1450-7522 Kcal/d for weight loss  Method for Estimating Needs: 20-25kcal/kg ABW  Total Protein Estimated Needs: 51-77 g/d  Method for Estimating Needs: 0.8-1.2 g/Kg ABW  Total Fluid Estimated Needs: 1 mL/Kcal  Dietitian Consult: No  Patient to participate in Cardiac Rehab sessions three times a week  Weekly Dietitian Weight Check  Encouraged patient to complete 3 day food diary  Follow Up Plan for Ongoing Self-Management Support    Education:  Mediterranean Diet; verbalizes understanding; Date: 4/26/22  Person taught: patient  Preferred Learning Method: Verbal and written  Education Needed/Provided: Nutrition counseling and education related to cardiac rehabilitation  Education Method: Weekly nutrition lectures on the Mediterranean diet, cooking, shopping, and dining out  Written Materials Provided: 3 Day Food Record, Introduction to Mediterranean Diet  Strategies Implemented: Motivational interviewing, Goal setting, Self-Monitoring, and Problem Solving    Comments:   Discussed ways to incorporate healthy snacks, eating on a schedule, and monitoring sodium intake for heart health.  Encouraged increased produce    Diabetes  Is the patient diabetic?   Yes   Other Core  "Components/Diabetes Assessment:   Labs:  Lab Results   Component Value Date    GLUF 189 (H) 04/13/2022    GLUF 176 (H) 12/20/2014    GLUF 234 (H) 11/25/2014     Lab Results   Component Value Date    HGBA1C 10.9 (H) 04/13/2022    HGBA1C 10.8 (H) 11/18/2021    HGBA1C 11.8 (H) 09/28/2021      Lab Results   Component Value Date    ESTIMATEDAVG 266 (H) 04/13/2022    ESTIMATEDAVG 263 (H) 11/18/2021    ESTIMATEDAVG 292 (H) 09/28/2021       History of diabetes since 2010  Diabetes medications: Glimepiride 4mg daily, Jardiance 25mg daily, Trulicity 1.5mg injection once weekly (pt admits to being noncompliant with this r/t c/o GI issues)  Blood Glucose Checks at Home: Yes: Other: does not check consistently "every few days"  Endocrinologist or PCP following DM: Dr. Marin- endocrinology    Other Core Components/Diabetes Plan:   Goals:  Hgb A1c < 7%  Exercise Blood Glucose: 100-300 mg/dl    Interventions:  Reviewed and Discussed Labs  Medication Compliance  Med Card Reconciled  Low Sodium, Mediterranean, ADA Diet  Weight Management  Physical Activity  Increase Knowledge of Contributory Factors  Home Monitoring    Education:  Mediterranean Diet; verbalizes understanding; Date: 4/26/22    Comments:   Patient verbalizes understanding to bring home glucometer and check glucose pre and post each exercise session.  Per cardiac rehab protocols, patient's glucose must be between 90 and 270 mg/dL to exercise.  Patient denies any recent glucose levels less than 60 mg/dL or greater than 300 mg/dL. Patient verbalizes importance of notifying rehab staff if symptoms of hypoglycemia occur while at cardiac rehab.  Abnormal labs will be reported to patient's PCP/Endocrinologist by rehab staff.    Noted updated glucose parameters of 100-300     Emphasized importance of regular glucose checking    RD contact information provided.      Elida Mota MS, RDN/LDN    Session: Orientation Cardiac Rehab Individual Treatment Plan - Initial " Assessment      Patient Name: Shanae Kumari MRN: [id number]   : 1965   Age: 57 y.o.   Date of Event: 2021   Primary Diagnosis: PTCA/Stent    EF: 65%    Physical Assessment:   There were no vitals taken for this visit.    ASSESSMENT:  Heart Sounds: regular rate and rhythm  Prosthetic Valve: No  Lung Sounds: normal air entry, lungs clear to auscultation  Capillary Refill: normal  Left Radial Pulse: Normal (+2)  Right Radial Pulse: Normal (+2)  Left Pedal Pulse: Normal (+2)  Right Pedal Pulse: Normal (+2)  Right Edema: none  Left Edema none  Strength: good  Range of Motion: full range of motion  Existing Limitations:      Site   [] Arthritis, bursitis    [] Amputation, atrophy    [x] Other: ACL tear right knee, Labrum tear right hip, pt is being seen for PT for low back pain.   []       Diabetic patient's foot examination comments: Normal -  Bilateral  Incisional site: N/A  Special needs: N/A    Psychosocial Assessment:   Outcome Survey Tools:    BRITTNY SCORES:   PRE   Anxiety 3   Depression 1   Somatic 4   Hostility 2     SF-36 SCORES:   PRE   Physical Function 19   Social Function 8   Mental Health 25   Pain 6   Change in Health 2   Physical Role Limitation 1   Mental Role Limitation 3   Energy/Fatigue 9   Health Perceptions 12   Total Score 85     PHQ-9:  PHQ-9 Depression Patient Health Questionnaire 2022   Over the last two weeks how often have you been bothered by little interest or pleasure in doing things 0   Over the last two weeks how often have you been bothered by feeling down, depressed or hopeless 0   Over the last two weeks how often have you been bothered by trouble falling or staying asleep, or sleeping too much 1   Over the last two weeks how often have you been bothered by feeling tired or having little energy 1   Over the last two weeks how often have you been bothered by a poor appetite or overeating 1   Over the last two weeks how often have you been bothered by feeling bad  about yourself - or that you are a failure or have let yourself or your family down 0   Over the last two weeks how often have you been bothered by trouble concentrating on things, such as reading the newspaper or watching television 0   Over the last two weeks how often have you been bothered by moving or speaking so slowly that other people could have noticed. 0   Over the last two weeks how often have you been bothered by thoughts that you would be better off dead, or of hurting yourself 0   If you checked off any problems, how difficult have these problems made it for you to do your work, take care of things at home or get along with other people? Somewhat difficult   Total Score 3              Living Arrangements: Lives alone  Family Support: family  Self Reported: Effective Coping Skills, Stress, Chronic Pain and Sleep Pattern Disturbances  Displays: happiness and smiles often  Medication:  Pt was on Zoloft but stopped it herself.  Pt states is was not helping. Pt takes melatonin for sleep.    Psychosocial Plan:   Goals:  Improved psychosocial coping strategies  Reduce manifestation of depression  Reduce manifestation of stress  Maintain positive outlook  Improve overall quality of life    Interventions/Recommendations:  Discussed Results of Surveys  Patient to Self Report Emotional Changes at Session Check In  Recommend Physical Activity  Recommend Attending Education Lectures  Notify MD: No  Program Referral: Declined  Pharmaceutical Intervention/Therapy: Declined  Other Needs: not applicable  Stage of Readiness to Change: Preparation    Education:  Signs and symptoms of depression, verbalizes understanding; Date:4/26/2022  Stress, verbalizes understanding; Date:4/26/2022    Comments:  Pt denies overwhelming stress or anxiety.  Patient has been instructed to notify staff in the event that circumstances worsen.  Patient verbalizes understanding.    Other Core Components/Risk Factors Assessment:   RISK  FACTORS:  diabetes, hyperlipidemia, hypertension, obesity, stress    Learning Barriers: None    Education Level:  Post-College Graduate Degree    Pre-test Score: 60    Medication Compliance: has not been compliant with taking medications due to the following: side effects    Other Core Components/Risk Factors Plan:   Goals:  Decrease cholesterol level: In Progress  Increase exercise tolerance: In Progress  Decrease blood pressure: Met  Weight loss: In Progress  Control diabetes by adjusting diet and exercise: In Progress  Learn more about healthy eating: In Progress    Interventions/Recommendations:  Recommend regular attendance for Cardiac Rehab: Exercise and Education Lectures  Encourage medication compliance  Individual Education/ Counseling: Yes  Physician Referral: No    Education:    diabetes, verbalizes understanding; Date: 4/26/2022  physical activity, verbalizes understanding; Date: 4/26/2022  risk factors, verbalizes understanding; Date: 4/26/2022         Education method adapted to patients education level and preferred method of learning.  Method: explanation    Comments:  Pt is pleasant and verbalizes interest in changing her lifestyle to reduce her cardiovascular risks.    Other Core Components/Hypertension Assessment:   Resting BP: 90/56  BP Readings from Last 1 Encounters:   10/07/22 134/78         BP Diagnosis: Hypertensive  Patient reported symptoms: none    Other Core Components/Hypertension Plan:   Goals:  Blood Pressure <130/80    Interventions/Recommendations:  Med Card Reconciled: Yes  Encourage medication compliance  Encourage sodium reduction  Encourage weight loss  Recommend physical activity  Educate on contributory factors  Reduce stress, anxiety, anger, depression, and/or chronic pain  Encourage home blood pressure monitoring  Recommend daily weights  MD notified/Physician Referral: No    Education:    Hypertension; verbalizes understanding; Date: 4/26/2022  Risk Factors; verbalizes  understanding; Date: 4/26/2022         Comments:  Pt will take her B/P daily at home and record log. Discussed with pt separation of times for taking her two B/P medication and hydration to prevent low pressure in the morning.      Does the patient have Heart Failure? No    Other Core Components/Tobacco Cessation Assessment:   Smoking Status: lifetime non-smoker  Smoking Cessation Barriers:  none  Stage of Readiness to Change: Maintenance    Other Core Components/Tobacco Cessation Plan:   Goals:  Maintain non-smoking status    Interventions:  Maintains non-smoking status    Education:    Risk Factors; verbalizes understanding; Date: 4/26/2022  Benefits of Cardiac Rehab; verbalizes understanding; Date: 4/26/2022         Comments:  Pt expresses no desire to smoke.    Discussed Cardiac Rehab program in depth with patient.  Medication list updated per patient & marked as reviewed.  Patient has been instructed to notify staff of any problems while attending rehab (ie: chest pain, shortness of breath, lightheadedness, dizziness).  Patient has been instructed to monitor blood pressure readings outside of rehab & to keep a daily log of the readings.  Patient verbalizes understanding.    Luis Urban RN

## 2022-12-16 ENCOUNTER — PATIENT MESSAGE (OUTPATIENT)
Dept: FAMILY MEDICINE | Facility: CLINIC | Age: 57
End: 2022-12-16
Payer: COMMERCIAL

## 2022-12-16 ENCOUNTER — TELEPHONE (OUTPATIENT)
Dept: FAMILY MEDICINE | Facility: CLINIC | Age: 57
End: 2022-12-16
Payer: COMMERCIAL

## 2022-12-19 ENCOUNTER — TELEPHONE (OUTPATIENT)
Dept: FAMILY MEDICINE | Facility: CLINIC | Age: 57
End: 2022-12-19
Payer: COMMERCIAL

## 2022-12-19 DIAGNOSIS — M94.262 CHONDROMALACIA OF LEFT KNEE: ICD-10-CM

## 2022-12-19 DIAGNOSIS — S83.512A RUPTURE OF ANTERIOR CRUCIATE LIGAMENT OF LEFT KNEE, INITIAL ENCOUNTER: Primary | ICD-10-CM

## 2022-12-19 DIAGNOSIS — M67.50 PLICA SYNDROME: ICD-10-CM

## 2022-12-19 DIAGNOSIS — M23.201 OLD TEAR OF LATERAL MENISCUS OF LEFT KNEE, UNSPECIFIED TEAR TYPE: ICD-10-CM

## 2022-12-19 DIAGNOSIS — M23.204 OLD TEAR OF MEDIAL MENISCUS OF LEFT KNEE, UNSPECIFIED TEAR TYPE: ICD-10-CM

## 2022-12-19 NOTE — TELEPHONE ENCOUNTER
----- Message from Marta Aguillon sent at 12/19/2022  1:41 PM CST -----  Type:  Patient Returning Call    Who Called: pt     Who Left Message for Patient: doesn't know    Does the patient know what this is regarding?:yes    Would the patient rather a call back or a response via [facility]? call    Best Call Back Number:[phone] (home)       Additional Information:

## 2022-12-20 ENCOUNTER — PATIENT MESSAGE (OUTPATIENT)
Dept: SURGERY | Facility: HOSPITAL | Age: 57
End: 2022-12-20
Payer: COMMERCIAL

## 2022-12-20 ENCOUNTER — PATIENT MESSAGE (OUTPATIENT)
Dept: FAMILY MEDICINE | Facility: CLINIC | Age: 57
End: 2022-12-20
Payer: COMMERCIAL

## 2022-12-20 ENCOUNTER — PATIENT MESSAGE (OUTPATIENT)
Dept: PREADMISSION TESTING | Facility: HOSPITAL | Age: 57
End: 2022-12-20
Payer: COMMERCIAL

## 2022-12-20 NOTE — TELEPHONE ENCOUNTER
Attempt to contact patient about scheduled appointment for today. Mail box is full. Mychart message sent.

## 2022-12-21 ENCOUNTER — PATIENT MESSAGE (OUTPATIENT)
Dept: ADMINISTRATIVE | Facility: HOSPITAL | Age: 57
End: 2022-12-21
Payer: COMMERCIAL

## 2022-12-21 ENCOUNTER — PATIENT MESSAGE (OUTPATIENT)
Dept: FAMILY MEDICINE | Facility: CLINIC | Age: 57
End: 2022-12-21
Payer: COMMERCIAL

## 2022-12-21 DIAGNOSIS — E11.9 TYPE 2 DIABETES MELLITUS WITHOUT COMPLICATION, UNSPECIFIED WHETHER LONG TERM INSULIN USE: ICD-10-CM

## 2023-01-05 ENCOUNTER — DOCUMENTATION ONLY (OUTPATIENT)
Dept: CARDIAC REHAB | Facility: CLINIC | Age: 58
End: 2023-01-05
Payer: COMMERCIAL

## 2023-01-05 NOTE — PROGRESS NOTES
Shanae arrived for her orientation to the Phase II cardiac rehab program.  She has not started the exercise classes as of yet due to an ACL tear.  She will contact us when she is ready to start the program.    As of 1-3-23, Shanae is now out of the 36 week time period to return to the program and has been discharged from the program.     Session: Orientation   Cardiac Rehab Individual Treatment Plan - Initial Assessment      Patient Name: Shanae Kumari MRN: [id number]   : 1965   Age: 57 y.o.   Primary Diagnosis: PTCA/STENT  Date of Event: 21  EF: 65%  Risk Stratification: high  Referring Physician: GONZALO   Exercise Assessment:     CPX/TM Date: 22 Results   RHR 97   Max    Peak VO2 (CPX only) 16.7   Actual METS (CPX only) 4.8   Estimated METS 7.0     Anthropometrics    Height 64 inches   Weight 202 lbs   BMI 34.7   Abdominal Girth 47.1   Body Composition 31.0%     ST Depression noted on Stress Test?:No  Angina with exercise?: No   Fall Risk: No   Assistive Devices:  independent   Currently exercising? No   There were many limitations noted by the patient.  Shanae stated she has torn left ACL, chronic low back pain, and torn labrum on the right hip pain.  Exercise modalities will be adjusted.      Exercise Plan:   Goals:  CR Exercise Goals: Attend Cardiac Rehab 3 times/week: In Progress  Home Aerobic Exercise: 2 additional days/week for 30-60 minutes: In Progress  Intensity of 12-15 on the Rate of Perceived Exertion (RPE) scale: In Progress  30% increase in entry estimated METS: 9.1 : In Progress  5 days/week for 30-60 minutes: In Progress    Intervention:   Discussed importance of regular attendance to cardiac rehab class    Exercise Prescription:  THR Range 119-129   Mode: Treadmill  Recumbent Bike  Upright Bike  Nustep  Elliptical   Frequency:  3 days/week   Duration:  30 - 60 minutes   Intensity:  12 - 15 RPE   Resistance Training:  Yes: 0 to 5 lb weights with 10-15 reps based on  strength and range of motion assessment     Home Prescription:  Mode Aerobic   Frequency: 2- 3 days/week   Duration: 30-60 minutes   Resistance Training: None        Education:  Orientation to Equipment; verbalizes understanding; Date: 22  Exercise Recommendations; verbalizes understanding; Date: 22  Exercise Safety; verbalizes understanding; Date: 22  Class Preparation: verbalizes understanding; Date: 22  Signs and symptoms to report: verbalizes understanding; Date: 22  Caffeine/Hydration: verbalizes understanding; Date: 22  Exercise Terminology: verbalizes understanding; Date: 22  Resistance Training: verbalizes understanding; Date: 22    Comments:  Shanae was encouraged to begin thinking about some type of aerobic exercise she can participate in at least 2 non-rehab days per week for at least 30 minutes in addition to attending Phase II cardiac rehab classes 3 days per week.  She stated understanding.    All consent forms were signed, proper attire and shoes were discussed.       Shanae will begin Cardiac Rehab on Friday, May 20 at 3:30pm.    The exercise prescription will be adjusted based on tolerance of exercise intensity by patient.    Tamara Posey, CEP    Orientation Cardiac Rehab Individual Treatment Plan - Initial Assessment      Patient Name: Shanae Kumari MRN: [id number]   : 1965   Age: 57 y.o.   Primary Diagnosis: s/p PTCA/stent, CAD, HTN, HLD, DM    Nutrition Assessment:     Anthropometrics    Height 64 inches   Weight 202 lbs   BMI 34.7   Abdominal Girth 47.1   Body Composition 31       Drug Allergies and Intolerances:  Review of patient's allergies indicates:   Allergen Reactions    Allergenic extracts Hives, Itching, Other (See Comments), Rash and Shortness Of Breath    Dog dander Hives, Shortness Of Breath, Itching, Swelling and Rash     Cat's dander, dust,  pollen    Losartan Other (See Comments)     angioedema    Mold Itching and  Shortness Of Breath    Metformin Other (See Comments)       Food Allergies and Intolerances:  NA    Past Medical History:  Past Medical History:   Diagnosis Date    Asthma     BRCA negative     Diabetes mellitus     Hypertension     Nausea after anesthesia 1989       Past Surgical History:  Past Surgical History:   Procedure Laterality Date    CORONARY ANGIOGRAPHY N/A 11/26/2021    Procedure: ANGIOGRAM, CORONARY ARTERY;  Surgeon: Ashu Horn MD;  Location: [facility] CATH LAB;  Service: Cardiology;  Laterality: N/A;    KNEE ARTHROSCOPY      knee surgery      ULTRASOUND GUIDANCE Right 11/26/2021    Procedure: ULTRASOUND GUIDANCE;  Surgeon: Ashu Horn MD;  Location: [facility] CATH LAB;  Service: Cardiology;  Laterality: Right;       Medications:  Current Outpatient Medications   Medication Sig    albuterol (PROAIR HFA) 90 mcg/actuation inhaler Inhale 2 puffs into the lungs every 4 (four) hours as needed for Wheezing.    aspirin 81 MG Chew Take 1 tablet (81 mg total) by mouth once daily.    atorvastatin (LIPITOR) 40 MG tablet Take 1 tablet (40 mg total) by mouth every evening.    blood sugar diagnostic (BLOOD GLUCOSE TEST) Strp 1 strip by Misc.(Non-Drug; Combo Route) route once daily.    blood-glucose meter kit Use as instructed to test blood sugar daily    clobetasol 0.05% (TEMOVATE) 0.05 % Oint AAA bid  x 1-2 wks then prn flares only    clopidogreL (PLAVIX) 75 mg tablet Take 1 tablet (75 mg total) by mouth once daily.    diltiaZEM HCl (MATZIM LA) 240 mg 24 hr tablet Take 1 tablet (240 mg total) by mouth once daily.    dulaglutide (TRULICITY) 1.5 mg/0.5 mL pen injector Inject 1.5 mg into the skin once a week.    empagliflozin (JARDIANCE) 25 mg tablet Take 1 tablet (25 mg total) by mouth once daily.    flash glucose scanning reader (FREESTYLE EVELIO 2 READER) Misc 1 kit by Misc.(Non-Drug; Combo Route) route Daily.    flash glucose sensor (FREESTYLE EVELIO 2 SENSOR) Kit 1 each by Misc.(Non-Drug; Combo Route) route every 14  (fourteen) days.    fluticasone propionate (FLONASE) 50 mcg/actuation nasal spray 1 spray (50 mcg total) by Each Nostril route once daily.    glimepiride (AMARYL) 4 MG tablet Take 1 tablet (4 mg total) by mouth before breakfast.    lancets 30 gauge Misc 1 lancet by Misc.(Non-Drug; Combo Route) route once daily.    medroxyPROGESTERone (PROVERA) 10 MG tablet Take 1 tablet (10 mg total) by mouth once daily.    montelukast (SINGULAIR) 10 mg tablet Take 1 tablet (10 mg total) by mouth once daily.    nitroGLYCERIN (NITROSTAT) 0.4 MG SL tablet Place 1 tablet (0.4 mg total) under the tongue every 5 (five) minutes as needed for Chest pain.    tiZANidine (ZANAFLEX) 2 MG tablet TAKE 1 TO 2 TABLETS BY MOUTH AT BEDTIME AS NEEDED FOR MUSCLE SPASMS AND PAIN     No current facility-administered medications for this visit.       Vitamins and Supplements:  MVI    Labs:  Patient confirms she is taking lipitor 40mg for cholesterol control.    Lab Results   Component Value Date    CHOL 184 04/13/2022     Lab Results   Component Value Date    HDL 42 04/13/2022     Lab Results   Component Value Date    LDLCALC 107.8 04/13/2022     Lab Results   Component Value Date    TRIG 171 (H) 04/13/2022     Lab Results   Component Value Date    CHOLHDL 22.8 04/13/2022         Lab Results   Component Value Date    GLUF 189 (H) 04/13/2022     Lab Results   Component Value Date    HGBA1C 10.9 (H) 04/13/2022       Nutrition/Diet History:  Patient eats 2-3 meals daily.    Seasons food with pepper/spices.  Patient denies use of a salt shaker at the table on prepared foods.   Dines out 2 per week at restaurants such as [employer].  Gets meal prep from [employer]  Chooses fried foods 1-2 time(s) per weeks.    Chooses fish 1-2 time(s) per week.   Beverages:  water, ICE, diet soda/green tea  Alcohol: none    24 Hour Recall:  Breakfast: Glucerna  Lunch:Fried shrimp/catfish, corn, salad with italian  Dinner:leftovers from lunch  Other: NA    Difficulty Chewing  or Swallowing: no  Current Exercise: See Exercise Physiologist Note  Food Safety/Food Preparation: self  Living Arrangements/Family Support: Lives alone  Cultural/Spiritual/Personal Preferences: not applicable   Barriers to Education: none identified  Stage of Change Related to Diet Habits: Contemplation    Nutrition Diagnosis:  Food and nutrition related knowledge deficit related to the lack of prior nutrition education as evidenced by diet history and 24 hour recall    Nutrition Plan:   Goals:  LDL-C < 70 (for high risk patients)  Hgb A1c < 7%  BMI < 25 and abdominal girth < 40M/<35 F  2 gram sodium, Mediterranean diet  Rehab weight goal: 10-15lbs  Fish intake (non-fried varieties) to a goal of 2-3 servings per week.   Increase fruit and vegetable intake    Interventions/Recommendations:  Lab results reviewed and discussed  Nutrition Prescription:  Total Energy Estimated Needs: 2119-8338 Kcal/d for weight loss  Method for Estimating Needs: 20-25kcal/kg ABW  Total Protein Estimated Needs: 51-77 g/d  Method for Estimating Needs: 0.8-1.2 g/Kg ABW  Total Fluid Estimated Needs: 1 mL/Kcal  Dietitian Consult: No  Patient to participate in Cardiac Rehab sessions three times a week  Weekly Dietitian Weight Check  Encouraged patient to complete 3 day food diary  Follow Up Plan for Ongoing Self-Management Support    Education:  Mediterranean Diet; verbalizes understanding; Date: 4/26/22  Person taught: patient  Preferred Learning Method: Verbal and written  Education Needed/Provided: Nutrition counseling and education related to cardiac rehabilitation  Education Method: Weekly nutrition lectures on the Mediterranean diet, cooking, shopping, and dining out  Written Materials Provided: 3 Day Food Record, Introduction to Mediterranean Diet  Strategies Implemented: Motivational interviewing, Goal setting, Self-Monitoring, and Problem Solving    Comments:   Discussed ways to incorporate healthy snacks, eating on a schedule, and  "monitoring sodium intake for heart health.  Encouraged increased produce    Diabetes  Is the patient diabetic?   Yes   Other Core Components/Diabetes Assessment:   Labs:  Lab Results   Component Value Date    GLUF 189 (H) 04/13/2022    GLUF 176 (H) 12/20/2014    GLUF 234 (H) 11/25/2014     Lab Results   Component Value Date    HGBA1C 10.9 (H) 04/13/2022    HGBA1C 10.8 (H) 11/18/2021    HGBA1C 11.8 (H) 09/28/2021      Lab Results   Component Value Date    ESTIMATEDAVG 266 (H) 04/13/2022    ESTIMATEDAVG 263 (H) 11/18/2021    ESTIMATEDAVG 292 (H) 09/28/2021       History of diabetes since 2010  Diabetes medications: Glimepiride 4mg daily, Jardiance 25mg daily, Trulicity 1.5mg injection once weekly (pt admits to being noncompliant with this r/t c/o GI issues)  Blood Glucose Checks at Home: Yes: Other: does not check consistently "every few days"  Endocrinologist or PCP following DM: Dr. Marin- endocrinology    Other Core Components/Diabetes Plan:   Goals:  Hgb A1c < 7%  Exercise Blood Glucose: 100-300 mg/dl    Interventions:  Reviewed and Discussed Labs  Medication Compliance  Med Card Reconciled  Low Sodium, Mediterranean, ADA Diet  Weight Management  Physical Activity  Increase Knowledge of Contributory Factors  Home Monitoring    Education:  Mediterranean Diet; verbalizes understanding; Date: 4/26/22    Comments:   Patient verbalizes understanding to bring home glucometer and check glucose pre and post each exercise session.  Per cardiac rehab protocols, patient's glucose must be between 90 and 270 mg/dL to exercise.  Patient denies any recent glucose levels less than 60 mg/dL or greater than 300 mg/dL. Patient verbalizes importance of notifying rehab staff if symptoms of hypoglycemia occur while at cardiac rehab.  Abnormal labs will be reported to patient's PCP/Endocrinologist by rehab staff.    Noted updated glucose parameters of 100-300     Emphasized importance of regular glucose checking    RD contact information " provided.      Elida Mota MS, RDN/LDN    Session: Orientation Cardiac Rehab Individual Treatment Plan - Initial Assessment      Patient Name: Shanae Kumari MRN: [id number]   : 1965   Age: 57 y.o.   Date of Event: 2021   Primary Diagnosis: PTCA/Stent    EF: 65%    Physical Assessment:   There were no vitals taken for this visit.    ASSESSMENT:  Heart Sounds: regular rate and rhythm  Prosthetic Valve: No  Lung Sounds: normal air entry, lungs clear to auscultation  Capillary Refill: normal  Left Radial Pulse: Normal (+2)  Right Radial Pulse: Normal (+2)  Left Pedal Pulse: Normal (+2)  Right Pedal Pulse: Normal (+2)  Right Edema: none  Left Edema none  Strength: good  Range of Motion: full range of motion  Existing Limitations:      Site   [] Arthritis, bursitis    [] Amputation, atrophy    [x] Other: ACL tear right knee, Labrum tear right hip, pt is being seen for PT for low back pain.   []       Diabetic patient's foot examination comments: Normal -  Bilateral  Incisional site: N/A  Special needs: N/A    Psychosocial Assessment:   Outcome Survey Tools:    BRITTNY SCORES:   PRE   Anxiety 3   Depression 1   Somatic 4   Hostility 2     SF-36 SCORES:   PRE   Physical Function 19   Social Function 8   Mental Health 25   Pain 6   Change in Health 2   Physical Role Limitation 1   Mental Role Limitation 3   Energy/Fatigue 9   Health Perceptions 12   Total Score 85     PHQ-9:  PHQ-9 Depression Patient Health Questionnaire 2022   Over the last two weeks how often have you been bothered by little interest or pleasure in doing things 0   Over the last two weeks how often have you been bothered by feeling down, depressed or hopeless 0   Over the last two weeks how often have you been bothered by trouble falling or staying asleep, or sleeping too much 1   Over the last two weeks how often have you been bothered by feeling tired or having little energy 1   Over the last two weeks how often have you been  bothered by a poor appetite or overeating 1   Over the last two weeks how often have you been bothered by feeling bad about yourself - or that you are a failure or have let yourself or your family down 0   Over the last two weeks how often have you been bothered by trouble concentrating on things, such as reading the newspaper or watching television 0   Over the last two weeks how often have you been bothered by moving or speaking so slowly that other people could have noticed. 0   Over the last two weeks how often have you been bothered by thoughts that you would be better off dead, or of hurting yourself 0   If you checked off any problems, how difficult have these problems made it for you to do your work, take care of things at home or get along with other people? Somewhat difficult   Total Score 3              Living Arrangements: Lives alone  Family Support: family  Self Reported: Effective Coping Skills, Stress, Chronic Pain and Sleep Pattern Disturbances  Displays: happiness and smiles often  Medication:  Pt was on Zoloft but stopped it herself.  Pt states is was not helping. Pt takes melatonin for sleep.    Psychosocial Plan:   Goals:  Improved psychosocial coping strategies  Reduce manifestation of depression  Reduce manifestation of stress  Maintain positive outlook  Improve overall quality of life    Interventions/Recommendations:  Discussed Results of Surveys  Patient to Self Report Emotional Changes at Session Check In  Recommend Physical Activity  Recommend Attending Education Lectures  Notify MD: No  Program Referral: Declined  Pharmaceutical Intervention/Therapy: Declined  Other Needs: not applicable  Stage of Readiness to Change: Preparation    Education:  Signs and symptoms of depression, verbalizes understanding; Date:4/26/2022  Stress, verbalizes understanding; Date:4/26/2022    Comments:  Pt denies overwhelming stress or anxiety.  Patient has been instructed to notify staff in the event that  circumstances worsen.  Patient verbalizes understanding.    Other Core Components/Risk Factors Assessment:   RISK FACTORS:  diabetes, hyperlipidemia, hypertension, obesity, stress    Learning Barriers: None    Education Level:  Post-College Graduate Degree    Pre-test Score: 60    Medication Compliance: has not been compliant with taking medications due to the following: side effects    Other Core Components/Risk Factors Plan:   Goals:  Decrease cholesterol level: In Progress  Increase exercise tolerance: In Progress  Decrease blood pressure: Met  Weight loss: In Progress  Control diabetes by adjusting diet and exercise: In Progress  Learn more about healthy eating: In Progress    Interventions/Recommendations:  Recommend regular attendance for Cardiac Rehab: Exercise and Education Lectures  Encourage medication compliance  Individual Education/ Counseling: Yes  Physician Referral: No    Education:    diabetes, verbalizes understanding; Date: 4/26/2022  physical activity, verbalizes understanding; Date: 4/26/2022  risk factors, verbalizes understanding; Date: 4/26/2022         Education method adapted to patients education level and preferred method of learning.  Method: explanation    Comments:  Pt is pleasant and verbalizes interest in changing her lifestyle to reduce her cardiovascular risks.    Other Core Components/Hypertension Assessment:   Resting BP: 90/56  BP Readings from Last 1 Encounters:   10/07/22 134/78         BP Diagnosis: Hypertensive  Patient reported symptoms: none    Other Core Components/Hypertension Plan:   Goals:  Blood Pressure <130/80    Interventions/Recommendations:  Med Card Reconciled: Yes  Encourage medication compliance  Encourage sodium reduction  Encourage weight loss  Recommend physical activity  Educate on contributory factors  Reduce stress, anxiety, anger, depression, and/or chronic pain  Encourage home blood pressure monitoring  Recommend daily weights  MD notified/Physician  Referral: No    Education:    Hypertension; verbalizes understanding; Date: 4/26/2022  Risk Factors; verbalizes understanding; Date: 4/26/2022         Comments:  Pt will take her B/P daily at home and record log. Discussed with pt separation of times for taking her two B/P medication and hydration to prevent low pressure in the morning.      Does the patient have Heart Failure? No    Other Core Components/Tobacco Cessation Assessment:   Smoking Status: lifetime non-smoker  Smoking Cessation Barriers:  none  Stage of Readiness to Change: Maintenance    Other Core Components/Tobacco Cessation Plan:   Goals:  Maintain non-smoking status    Interventions:  Maintains non-smoking status    Education:    Risk Factors; verbalizes understanding; Date: 4/26/2022  Benefits of Cardiac Rehab; verbalizes understanding; Date: 4/26/2022         Comments:  Pt expresses no desire to smoke.    Discussed Cardiac Rehab program in depth with patient.  Medication list updated per patient & marked as reviewed.  Patient has been instructed to notify staff of any problems while attending rehab (ie: chest pain, shortness of breath, lightheadedness, dizziness).  Patient has been instructed to monitor blood pressure readings outside of rehab & to keep a daily log of the readings.  Patient verbalizes understanding.    Luis Urban RN                   no

## 2023-01-19 ENCOUNTER — PATIENT MESSAGE (OUTPATIENT)
Dept: ADMINISTRATIVE | Facility: HOSPITAL | Age: 58
End: 2023-01-19
Payer: COMMERCIAL

## 2023-01-27 ENCOUNTER — PATIENT OUTREACH (OUTPATIENT)
Dept: ADMINISTRATIVE | Facility: HOSPITAL | Age: 58
End: 2023-01-27
Payer: COMMERCIAL

## 2023-02-02 ENCOUNTER — OFFICE VISIT (OUTPATIENT)
Dept: CARDIOLOGY | Facility: CLINIC | Age: 58
End: 2023-02-02
Payer: COMMERCIAL

## 2023-02-02 VITALS
RESPIRATION RATE: 15 BRPM | HEIGHT: 64 IN | OXYGEN SATURATION: 97 % | BODY MASS INDEX: 33.32 KG/M2 | HEART RATE: 85 BPM | WEIGHT: 195.19 LBS | SYSTOLIC BLOOD PRESSURE: 146 MMHG | DIASTOLIC BLOOD PRESSURE: 68 MMHG

## 2023-02-02 DIAGNOSIS — M54.50 CHRONIC BILATERAL LOW BACK PAIN WITHOUT SCIATICA: Chronic | ICD-10-CM

## 2023-02-02 DIAGNOSIS — G89.29 CHRONIC BILATERAL LOW BACK PAIN WITHOUT SCIATICA: Chronic | ICD-10-CM

## 2023-02-02 DIAGNOSIS — E78.5 HYPERLIPIDEMIA, UNSPECIFIED HYPERLIPIDEMIA TYPE: Chronic | ICD-10-CM

## 2023-02-02 DIAGNOSIS — R80.9 TYPE 2 DIABETES MELLITUS WITH MICROALBUMINURIA, WITHOUT LONG-TERM CURRENT USE OF INSULIN: Chronic | ICD-10-CM

## 2023-02-02 DIAGNOSIS — E11.29 TYPE 2 DIABETES MELLITUS WITH MICROALBUMINURIA, WITHOUT LONG-TERM CURRENT USE OF INSULIN: Chronic | ICD-10-CM

## 2023-02-02 DIAGNOSIS — I10 ESSENTIAL HYPERTENSION: Primary | Chronic | ICD-10-CM

## 2023-02-02 DIAGNOSIS — R29.898 DECREASED STRENGTH OF TRUNK AND BACK: ICD-10-CM

## 2023-02-02 DIAGNOSIS — I25.10 CORONARY ARTERY DISEASE, UNSPECIFIED VESSEL OR LESION TYPE, UNSPECIFIED WHETHER ANGINA PRESENT, UNSPECIFIED WHETHER NATIVE OR TRANSPLANTED HEART: ICD-10-CM

## 2023-02-02 DIAGNOSIS — E66.9 OBESITY, CLASS I, BMI 30-34.9: Chronic | ICD-10-CM

## 2023-02-02 PROCEDURE — 3072F PR LOW RISK FOR RETINOPATHY: ICD-10-PCS | Mod: CPTII,S$GLB,, | Performed by: INTERNAL MEDICINE

## 2023-02-02 PROCEDURE — 99999 PR PBB SHADOW E&M-EST. PATIENT-LVL V: CPT | Mod: PBBFAC,,, | Performed by: INTERNAL MEDICINE

## 2023-02-02 PROCEDURE — 1160F RVW MEDS BY RX/DR IN RCRD: CPT | Mod: CPTII,S$GLB,, | Performed by: INTERNAL MEDICINE

## 2023-02-02 PROCEDURE — 3008F BODY MASS INDEX DOCD: CPT | Mod: CPTII,S$GLB,, | Performed by: INTERNAL MEDICINE

## 2023-02-02 PROCEDURE — 1159F PR MEDICATION LIST DOCUMENTED IN MEDICAL RECORD: ICD-10-PCS | Mod: CPTII,S$GLB,, | Performed by: INTERNAL MEDICINE

## 2023-02-02 PROCEDURE — 3078F PR MOST RECENT DIASTOLIC BLOOD PRESSURE < 80 MM HG: ICD-10-PCS | Mod: CPTII,S$GLB,, | Performed by: INTERNAL MEDICINE

## 2023-02-02 PROCEDURE — 3072F LOW RISK FOR RETINOPATHY: CPT | Mod: CPTII,S$GLB,, | Performed by: INTERNAL MEDICINE

## 2023-02-02 PROCEDURE — 99214 PR OFFICE/OUTPT VISIT, EST, LEVL IV, 30-39 MIN: ICD-10-PCS | Mod: S$GLB,,, | Performed by: INTERNAL MEDICINE

## 2023-02-02 PROCEDURE — 3008F PR BODY MASS INDEX (BMI) DOCUMENTED: ICD-10-PCS | Mod: CPTII,S$GLB,, | Performed by: INTERNAL MEDICINE

## 2023-02-02 PROCEDURE — 1159F MED LIST DOCD IN RCRD: CPT | Mod: CPTII,S$GLB,, | Performed by: INTERNAL MEDICINE

## 2023-02-02 PROCEDURE — 3077F PR MOST RECENT SYSTOLIC BLOOD PRESSURE >= 140 MM HG: ICD-10-PCS | Mod: CPTII,S$GLB,, | Performed by: INTERNAL MEDICINE

## 2023-02-02 PROCEDURE — 93000 EKG 12-LEAD: ICD-10-PCS | Mod: S$GLB,,, | Performed by: INTERNAL MEDICINE

## 2023-02-02 PROCEDURE — 3077F SYST BP >= 140 MM HG: CPT | Mod: CPTII,S$GLB,, | Performed by: INTERNAL MEDICINE

## 2023-02-02 PROCEDURE — 99999 PR PBB SHADOW E&M-EST. PATIENT-LVL V: ICD-10-PCS | Mod: PBBFAC,,, | Performed by: INTERNAL MEDICINE

## 2023-02-02 PROCEDURE — 1160F PR REVIEW ALL MEDS BY PRESCRIBER/CLIN PHARMACIST DOCUMENTED: ICD-10-PCS | Mod: CPTII,S$GLB,, | Performed by: INTERNAL MEDICINE

## 2023-02-02 PROCEDURE — 99214 OFFICE O/P EST MOD 30 MIN: CPT | Mod: S$GLB,,, | Performed by: INTERNAL MEDICINE

## 2023-02-02 PROCEDURE — 93000 ELECTROCARDIOGRAM COMPLETE: CPT | Mod: S$GLB,,, | Performed by: INTERNAL MEDICINE

## 2023-02-02 PROCEDURE — 3078F DIAST BP <80 MM HG: CPT | Mod: CPTII,S$GLB,, | Performed by: INTERNAL MEDICINE

## 2023-02-02 NOTE — PROGRESS NOTES
REASON FOR CONSULT:   Shanae Kumari is a 56 y.o. female who presents for evaluation of chest tightness     HISTORY OF PRESENT ILLNESS:      Patient is a pleasant 55-year-old lady.  Medical history significant for diabetes, hypertension, obesity.  Had an episode of chest tightness.  Couple of times.  Mostly at rest.  Self limiting.  No radiation.  EKG done.  Personally reviewed.  Normal sinus rhythm without acute ischemic changes.  Denies orthopnea, PND, swelling of feet.     Notes from April 2021:  Follow-up.  Mildly abnormal stress test.  Has been chest pain-free.     The left ventricle is normal in size with mild concentric hypertrophy and normal systolic function.  The estimated ejection fraction is 65%.  Normal right ventricular size with normal right ventricular systolic function.  The estimated PA systolic pressure is 14 mmHg.          Abnormal myocardial perfusion scan.    There is a mild intensity, small sized, reversible defect that is consistent with ischemia in the distal anterior wall(s).    The visually estimated ejection fraction is normal during stress.    There is normal wall motion post stress.        Notes from July 2021:  Patient here for follow-up.  No chest pains.  Dyspnea on exertion unchanged.  EKG done personally reviewed and demonstrates normal sinus rhythm, cannot rule out anterior infarct with poor R-wave progression.     Notes from September 21:  Patient here for follow-up.  CTA demonstrated mid LAD 70% lesion.  Patient states she has been mostly sedentary and no chest pain at rest.  She does not exercise, however does walk around a lot for her job.    Dec 21: fu after recent pci  No complications. No cp, orthopnea, pnd      The Prox LAD to Mid LAD lesion was 80% stenosed with 0% stenosis post-intervention.  A stent was successfully placed at 12 [address]N for 10 sec.  The estimated blood loss was <50 mL.  There was single vessel coronary artery disease.     Successful IVUS guided PCI of  prox-mid LAD with CARMEN with excellent angiographic results        Coronary angiogram:     Left main: no significant stenosis     LAD: prox-mid LAD 80% stenosis     LCX: Luminal irregularities     RCA: Luminal irregularities     Access:     We accessed the right radial artery using ultrasound guidance. The vessel appeared widely patent, suitable for endovascular access. The images were interpreted by me and saved.  Access was closed with radial band.        Notes from October 2022: Patient here for follow-up.  Can walk a block without any chest pains or tightness.  Going for knee surgery      Notes from February 2023: Patient here for follow-up.  Denies any chest pains at rest on exertion, orthopnea, PND.  Doing fine.     PAST MEDICAL HISTORY:           Past Medical History:   Diagnosis Date    Asthma      BRCA negative      Diabetes mellitus      Hypertension           PAST SURGICAL HISTORY:            Past Surgical History:   Procedure Laterality Date    KNEE ARTHROSCOPY        knee surgery             ALLERGIES AND MEDICATION:            Review of patient's allergies indicates:   Allergen Reactions    Allergenic extracts Hives, Itching, Other (See Comments), Rash and Shortness Of Breath    Dog dander Hives, Shortness Of Breath, Itching, Swelling and Rash       Cat's dander, dust,  pollen    Losartan Other (See Comments)       angioedema    Mold Itching and Shortness Of Breath    Metformin Other (See Comments)          Medication List            Accurate as of September 29, 2021  3:21 PM. If you have any questions, ask your nurse or doctor.              CONTINUE taking these medications    albuterol 90 mcg/actuation inhaler  Commonly known as: PROAIR HFA  Inhale 2 puffs into the lungs every 4 (four) hours as needed for Wheezing.      aspirin 81 MG Chew  Take 1 tablet (81 mg total) by mouth once daily.      atorvastatin 40 MG tablet  Commonly known as: LIPITOR  Take 1 tablet (40 mg total) by mouth every evening.       clotrimazole-betamethasone 1-0.05% cream  Commonly known as: LOTRISONE  Apply topically 2 (two) times daily. Apply to foot      diltiaZEM HCl 240 mg 24 hr tablet  Commonly known as: MATZIM LA  TAKE 1 TABLET (240 MG TOTAL) BY MOUTH ONCE DAILY.      doxazosin 4 MG tablet  Commonly known as: CARDURA  TAKE 1 TABLET BY MOUTH ONCE NIGHTLY      fluticasone propionate 50 mcg/actuation nasal spray  Commonly known as: FLONASE  1 spray (50 mcg total) by Each Nostril route once daily.      glimepiride 4 MG tablet  Commonly known as: AMARYL  TAKE 1 TABLET (4 MG TOTAL) BY MOUTH BEFORE BREAKFAST.      medroxyPROGESTERone 10 MG tablet  Commonly known as: PROVERA  Take 1 tablet (10 mg total) by mouth once daily.      meloxicam 15 MG tablet  Commonly known as: MOBIC  TAKE 1 TABLET BY MOUTH EVERY DAY      metoprolol tartrate 50 MG tablet  Commonly known as: LOPRESSOR  TAKE 1 TABLET (50 MG TOTAL) BY MOUTH AS NEEDED (USE IF NEEDED PRIOR TO CTA).      montelukast 10 mg tablet  Commonly known as: SINGULAIR  Take 1 tablet (10 mg total) by mouth once daily.      nitroGLYCERIN 0.4 MG SL tablet  Commonly known as: NITROSTAT  Place 1 tablet (0.4 mg total) under the tongue every 5 (five) minutes as needed for Chest pain.      sertraline 50 MG tablet  Commonly known as: ZOLOFT  TAKE 1 TABLET BY MOUTH EVERY DAY      TRULICITY 1.5 mg/0.5 mL pen injector  Generic drug: dulaglutide  Inject 1.5 mg into the skin once a week.                SOCIAL HISTORY:      Social History               Socioeconomic History    Marital status: Unknown       Spouse name: Not on file    Number of children: Not on file    Years of education: Not on file    Highest education level: Not on file   Occupational History    Not on file   Tobacco Use    Smoking status: Never Smoker    Smokeless tobacco: Never Used   Substance and Sexual Activity    Alcohol use: No    Drug use: No    Sexual activity: Not Currently   Other Topics Concern    Not on file   Social History  Narrative    Not on file      Social Determinants of Health          Financial Resource Strain: Low Risk     Difficulty of Paying Living Expenses: Not very hard   Food Insecurity: No Food Insecurity    Worried About Running Out of Food in the Last Year: Never true    Ran Out of Food in the Last Year: Never true   Transportation Needs: No Transportation Needs    Lack of Transportation (Medical): No    Lack of Transportation (Non-Medical): No   Physical Activity: Inactive    Days of Exercise per Week: 0 days    Minutes of Exercise per Session: 0 min   Stress: No Stress Concern Present    Feeling of Stress : Not at all   Social Connections: Unknown    Frequency of Communication with Friends and Family: Three times a week    Frequency of Social Gatherings with Friends and Family: Never    Attends Jewish Services: Not on file    Active Member of Clubs or Organizations: Yes    Attends Club or Organization Meetings: Never    Marital Status: Patient refused            FAMILY HISTORY:            Family History   Problem Relation Age of Onset    Breast cancer Other      Diabetes Mother      COPD Mother      Cancer Mother           lung    Diabetes Father      Heart disease Father      Hypertension Father      Hyperlipidemia Father      Kidney disease Father      Cancer Sister      Diabetes Brother      Hypertension Brother      Kidney disease Brother           diabetic    Diabetes Maternal Aunt      Diabetes Maternal Uncle      Diabetes Maternal Grandmother      Colon cancer Neg Hx      Ovarian cancer Neg Hx      Stroke Neg Hx           REVIEW OF SYSTEMS:   Review of Systems   Constitutional: Negative.   HENT: Negative.    Eyes: Negative.    Cardiovascular: Positive for dyspnea on exertion.   Respiratory: Negative.    Endocrine: Negative.    Hematologic/Lymphatic: Negative.    Skin: Negative.    Musculoskeletal: Negative.    Gastrointestinal: Negative.    Genitourinary: Negative.    Neurological: Negative.   "  Psychiatric/Behavioral: Negative.    Allergic/Immunologic: Negative.          A 10 point review of systems was performed and all the pertinent positives have been mentioned. Rest of review of systems was negative.           PHYSICAL EXAM:          Vitals:     09/29/21 1509   BP: 132/74   Pulse: 91    Body mass index is 34.83 kg/m².  Weight: 90.6 kg (199 lb 11.8 oz)   Height: 5' 3.5" (161.3 cm)      Physical Exam  Constitutional:       Appearance: Normal appearance. She is well-developed.   HENT:      Head: Normocephalic.   Eyes:      Pupils: Pupils are equal, round, and reactive to light.   Cardiovascular:      Rate and Rhythm: Normal rate and regular rhythm.   Pulmonary:      Effort: Pulmonary effort is normal.      Breath sounds: Normal breath sounds.   Abdominal:      General: Bowel sounds are normal.      Palpations: Abdomen is soft.      Tenderness: There is no abdominal tenderness.   Musculoskeletal:         General: Normal range of motion.      Cervical back: Normal range of motion and neck supple.   Skin:     General: Skin is warm.   Neurological:      Mental Status: She is alert and oriented to person, place, and time.             DATA:      Laboratory:  CBC:       Recent Labs   Lab 08/26/20  1151 09/28/21  0859   WBC 8.64 8.53   Hemoglobin 14.0 14.5   Hematocrit 43.7 42.7   Platelets 296 336         CHEMISTRIES:        Recent Labs   Lab 08/26/20  1151 12/17/20  1220 09/28/21  0859   Glucose 183 H 318 H 137 H   Sodium 129 L 138 133 L   Potassium 3.9 4.2 4.3   BUN 12 20 15   Creatinine 0.7 0.9 0.7   eGFR if African American >60.0 >60.0 >60.0   eGFR if non African American >60.0 >60.0 >60.0   Calcium 8.9 9.8 9.1         CARDIAC BIOMARKERS:         COAGS:         LIPIDS/LFTS:         Recent Labs   Lab 03/04/19  0803 06/25/19  1352 08/26/20  1151 09/28/21  0859   Cholesterol 169  --  169 163   Triglycerides 302 H  --  215 H 237 H   HDL 42  --  47 39 L   LDL Cholesterol 66.6  --  79.0 76.6   Non-HDL " Cholesterol 127  --  122 124   AST  --  19 23 24   ALT  --  21 26 23                 Hemoglobin A1C   Date Value Ref Range Status   09/28/2021 11.8 (H) 4.0 - 5.6 % Final       Comment:       ADA Screening Guidelines:  5.7-6.4%  Consistent with prediabetes  >or=6.5%  Consistent with diabetes     High levels of fetal hemoglobin interfere with the HbA1C  assay. Heterozygous hemoglobin variants (HbS, HgC, etc)do  not significantly interfere with this assay.   However, presence of multiple variants may affect accuracy.      12/17/2020 9.4 (H) 4.0 - 5.6 % Final       Comment:       ADA Screening Guidelines:  5.7-6.4%  Consistent with prediabetes  >or=6.5%  Consistent with diabetes  High levels of fetal hemoglobin interfere with the HbA1C  assay. Heterozygous hemoglobin variants (HbS, HgC, etc)do  not significantly interfere with this assay.   However, presence of multiple variants may affect accuracy.      08/26/2020 11.3 (H) 4.0 - 5.6 % Final       Comment:       ADA Screening Guidelines:  5.7-6.4%  Consistent with prediabetes  >or=6.5%  Consistent with diabetes  High levels of fetal hemoglobin interfere with the HbA1C  assay. Heterozygous hemoglobin variants (HbS, HgC, etc)do  not significantly interfere with this assay.   However, presence of multiple variants may affect accuracy.            TSH         The 10-year ASCVD risk score (Niagararonel GIBBONS Jr., et al., 2013) is: 5.6%    Values used to calculate the score:      Age: 56 years      Sex: Female      Is Non- : No      Diabetic: Yes      Tobacco smoker: No      Systolic Blood Pressure: 132 mmHg      Is BP treated: Yes      HDL Cholesterol: 39 mg/dL      Total Cholesterol: 163 mg/dL                  ASSESSMENT AND PLAN          Patient Active Problem List   Diagnosis    LSIL (low grade squamous intraepithelial lesion) on Pap smear    Asthma, mild intermittent    Essential hypertension    Hyperlipidemia    Right hip pain    Acetabular labrum tear     Type 2 diabetes mellitus with microalbuminuria, without long-term current use of insulin    Non-seasonal allergic rhinitis    Slow transit constipation    Obesity, Class I, BMI 30-34.9         Patient with multiple risk factors for coronary artery disease. Now s/p pci. Lad. Doing fine.    Exercise tolerance greater than 4 Mets.  May proceed for knee surgery.  No further cardiac workup needed prior to surgery.  May hold Plavix as needed for knee surgery.  Please to not hold aspirin unless absolutely necessary.    Aspirin 81 mg qd indefinitely, plavix 75 mg qd x 12 m from date of PCI.    Statins      Follow-up in 6 months.

## 2023-02-20 DIAGNOSIS — R80.9 TYPE 2 DIABETES MELLITUS WITH MICROALBUMINURIA, WITHOUT LONG-TERM CURRENT USE OF INSULIN: ICD-10-CM

## 2023-02-20 DIAGNOSIS — R80.9 TYPE 2 DIABETES MELLITUS WITH MICROALBUMINURIA, WITHOUT LONG-TERM CURRENT USE OF INSULIN: Chronic | ICD-10-CM

## 2023-02-20 DIAGNOSIS — E11.29 TYPE 2 DIABETES MELLITUS WITH MICROALBUMINURIA, WITHOUT LONG-TERM CURRENT USE OF INSULIN: Chronic | ICD-10-CM

## 2023-02-20 DIAGNOSIS — E11.29 TYPE 2 DIABETES MELLITUS WITH MICROALBUMINURIA, WITHOUT LONG-TERM CURRENT USE OF INSULIN: ICD-10-CM

## 2023-02-21 NOTE — TELEPHONE ENCOUNTER
Refill Routing Note   Medication(s) are not appropriate for processing by [facility] for the following reason(s):       Required labs outdated    [facility] action(s):  Defer   Requires labs : Yes         Appointments  past 12m or future 3m with PCP    Date Provider   Last Visit   7/6/2022 Tima Stanley MD   Next Visit   4/10/2023 Tima Stanley MD   ED visits in past 90 days: 0        Note composed:1:36 PM 02/21/2023

## 2023-02-21 NOTE — TELEPHONE ENCOUNTER
Care Due:                  Date            Visit Type   Department     Provider  --------------------------------------------------------------------------------                                ESTABLISHED   [facility] FAMILY                              PATIENT -    MED/ INTERNAL  Last Visit: 07-      VIRTUAL      MED/ PEDS      Tima Stanley                              MYCHART                              ANNUAL       [facility]                              CHECKUP/PHY  MED/ INTERNAL  Next Visit: 04-      S            MED/ PEDS      Tima Stanley                                                            Last  Test          Frequency    Reason                     Performed    Due Date  --------------------------------------------------------------------------------    HBA1C.......  6 months...  dulaglutide..............  04-   10-    [employer] Embedded Care Gaps. Reference number: [id number]. 2/20/2023   10:32:49 PM CST

## 2023-02-22 ENCOUNTER — PATIENT MESSAGE (OUTPATIENT)
Dept: FAMILY MEDICINE | Facility: CLINIC | Age: 58
End: 2023-02-22
Payer: COMMERCIAL

## 2023-02-22 RX ORDER — DULAGLUTIDE 1.5 MG/.5ML
1.5 INJECTION, SOLUTION SUBCUTANEOUS WEEKLY
Qty: 12 PEN | Refills: 0 | Status: SHIPPED | OUTPATIENT
Start: 2023-02-22 | End: 2023-05-25 | Stop reason: SDUPTHER

## 2023-03-23 ENCOUNTER — PATIENT MESSAGE (OUTPATIENT)
Dept: ADMINISTRATIVE | Facility: HOSPITAL | Age: 58
End: 2023-03-23
Payer: COMMERCIAL

## 2023-04-05 ENCOUNTER — PATIENT OUTREACH (OUTPATIENT)
Dept: ADMINISTRATIVE | Facility: HOSPITAL | Age: 58
End: 2023-04-05
Payer: COMMERCIAL

## 2023-04-05 ENCOUNTER — PATIENT MESSAGE (OUTPATIENT)
Dept: ADMINISTRATIVE | Facility: HOSPITAL | Age: 58
End: 2023-04-05
Payer: COMMERCIAL

## 2023-04-05 NOTE — PROGRESS NOTES
[facility] 1 DM EYE 04.03.23 - Left message for patient to call our office. Please schedule.    Overdue Diabetes labs w/ urine and Colorectal Screening - Left message for patient to call our office. Please schedule.    Non-Compliant Blood Pressure Reading - Left message for patient to call our office. Updated blood pressure reading needed.

## 2023-04-12 ENCOUNTER — PATIENT MESSAGE (OUTPATIENT)
Dept: ADMINISTRATIVE | Facility: HOSPITAL | Age: 58
End: 2023-04-12
Payer: COMMERCIAL

## 2023-04-19 DIAGNOSIS — E11.9 TYPE 2 DIABETES MELLITUS WITHOUT COMPLICATION: ICD-10-CM

## 2023-04-20 DIAGNOSIS — E11.29 TYPE 2 DIABETES MELLITUS WITH MICROALBUMINURIA, WITHOUT LONG-TERM CURRENT USE OF INSULIN: Primary | ICD-10-CM

## 2023-04-20 DIAGNOSIS — R80.9 TYPE 2 DIABETES MELLITUS WITH MICROALBUMINURIA, WITHOUT LONG-TERM CURRENT USE OF INSULIN: Primary | ICD-10-CM

## 2023-04-27 ENCOUNTER — TELEPHONE (OUTPATIENT)
Dept: PHARMACY | Facility: CLINIC | Age: 58
End: 2023-04-27
Payer: COMMERCIAL

## 2023-05-01 RX ORDER — BLOOD-GLUCOSE SENSOR
EACH MISCELLANEOUS
Qty: 3 EACH | Refills: 11 | Status: SHIPPED | OUTPATIENT
Start: 2023-05-01 | End: 2023-08-02

## 2023-05-24 ENCOUNTER — TELEPHONE (OUTPATIENT)
Dept: PHARMACY | Facility: CLINIC | Age: 58
End: 2023-05-24
Payer: COMMERCIAL

## 2023-05-24 NOTE — TELEPHONE ENCOUNTER
DOCUMENTATION ONLY  dilTIAZem HCl ER 240mg tablets  Approval date: 5/23/2023 to 5/23/2024   Case ID# [id number]

## 2023-05-25 ENCOUNTER — PATIENT MESSAGE (OUTPATIENT)
Dept: CARDIOLOGY | Facility: CLINIC | Age: 58
End: 2023-05-25
Payer: COMMERCIAL

## 2023-05-25 DIAGNOSIS — J45.20 MILD INTERMITTENT ASTHMA WITHOUT COMPLICATION: ICD-10-CM

## 2023-05-25 DIAGNOSIS — R80.9 TYPE 2 DIABETES MELLITUS WITH MICROALBUMINURIA, WITHOUT LONG-TERM CURRENT USE OF INSULIN: Chronic | ICD-10-CM

## 2023-05-25 DIAGNOSIS — E11.29 TYPE 2 DIABETES MELLITUS WITH MICROALBUMINURIA, WITHOUT LONG-TERM CURRENT USE OF INSULIN: Chronic | ICD-10-CM

## 2023-05-25 RX ORDER — ALBUTEROL SULFATE 90 UG/1
2 AEROSOL, METERED RESPIRATORY (INHALATION) EVERY 4 HOURS PRN
Qty: 25.5 G | Refills: 0 | Status: SHIPPED | OUTPATIENT
Start: 2023-05-25 | End: 2023-10-20 | Stop reason: SDUPTHER

## 2023-05-25 NOTE — TELEPHONE ENCOUNTER
Care Due:                  Date            Visit Type   Department     Provider  --------------------------------------------------------------------------------                                ESTABLISHED   [facility] FAMILY                              PATIENT -    MED/ INTERNAL  Last Visit: 07-      VIRTUAL      MED/ PEDS      Tima Stanley                              MYCHART                              ANNUAL       [facility] FAM[facility]                              CHECKUP/PHY  MED/ INTERNAL  Next Visit: 06-      S            MED/ PEDS      Tima Stanley                                                            Last  Test          Frequency    Reason                     Performed    Due Date  --------------------------------------------------------------------------------    HBA1C.......  6 months...  dulaglutide..............  04-   10-    [facility] Embedded Care Due Messages. Reference number: [id number].   5/25/2023 4:16:37 PM CDT

## 2023-05-25 NOTE — TELEPHONE ENCOUNTER
No care due was identified.  [facility] Embedded Care Due Messages. Reference number: [id number].   5/25/2023 4:17:41 PM CDT

## 2023-05-25 NOTE — TELEPHONE ENCOUNTER
Refill Routing Note   Medication(s) are not appropriate for processing by [facility] for the following reason(s):      Required labs outdated    [facility] action(s):  Defer Care Due:  Labs due          Appointments  past 12m or future 3m with PCP    Date Provider   Last Visit   Visit date not found Smita Flores MD   Next Visit   Visit date not found Smita Flores MD   ED visits in past 90 days: 0        Note composed:6:18 PM 05/25/2023

## 2023-05-25 NOTE — TELEPHONE ENCOUNTER
Refill Decision Note   Shanae Kumari  is requesting a refill authorization.  Brief Assessment and Rationale for Refill:  Approve     Medication Therapy Plan:         Comments:     No Care Gaps recommended.     Note composed:6:17 PM 05/25/2023             None available

## 2023-05-26 RX ORDER — DULAGLUTIDE 1.5 MG/.5ML
1.5 INJECTION, SOLUTION SUBCUTANEOUS WEEKLY
Qty: 12 PEN | Refills: 0 | Status: SHIPPED | OUTPATIENT
Start: 2023-05-26 | End: 2023-12-18 | Stop reason: SDUPTHER

## 2023-06-12 ENCOUNTER — PATIENT OUTREACH (OUTPATIENT)
Dept: ADMINISTRATIVE | Facility: HOSPITAL | Age: 58
End: 2023-06-12
Payer: COMMERCIAL

## 2023-06-12 NOTE — PROGRESS NOTES
BOOKED LABS AND URINE TO FOLLOW PCP APPT.  Health Maintenance Due   Topic Date Due    Shingles Vaccine (1 of 2) Never done    Pneumococcal Vaccines (Age 0-64) (2 - PCV) 08/24/2018    Hemoglobin A1c  07/13/2022    Diabetes Urine Screening  09/28/2022    Foot Exam  11/18/2022    Eye Exam  02/09/2023    Lipid Panel  04/13/2023    High Dose Statin  06/08/2023    Aspirin/Antiplatelet Therapy  06/08/2023

## 2023-06-14 ENCOUNTER — PATIENT OUTREACH (OUTPATIENT)
Dept: ADMINISTRATIVE | Facility: HOSPITAL | Age: 58
End: 2023-06-14
Payer: COMMERCIAL

## 2023-06-14 DIAGNOSIS — E11.00 TYPE 2 DIABETES MELLITUS WITH HYPEROSMOLARITY WITHOUT COMA, WITHOUT LONG-TERM CURRENT USE OF INSULIN: Primary | ICD-10-CM

## 2023-06-20 ENCOUNTER — TELEPHONE (OUTPATIENT)
Dept: PHARMACY | Facility: CLINIC | Age: 58
End: 2023-06-20
Payer: COMMERCIAL

## 2023-06-22 ENCOUNTER — PATIENT OUTREACH (OUTPATIENT)
Dept: ADMINISTRATIVE | Facility: HOSPITAL | Age: 58
End: 2023-06-22
Payer: COMMERCIAL

## 2023-06-22 NOTE — PROGRESS NOTES
[facility] 1 BP Gap Report 05.01.23 - the patient has an upcoming appointment scheduled on 06/23/2023 w/ Endocrinology.    Overdue Diabetes Labs w/ Urine, Diabetic Eye Exam, and Colorectal Screening - Left message for patient to call our office. Please schedule.

## 2023-06-26 ENCOUNTER — PATIENT MESSAGE (OUTPATIENT)
Dept: FAMILY MEDICINE | Facility: CLINIC | Age: 58
End: 2023-06-26
Payer: COMMERCIAL

## 2023-06-29 DIAGNOSIS — I10 ESSENTIAL HYPERTENSION: Primary | ICD-10-CM

## 2023-06-29 DIAGNOSIS — I25.10 CORONARY ARTERY DISEASE, UNSPECIFIED VESSEL OR LESION TYPE, UNSPECIFIED WHETHER ANGINA PRESENT, UNSPECIFIED WHETHER NATIVE OR TRANSPLANTED HEART: ICD-10-CM

## 2023-07-03 ENCOUNTER — PATIENT MESSAGE (OUTPATIENT)
Dept: FAMILY MEDICINE | Facility: CLINIC | Age: 58
End: 2023-07-03
Payer: COMMERCIAL

## 2023-07-05 ENCOUNTER — TELEPHONE (OUTPATIENT)
Dept: FAMILY MEDICINE | Facility: CLINIC | Age: 58
End: 2023-07-05
Payer: COMMERCIAL

## 2023-07-12 DIAGNOSIS — R80.9 TYPE 2 DIABETES MELLITUS WITH MICROALBUMINURIA, WITHOUT LONG-TERM CURRENT USE OF INSULIN: ICD-10-CM

## 2023-07-12 DIAGNOSIS — E11.29 TYPE 2 DIABETES MELLITUS WITH MICROALBUMINURIA, WITHOUT LONG-TERM CURRENT USE OF INSULIN: ICD-10-CM

## 2023-07-12 DIAGNOSIS — E11.9 TYPE 2 DIABETES MELLITUS WITHOUT COMPLICATION: ICD-10-CM

## 2023-07-14 RX ORDER — NAPROXEN SODIUM 220 MG/1
81 TABLET, FILM COATED ORAL DAILY
Qty: 90 TABLET | Refills: 3 | Status: SHIPPED | OUTPATIENT
Start: 2023-07-14

## 2023-07-14 RX ORDER — ATORVASTATIN CALCIUM 40 MG/1
40 TABLET, FILM COATED ORAL NIGHTLY
Qty: 90 TABLET | Refills: 3 | Status: SHIPPED | OUTPATIENT
Start: 2023-07-14 | End: 2023-09-14

## 2023-07-14 RX ORDER — NITROGLYCERIN 0.4 MG/1
0.4 TABLET SUBLINGUAL EVERY 5 MIN PRN
Qty: 90 TABLET | Refills: 3 | Status: SHIPPED | OUTPATIENT
Start: 2023-07-14 | End: 2023-09-13

## 2023-07-31 DIAGNOSIS — J45.20 MILD INTERMITTENT ASTHMA WITHOUT COMPLICATION: ICD-10-CM

## 2023-07-31 RX ORDER — MONTELUKAST SODIUM 10 MG/1
10 TABLET ORAL DAILY
Qty: 90 TABLET | Refills: 0 | Status: SHIPPED | OUTPATIENT
Start: 2023-07-31 | End: 2023-12-18 | Stop reason: SDUPTHER

## 2023-07-31 NOTE — TELEPHONE ENCOUNTER
No care due was identified.  [facility] Embedded Care Due Messages. Reference number: [id number].   7/31/2023 2:44:19 AM CDT

## 2023-07-31 NOTE — TELEPHONE ENCOUNTER
Refill Decision Note   Shanae Kumari  is requesting a refill authorization.  Brief Assessment and Rationale for Refill:  Approve     Medication Therapy Plan:         Comments:     Note composed:9:50 AM 07/31/2023             Appointments     Last Visit   7/6/2022 Tima Stanley MD   Next Visit   9/13/2023 Tima Stanley MD

## 2023-08-01 DIAGNOSIS — E11.00 TYPE 2 DIABETES MELLITUS WITH HYPEROSMOLARITY WITHOUT COMA, WITHOUT LONG-TERM CURRENT USE OF INSULIN: Primary | ICD-10-CM

## 2023-08-02 ENCOUNTER — OFFICE VISIT (OUTPATIENT)
Dept: FAMILY MEDICINE | Facility: CLINIC | Age: 58
End: 2023-08-02
Payer: COMMERCIAL

## 2023-08-02 VITALS
HEIGHT: 64 IN | HEART RATE: 101 BPM | OXYGEN SATURATION: 95 % | WEIGHT: 196 LBS | BODY MASS INDEX: 33.46 KG/M2 | DIASTOLIC BLOOD PRESSURE: 84 MMHG | TEMPERATURE: 98 F | SYSTOLIC BLOOD PRESSURE: 138 MMHG

## 2023-08-02 DIAGNOSIS — F41.9 ANXIETY AND DEPRESSION: ICD-10-CM

## 2023-08-02 DIAGNOSIS — R80.9 TYPE 2 DIABETES MELLITUS WITH MICROALBUMINURIA, WITHOUT LONG-TERM CURRENT USE OF INSULIN: Chronic | ICD-10-CM

## 2023-08-02 DIAGNOSIS — Z12.11 ENCOUNTER FOR FIT (FECAL IMMUNOCHEMICAL TEST) SCREENING: ICD-10-CM

## 2023-08-02 DIAGNOSIS — I10 ESSENTIAL HYPERTENSION: Chronic | ICD-10-CM

## 2023-08-02 DIAGNOSIS — Z65.9 MEDICATION NONADHERENCE DUE TO PSYCHOSOCIAL PROBLEM: ICD-10-CM

## 2023-08-02 DIAGNOSIS — I25.10 CORONARY ARTERY DISEASE INVOLVING NATIVE CORONARY ARTERY OF NATIVE HEART WITHOUT ANGINA PECTORIS: Chronic | ICD-10-CM

## 2023-08-02 DIAGNOSIS — Z91.148 MEDICATION NONADHERENCE DUE TO PSYCHOSOCIAL PROBLEM: ICD-10-CM

## 2023-08-02 DIAGNOSIS — E11.29 TYPE 2 DIABETES MELLITUS WITH MICROALBUMINURIA, WITHOUT LONG-TERM CURRENT USE OF INSULIN: Chronic | ICD-10-CM

## 2023-08-02 DIAGNOSIS — Z00.00 ROUTINE MEDICAL EXAM: Primary | ICD-10-CM

## 2023-08-02 DIAGNOSIS — Z23 NEED FOR VACCINATION FOR PNEUMOCOCCUS: ICD-10-CM

## 2023-08-02 DIAGNOSIS — E78.49 OTHER HYPERLIPIDEMIA: Chronic | ICD-10-CM

## 2023-08-02 DIAGNOSIS — F32.A ANXIETY AND DEPRESSION: ICD-10-CM

## 2023-08-02 PROBLEM — R07.9 CHEST PAIN: Status: RESOLVED | Noted: 2021-11-26 | Resolved: 2023-08-02

## 2023-08-02 PROBLEM — F41.1 GAD (GENERALIZED ANXIETY DISORDER): Chronic | Status: ACTIVE | Noted: 2023-08-02

## 2023-08-02 PROCEDURE — 3008F BODY MASS INDEX DOCD: CPT | Mod: CPTII,S$GLB,, | Performed by: INTERNAL MEDICINE

## 2023-08-02 PROCEDURE — 99396 PR PREVENTIVE VISIT,EST,40-64: ICD-10-PCS | Mod: 25,S$GLB,, | Performed by: INTERNAL MEDICINE

## 2023-08-02 PROCEDURE — 99999 PR PBB SHADOW E&M-EST. PATIENT-LVL III: CPT | Mod: PBBFAC,,, | Performed by: INTERNAL MEDICINE

## 2023-08-02 PROCEDURE — 1160F RVW MEDS BY RX/DR IN RCRD: CPT | Mod: CPTII,S$GLB,, | Performed by: INTERNAL MEDICINE

## 2023-08-02 PROCEDURE — 99999 PR PBB SHADOW E&M-EST. PATIENT-LVL III: ICD-10-PCS | Mod: PBBFAC,,, | Performed by: INTERNAL MEDICINE

## 2023-08-02 PROCEDURE — 90471 PNEUMOCOCCAL CONJUGATE VACCINE 20-VALENT: ICD-10-PCS | Mod: 59,S$GLB,, | Performed by: INTERNAL MEDICINE

## 2023-08-02 PROCEDURE — 99214 OFFICE O/P EST MOD 30 MIN: CPT | Mod: 25,S$GLB,, | Performed by: INTERNAL MEDICINE

## 2023-08-02 PROCEDURE — 90471 IMMUNIZATION ADMIN: CPT | Mod: 59,S$GLB,, | Performed by: INTERNAL MEDICINE

## 2023-08-02 PROCEDURE — 90677 PNEUMOCOCCAL CONJUGATE VACCINE 20-VALENT: ICD-10-PCS | Mod: S$GLB,,, | Performed by: INTERNAL MEDICINE

## 2023-08-02 PROCEDURE — 3079F PR MOST RECENT DIASTOLIC BLOOD PRESSURE 80-89 MM HG: ICD-10-PCS | Mod: CPTII,S$GLB,, | Performed by: INTERNAL MEDICINE

## 2023-08-02 PROCEDURE — 3075F SYST BP GE 130 - 139MM HG: CPT | Mod: CPTII,S$GLB,, | Performed by: INTERNAL MEDICINE

## 2023-08-02 PROCEDURE — 1160F PR REVIEW ALL MEDS BY PRESCRIBER/CLIN PHARMACIST DOCUMENTED: ICD-10-PCS | Mod: CPTII,S$GLB,, | Performed by: INTERNAL MEDICINE

## 2023-08-02 PROCEDURE — 99396 PREV VISIT EST AGE 40-64: CPT | Mod: 25,S$GLB,, | Performed by: INTERNAL MEDICINE

## 2023-08-02 PROCEDURE — 1159F MED LIST DOCD IN RCRD: CPT | Mod: CPTII,S$GLB,, | Performed by: INTERNAL MEDICINE

## 2023-08-02 PROCEDURE — 1159F PR MEDICATION LIST DOCUMENTED IN MEDICAL RECORD: ICD-10-PCS | Mod: CPTII,S$GLB,, | Performed by: INTERNAL MEDICINE

## 2023-08-02 PROCEDURE — 3008F PR BODY MASS INDEX (BMI) DOCUMENTED: ICD-10-PCS | Mod: CPTII,S$GLB,, | Performed by: INTERNAL MEDICINE

## 2023-08-02 PROCEDURE — 3079F DIAST BP 80-89 MM HG: CPT | Mod: CPTII,S$GLB,, | Performed by: INTERNAL MEDICINE

## 2023-08-02 PROCEDURE — 90677 PCV20 VACCINE IM: CPT | Mod: S$GLB,,, | Performed by: INTERNAL MEDICINE

## 2023-08-02 PROCEDURE — 3072F PR LOW RISK FOR RETINOPATHY: ICD-10-PCS | Mod: CPTII,S$GLB,, | Performed by: INTERNAL MEDICINE

## 2023-08-02 PROCEDURE — 3075F PR MOST RECENT SYSTOLIC BLOOD PRESS GE 130-139MM HG: ICD-10-PCS | Mod: CPTII,S$GLB,, | Performed by: INTERNAL MEDICINE

## 2023-08-02 PROCEDURE — 3072F LOW RISK FOR RETINOPATHY: CPT | Mod: CPTII,S$GLB,, | Performed by: INTERNAL MEDICINE

## 2023-08-02 PROCEDURE — 99214 PR OFFICE/OUTPT VISIT, EST, LEVL IV, 30-39 MIN: ICD-10-PCS | Mod: 25,S$GLB,, | Performed by: INTERNAL MEDICINE

## 2023-08-02 RX ORDER — SERTRALINE HYDROCHLORIDE 100 MG/1
TABLET, FILM COATED ORAL
Qty: 90 TABLET | Refills: 1 | Status: SHIPPED | OUTPATIENT
Start: 2023-08-02 | End: 2024-01-11 | Stop reason: ALTCHOICE

## 2023-08-02 RX ORDER — DILTIAZEM HYDROCHLORIDE 120 MG/1
120 CAPSULE, EXTENDED RELEASE ORAL DAILY
Qty: 90 CAPSULE | Refills: 1 | Status: SHIPPED | OUTPATIENT
Start: 2023-08-02 | End: 2024-08-01

## 2023-08-02 NOTE — PROGRESS NOTES
Patient given [id number] vaccine via injection. 0 complaints of, tolerated well. VIS given & advised to wait in lobby 15 mins for monitoring. Verbalized understanding. Also given fitkit per order, along with directions on use. Understanding verbalized.

## 2023-08-02 NOTE — PROGRESS NOTES
Assessment & Plan  Problem List Items Addressed This Visit          Psychiatric    Anxiety and depression (Chronic)    Current Assessment & Plan     Restart sertraline.  We will plan to titrate to 100 mg.         Relevant Medications    sertraline (ZOLOFT) 100 MG tablet       Cardiac/Vascular    Essential hypertension (Chronic)    Current Assessment & Plan     Restart diltiazem at a lower dose          Relevant Medications    diltiaZEM (DILACOR XR) 120 MG CDCR    Other hyperlipidemia (Chronic)    Current Assessment & Plan     Not on statin.  Recheck labs         CAD (coronary artery disease) (Chronic)    Current Assessment & Plan     Stable, asymptomatic chronic condition.  Will continue to maximize risk factor reduction and adjust medication as needed.             Endocrine    Type 2 diabetes mellitus with microalbuminuria, without long-term current use of insulin (Chronic)    Overview     Angioedema to ARB.  Persistent metabolic acidosis when on metformin that resolved with stopping.  ?GI losses         Current Assessment & Plan     I suspect she is had hyperglycemia for quite some time.  Discussed the plan to restart Jardiance as well as GLP 1 and titrate to effect.  I will hope to keep her off of glimepiride.          Other Visit Diagnoses       Routine medical exam    -  Primary  -    Discussed healthy diet, regular exercise, necessary labs, age appropriate cancer screening, and routine vaccinations.       Need for vaccination for pneumococcus        Relevant Orders    (In Office Administered) Pneumococcal Conjugate Vaccine (20 Valent) (IM)    Encounter for FIT (fecal immunochemical test) screening  -   FitKit was given to patient on 8/2/2023 2:11 PM           Relevant Orders    Fecal Immunochemical Test (iFOBT)    Medication nonadherence due to psychosocial problem    -  As above.  Complicated social situation. We did have a rachel discussion today that I would feel that she needs more aggressive approach to  her stress and anxiety management with mental health as opposed to additional medications or education regarding her chronic conditions              Health Maintenance reviewed, as above.  She states she gets her mammograms with her gyn and has not appointment coming up.  .    Follow-up: Follow up in about 3 months (around 11/2/2023) for DM follow up, virtual.  ______________________________________________________________________    Chief Complaint  Chief Complaint   Patient presents with    Annual Exam       HPI  Shanae Kumari is a 57 y.o. female with medical diagnoses as listed in the medical history and problem list that presents for routine physical.  Pt is known to me with their last appointment July 2022    She is due for labs.    In addition to routine physical she would like to address her diabetes and stress levels.    She has been nonadherent to her dietary or medication regimens.  She is been off of several of her medicines for many weeks.  She is been having some paresthesias in her fingers    Answers submitted by the patient for this visit:  Diabetes Questionnaire (Submitted on 8/1/2023)  Chief Complaint: Diabetes problem  Diabetes type: type 2  MedicAlert ID: No  Disease duration: 10 Years  blurred vision: No  foot paresthesias: No  foot ulcerations: No  visual change: No  weight loss: No  Symptom course: stable  hunger: No  mood changes: No  sleepiness: No  sweats: No  blackouts: No  hospitalization: No  nocturnal hypoglycemia: No  required assistance: No  required glucagon: No  CVA: No  heart disease: Yes  nephropathy: No  peripheral neuropathy: No  PVD: No  retinopathy: No  autonomic neuropathy: No  CAD risks: family history, hypertension, obesity, stress, diabetes mellitus  Current treatments: oral agent (triple therapy)  Treatment compliance: some of the time    She is very high levels of anxiety and stress.  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  She can not identify any immediate things that she does for stress reduction.  She does state that she likes to listen to music and read and this is somewhat helpful.  We have previously had her on sertraline 50 mg during the pandemic due to high levels of anxiety but she states that this was not effective.  No side effects          PAST MEDICAL HISTORY:  Past Medical History:   Diagnosis Date    Asthma     BRCA negative     Diabetes mellitus     Hypertension     Nausea after anesthesia 1989       PAST SURGICAL HISTORY:  Past Surgical History:   Procedure Laterality Date    CORONARY ANGIOGRAPHY N/A 11/26/2021    Procedure: ANGIOGRAM, CORONARY ARTERY;  Surgeon: Ashu Horn MD;  Location: [facility] CATH LAB;  Service: Cardiology;  Laterality: N/A;    KNEE ARTHROSCOPY      knee surgery      ULTRASOUND GUIDANCE Right 11/26/2021    Procedure: ULTRASOUND GUIDANCE;  Surgeon: Ashu Horn MD;  Location: [facility] CATH LAB;  Service: Cardiology;  Laterality: Right;       SOCIAL HISTORY:  Social History     Socioeconomic History    Marital status: Unknown   Tobacco Use    Smoking status: Never    Smokeless tobacco: Never   Substance and Sexual Activity    Alcohol use: No    Drug use: No    Sexual activity: Not Currently     Social Determinants of Health     Financial Resource Strain: Unknown (7/11/2023)    Overall Financial Resource Strain (CARDIA)     Difficulty of Paying Living Expenses: Patient refused   Food Insecurity: Unknown (7/11/2023)    Hunger Vital Sign     Worried About Running Out of Food in the Last Year: Patient refused     Ran Out of Food in the Last Year: Patient refused   Transportation Needs: Unknown (7/11/2023)    PRAPARE - Transportation     Lack of Transportation (Medical): Patient refused     Lack of Transportation (Non-Medical): Patient refused   Physical Activity: Unknown (7/11/2023)    Exercise Vital Sign      Days of Exercise per Week: Patient refused   Stress: Unknown (7/11/2023)    [place] [employer] - Occupational Stress Questionnaire     Feeling of Stress : Patient refused   Social Connections: Unknown (7/11/2023)    Social Connection and Isolation Panel [NHANES]     Frequency of Communication with Friends and Family: Patient refused     Frequency of Social Gatherings with Friends and Family: Patient refused     Active Member of Clubs or Organizations: Patient refused     Attends Club or Organization Meetings: Patient refused     Marital Status: Patient refused   Housing Stability: Unknown (7/11/2023)    Housing Stability Vital Sign     Unable to Pay for Housing in the Last Year: Patient refused     Unstable Housing in the Last Year: Patient refused       FAMILY HISTORY:  Family History   Problem Relation Age of Onset    Diabetes Mother     COPD Mother     Cancer Mother         lung    Cataracts Mother     Bipolar disorder Mother     Dementia Mother     Diabetes Father     Heart disease Father     Hypertension Father     Hyperlipidemia Father     Kidney disease Father     Cancer Sister     Diabetes Brother     Hypertension Brother     Kidney disease Brother         diabetic    Diabetes Maternal Grandmother     Blindness Maternal Grandmother     No Known Problems Maternal Grandfather     No Known Problems Paternal Grandmother     No Known Problems Paternal Grandfather     Diabetes Maternal Aunt     Diabetes Maternal Uncle     No Known Problems Paternal Aunt     No Known Problems Paternal Uncle     Breast cancer Other     Colon cancer Neg Hx     Ovarian cancer Neg Hx     Stroke Neg Hx        ALLERGIES AND MEDICATIONS: updated and reviewed.  Review of patient's allergies indicates:   Allergen Reactions    Allergenic extracts Hives, Itching, Other (See Comments), Rash and Shortness Of Breath    Dog dander Hives, Shortness Of Breath, Itching, Swelling and Rash     Cat's dander, dust,  pollen     Losartan Other (See Comments)     angioedema    Mold Itching and Shortness Of Breath    Metformin Other (See Comments)     Current Outpatient Medications   Medication Sig Dispense Refill    albuterol (PROAIR HFA) 90 mcg/actuation inhaler Inhale 2 puffs into the lungs every 4 (four) hours as needed for Wheezing. 25.5 g 0    aspirin 81 MG Chew Take 1 tablet (81 mg total) by mouth once daily. 90 tablet 3    atorvastatin (LIPITOR) 40 MG tablet Take 1 tablet (40 mg total) by mouth every evening. 90 tablet 3    blood sugar diagnostic (BLOOD GLUCOSE TEST) Strp 1 strip by Misc.(Non-Drug; Combo Route) route once daily. 100 strip 3    blood-glucose meter kit Use as instructed to test blood sugar daily 1 each 0    dulaglutide (TRULICITY) 1.5 mg/0.5 mL pen injector Inject 1.5 mg into the skin once a week. 12 pen 0    empagliflozin (JARDIANCE) 25 mg tablet Take 1 tablet (25 mg total) by mouth once daily. 90 tablet 0    flash glucose scanning reader (FREESTYLE EVELIO 2 READER) Misc 1 kit by Misc.(Non-Drug; Combo Route) route Daily. 1 each 0    flash glucose sensor (FREESTYLE EVELIO 2 SENSOR) Kit 1 each by Misc.(Non-Drug; Combo Route) route every 14 (fourteen) days. 2 kit 11    fluticasone propionate (FLONASE) 50 mcg/actuation nasal spray 1 spray (50 mcg total) by Each Nostril route once daily. 3 Bottle 3    glimepiride (AMARYL) 4 MG tablet Take 1 tablet (4 mg total) by mouth before breakfast. 90 tablet 3    montelukast (SINGULAIR) 10 mg tablet Take 1 tablet (10 mg total) by mouth once daily. 90 tablet 0    nitroGLYCERIN (NITROSTAT) 0.4 MG SL tablet Place 1 tablet (0.4 mg total) under the tongue every 5 (five) minutes as needed for Chest pain. 90 tablet 3    clopidogreL (PLAVIX) 75 mg tablet Take 1 tablet (75 mg total) by mouth once daily. 30 tablet 11    diltiaZEM (DILACOR XR) 120 MG CDCR Take 1 capsule (120 mg total) by mouth once daily. 90 capsule 1    lancets 30 gauge Misc 1 lancet by Misc.(Non-Drug; Combo Route) route once  "daily. 100 each 3    medroxyPROGESTERone (PROVERA) 10 MG tablet Take 1 tablet (10 mg total) by mouth once daily. (Patient not taking: Reported on 8/2/2023) 10 tablet 12    sertraline (ZOLOFT) 100 MG tablet Crack the 1st 3 tabs in half and take half tab daily for 6 days then increase to full tab 90 tablet 1     No current facility-administered medications for this visit.         ROS  Review of Systems   Constitutional:  Positive for fatigue.   Cardiovascular:  Negative for chest pain.   Endocrine: Positive for polyuria. Negative for polydipsia and polyphagia.   Skin:  Negative for pallor.   Neurological:  Negative for dizziness, tremors, seizures, speech difficulty, weakness and headaches.   Psychiatric/Behavioral:  Negative for confusion. The patient is nervous/anxious.            Physical Exam  Vitals:    08/02/23 1332   BP: 138/84   Pulse: 101   Temp: 98.3 °F (36.8 °C)   TempSrc: Oral   SpO2: 95%   Weight: 88.9 kg (195 lb 15.8 oz)   Height: 5' 4" (1.626 m)    Body mass index is 33.64 kg/m².  Weight: 88.9 kg (195 lb 15.8 oz)   Height: 5' 4" (162.6 cm)   Physical Exam  Constitutional:       General: She is not in acute distress.     Appearance: She is well-developed.   HENT:      Head: Normocephalic and atraumatic.   Eyes:      General: Lids are normal. No scleral icterus.     Conjunctiva/sclera: Conjunctivae normal.      Pupils: Pupils are equal, round, and reactive to light.   Neck:      Thyroid: No thyromegaly.      Vascular: No carotid bruit or JVD.   Cardiovascular:      Rate and Rhythm: Normal rate and regular rhythm.      Pulses: Normal pulses.      Heart sounds: Normal heart sounds. No murmur heard.     No friction rub. No S3 or S4 sounds.   Pulmonary:      Effort: Pulmonary effort is normal.      Breath sounds: Normal breath sounds. No wheezing, rhonchi or rales.   Abdominal:      General: Bowel sounds are normal.      Palpations: Abdomen is soft.      Tenderness: There is no abdominal tenderness. "   Musculoskeletal:         General: No tenderness.      Cervical back: Full passive range of motion without pain and neck supple.      Right lower leg: No edema.      Left lower leg: No edema.   Skin:     General: Skin is warm and dry.      Findings: No rash.   Neurological:      Mental Status: She is alert and oriented to person, place, and time.   Psychiatric:         Speech: Speech normal.         Behavior: Behavior normal.         Thought Content: Thought content normal.             Health Maintenance         Date Due Completion Date    Shingles Vaccine (1 of 2) Never done ---    Hemoglobin A1c 07/13/2022 4/13/2022    Diabetes Urine Screening 09/28/2022 9/28/2021    Foot Exam 11/18/2022 11/18/2021    Override on 3/7/2019: Done    Override on 2/19/2018: Done    Eye Exam 02/09/2023 2/9/2022    Lipid Panel 04/13/2023 4/13/2022    Colorectal Cancer Screening 10/20/2023 10/20/2022    Mammogram 10/20/2023 10/20/2022    Influenza Vaccine (1) 09/01/2023 9/16/2022    Override on 8/21/2016: Done    Override on 9/12/2015: Done    High Dose Statin 08/02/2024 8/2/2023    Aspirin/Antiplatelet Therapy 08/02/2024 8/2/2023    Cervical Cancer Screening 08/16/2026 10/20/2022    TETANUS VACCINE 03/09/2029 3/9/2019

## 2023-08-02 NOTE — ASSESSMENT & PLAN NOTE
I suspect she is had hyperglycemia for quite some time.  Discussed the plan to restart Jardiance as well as GLP 1 and titrate to effect.  I will hope to keep her off of glimepiride.    We did have a rachel discussion today that I would feel that she needs more aggressive approach to her stress and anxiety management with mental health as opposed to additional medications or education regarding her chronic conditions

## 2023-08-04 ENCOUNTER — LAB VISIT (OUTPATIENT)
Dept: LAB | Facility: HOSPITAL | Age: 58
End: 2023-08-04
Attending: INTERNAL MEDICINE
Payer: COMMERCIAL

## 2023-08-04 DIAGNOSIS — E11.00 TYPE 2 DIABETES MELLITUS WITH HYPEROSMOLARITY WITHOUT COMA, WITHOUT LONG-TERM CURRENT USE OF INSULIN: ICD-10-CM

## 2023-08-04 DIAGNOSIS — E11.29 TYPE 2 DIABETES MELLITUS WITH MICROALBUMINURIA, WITHOUT LONG-TERM CURRENT USE OF INSULIN: ICD-10-CM

## 2023-08-04 DIAGNOSIS — R80.9 TYPE 2 DIABETES MELLITUS WITH MICROALBUMINURIA, WITHOUT LONG-TERM CURRENT USE OF INSULIN: ICD-10-CM

## 2023-08-04 LAB
ALBUMIN SERPL BCP-MCNC: 4.1 G/DL (ref 3.5–5.2)
ALP SERPL-CCNC: 73 U/L (ref 55–135)
ALT SERPL W/O P-5'-P-CCNC: 19 U/L (ref 10–44)
ANION GAP SERPL CALC-SCNC: 14 MMOL/L (ref 8–16)
AST SERPL-CCNC: 16 U/L (ref 10–40)
BILIRUB SERPL-MCNC: 0.5 MG/DL (ref 0.1–1)
BUN SERPL-MCNC: 21 MG/DL (ref 6–20)
CALCIUM SERPL-MCNC: 9.6 MG/DL (ref 8.7–10.5)
CHLORIDE SERPL-SCNC: 102 MMOL/L (ref 95–110)
CO2 SERPL-SCNC: 21 MMOL/L (ref 23–29)
CREAT SERPL-MCNC: 0.9 MG/DL (ref 0.5–1.4)
EST. GFR  (NO RACE VARIABLE): >60 ML/MIN/1.73 M^2
ESTIMATED AVG GLUCOSE: 280 MG/DL (ref 68–131)
GLUCOSE SERPL-MCNC: 264 MG/DL (ref 70–110)
HBA1C MFR BLD: 11.4 % (ref 4–5.6)
POTASSIUM SERPL-SCNC: 3.9 MMOL/L (ref 3.5–5.1)
PROT SERPL-MCNC: 7.5 G/DL (ref 6–8.4)
SODIUM SERPL-SCNC: 137 MMOL/L (ref 136–145)

## 2023-08-04 PROCEDURE — 83036 HEMOGLOBIN GLYCOSYLATED A1C: CPT | Performed by: INTERNAL MEDICINE

## 2023-08-04 PROCEDURE — 80053 COMPREHEN METABOLIC PANEL: CPT | Performed by: INTERNAL MEDICINE

## 2023-08-04 PROCEDURE — 36415 COLL VENOUS BLD VENIPUNCTURE: CPT | Mod: PO | Performed by: INTERNAL MEDICINE

## 2023-08-06 NOTE — PROGRESS NOTES
These lab results are showing very high sugars as we predicted.  I would again recommend that we focus on the Jardiance and the Trulicity. Please let us know if you need refills sent in.      Keep an eye on your sugars for when we have our virtual in September.     Sincerely,  Tima Stanley MD

## 2023-08-15 LAB
PAP RECOMMENDATION EXT: ABNORMAL
PAP SMEAR: ABNORMAL

## 2023-08-16 ENCOUNTER — PATIENT MESSAGE (OUTPATIENT)
Dept: FAMILY MEDICINE | Facility: CLINIC | Age: 58
End: 2023-08-16
Payer: COMMERCIAL

## 2023-08-17 ENCOUNTER — PATIENT MESSAGE (OUTPATIENT)
Dept: FAMILY MEDICINE | Facility: CLINIC | Age: 58
End: 2023-08-17
Payer: COMMERCIAL

## 2023-08-23 ENCOUNTER — LAB VISIT (OUTPATIENT)
Dept: LAB | Facility: HOSPITAL | Age: 58
End: 2023-08-23
Attending: INTERNAL MEDICINE
Payer: COMMERCIAL

## 2023-08-23 DIAGNOSIS — E11.9 TYPE 2 DIABETES MELLITUS WITHOUT COMPLICATION: ICD-10-CM

## 2023-08-23 LAB
CHOLEST SERPL-MCNC: 242 MG/DL (ref 120–199)
CHOLEST/HDLC SERPL: 6.1 {RATIO} (ref 2–5)
HDLC SERPL-MCNC: 40 MG/DL (ref 40–75)
HDLC SERPL: 16.5 % (ref 20–50)
LDLC SERPL CALC-MCNC: 123.8 MG/DL (ref 63–159)
NONHDLC SERPL-MCNC: 202 MG/DL
TRIGL SERPL-MCNC: 391 MG/DL (ref 30–150)

## 2023-08-23 PROCEDURE — 80061 LIPID PANEL: CPT | Performed by: INTERNAL MEDICINE

## 2023-08-23 PROCEDURE — 36415 COLL VENOUS BLD VENIPUNCTURE: CPT | Performed by: INTERNAL MEDICINE

## 2023-09-14 ENCOUNTER — OFFICE VISIT (OUTPATIENT)
Dept: CARDIOLOGY | Facility: CLINIC | Age: 58
End: 2023-09-14
Payer: COMMERCIAL

## 2023-09-14 VITALS
SYSTOLIC BLOOD PRESSURE: 140 MMHG | WEIGHT: 195.13 LBS | BODY MASS INDEX: 33.31 KG/M2 | RESPIRATION RATE: 15 BRPM | DIASTOLIC BLOOD PRESSURE: 80 MMHG | HEART RATE: 109 BPM | OXYGEN SATURATION: 98 % | HEIGHT: 64 IN

## 2023-09-14 DIAGNOSIS — R80.9 TYPE 2 DIABETES MELLITUS WITH MICROALBUMINURIA, WITHOUT LONG-TERM CURRENT USE OF INSULIN: Chronic | ICD-10-CM

## 2023-09-14 DIAGNOSIS — G47.9 SLEEP DISTURBANCES: Chronic | ICD-10-CM

## 2023-09-14 DIAGNOSIS — E66.9 OBESITY, CLASS I, BMI 30-34.9: Chronic | ICD-10-CM

## 2023-09-14 DIAGNOSIS — E66.01 MORBID OBESITY: ICD-10-CM

## 2023-09-14 DIAGNOSIS — I25.10 CORONARY ARTERY DISEASE, UNSPECIFIED VESSEL OR LESION TYPE, UNSPECIFIED WHETHER ANGINA PRESENT, UNSPECIFIED WHETHER NATIVE OR TRANSPLANTED HEART: Primary | ICD-10-CM

## 2023-09-14 DIAGNOSIS — I77.9 CAROTID ARTERY DISEASE, UNSPECIFIED LATERALITY, UNSPECIFIED TYPE: ICD-10-CM

## 2023-09-14 DIAGNOSIS — E11.29 TYPE 2 DIABETES MELLITUS WITH MICROALBUMINURIA, WITHOUT LONG-TERM CURRENT USE OF INSULIN: Chronic | ICD-10-CM

## 2023-09-14 PROCEDURE — 93000 EKG 12-LEAD: ICD-10-PCS | Mod: S$GLB,,, | Performed by: INTERNAL MEDICINE

## 2023-09-14 PROCEDURE — 3008F BODY MASS INDEX DOCD: CPT | Mod: CPTII,S$GLB,, | Performed by: INTERNAL MEDICINE

## 2023-09-14 PROCEDURE — 3079F DIAST BP 80-89 MM HG: CPT | Mod: CPTII,S$GLB,, | Performed by: INTERNAL MEDICINE

## 2023-09-14 PROCEDURE — 3066F NEPHROPATHY DOC TX: CPT | Mod: CPTII,S$GLB,, | Performed by: INTERNAL MEDICINE

## 2023-09-14 PROCEDURE — 1160F PR REVIEW ALL MEDS BY PRESCRIBER/CLIN PHARMACIST DOCUMENTED: ICD-10-PCS | Mod: CPTII,S$GLB,, | Performed by: INTERNAL MEDICINE

## 2023-09-14 PROCEDURE — 93000 ELECTROCARDIOGRAM COMPLETE: CPT | Mod: S$GLB,,, | Performed by: INTERNAL MEDICINE

## 2023-09-14 PROCEDURE — 3062F PR POS MACROALBUMINURIA RESULT DOCUMENTED/REVIEW: ICD-10-PCS | Mod: CPTII,S$GLB,, | Performed by: INTERNAL MEDICINE

## 2023-09-14 PROCEDURE — 1160F RVW MEDS BY RX/DR IN RCRD: CPT | Mod: CPTII,S$GLB,, | Performed by: INTERNAL MEDICINE

## 2023-09-14 PROCEDURE — 3066F PR DOCUMENTATION OF TREATMENT FOR NEPHROPATHY: ICD-10-PCS | Mod: CPTII,S$GLB,, | Performed by: INTERNAL MEDICINE

## 2023-09-14 PROCEDURE — 3072F LOW RISK FOR RETINOPATHY: CPT | Mod: CPTII,S$GLB,, | Performed by: INTERNAL MEDICINE

## 2023-09-14 PROCEDURE — 1159F MED LIST DOCD IN RCRD: CPT | Mod: CPTII,S$GLB,, | Performed by: INTERNAL MEDICINE

## 2023-09-14 PROCEDURE — 3062F POS MACROALBUMINURIA REV: CPT | Mod: CPTII,S$GLB,, | Performed by: INTERNAL MEDICINE

## 2023-09-14 PROCEDURE — 3079F PR MOST RECENT DIASTOLIC BLOOD PRESSURE 80-89 MM HG: ICD-10-PCS | Mod: CPTII,S$GLB,, | Performed by: INTERNAL MEDICINE

## 2023-09-14 PROCEDURE — 99214 OFFICE O/P EST MOD 30 MIN: CPT | Mod: S$GLB,,, | Performed by: INTERNAL MEDICINE

## 2023-09-14 PROCEDURE — 99214 PR OFFICE/OUTPT VISIT, EST, LEVL IV, 30-39 MIN: ICD-10-PCS | Mod: S$GLB,,, | Performed by: INTERNAL MEDICINE

## 2023-09-14 PROCEDURE — 99999 PR PBB SHADOW E&M-EST. PATIENT-LVL V: ICD-10-PCS | Mod: PBBFAC,,, | Performed by: INTERNAL MEDICINE

## 2023-09-14 PROCEDURE — 3077F SYST BP >= 140 MM HG: CPT | Mod: CPTII,S$GLB,, | Performed by: INTERNAL MEDICINE

## 2023-09-14 PROCEDURE — 3008F PR BODY MASS INDEX (BMI) DOCUMENTED: ICD-10-PCS | Mod: CPTII,S$GLB,, | Performed by: INTERNAL MEDICINE

## 2023-09-14 PROCEDURE — 3077F PR MOST RECENT SYSTOLIC BLOOD PRESSURE >= 140 MM HG: ICD-10-PCS | Mod: CPTII,S$GLB,, | Performed by: INTERNAL MEDICINE

## 2023-09-14 PROCEDURE — 3046F PR MOST RECENT HEMOGLOBIN A1C LEVEL > 9.0%: ICD-10-PCS | Mod: CPTII,S$GLB,, | Performed by: INTERNAL MEDICINE

## 2023-09-14 PROCEDURE — 3072F PR LOW RISK FOR RETINOPATHY: ICD-10-PCS | Mod: CPTII,S$GLB,, | Performed by: INTERNAL MEDICINE

## 2023-09-14 PROCEDURE — 1159F PR MEDICATION LIST DOCUMENTED IN MEDICAL RECORD: ICD-10-PCS | Mod: CPTII,S$GLB,, | Performed by: INTERNAL MEDICINE

## 2023-09-14 PROCEDURE — 99999 PR PBB SHADOW E&M-EST. PATIENT-LVL V: CPT | Mod: PBBFAC,,, | Performed by: INTERNAL MEDICINE

## 2023-09-14 PROCEDURE — 3046F HEMOGLOBIN A1C LEVEL >9.0%: CPT | Mod: CPTII,S$GLB,, | Performed by: INTERNAL MEDICINE

## 2023-09-14 RX ORDER — ATORVASTATIN CALCIUM 80 MG/1
80 TABLET, FILM COATED ORAL NIGHTLY
Qty: 90 TABLET | Refills: 3 | Status: SHIPPED | OUTPATIENT
Start: 2023-09-14

## 2023-09-14 RX ORDER — EZETIMIBE 10 MG/1
10 TABLET ORAL DAILY
Qty: 90 TABLET | Refills: 3 | Status: SHIPPED | OUTPATIENT
Start: 2023-09-14

## 2023-09-14 NOTE — PROGRESS NOTES
REASON FOR CONSULT:   Shanae Kumari is a 56 y.o. female who presents for evaluation of chest tightness     HISTORY OF PRESENT ILLNESS:      Patient is a pleasant 55-year-old lady.  Medical history significant for diabetes, hypertension, obesity.  Had an episode of chest tightness.  Couple of times.  Mostly at rest.  Self limiting.  No radiation.  EKG done.  Personally reviewed.  Normal sinus rhythm without acute ischemic changes.  Denies orthopnea, PND, swelling of feet.     Notes from April 2021:  Follow-up.  Mildly abnormal stress test.  Has been chest pain-free.     The left ventricle is normal in size with mild concentric hypertrophy and normal systolic function.  The estimated ejection fraction is 65%.  Normal right ventricular size with normal right ventricular systolic function.  The estimated PA systolic pressure is 14 mmHg.          Abnormal myocardial perfusion scan.    There is a mild intensity, small sized, reversible defect that is consistent with ischemia in the distal anterior wall(s).    The visually estimated ejection fraction is normal during stress.    There is normal wall motion post stress.        Notes from July 2021:  Patient here for follow-up.  No chest pains.  Dyspnea on exertion unchanged.  EKG done personally reviewed and demonstrates normal sinus rhythm, cannot rule out anterior infarct with poor R-wave progression.     Notes from September 21:  Patient here for follow-up.  CTA demonstrated mid LAD 70% lesion.  Patient states she has been mostly sedentary and no chest pain at rest.  She does not exercise, however does walk around a lot for her job.    Dec 21: fu after recent pci  No complications. No cp, orthopnea, pnd      The Prox LAD to Mid LAD lesion was 80% stenosed with 0% stenosis post-intervention.  A stent was successfully placed at 12 [address]N for 10 sec.  The estimated blood loss was <50 mL.  There was single vessel coronary artery disease.     Successful IVUS guided PCI of  prox-mid LAD with CARMEN with excellent angiographic results        Coronary angiogram:     Left main: no significant stenosis     LAD: prox-mid LAD 80% stenosis     LCX: Luminal irregularities     RCA: Luminal irregularities     Access:     We accessed the right radial artery using ultrasound guidance. The vessel appeared widely patent, suitable for endovascular access. The images were interpreted by me and saved.  Access was closed with radial band.        Notes from October 2022: Patient here for follow-up.  Can walk a block without any chest pains or tightness.  Going for knee surgery      Notes from February 2023: Patient here for follow-up.  Denies any chest pains at rest on exertion, orthopnea, PND.  Doing fine.    Notes from September 23:  Patient here for follow-up.  Doing fine.  Denies any chest pains at rest on exertion, orthopnea, PND.     PAST MEDICAL HISTORY:           Past Medical History:   Diagnosis Date    Asthma      BRCA negative      Diabetes mellitus      Hypertension           PAST SURGICAL HISTORY:            Past Surgical History:   Procedure Laterality Date    KNEE ARTHROSCOPY        knee surgery             ALLERGIES AND MEDICATION:            Review of patient's allergies indicates:   Allergen Reactions    Allergenic extracts Hives, Itching, Other (See Comments), Rash and Shortness Of Breath    Dog dander Hives, Shortness Of Breath, Itching, Swelling and Rash       Cat's dander, dust,  pollen    Losartan Other (See Comments)       angioedema    Mold Itching and Shortness Of Breath    Metformin Other (See Comments)          Medication List            Accurate as of September 29, 2021  3:21 PM. If you have any questions, ask your nurse or doctor.              CONTINUE taking these medications    albuterol 90 mcg/actuation inhaler  Commonly known as: PROAIR HFA  Inhale 2 puffs into the lungs every 4 (four) hours as needed for Wheezing.      aspirin 81 MG Chew  Take 1 tablet (81 mg total) by  mouth once daily.      atorvastatin 40 MG tablet  Commonly known as: LIPITOR  Take 1 tablet (40 mg total) by mouth every evening.      clotrimazole-betamethasone 1-0.05% cream  Commonly known as: LOTRISONE  Apply topically 2 (two) times daily. Apply to foot      diltiaZEM HCl 240 mg 24 hr tablet  Commonly known as: MATZIM LA  TAKE 1 TABLET (240 MG TOTAL) BY MOUTH ONCE DAILY.      doxazosin 4 MG tablet  Commonly known as: CARDURA  TAKE 1 TABLET BY MOUTH ONCE NIGHTLY      fluticasone propionate 50 mcg/actuation nasal spray  Commonly known as: FLONASE  1 spray (50 mcg total) by Each Nostril route once daily.      glimepiride 4 MG tablet  Commonly known as: AMARYL  TAKE 1 TABLET (4 MG TOTAL) BY MOUTH BEFORE BREAKFAST.      medroxyPROGESTERone 10 MG tablet  Commonly known as: PROVERA  Take 1 tablet (10 mg total) by mouth once daily.      meloxicam 15 MG tablet  Commonly known as: MOBIC  TAKE 1 TABLET BY MOUTH EVERY DAY      metoprolol tartrate 50 MG tablet  Commonly known as: LOPRESSOR  TAKE 1 TABLET (50 MG TOTAL) BY MOUTH AS NEEDED (USE IF NEEDED PRIOR TO CTA).      montelukast 10 mg tablet  Commonly known as: SINGULAIR  Take 1 tablet (10 mg total) by mouth once daily.      nitroGLYCERIN 0.4 MG SL tablet  Commonly known as: NITROSTAT  Place 1 tablet (0.4 mg total) under the tongue every 5 (five) minutes as needed for Chest pain.      sertraline 50 MG tablet  Commonly known as: ZOLOFT  TAKE 1 TABLET BY MOUTH EVERY DAY      TRULICITY 1.5 mg/0.5 mL pen injector  Generic drug: dulaglutide  Inject 1.5 mg into the skin once a week.                SOCIAL HISTORY:      Social History               Socioeconomic History    Marital status: Unknown       Spouse name: Not on file    Number of children: Not on file    Years of education: Not on file    Highest education level: Not on file   Occupational History    Not on file   Tobacco Use    Smoking status: Never Smoker    Smokeless tobacco: Never Used   Substance and Sexual  Activity    Alcohol use: No    Drug use: No    Sexual activity: Not Currently   Other Topics Concern    Not on file   Social History Narrative    Not on file      Social Determinants of Health          Financial Resource Strain: Low Risk     Difficulty of Paying Living Expenses: Not very hard   Food Insecurity: No Food Insecurity    Worried About Running Out of Food in the Last Year: Never true    Ran Out of Food in the Last Year: Never true   Transportation Needs: No Transportation Needs    Lack of Transportation (Medical): No    Lack of Transportation (Non-Medical): No   Physical Activity: Inactive    Days of Exercise per Week: 0 days    Minutes of Exercise per Session: 0 min   Stress: No Stress Concern Present    Feeling of Stress : Not at all   Social Connections: Unknown    Frequency of Communication with Friends and Family: Three times a week    Frequency of Social Gatherings with Friends and Family: Never    Attends Druze Services: Not on file    Active Member of Clubs or Organizations: Yes    Attends Club or Organization Meetings: Never    Marital Status: Patient refused            FAMILY HISTORY:            Family History   Problem Relation Age of Onset    Breast cancer Other      Diabetes Mother      COPD Mother      Cancer Mother           lung    Diabetes Father      Heart disease Father      Hypertension Father      Hyperlipidemia Father      Kidney disease Father      Cancer Sister      Diabetes Brother      Hypertension Brother      Kidney disease Brother           diabetic    Diabetes Maternal Aunt      Diabetes Maternal Uncle      Diabetes Maternal Grandmother      Colon cancer Neg Hx      Ovarian cancer Neg Hx      Stroke Neg Hx           REVIEW OF SYSTEMS:   Review of Systems   Constitutional: Negative.   HENT: Negative.    Eyes: Negative.      Respiratory: Negative.    Endocrine: Negative.    Hematologic/Lymphatic: Negative.    Skin: Negative.    Musculoskeletal: Negative.   "  Gastrointestinal: Negative.    Genitourinary: Negative.    Neurological: Negative.    Psychiatric/Behavioral: Negative.    Allergic/Immunologic: Negative.          A 10 point review of systems was performed and all the pertinent positives have been mentioned. Rest of review of systems was negative.           PHYSICAL EXAM:          Vitals:     09/29/21 1509   BP: 132/74   Pulse: 91    Body mass index is 34.83 kg/m².  Weight: 90.6 kg (199 lb 11.8 oz)   Height: 5' 3.5" (161.3 cm)      Physical Exam  Constitutional:       Appearance: Normal appearance. She is well-developed.   HENT:      Head: Normocephalic.   Eyes:      Pupils: Pupils are equal, round, and reactive to light.   Cardiovascular:      Rate and Rhythm: Normal rate and regular rhythm.   Pulmonary:      Effort: Pulmonary effort is normal.      Breath sounds: Normal breath sounds.   Abdominal:      General: Bowel sounds are normal.      Palpations: Abdomen is soft.      Tenderness: There is no abdominal tenderness.   Musculoskeletal:         General: Normal range of motion.      Cervical back: Normal range of motion and neck supple.   Skin:     General: Skin is warm.   Neurological:      Mental Status: She is alert and oriented to person, place, and time.             DATA:      Laboratory:  CBC:       Recent Labs   Lab 08/26/20  1151 09/28/21  0859   WBC 8.64 8.53   Hemoglobin 14.0 14.5   Hematocrit 43.7 42.7   Platelets 296 336         CHEMISTRIES:        Recent Labs   Lab 08/26/20  1151 12/17/20  1220 09/28/21  0859   Glucose 183 H 318 H 137 H   Sodium 129 L 138 133 L   Potassium 3.9 4.2 4.3   BUN 12 20 15   Creatinine 0.7 0.9 0.7   eGFR if African American >60.0 >60.0 >60.0   eGFR if non African American >60.0 >60.0 >60.0   Calcium 8.9 9.8 9.1         CARDIAC BIOMARKERS:         COAGS:         LIPIDS/LFTS:         Recent Labs   Lab 03/04/19  0803 06/25/19  1352 08/26/20  1151 09/28/21  0859   Cholesterol 169  --  169 163   Triglycerides 302 H  --  215 H " 237 H   HDL 42  --  47 39 L   LDL Cholesterol 66.6  --  79.0 76.6   Non-HDL Cholesterol 127  --  122 124   AST  --  19 23 24   ALT  --  21 26 23                 Hemoglobin A1C   Date Value Ref Range Status   09/28/2021 11.8 (H) 4.0 - 5.6 % Final       Comment:       ADA Screening Guidelines:  5.7-6.4%  Consistent with prediabetes  >or=6.5%  Consistent with diabetes     High levels of fetal hemoglobin interfere with the HbA1C  assay. Heterozygous hemoglobin variants (HbS, HgC, etc)do  not significantly interfere with this assay.   However, presence of multiple variants may affect accuracy.      12/17/2020 9.4 (H) 4.0 - 5.6 % Final       Comment:       ADA Screening Guidelines:  5.7-6.4%  Consistent with prediabetes  >or=6.5%  Consistent with diabetes  High levels of fetal hemoglobin interfere with the HbA1C  assay. Heterozygous hemoglobin variants (HbS, HgC, etc)do  not significantly interfere with this assay.   However, presence of multiple variants may affect accuracy.      08/26/2020 11.3 (H) 4.0 - 5.6 % Final       Comment:       ADA Screening Guidelines:  5.7-6.4%  Consistent with prediabetes  >or=6.5%  Consistent with diabetes  High levels of fetal hemoglobin interfere with the HbA1C  assay. Heterozygous hemoglobin variants (HbS, HgC, etc)do  not significantly interfere with this assay.   However, presence of multiple variants may affect accuracy.            TSH         The 10-year ASCVD risk score (Domingo GIBBONS Jr., et al., 2013) is: 5.6%    Values used to calculate the score:      Age: 56 years      Sex: Female      Is Non- : No      Diabetic: Yes      Tobacco smoker: No      Systolic Blood Pressure: 132 mmHg      Is BP treated: Yes      HDL Cholesterol: 39 mg/dL      Total Cholesterol: 163 mg/dL                  ASSESSMENT AND PLAN          Patient Active Problem List   Diagnosis    LSIL (low grade squamous intraepithelial lesion) on Pap smear    Asthma, mild intermittent    Essential  hypertension    Hyperlipidemia    Right hip pain    Acetabular labrum tear    Type 2 diabetes mellitus with microalbuminuria, without long-term current use of insulin    Non-seasonal allergic rhinitis    Slow transit constipation    Obesity, Class I, BMI 30-34.9         Patient with multiple risk factors for coronary artery disease. Now s/p pci. Lad. Doing fine.      Dyslipidemia:  LDL not at goal.  Increase Lipitor to 80 mg daily.  Add ezetimibe 10 mg daily    Aspirin 81 mg qd indefinitely, plavix 75 mg qd x 12 m from date of PCI.    Recheck lipid profile in 6 months followed by clinic visit      Follow-up in 6 months.

## 2023-10-17 ENCOUNTER — OFFICE VISIT (OUTPATIENT)
Dept: FAMILY MEDICINE | Facility: CLINIC | Age: 58
End: 2023-10-17
Payer: COMMERCIAL

## 2023-10-17 VITALS
SYSTOLIC BLOOD PRESSURE: 142 MMHG | HEIGHT: 64 IN | BODY MASS INDEX: 33.51 KG/M2 | DIASTOLIC BLOOD PRESSURE: 78 MMHG | WEIGHT: 196.31 LBS | HEART RATE: 109 BPM | TEMPERATURE: 99 F | OXYGEN SATURATION: 96 %

## 2023-10-17 DIAGNOSIS — I10 ESSENTIAL HYPERTENSION: Chronic | ICD-10-CM

## 2023-10-17 DIAGNOSIS — R80.9 TYPE 2 DIABETES MELLITUS WITH MICROALBUMINURIA, WITHOUT LONG-TERM CURRENT USE OF INSULIN: Chronic | ICD-10-CM

## 2023-10-17 DIAGNOSIS — J00 ACUTE NASOPHARYNGITIS: Primary | ICD-10-CM

## 2023-10-17 DIAGNOSIS — E11.29 TYPE 2 DIABETES MELLITUS WITH MICROALBUMINURIA, WITHOUT LONG-TERM CURRENT USE OF INSULIN: Chronic | ICD-10-CM

## 2023-10-17 DIAGNOSIS — E78.49 OTHER HYPERLIPIDEMIA: Chronic | ICD-10-CM

## 2023-10-17 DIAGNOSIS — I25.10 CORONARY ARTERY DISEASE INVOLVING NATIVE CORONARY ARTERY OF NATIVE HEART WITHOUT ANGINA PECTORIS: Chronic | ICD-10-CM

## 2023-10-17 PROCEDURE — 3066F PR DOCUMENTATION OF TREATMENT FOR NEPHROPATHY: ICD-10-PCS | Mod: CPTII,S$GLB,, | Performed by: NURSE PRACTITIONER

## 2023-10-17 PROCEDURE — 99214 PR OFFICE/OUTPT VISIT, EST, LEVL IV, 30-39 MIN: ICD-10-PCS | Mod: S$GLB,,, | Performed by: NURSE PRACTITIONER

## 2023-10-17 PROCEDURE — 3072F PR LOW RISK FOR RETINOPATHY: ICD-10-PCS | Mod: CPTII,S$GLB,, | Performed by: NURSE PRACTITIONER

## 2023-10-17 PROCEDURE — 3046F HEMOGLOBIN A1C LEVEL >9.0%: CPT | Mod: CPTII,S$GLB,, | Performed by: NURSE PRACTITIONER

## 2023-10-17 PROCEDURE — 3077F SYST BP >= 140 MM HG: CPT | Mod: CPTII,S$GLB,, | Performed by: NURSE PRACTITIONER

## 2023-10-17 PROCEDURE — 3062F PR POS MACROALBUMINURIA RESULT DOCUMENTED/REVIEW: ICD-10-PCS | Mod: CPTII,S$GLB,, | Performed by: NURSE PRACTITIONER

## 2023-10-17 PROCEDURE — 3078F PR MOST RECENT DIASTOLIC BLOOD PRESSURE < 80 MM HG: ICD-10-PCS | Mod: CPTII,S$GLB,, | Performed by: NURSE PRACTITIONER

## 2023-10-17 PROCEDURE — 3008F BODY MASS INDEX DOCD: CPT | Mod: CPTII,S$GLB,, | Performed by: NURSE PRACTITIONER

## 2023-10-17 PROCEDURE — 3078F DIAST BP <80 MM HG: CPT | Mod: CPTII,S$GLB,, | Performed by: NURSE PRACTITIONER

## 2023-10-17 PROCEDURE — 3072F LOW RISK FOR RETINOPATHY: CPT | Mod: CPTII,S$GLB,, | Performed by: NURSE PRACTITIONER

## 2023-10-17 PROCEDURE — 3066F NEPHROPATHY DOC TX: CPT | Mod: CPTII,S$GLB,, | Performed by: NURSE PRACTITIONER

## 2023-10-17 PROCEDURE — 1160F RVW MEDS BY RX/DR IN RCRD: CPT | Mod: CPTII,S$GLB,, | Performed by: NURSE PRACTITIONER

## 2023-10-17 PROCEDURE — 3062F POS MACROALBUMINURIA REV: CPT | Mod: CPTII,S$GLB,, | Performed by: NURSE PRACTITIONER

## 2023-10-17 PROCEDURE — 3077F PR MOST RECENT SYSTOLIC BLOOD PRESSURE >= 140 MM HG: ICD-10-PCS | Mod: CPTII,S$GLB,, | Performed by: NURSE PRACTITIONER

## 2023-10-17 PROCEDURE — 3046F PR MOST RECENT HEMOGLOBIN A1C LEVEL > 9.0%: ICD-10-PCS | Mod: CPTII,S$GLB,, | Performed by: NURSE PRACTITIONER

## 2023-10-17 PROCEDURE — 99999 PR PBB SHADOW E&M-EST. PATIENT-LVL V: ICD-10-PCS | Mod: PBBFAC,,, | Performed by: NURSE PRACTITIONER

## 2023-10-17 PROCEDURE — 99214 OFFICE O/P EST MOD 30 MIN: CPT | Mod: S$GLB,,, | Performed by: NURSE PRACTITIONER

## 2023-10-17 PROCEDURE — 1159F MED LIST DOCD IN RCRD: CPT | Mod: CPTII,S$GLB,, | Performed by: NURSE PRACTITIONER

## 2023-10-17 PROCEDURE — 1160F PR REVIEW ALL MEDS BY PRESCRIBER/CLIN PHARMACIST DOCUMENTED: ICD-10-PCS | Mod: CPTII,S$GLB,, | Performed by: NURSE PRACTITIONER

## 2023-10-17 PROCEDURE — 99999 PR PBB SHADOW E&M-EST. PATIENT-LVL V: CPT | Mod: PBBFAC,,, | Performed by: NURSE PRACTITIONER

## 2023-10-17 PROCEDURE — 1159F PR MEDICATION LIST DOCUMENTED IN MEDICAL RECORD: ICD-10-PCS | Mod: CPTII,S$GLB,, | Performed by: NURSE PRACTITIONER

## 2023-10-17 PROCEDURE — 3008F PR BODY MASS INDEX (BMI) DOCUMENTED: ICD-10-PCS | Mod: CPTII,S$GLB,, | Performed by: NURSE PRACTITIONER

## 2023-10-17 RX ORDER — PROMETHAZINE HYDROCHLORIDE AND DEXTROMETHORPHAN HYDROBROMIDE 6.25; 15 MG/5ML; MG/5ML
5 SYRUP ORAL NIGHTLY
Qty: 180 ML | Refills: 0 | Status: SHIPPED | OUTPATIENT
Start: 2023-10-17 | End: 2023-11-22

## 2023-10-17 RX ORDER — MINERAL OIL
180 ENEMA (ML) RECTAL DAILY
Qty: 30 TABLET | Refills: 5 | Status: SHIPPED | OUTPATIENT
Start: 2023-10-17 | End: 2024-10-16

## 2023-10-17 RX ORDER — FLUTICASONE PROPIONATE 50 MCG
1 SPRAY, SUSPENSION (ML) NASAL DAILY
Qty: 16 G | Refills: 5 | Status: SHIPPED | OUTPATIENT
Start: 2023-10-17

## 2023-10-17 NOTE — PATIENT INSTRUCTIONS
Try Mucinex (without D or DM).  Flonase and Allegra daily.  Cough syrup (will make you drowsy).  Continue to use the inhaler.  Increase water intake.  Increase rest.

## 2023-10-17 NOTE — LETTER
October 17, 2023      [facility]  [address] [facility]  [place] [address]  Phone: [phone]  Fax: [phone]       Patient: Shanae Kumari   YOB: 1965  Date of Visit: 10/17/2023    Please excuse 10/16/2023-10/17/2023    To Whom It May Concern:    Jayesh Kumari  was at [facility] on 10/17/2023. The patient may return to work/school on 10/18/2023 with no restrictions. If you have any questions or concerns, or if I can be of further assistance, please do not hesitate to contact me.    Sincerely,    Kat Harrison NP

## 2023-10-17 NOTE — PROGRESS NOTES
History of Present Illness   Shanae Kumari is a 58 y.o. woman with medical history as listed below who presents today for evaluation of cough cold symptoms x2 days. She reports cough, congestion, and intermittent wheezing. She has tried her Albuterol inhaler and taking her Singulair without relief. She had two negative home COVID-19 tests. She has no additional complaints and is otherwise healthy on today's visit.    Past Medical History:   Diagnosis Date    Asthma     BRCA negative     Diabetes mellitus     Hypertension     Nausea after anesthesia 1989       Past Surgical History:   Procedure Laterality Date    CORONARY ANGIOGRAPHY N/A 11/26/2021    Procedure: ANGIOGRAM, CORONARY ARTERY;  Surgeon: Ashu Horn MD;  Location: [facility] CATH LAB;  Service: Cardiology;  Laterality: N/A;    KNEE ARTHROSCOPY      knee surgery      ULTRASOUND GUIDANCE Right 11/26/2021    Procedure: ULTRASOUND GUIDANCE;  Surgeon: Ashu Horn MD;  Location: [facility] CATH LAB;  Service: Cardiology;  Laterality: Right;       Social History     Socioeconomic History    Marital status: Unknown   Tobacco Use    Smoking status: Never    Smokeless tobacco: Never   Substance and Sexual Activity    Alcohol use: No    Drug use: No    Sexual activity: Not Currently     Social Determinants of Health     Financial Resource Strain: Unknown (9/14/2023)    Overall Financial Resource Strain (CARDIA)     Difficulty of Paying Living Expenses: Patient refused   Food Insecurity: Unknown (9/14/2023)    Hunger Vital Sign     Worried About Running Out of Food in the Last Year: Patient refused     Ran Out of Food in the Last Year: Patient refused   Transportation Needs: Unknown (9/14/2023)    PRAPARE - Transportation     Lack of Transportation (Medical): Patient refused     Lack of Transportation (Non-Medical): Patient refused   Physical Activity: Unknown (9/14/2023)    Exercise Vital Sign     Days of Exercise per Week: Patient refused   Stress: Unknown  (9/14/2023)    [employer] [place] [employer] - Occupational Stress Questionnaire     Feeling of Stress : Patient refused   Social Connections: Unknown (9/14/2023)    Social Connection and Isolation Panel [NHANES]     Frequency of Communication with Friends and Family: Patient refused     Frequency of Social Gatherings with Friends and Family: Patient refused     Active Member of Clubs or Organizations: Patient refused     Attends Club or Organization Meetings: Patient refused     Marital Status: Patient refused   Housing Stability: Unknown (9/14/2023)    Housing Stability Vital Sign     Unable to Pay for Housing in the Last Year: Patient refused     Unstable Housing in the Last Year: Patient refused       Family History   Problem Relation Age of Onset    Diabetes Mother     COPD Mother     Cancer Mother         lung    Cataracts Mother     Bipolar disorder Mother     Dementia Mother     Diabetes Father     Heart disease Father     Hypertension Father     Hyperlipidemia Father     Kidney disease Father     Cancer Sister     Diabetes Brother     Hypertension Brother     Kidney disease Brother         diabetic    Diabetes Maternal Grandmother     Blindness Maternal Grandmother     No Known Problems Maternal Grandfather     No Known Problems Paternal Grandmother     No Known Problems Paternal Grandfather     Diabetes Maternal Aunt     Diabetes Maternal Uncle     No Known Problems Paternal Aunt     No Known Problems Paternal Uncle     Breast cancer Other     Colon cancer Neg Hx     Ovarian cancer Neg Hx     Stroke Neg Hx        Review of Systems  Review of Systems   Constitutional:  Negative for chills, fever, malaise/fatigue and weight loss.   HENT:  Positive for congestion and sore throat. Negative for ear pain and sinus pain.    Respiratory:  Positive for cough and wheezing. Negative for hemoptysis.    Gastrointestinal:  Negative for heartburn.   Musculoskeletal:  Negative for myalgias.   Skin:  Negative  "for rash.   Neurological:  Negative for headaches.   Endo/Heme/Allergies:  Positive for environmental allergies.     A complete review of systems was otherwise negative.    Physical Exam  BP (!) 142/78 (BP Location: Right arm, Patient Position: Sitting, BP Method: Large (Manual))   Pulse 109   Temp 98.6 °F (37 °C) (Oral)   Ht 5' 4" (1.626 m)   Wt 89 kg (196 lb 5.1 oz)   SpO2 96%   BMI 33.70 kg/m²   General appearance: alert, appears stated age, cooperative, and no distress  Eyes: negative findings: lids and lashes normal and conjunctivae and sclerae normal  Ears: normal TM's and external ear canals both ears  Nose: clear discharge, moderate congestion, no sinus tenderness  Throat: lips, mucosa, and tongue normal; teeth and gums normal and moderate oropharyngeal erythema with mild clear post nasal drainage  Neck: no adenopathy and supple, symmetrical, trachea midline  Lungs: clear to auscultation bilaterally  Heart: regular rate and rhythm, S1, S2 normal, no murmur, click, rub or gallop  Skin: Skin color, texture, turgor normal. No rashes or lesions  Neurologic: Grossly normal    Assessment/Plan  Acute nasopharyngitis  Viral, antibiotics not indicated.  Flonase and Allegra daily with nighttime cough medication and Mucinex OTC daily.  Continue inhaler PRN.  Rest and increase hydration.  RTC PRN.  -     fluticasone propionate (FLONASE) 50 mcg/actuation nasal spray; 1 spray (50 mcg total) by Each Nostril route once daily.  Dispense: 16 g; Refill: 5  -     fexofenadine (ALLEGRA) 180 MG tablet; Take 1 tablet (180 mg total) by mouth once daily.  Dispense: 30 tablet; Refill: 5  -     promethazine-dextromethorphan (PROMETHAZINE-DM) 6.25-15 mg/5 mL Syrp; Take 5 mLs by mouth every evening.  Dispense: 180 mL; Refill: 0    Essential hypertension  The current medical regimen is effective;  continue present plan and medications.    Other hyperlipidemia  The current medical regimen is effective;  continue present plan and " medications.    Coronary artery disease involving native coronary artery of native heart without angina pectoris  The current medical regimen is effective;  continue present plan and medications.    Type 2 diabetes mellitus with microalbuminuria, without long-term current use of insulin  The current medical regimen is effective;  continue present plan and medications.    Patient has verbalized understanding and is in agreement with plan of care.    Follow up if symptoms worsen or fail to improve.    Answers submitted by the patient for this visit:  Cough Questionnaire (Submitted on 10/17/2023)  Chief Complaint: Cough  Chronicity: new  Onset: in the past 7 days  Progression since onset: rapidly worsening  Frequency: every few minutes  Cough characteristics: non-productive, productive of sputum, productive of purulent sputum  ear congestion: No  nasal congestion: No  postnasal drip: Yes  rhinorrhea: Yes  Aggravated by: animals, cold air, dust, exercise, pollens, stress  Risk factors for lung disease: travel  asthma: Yes  bronchiectasis: No  bronchitis: No  COPD: No  emphysema: No  pneumonia: Yes  Treatments tried: a beta-agonist inhaler, leukotriene antagonists, rest  Improvement on treatment: no relief

## 2023-10-18 ENCOUNTER — PATIENT MESSAGE (OUTPATIENT)
Dept: FAMILY MEDICINE | Facility: CLINIC | Age: 58
End: 2023-10-18
Payer: COMMERCIAL

## 2023-10-18 DIAGNOSIS — J45.20 MILD INTERMITTENT ASTHMA WITHOUT COMPLICATION: ICD-10-CM

## 2023-10-18 DIAGNOSIS — J45.21 MILD INTERMITTENT ASTHMA WITH ACUTE EXACERBATION: Primary | ICD-10-CM

## 2023-10-19 DIAGNOSIS — R80.9 TYPE 2 DIABETES MELLITUS WITH MICROALBUMINURIA, WITHOUT LONG-TERM CURRENT USE OF INSULIN: Chronic | ICD-10-CM

## 2023-10-19 DIAGNOSIS — E11.29 TYPE 2 DIABETES MELLITUS WITH MICROALBUMINURIA, WITHOUT LONG-TERM CURRENT USE OF INSULIN: Chronic | ICD-10-CM

## 2023-10-20 ENCOUNTER — OFFICE VISIT (OUTPATIENT)
Dept: URGENT CARE | Facility: CLINIC | Age: 58
End: 2023-10-20
Payer: COMMERCIAL

## 2023-10-20 VITALS
HEIGHT: 64 IN | WEIGHT: 196.19 LBS | HEART RATE: 107 BPM | RESPIRATION RATE: 12 BRPM | TEMPERATURE: 99 F | BODY MASS INDEX: 33.49 KG/M2 | SYSTOLIC BLOOD PRESSURE: 153 MMHG | DIASTOLIC BLOOD PRESSURE: 87 MMHG | OXYGEN SATURATION: 96 %

## 2023-10-20 DIAGNOSIS — J45.21 MILD INTERMITTENT ASTHMA WITH EXACERBATION: Primary | ICD-10-CM

## 2023-10-20 PROCEDURE — 96372 THER/PROPH/DIAG INJ SC/IM: CPT | Mod: S$GLB,,,

## 2023-10-20 PROCEDURE — 96372 PR INJECTION,THERAP/PROPH/DIAG2ST, IM OR SUBCUT: ICD-10-PCS | Mod: S$GLB,,,

## 2023-10-20 PROCEDURE — 94640 PR INHAL RX, AIRWAY OBST/DX SPUTUM INDUCT: ICD-10-PCS | Mod: 59,S$GLB,,

## 2023-10-20 PROCEDURE — 94640 AIRWAY INHALATION TREATMENT: CPT | Mod: 59,S$GLB,,

## 2023-10-20 PROCEDURE — 99213 PR OFFICE/OUTPT VISIT, EST, LEVL III, 20-29 MIN: ICD-10-PCS | Mod: 25,S$GLB,,

## 2023-10-20 PROCEDURE — 99213 OFFICE O/P EST LOW 20 MIN: CPT | Mod: 25,S$GLB,,

## 2023-10-20 RX ORDER — DEXAMETHASONE SODIUM PHOSPHATE 100 MG/10ML
10 INJECTION INTRAMUSCULAR; INTRAVENOUS
Status: COMPLETED | OUTPATIENT
Start: 2023-10-20 | End: 2023-10-20

## 2023-10-20 RX ORDER — PREDNISONE 20 MG/1
40 TABLET ORAL DAILY
Qty: 10 TABLET | Refills: 0 | Status: SHIPPED | OUTPATIENT
Start: 2023-10-20 | End: 2023-10-25

## 2023-10-20 RX ORDER — GLIMEPIRIDE 4 MG/1
4 TABLET ORAL
Qty: 90 TABLET | Refills: 3 | Status: SHIPPED | OUTPATIENT
Start: 2023-10-20 | End: 2023-12-18 | Stop reason: SDUPTHER

## 2023-10-20 RX ORDER — IPRATROPIUM BROMIDE 0.5 MG/2.5ML
0.5 SOLUTION RESPIRATORY (INHALATION)
Status: COMPLETED | OUTPATIENT
Start: 2023-10-20 | End: 2023-10-20

## 2023-10-20 RX ORDER — PROMETHAZINE HYDROCHLORIDE AND DEXTROMETHORPHAN HYDROBROMIDE 6.25; 15 MG/5ML; MG/5ML
5 SYRUP ORAL NIGHTLY
Qty: 180 ML | Refills: 0 | Status: SHIPPED | OUTPATIENT
Start: 2023-10-20 | End: 2023-11-25

## 2023-10-20 RX ORDER — ALBUTEROL SULFATE 0.83 MG/ML
2.5 SOLUTION RESPIRATORY (INHALATION)
Status: COMPLETED | OUTPATIENT
Start: 2023-10-20 | End: 2023-10-20

## 2023-10-20 RX ORDER — ALBUTEROL SULFATE 90 UG/1
2 AEROSOL, METERED RESPIRATORY (INHALATION) EVERY 4 HOURS PRN
Qty: 25.5 G | Refills: 0 | Status: SHIPPED | OUTPATIENT
Start: 2023-10-20 | End: 2023-12-18 | Stop reason: SDUPTHER

## 2023-10-20 RX ADMIN — ALBUTEROL SULFATE 2.5 MG: 0.83 SOLUTION RESPIRATORY (INHALATION) at 03:10

## 2023-10-20 RX ADMIN — DEXAMETHASONE SODIUM PHOSPHATE 10 MG: 100 INJECTION INTRAMUSCULAR; INTRAVENOUS at 02:10

## 2023-10-20 RX ADMIN — IPRATROPIUM BROMIDE 0.5 MG: 0.5 SOLUTION RESPIRATORY (INHALATION) at 02:10

## 2023-10-20 NOTE — ADDENDUM NOTE
Addended by: NASEEM QUINTANILLA on: 10/20/2023 12:40 PM     Modules accepted: Orders     PULMONARY MEDICINE FOLLOW-UP    REASON FOR FOLLOW-UP/CHIEF COMPLAINT: Right Hilar Mass with likely endobronchial obstruction    ASSESSMENT/ PLAN:  1. Small cell lung cancer, lung primary, RUL mass and mediastinal involvement, presumed liver and brain and bone mets: Airway obstruction is not amenable to therapeutic bronchoscopic techniques. Oncology following. Initiation on hold for chemotherapy given fevers. Monitor.     2. Acute hypercapnic and hypoxemic respiratory failure: On AC 75% Fi02. Increased 02 needs after thoracentesis. ACCS for management of ventilator.     3. Fevers/ hypotension: On Ethan and Vaso. Suspect pneumonia. See below.     4.  R sided Post obstructive pneumonia: On antibiotic coverage- Cefepime and Vancomycin (started 3/4/2020). Sputum with rare gram positive bacilli.  Urine CX negative to date. BC negative to date.      5. Right pleural effusion: S/P thoracentesis. LDH >1,000. Monitor.     6.  Seizure activity: On keppra. No recent seizures.     7.  Atrial fibrillation with rapid ventricular response: Remains uncontrolled. Fever now contributing. On diltiazem gtt.     8. Tobacco use    9. Goals of care: Family updated daily. No family in room this AM. Agree with addresses code status given change in clinical status over the past 24-48 hours.      HISTORY OF PRESENT ILLNESS/SUBJECTIVE: Fevers persists overnight, Tmax 101.7. On diltiazem with uncontrolled HR. On propofol, fentanyl, vasopressin and ethan. On Pressure AC Fi02 75%    Vital Last Value 24 Hour Range   Temperature (!) 100.6 °F (38.1 °C) Temp  Min: 98.2 °F (36.8 °C)  Max: 102.4 °F (39.1 °C)   Pulse (!) 125 Pulse  Min: 116  Max: 133   Respiratory Rate (!) 27 Resp  Min: 6  Max: 46   Non-Invasive   Blood Pressure (!) 163/65 (03/03/20 0828) No data recorded   Arterial  Blood Pressure   No data recorded   Pulse Oximetry 98 % SpO2  Min: 77 %  Max: 100 %     Vent Settings Last Value   FiO2 75 %(Adjust PO2 130) (03/06/20 0805)   Mode Pressure A/C  (03/06/20 0805)   Rate 26 (03/06/20 0805)   Tidal Volume 400 mL (03/05/20 1144)   Pressure Support 10 cm H20 (03/03/20 1717)   PEEP/CPAC/EPAP 10 cm H20 (03/06/20 0805)         I/O last 3 completed shifts:  In: 3294 [I.V.:1128; NG/GT:1544; IV Piggyback:622]  Out: 1450 [Urine:1450]  Wt Readings from Last 3 Encounters:   03/06/20 68.9 kg   02/21/20 68.5 kg   02/17/20 72.1 kg       GENERAL: Intubated and sedated.   HENT:  Rare eye opening, pupils appear equal. Oral mucosa and lips dry. FT and OET present.    NECK:  Trachea midline, no masses or obvious JVD.    CARDIOVASCULAR: Regular, tachy, bouding throughout pericardium.  S1 S2.  No edema.  LUNGS:  Coarse sounds anterior fields. Synchronous with vent.  ABDOMEN:  Soft.  No distention or grimacing with palpation, + BS   EXTREMITIES: Radial pulses 2+, no nailbed cyanosis, no pitting lower leg edema.  SKIN:  Warm.  Dry.  No rashes.  NEUROLOGIC:  Sedated. Does not open eyes or follow commands.     Medications personally reviewed.    LABS: personally reviewed.   Lab Results   Component Value Date    SODIUM 140 03/06/2020    POTASSIUM 4.7 03/06/2020    CHLORIDE 107 03/06/2020    CO2 24 03/06/2020    BUN 48 (H) 03/06/2020    CREATININE 1.27 (H) 03/06/2020    GLUCOSE 140 (H) 03/06/2020    MG 2.8 (H) 03/06/2020    WBC 7.6 03/06/2020    HGB 8.1 (L) 03/06/2020    HCT 27.2 (L) 03/06/2020    PLT 83 (L) 03/06/2020    PT 11.1 03/06/2020    INR 1.0 03/06/2020    PTT 35 (H) 02/22/2020    BILIRUBIN 0.6 03/06/2020    ALKPT 165 (H) 03/06/2020    AST 76 (H) 03/06/2020    GPT 15 03/06/2020     IMAGING: 3/6/2020 CXR personally reviewed, there is complete opacification of RUL and interval decrease in right pleural effusion and now with infiltrates.     No family present at time of visit    MARCY Perez    Pulmonary attending:  I have seen and examined Anival Wiley with MARCY Perez and agree with her assessment and plan.  On my exam, sedated.  Tachycardic.  Off pressors and  hypertensive.  On 60% FIO2.  Lungs with minimal BS on R.  BUE edema.  CXRAY personally reviewed.  R effusion reaccumulating.  D/w Dr. Lozano and patient's wife.  Chemo on hold given new infection.  Would recommend DNR with MMS with still hope of improvement in fevers so he can receive chemo.  Patient's wife will think about this code change.  All questions answered.  Continue supportive care for now.    Alexander Collins MD

## 2023-10-20 NOTE — PROGRESS NOTES
"Subjective:      Patient ID: Shanae Kumari is a 58 y.o. female.    Vitals:  height is 5' 4" (1.626 m) and weight is 89 kg (196 lb 3.4 oz). Her oral temperature is 99 °F (37.2 °C). Her blood pressure is 153/87 (abnormal) and her pulse is 107. Her respiration is 12 and oxygen saturation is 96%.     Chief Complaint: Cough (asthma.  no relief on current course of meds. steroid injection was suggested by [facility] but can't get an appointment there. Thanks. - Entered by patient)    Patient is a 58-year-old female with history of asthma who presents with coughing, wheezing, chest tightness, SOB x1 week. Symptoms worsening. She has been using her albuterol rescue inhaler multiple times per day.  Denies fever, chills, chest pain, hemoptysis, nausea, vomiting.    Cough  This is a new problem. The problem has been unchanged. The problem occurs constantly. The cough is Non-productive. Associated symptoms include shortness of breath and wheezing. Pertinent negatives include no chest pain, chills, ear pain, fever, headaches or sore throat. Nothing aggravates the symptoms. She has tried nothing for the symptoms. Her past medical history is significant for asthma.       Constitution: Negative for chills and fever.   HENT:  Negative for ear pain, congestion and sore throat.    Neck: Negative for neck pain.   Cardiovascular:  Negative for chest pain.   Respiratory:  Positive for chest tightness, cough, shortness of breath, wheezing and asthma.    Gastrointestinal:  Negative for nausea and vomiting.   Allergic/Immunologic: Positive for asthma.   Neurological:  Negative for headaches.      Objective:     Physical Exam   Constitutional: She is oriented to person, place, and time. She appears well-developed.   HENT:   Head: Normocephalic and atraumatic.   Ears:   Right Ear: External ear normal.   Left Ear: External ear normal.   Nose: Nose normal.   Mouth/Throat: Oropharynx is clear and moist. Mucous membranes are moist.   Eyes: " Conjunctivae, EOM and lids are normal. Pupils are equal, round, and reactive to light.   Neck: Trachea normal and phonation normal. Neck supple.   Cardiovascular: Normal rate, regular rhythm, normal heart sounds and normal pulses.   Pulmonary/Chest: Effort normal and breath sounds normal. No respiratory distress.   Musculoskeletal: Normal range of motion.         General: Normal range of motion.   Neurological: no focal deficit. She is alert and oriented to person, place, and time.   Skin: Skin is warm, dry and intact. Capillary refill takes less than 2 seconds.   Psychiatric: Her speech is normal and behavior is normal. Judgment and thought content normal.   Nursing note and vitals reviewed.    Assessment:     1. Mild intermittent asthma with exacerbation        Plan:     Mild intermittent asthma with exacerbation  -     dexAMETHasone injection 10 mg  -     albuterol nebulizer solution 2.5 mg  -     ipratropium 0.02 % nebulizer solution 0.5 mg  -     predniSONE (DELTASONE) 20 MG tablet; Take 2 tablets (40 mg total) by mouth once daily. for 5 days  Dispense: 10 tablet; Refill: 0    Discussed with patient that steroids will elevate her blood pressure and blood glucose levels. Advised patient to continue monitoring both at home.        Patient Instructions   Asthma Discharge   If your condition worsens or fails to improve we recommend that you receive another evaluation at the ER immediately or contact your PCP to discuss your concerns or return here. You must understand that you've received an urgent care treatment only and that you may be released before all your medical problems are known or treated. You the patient will arrange for follouwp care as instructed.   Keep the scheduled appointment with your specialist as discussed.    You received a steroid shot on today   Rest and fluids are important  Take inhaler as prescribed and needed for wheezing  -  Flonase (fluticasone) is a nasal spray which is available over  "the counter and may help with your symptoms.   -  If you have hypertension avoid using the "D" which is the decongestant.  Instead you can use Coricidin HBP for cold and cough symptoms.    -  If you just have drainage you can take plain Zyrtec, Claritin or Allegra   -  If you just have a congested feeling you can take pseudoephedrine (unless you have high blood pressure) which you have to sign for behind the counter. Do not buy the phenylephrine which is on the shelf as it is not effective   -  Rest and fluids are also important.   -  Tylenol or ibuprofen can also be used as directed for pain unless you have an allergy to them or medical condition such as stomach ulcers, kidney or liver disease or blood thinners etc for which you should not be taking these type of medications.   Please follow up with your primary care doctor or specialist in the next 48-72hrs as needed and if no improvement  If you  smoke, please stop smoking.        "

## 2023-10-20 NOTE — PATIENT INSTRUCTIONS
"Asthma Discharge   If your condition worsens or fails to improve we recommend that you receive another evaluation at the ER immediately or contact your PCP to discuss your concerns or return here. You must understand that you've received an urgent care treatment only and that you may be released before all your medical problems are known or treated. You the patient will arrange for follouwp care as instructed.   Keep the scheduled appointment with your specialist as discussed.    You received a steroid shot on today   Rest and fluids are important  Take inhaler as prescribed and needed for wheezing  -  Flonase (fluticasone) is a nasal spray which is available over the counter and may help with your symptoms.   -  If you have hypertension avoid using the "D" which is the decongestant.  Instead you can use Coricidin HBP for cold and cough symptoms.    -  If you just have drainage you can take plain Zyrtec, Claritin or Allegra   -  If you just have a congested feeling you can take pseudoephedrine (unless you have high blood pressure) which you have to sign for behind the counter. Do not buy the phenylephrine which is on the shelf as it is not effective   -  Rest and fluids are also important.   -  Tylenol or ibuprofen can also be used as directed for pain unless you have an allergy to them or medical condition such as stomach ulcers, kidney or liver disease or blood thinners etc for which you should not be taking these type of medications.   Please follow up with your primary care doctor or specialist in the next 48-72hrs as needed and if no improvement  If you  smoke, please stop smoking.    "

## 2023-10-20 NOTE — TELEPHONE ENCOUNTER
Can we see if we can get her in a nurse visit for a steroid injection today?    Thanks!  Kat OTTO

## 2023-10-21 ENCOUNTER — PATIENT MESSAGE (OUTPATIENT)
Dept: FAMILY MEDICINE | Facility: CLINIC | Age: 58
End: 2023-10-21
Payer: COMMERCIAL

## 2023-10-24 ENCOUNTER — PATIENT OUTREACH (OUTPATIENT)
Dept: ADMINISTRATIVE | Facility: HOSPITAL | Age: 58
End: 2023-10-24
Payer: COMMERCIAL

## 2023-10-24 ENCOUNTER — PATIENT MESSAGE (OUTPATIENT)
Dept: ADMINISTRATIVE | Facility: HOSPITAL | Age: 58
End: 2023-10-24
Payer: COMMERCIAL

## 2023-10-24 ENCOUNTER — PATIENT MESSAGE (OUTPATIENT)
Dept: FAMILY MEDICINE | Facility: CLINIC | Age: 58
End: 2023-10-24
Payer: COMMERCIAL

## 2023-10-24 DIAGNOSIS — Z12.31 ENCOUNTER FOR SCREENING MAMMOGRAM FOR MALIGNANT NEOPLASM OF BREAST: Primary | ICD-10-CM

## 2023-10-24 NOTE — PROGRESS NOTES
[facility] 1 DM Eye Gap Report 10.14.23 - Left message for patient to call our office. Please schedule.    Overdue Colorectal Screening - friendly reminder left for patient to complete her FitKit and mail it back to our office.    Mammogram - Left message for patient to call our office. Please schedule    Non-Compliant Blood Pressure Reading - the patient has an upcoming appointment w/ primary care on 11/07/2023.    Pap Smear updated.

## 2023-11-06 ENCOUNTER — PATIENT MESSAGE (OUTPATIENT)
Dept: ADMINISTRATIVE | Facility: HOSPITAL | Age: 58
End: 2023-11-06
Payer: COMMERCIAL

## 2023-12-01 ENCOUNTER — TELEPHONE (OUTPATIENT)
Dept: PHARMACY | Facility: CLINIC | Age: 58
End: 2023-12-01
Payer: COMMERCIAL

## 2023-12-01 NOTE — TELEPHONE ENCOUNTER
Assessed patient medication adherence for population health /[facility] medication adherence project

## 2023-12-18 DIAGNOSIS — R80.9 TYPE 2 DIABETES MELLITUS WITH MICROALBUMINURIA, WITHOUT LONG-TERM CURRENT USE OF INSULIN: ICD-10-CM

## 2023-12-18 DIAGNOSIS — J45.20 MILD INTERMITTENT ASTHMA WITHOUT COMPLICATION: ICD-10-CM

## 2023-12-18 DIAGNOSIS — E11.29 TYPE 2 DIABETES MELLITUS WITH MICROALBUMINURIA, WITHOUT LONG-TERM CURRENT USE OF INSULIN: Chronic | ICD-10-CM

## 2023-12-18 DIAGNOSIS — E11.29 TYPE 2 DIABETES MELLITUS WITH MICROALBUMINURIA, WITHOUT LONG-TERM CURRENT USE OF INSULIN: ICD-10-CM

## 2023-12-18 DIAGNOSIS — R80.9 TYPE 2 DIABETES MELLITUS WITH MICROALBUMINURIA, WITHOUT LONG-TERM CURRENT USE OF INSULIN: Chronic | ICD-10-CM

## 2023-12-18 DIAGNOSIS — J45.21 MILD INTERMITTENT ASTHMA WITH ACUTE EXACERBATION: ICD-10-CM

## 2023-12-18 RX ORDER — DULAGLUTIDE 1.5 MG/.5ML
1.5 INJECTION, SOLUTION SUBCUTANEOUS WEEKLY
Qty: 12 PEN | Refills: 0 | Status: SHIPPED | OUTPATIENT
Start: 2023-12-18

## 2023-12-19 RX ORDER — GLIMEPIRIDE 4 MG/1
4 TABLET ORAL
Qty: 90 TABLET | Refills: 1 | Status: SHIPPED | OUTPATIENT
Start: 2023-12-19 | End: 2024-06-16

## 2023-12-19 RX ORDER — ALBUTEROL SULFATE 90 UG/1
2 AEROSOL, METERED RESPIRATORY (INHALATION) EVERY 4 HOURS PRN
Qty: 25.5 G | Refills: 1 | Status: SHIPPED | OUTPATIENT
Start: 2023-12-19

## 2023-12-19 RX ORDER — MONTELUKAST SODIUM 10 MG/1
10 TABLET ORAL DAILY
Qty: 90 TABLET | Refills: 2 | Status: SHIPPED | OUTPATIENT
Start: 2023-12-19

## 2023-12-19 NOTE — TELEPHONE ENCOUNTER
Shanae Kumari  is requesting a refill authorization.  Brief Assessment and Rationale for Refill:  Approve     Medication Therapy Plan:         Pharmacist review requested: Yes   Extended chart review required: Yes   Comments:     Note composed:9:42 AM 12/19/2023

## 2023-12-19 NOTE — TELEPHONE ENCOUNTER
No care due was identified.  [facility] Embedded Care Due Messages. Reference number: [id number].   12/18/2023 6:13:29 PM CST

## 2023-12-19 NOTE — TELEPHONE ENCOUNTER
Refill Decision Note   Shanae Kumari  is requesting a refill authorization.  Brief Assessment and Rationale for Refill:  Approve     Medication Therapy Plan:         Comments:     Note composed:7:38 PM 12/18/2023

## 2023-12-19 NOTE — TELEPHONE ENCOUNTER
No care due was identified.  [facility] Embedded Care Due Messages. Reference number: [id number].   12/18/2023 6:58:22 PM CST

## 2023-12-19 NOTE — TELEPHONE ENCOUNTER
Refill Routing Note   Medication(s) are not appropriate for processing by [facility] for the following reason(s):      Drug-disease interaction    [facility] action(s):  Defer Care Due:  None identified     Medication Therapy Plan: montelukast and Anxiety and depression    Pharmacist review requested: Yes     Appointments  past 12m or future 3m with PCP    Date Provider   Last Visit   8/2/2023 Tima Stanley MD   Next Visit   2/6/2024 Tima Stanley MD   ED visits in past 90 days: 0        Note composed:7:02 PM 12/18/2023

## 2023-12-20 RX ORDER — FLASH GLUCOSE SENSOR
1 KIT MISCELLANEOUS
Qty: 2 KIT | Refills: 11 | Status: SHIPPED | OUTPATIENT
Start: 2023-12-20

## 2023-12-26 DIAGNOSIS — M25.561 RIGHT KNEE PAIN, UNSPECIFIED CHRONICITY: Primary | ICD-10-CM

## 2023-12-27 ENCOUNTER — TELEPHONE (OUTPATIENT)
Dept: ORTHOPEDICS | Facility: CLINIC | Age: 58
End: 2023-12-27
Payer: COMMERCIAL

## 2023-12-27 NOTE — TELEPHONE ENCOUNTER
[facility] for patient to give the clinic a call back, as she is an est patient with Dr. Lam, just wanted to get a little more of information on her apt. Tomorrow with Dr. Lopez, She can call the clinic at [phone]

## 2023-12-29 ENCOUNTER — PATIENT MESSAGE (OUTPATIENT)
Dept: ORTHOPEDICS | Facility: CLINIC | Age: 58
End: 2023-12-29
Payer: COMMERCIAL

## 2023-12-29 ENCOUNTER — TELEPHONE (OUTPATIENT)
Dept: ORTHOPEDICS | Facility: CLINIC | Age: 58
End: 2023-12-29
Payer: COMMERCIAL

## 2023-12-29 NOTE — TELEPHONE ENCOUNTER
2nd attempt to reach the patient regarding apt with Dr. Lopez left a voicemail to return the clinic call at [phone].

## 2024-01-11 ENCOUNTER — PATIENT MESSAGE (OUTPATIENT)
Dept: BEHAVIORAL HEALTH | Facility: CLINIC | Age: 59
End: 2024-01-11
Payer: COMMERCIAL

## 2024-01-11 ENCOUNTER — OFFICE VISIT (OUTPATIENT)
Dept: INTERNAL MEDICINE | Facility: CLINIC | Age: 59
End: 2024-01-11
Payer: COMMERCIAL

## 2024-01-11 VITALS
OXYGEN SATURATION: 97 % | HEART RATE: 97 BPM | WEIGHT: 197.88 LBS | HEIGHT: 64 IN | BODY MASS INDEX: 33.78 KG/M2 | SYSTOLIC BLOOD PRESSURE: 130 MMHG | DIASTOLIC BLOOD PRESSURE: 84 MMHG

## 2024-01-11 DIAGNOSIS — E66.01 MORBID OBESITY: ICD-10-CM

## 2024-01-11 DIAGNOSIS — I10 ESSENTIAL HYPERTENSION: Chronic | ICD-10-CM

## 2024-01-11 DIAGNOSIS — F41.1 GENERALIZED ANXIETY DISORDER: ICD-10-CM

## 2024-01-11 DIAGNOSIS — R80.9 TYPE 2 DIABETES MELLITUS WITH MICROALBUMINURIA, WITHOUT LONG-TERM CURRENT USE OF INSULIN: Chronic | ICD-10-CM

## 2024-01-11 DIAGNOSIS — E78.49 OTHER HYPERLIPIDEMIA: Chronic | ICD-10-CM

## 2024-01-11 DIAGNOSIS — Z63.8 CAREGIVER ROLE STRAIN: ICD-10-CM

## 2024-01-11 DIAGNOSIS — F33.1 MODERATE EPISODE OF RECURRENT MAJOR DEPRESSIVE DISORDER: Primary | ICD-10-CM

## 2024-01-11 DIAGNOSIS — I25.10 CORONARY ARTERY DISEASE INVOLVING NATIVE CORONARY ARTERY OF NATIVE HEART WITHOUT ANGINA PECTORIS: Chronic | ICD-10-CM

## 2024-01-11 DIAGNOSIS — E11.29 TYPE 2 DIABETES MELLITUS WITH MICROALBUMINURIA, WITHOUT LONG-TERM CURRENT USE OF INSULIN: Chronic | ICD-10-CM

## 2024-01-11 DIAGNOSIS — I77.9 CAROTID ARTERY DISEASE, UNSPECIFIED LATERALITY, UNSPECIFIED TYPE: ICD-10-CM

## 2024-01-11 PROCEDURE — 99214 OFFICE O/P EST MOD 30 MIN: CPT | Mod: S$GLB,,, | Performed by: NURSE PRACTITIONER

## 2024-01-11 PROCEDURE — 3075F SYST BP GE 130 - 139MM HG: CPT | Mod: CPTII,S$GLB,, | Performed by: NURSE PRACTITIONER

## 2024-01-11 PROCEDURE — 99999 PR PBB SHADOW E&M-EST. PATIENT-LVL V: CPT | Mod: PBBFAC,,, | Performed by: NURSE PRACTITIONER

## 2024-01-11 PROCEDURE — 3079F DIAST BP 80-89 MM HG: CPT | Mod: CPTII,S$GLB,, | Performed by: NURSE PRACTITIONER

## 2024-01-11 PROCEDURE — 1160F RVW MEDS BY RX/DR IN RCRD: CPT | Mod: CPTII,S$GLB,, | Performed by: NURSE PRACTITIONER

## 2024-01-11 PROCEDURE — 1159F MED LIST DOCD IN RCRD: CPT | Mod: CPTII,S$GLB,, | Performed by: NURSE PRACTITIONER

## 2024-01-11 PROCEDURE — 3008F BODY MASS INDEX DOCD: CPT | Mod: CPTII,S$GLB,, | Performed by: NURSE PRACTITIONER

## 2024-01-11 RX ORDER — ESCITALOPRAM OXALATE 20 MG/1
20 TABLET ORAL DAILY
Qty: 30 TABLET | Refills: 2 | Status: SHIPPED | OUTPATIENT
Start: 2024-01-11 | End: 2025-01-10

## 2024-01-11 SDOH — SOCIAL DETERMINANTS OF HEALTH (SDOH): OTHER SPECIFIED PROBLEMS RELATED TO PRIMARY SUPPORT GROUP: Z63.8

## 2024-01-11 NOTE — PROGRESS NOTES
History of Present Illness   Shanae Kumari is a 58 y.o. woman with medical history as listed below who presents today for evaluation of severe worsening of depression and anxiety over the last few months. 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 She is juggling this with high demand job and other personal life stressors. She feels the Zoloft is no longer effective. She is also interested in counseling/therapy. We discussed FMLA and STD through work, I believe she would benefit from time off. She will send paperwork over for me to complete for this.    Past Medical History:   Diagnosis Date    Asthma     BRCA negative     Diabetes mellitus     Hypertension     Nausea after anesthesia 1989       Past Surgical History:   Procedure Laterality Date    CORONARY ANGIOGRAPHY N/A 11/26/2021    Procedure: ANGIOGRAM, CORONARY ARTERY;  Surgeon: Ashu Horn MD;  Location: [facility] CATH LAB;  Service: Cardiology;  Laterality: N/A;    KNEE ARTHROSCOPY      knee surgery      ULTRASOUND GUIDANCE Right 11/26/2021    Procedure: ULTRASOUND GUIDANCE;  Surgeon: Ashu Horn MD;  Location: [facility] CATH LAB;  Service: Cardiology;  Laterality: Right;       Social History     Socioeconomic History    Marital status: Unknown   Tobacco Use    Smoking status: Never    Smokeless tobacco: Never   Substance and Sexual Activity    Alcohol use: No    Drug use: No    Sexual activity: Not Currently     Social Determinants of Health     Financial Resource Strain: Patient Declined (12/28/2023)    Overall Financial Resource Strain (CARDIA)     Difficulty of Paying Living Expenses: Patient declined   Food Insecurity: Patient Declined (12/28/2023)    Hunger Vital Sign     Worried About Running Out of Food in the Last Year: Patient declined     Ran Out of Food in the Last Year: Patient declined   Transportation Needs: Patient Declined (12/28/2023)    PRAPARE - Transportation     Lack of Transportation  (Medical): Patient declined     Lack of Transportation (Non-Medical): Patient declined   Physical Activity: Unknown (12/28/2023)    Exercise Vital Sign     Days of Exercise per Week: Patient declined   Stress: Patient Declined (12/28/2023)    [employer] - Occupational Stress Questionnaire     Feeling of Stress : Patient declined   Social Connections: Unknown (12/28/2023)    Social Connection and Isolation Panel [NHANES]     Frequency of Communication with Friends and Family: Patient declined     Frequency of Social Gatherings with Friends and Family: Patient declined     Active Member of Clubs or Organizations: Patient declined     Attends Club or Organization Meetings: Patient declined     Marital Status: Patient declined   Housing Stability: Patient Declined (12/28/2023)    Housing Stability Vital Sign     Unable to Pay for Housing in the Last Year: Patient declined     Unstable Housing in the Last Year: Patient declined       Family History   Problem Relation Age of Onset    Diabetes Mother     COPD Mother     Cancer Mother         lung    Cataracts Mother     Bipolar disorder Mother     Dementia Mother     Diabetes Father     Heart disease Father     Hypertension Father     Hyperlipidemia Father     Kidney disease Father     Cancer Sister     Diabetes Brother     Hypertension Brother     Kidney disease Brother         diabetic    Diabetes Maternal Grandmother     Blindness Maternal Grandmother     No Known Problems Maternal Grandfather     No Known Problems Paternal Grandmother     No Known Problems Paternal Grandfather     Diabetes Maternal Aunt     Diabetes Maternal Uncle     No Known Problems Paternal Aunt     No Known Problems Paternal Uncle     Breast cancer Other     Colon cancer Neg Hx     Ovarian cancer Neg Hx     Stroke Neg Hx        Review of Systems  Review of Systems   HENT:  Negative for hearing loss.    Eyes:  Negative for discharge.   Respiratory:  Negative for  "wheezing.    Gastrointestinal:  Negative for blood in stool, constipation, diarrhea and vomiting.   Genitourinary:  Negative for dysuria and hematuria.   Musculoskeletal:  Negative for neck pain.   Neurological:  Positive for headaches. Negative for weakness.   Endo/Heme/Allergies:  Negative for polydipsia.   Psychiatric/Behavioral:  Positive for depression. Negative for hallucinations, substance abuse and suicidal ideas. The patient is nervous/anxious and has insomnia.      A complete review of systems was otherwise negative.    Physical Exam  /84 (BP Location: Left arm, Patient Position: Sitting, BP Method: Large (Manual))   Pulse 97   Ht 5' 4" (1.626 m)   Wt 89.8 kg (197 lb 13.8 oz)   SpO2 97%   BMI 33.96 kg/m²   General appearance: alert, appears stated age, cooperative, and no distress  Neurologic: Grossly normal  Psychiatric: she displays normal mood and affect, she is tearful at times, she denies SI or HI    Assessment/Plan  Moderate episode of recurrent major depressive disorder  Stop Zoloft. Start Lexapro.  Referral to I program for counseling and therapy.  Happy to fill out FMLA or STD forms for patient, I do believe she would benefit from this given significance of her depression today and the caregiver role strain.  -     Ambulatory referral/consult to Primary Care Behavioral Health (Non-Opioids); Future; Expected date: 01/18/2024  -     EScitalopram oxalate (LEXAPRO) 20 MG tablet; Take 1 tablet (20 mg total) by mouth once daily.  Dispense: 30 tablet; Refill: 2    Caregiver role strain  Stop Zoloft. Start Lexapro.  Referral to BHI program for counseling and therapy.  Happy to fill out FMLA or STD forms for patient, I do believe she would benefit from this given significance of her depression today and the caregiver role strain.  -     Ambulatory referral/consult to Primary Care Behavioral Health (Non-Opioids); Future; Expected date: 01/18/2024  -     EScitalopram oxalate (LEXAPRO) 20 MG " tablet; Take 1 tablet (20 mg total) by mouth once daily.  Dispense: 30 tablet; Refill: 2    Type 2 diabetes mellitus with microalbuminuria, without long-term current use of insulin  The current medical regimen is effective;  continue present plan and medications.  Labs ordered today, she will self schedule on the portal.  Follow-up in about 2-4 weeks, diabetes follow-up.  -     HEMOGLOBIN A1C; Future; Expected date: 01/11/2024  -     BASIC METABOLIC PANEL; Future; Expected date: 01/11/2024    Generalized anxiety disorder  Stop Zoloft. Start Lexapro.  Referral to I program for counseling and therapy.  Happy to fill out FMLA or STD forms for patient, I do believe she would benefit from this given significance of her depression today and the caregiver role strain.  -     Ambulatory referral/consult to Primary Care Behavioral Health (Non-Opioids); Future; Expected date: 01/18/2024  -     EScitalopram oxalate (LEXAPRO) 20 MG tablet; Take 1 tablet (20 mg total) by mouth once daily.  Dispense: 30 tablet; Refill: 2    Essential hypertension  The current medical regimen is effective;  continue present plan and medications.    Other hyperlipidemia  The current medical regimen is effective;  continue present plan and medications.    Coronary artery disease involving native coronary artery of native heart without angina pectoris  The current medical regimen is effective;  continue present plan and medications.    Morbid obesity  The patient is asked to make an attempt to improve diet and exercise patterns to aid in medical management of this problem.    Carotid artery disease, unspecified laterality, unspecified type  The current medical regimen is effective;  continue present plan and medications.    Patient has verbalized understanding and is in agreement with plan of care.    Follow up in about 2 weeks (around 1/25/2024) for depression follow-up.      Answers submitted by the patient for this visit:  Review of Systems  Questionnaire (Submitted on 1/11/2024)  activity change: Yes  unexpected weight change: No  rhinorrhea: No  trouble swallowing: No  visual disturbance: No  chest tightness: No  polyuria: No  difficulty urinating: No  menstrual problem: No  joint swelling: Yes  arthralgias: Yes  confusion: Yes  dysphoric mood: Yes

## 2024-01-12 ENCOUNTER — PATIENT MESSAGE (OUTPATIENT)
Dept: INTERNAL MEDICINE | Facility: CLINIC | Age: 59
End: 2024-01-12
Payer: COMMERCIAL

## 2024-01-12 ENCOUNTER — PATIENT MESSAGE (OUTPATIENT)
Dept: BEHAVIORAL HEALTH | Facility: CLINIC | Age: 59
End: 2024-01-12
Payer: COMMERCIAL

## 2024-01-17 ENCOUNTER — OFFICE VISIT (OUTPATIENT)
Dept: INTERNAL MEDICINE | Facility: CLINIC | Age: 59
End: 2024-01-17
Payer: COMMERCIAL

## 2024-01-17 VITALS
BODY MASS INDEX: 34.47 KG/M2 | DIASTOLIC BLOOD PRESSURE: 84 MMHG | HEIGHT: 63 IN | HEART RATE: 96 BPM | SYSTOLIC BLOOD PRESSURE: 134 MMHG | OXYGEN SATURATION: 96 % | WEIGHT: 194.56 LBS

## 2024-01-17 DIAGNOSIS — F32.A ANXIETY AND DEPRESSION: Chronic | ICD-10-CM

## 2024-01-17 DIAGNOSIS — F41.9 ANXIETY AND DEPRESSION: Chronic | ICD-10-CM

## 2024-01-17 DIAGNOSIS — Z63.8 CAREGIVER ROLE STRAIN: ICD-10-CM

## 2024-01-17 DIAGNOSIS — I10 ESSENTIAL HYPERTENSION: Primary | Chronic | ICD-10-CM

## 2024-01-17 PROCEDURE — 1159F MED LIST DOCD IN RCRD: CPT | Mod: CPTII,S$GLB,, | Performed by: INTERNAL MEDICINE

## 2024-01-17 PROCEDURE — 3008F BODY MASS INDEX DOCD: CPT | Mod: CPTII,S$GLB,, | Performed by: INTERNAL MEDICINE

## 2024-01-17 PROCEDURE — 99999 PR PBB SHADOW E&M-EST. PATIENT-LVL IV: CPT | Mod: PBBFAC,,, | Performed by: INTERNAL MEDICINE

## 2024-01-17 PROCEDURE — 99213 OFFICE O/P EST LOW 20 MIN: CPT | Mod: S$GLB,,, | Performed by: INTERNAL MEDICINE

## 2024-01-17 PROCEDURE — 1160F RVW MEDS BY RX/DR IN RCRD: CPT | Mod: CPTII,S$GLB,, | Performed by: INTERNAL MEDICINE

## 2024-01-17 PROCEDURE — 3079F DIAST BP 80-89 MM HG: CPT | Mod: CPTII,S$GLB,, | Performed by: INTERNAL MEDICINE

## 2024-01-17 PROCEDURE — 3075F SYST BP GE 130 - 139MM HG: CPT | Mod: CPTII,S$GLB,, | Performed by: INTERNAL MEDICINE

## 2024-01-17 SDOH — SOCIAL DETERMINANTS OF HEALTH (SDOH): OTHER SPECIFIED PROBLEMS RELATED TO PRIMARY SUPPORT GROUP: Z63.8

## 2024-01-17 NOTE — PROGRESS NOTES
"Assessment & Plan  Problem List Items Addressed This Visit          Psychiatric    Anxiety and depression (Chronic)    Current Assessment & Plan     I filled out her paperwork to allow for evaluation for short-term disability from work            Cardiac/Vascular    Essential hypertension - Primary (Chronic)    Current Assessment & Plan     The current medical regimen is effective;  continue present plan and medications.           Other Visit Diagnoses       Caregiver role strain    -  Paperwork filled out              Health Maintenance reviewed, deferred due to paperwork.    Follow-up: Follow up if symptoms worsen or fail to improve.  ______________________________________________________________________    Chief Complaint  Chief Complaint   Patient presents with    Hypertension    anxiety and depression     Caregiver role strain.  paperwork       HPI  Shanae Kumari is a 58 y.o. female with medical diagnoses as listed in the medical history and problem list that presents for hypertension anxiety depression follow up.  Pt is known to me with their last appointment 1/11/2024.      She has been seen by me as well as my collaborating AP P recently.  She has been having increased amounts of anxiety and depression it is now affecting her job.  This is due to caregiver stress.  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      ROS  Review of Systems        Physical Exam  Vitals:    01/17/24 1330   BP: 134/84   BP Location: Left arm   Patient Position: Sitting   BP Method: Large (Manual)   Pulse: 96   SpO2: 96%   Weight: 88.3 kg (194 lb 8.9 oz)   Height: 5' 3" (1.6 m)    Body mass index is 34.46 kg/m².  Weight: 88.3 kg (194 lb 8.9 oz)   Height: 5' 3" (160 cm)   Physical Exam  Constitutional:       General: She is not in acute distress.     Appearance: She is well-developed.   HENT:      Head: Normocephalic and atraumatic.   Eyes:      General: Lids are normal. No scleral icterus.     Conjunctiva/sclera: " Conjunctivae normal.      Pupils: Pupils are equal, round, and reactive to light.   Neck:      Thyroid: No thyromegaly.      Vascular: No carotid bruit or JVD.   Cardiovascular:      Rate and Rhythm: Normal rate and regular rhythm.      Pulses: Normal pulses.      Heart sounds: Normal heart sounds. No murmur heard.     No friction rub. No S3 or S4 sounds.   Pulmonary:      Effort: Pulmonary effort is normal.      Breath sounds: Normal breath sounds. No wheezing, rhonchi or rales.   Abdominal:      General: Bowel sounds are normal.      Palpations: Abdomen is soft.      Tenderness: There is no abdominal tenderness.   Musculoskeletal:         General: No tenderness.      Cervical back: Full passive range of motion without pain and neck supple.      Right lower leg: No edema.      Left lower leg: No edema.   Skin:     General: Skin is warm and dry.      Findings: No rash.   Neurological:      Mental Status: She is alert and oriented to person, place, and time.   Psychiatric:         Speech: Speech normal.         Behavior: Behavior normal.         Thought Content: Thought content normal.

## 2024-01-18 ENCOUNTER — PATIENT MESSAGE (OUTPATIENT)
Dept: INTERNAL MEDICINE | Facility: CLINIC | Age: 59
End: 2024-01-18
Payer: COMMERCIAL

## 2024-01-18 ENCOUNTER — TELEPHONE (OUTPATIENT)
Dept: BEHAVIORAL HEALTH | Facility: CLINIC | Age: 59
End: 2024-01-18
Payer: COMMERCIAL

## 2024-01-18 NOTE — PROGRESS NOTES
Behavioral Health Community Health Worker  Initial Assessment  Completed by:  Broderick Sapp    Date:  1/18/2024    Patient Enrollment in Behavioral Health Program:  Patient verbalized understanding of Behavioral Health Integration services to include:  Patient understands that CHW, LCSW, PharmD and consulting Psychiatrist are members of the care team working collaboratively with his/her primary care provider: Yes  Patient understands that activation of their [facility] patient portal account is required for accessing the full scope of team services: Yes  Patient understands that some counseling sessions may occur via video: Yes  Clinic visits with the psychiatrist may be subject to a co-pay based on your insurance: Yes  Patient consents to enroll in BHI program: Yes    Assessments     Single Item Health Literacy Scale:  How often do you need to have someone help you read instructions, pamphlets or other written material from your doctor or pharmacy?: Never    Promis 10:  Promis 10 Responses  In general, would you say your health is: Fair  In general, would you say your quality of life is: Fair  In general, how would you rate your physical health?: Fair  In general, how would you rate your mental health, including your mood and your ability to think?: Poor  In general, how would you rate your satisfaction with your social activities and relationships?: Good  In general, please rate how well you carry out your usual social activities and roles. (This includes activities at home, at work and in your community, and responsibilities as a parent, child, spouse, employee, friend, etc.): Fair  To what extent are you able to carry out your everyday physical activities such as walking, climbing stairs, carrying groceries, or moving a chair? : Completely  How often have you been bothered by emotional problems such as feeling anxious, depressed or irritable?: Always  In the past 7 days, how would you rate your fatigue on  average?: Severe  In the past 7 days, on a scale of 0 to 10 (where 0 is no pain and 10 is the worst pain imaginable) how would you rate your pain on average?: 3  Global Physical Health: 14  Global Mental health Score: 11    Depression PHQ:      1/18/2024    12:29 PM 8/2/2023     1:33 PM 4/26/2022     8:00 AM 11/19/2021     7:11 AM   PHQ-9 Depression Patient Health Questionnaire   Over the last two weeks how often have you been bothered by little interest or pleasure in doing things 1 0 0 0   Over the last two weeks how often have you been bothered by feeling down, depressed or hopeless 3 0 0 0   Over the last two weeks how often have you been bothered by trouble falling or staying asleep, or sleeping too much 3  1    Over the last two weeks how often have you been bothered by feeling tired or having little energy 3  1    Over the last two weeks how often have you been bothered by a poor appetite or overeating 2  1    Over the last two weeks how often have you been bothered by feeling bad about yourself - or that you are a failure or have let yourself or your family down 1  0    Over the last two weeks how often have you been bothered by trouble concentrating on things, such as reading the newspaper or watching television 3  0    Over the last two weeks how often have you been bothered by moving or speaking so slowly that other people could have noticed. 0  0    Over the last two weeks how often have you been bothered by thoughts that you would be better off dead, or of hurting yourself 0  0    If you checked off any problems, how difficult have these problems made it for you to do your work, take care of things at home or get along with other people? Extremely difficult  Somewhat difficult    PHQ-9 Score 16  3           Generalized Anxiety Disorder 7-Item Scale:      1/18/2024    12:31 PM   GAD7   1. Feeling nervous, anxious, or on edge? 3   2. Not being able to stop or control worrying? 3   3. Worrying too much  about different things? 3   4. Trouble relaxing? 3   5. Being so restless that it is hard to sit still? 2   6. Becoming easily annoyed or irritable? 3   7. Feeling afraid as if something awful might happen? 2   8. If you checked off any problems, how difficult have these problems made it for you to do your work, take care of things at home, or get along with other people? 3   ABDON-7 Score 19       History     Social History     Socioeconomic History    Marital status: Unknown   Tobacco Use    Smoking status: Never    Smokeless tobacco: Never   Substance and Sexual Activity    Alcohol use: No    Drug use: No    Sexual activity: Not Currently     Social Determinants of Health     Financial Resource Strain: Patient Declined (12/28/2023)    Overall Financial Resource Strain (CARDIA)     Difficulty of Paying Living Expenses: Patient declined   Food Insecurity: Patient Declined (12/28/2023)    Hunger Vital Sign     Worried About Running Out of Food in the Last Year: Patient declined     Ran Out of Food in the Last Year: Patient declined   Transportation Needs: Patient Declined (12/28/2023)    PRAPARE - Transportation     Lack of Transportation (Medical): Patient declined     Lack of Transportation (Non-Medical): Patient declined   Physical Activity: Unknown (12/28/2023)    Exercise Vital Sign     Days of Exercise per Week: Patient declined   Stress: Patient Declined (12/28/2023)    [employer] - Occupational Stress Questionnaire     Feeling of Stress : Patient declined   Social Connections: Unknown (12/28/2023)    Social Connection and Isolation Panel [NHANES]     Frequency of Communication with Friends and Family: Patient declined     Frequency of Social Gatherings with Friends and Family: Patient declined     Active Member of Clubs or Organizations: Patient declined     Attends Club or Organization Meetings: Patient declined     Marital Status: Patient declined   Housing Stability: Patient  "Declined (12/28/2023)    Housing Stability Vital Sign     Unable to Pay for Housing in the Last Year: Patient declined     Unstable Housing in the Last Year: Patient declined       Call Summary   Patient was referred to the BHI (Non-opioid) program by Primary Care Provider, Dr. Hunter KEITH contacted Shanae Kumari who reports depression that limits [his/her] activities of daily living (ADLs).   Patient scored "16" on the PHQ9 and "19" on the ABDON 7. Based on these scores patient is eligible for the Behavioral health Integration (Non-opioid) Program. SAGAR completed the intake and scheduled an appointment for patient with Elisa Curry LCSW, on 2/12/24.          "

## 2024-01-23 ENCOUNTER — PATIENT MESSAGE (OUTPATIENT)
Dept: INTERNAL MEDICINE | Facility: CLINIC | Age: 59
End: 2024-01-23
Payer: COMMERCIAL

## 2024-01-23 DIAGNOSIS — Z63.8 CAREGIVER ROLE STRAIN: ICD-10-CM

## 2024-01-23 DIAGNOSIS — F41.9 ANXIETY AND DEPRESSION: Primary | ICD-10-CM

## 2024-01-23 DIAGNOSIS — F32.A ANXIETY AND DEPRESSION: Primary | ICD-10-CM

## 2024-01-23 SDOH — SOCIAL DETERMINANTS OF HEALTH (SDOH): OTHER SPECIFIED PROBLEMS RELATED TO PRIMARY SUPPORT GROUP: Z63.8

## 2024-01-30 ENCOUNTER — TELEPHONE (OUTPATIENT)
Dept: BEHAVIORAL HEALTH | Facility: CLINIC | Age: 59
End: 2024-01-30
Payer: COMMERCIAL

## 2024-01-31 ENCOUNTER — PATIENT MESSAGE (OUTPATIENT)
Dept: INTERNAL MEDICINE | Facility: CLINIC | Age: 59
End: 2024-01-31
Payer: COMMERCIAL

## 2024-02-09 ENCOUNTER — PATIENT MESSAGE (OUTPATIENT)
Dept: BEHAVIORAL HEALTH | Facility: CLINIC | Age: 59
End: 2024-02-09
Payer: COMMERCIAL

## 2024-02-22 ENCOUNTER — PATIENT MESSAGE (OUTPATIENT)
Dept: BEHAVIORAL HEALTH | Facility: CLINIC | Age: 59
End: 2024-02-22
Payer: COMMERCIAL

## 2024-03-05 ENCOUNTER — PATIENT MESSAGE (OUTPATIENT)
Dept: ADMINISTRATIVE | Facility: HOSPITAL | Age: 59
End: 2024-03-05
Payer: COMMERCIAL

## 2024-03-06 ENCOUNTER — TELEPHONE (OUTPATIENT)
Dept: BEHAVIORAL HEALTH | Facility: CLINIC | Age: 59
End: 2024-03-06
Payer: COMMERCIAL

## 2024-03-06 ENCOUNTER — OFFICE VISIT (OUTPATIENT)
Dept: PSYCHIATRY | Facility: CLINIC | Age: 59
End: 2024-03-06
Payer: COMMERCIAL

## 2024-03-06 ENCOUNTER — PATIENT MESSAGE (OUTPATIENT)
Dept: PSYCHIATRY | Facility: CLINIC | Age: 59
End: 2024-03-06
Payer: COMMERCIAL

## 2024-03-06 VITALS
DIASTOLIC BLOOD PRESSURE: 88 MMHG | HEART RATE: 85 BPM | WEIGHT: 190.13 LBS | SYSTOLIC BLOOD PRESSURE: 154 MMHG | BODY MASS INDEX: 33.68 KG/M2

## 2024-03-06 DIAGNOSIS — G47.00 INSOMNIA, UNSPECIFIED TYPE: ICD-10-CM

## 2024-03-06 DIAGNOSIS — Z12.11 SCREENING FOR COLON CANCER: ICD-10-CM

## 2024-03-06 DIAGNOSIS — F41.9 ANXIETY: ICD-10-CM

## 2024-03-06 DIAGNOSIS — F32.1 CURRENT MODERATE EPISODE OF MAJOR DEPRESSIVE DISORDER WITHOUT PRIOR EPISODE: Primary | ICD-10-CM

## 2024-03-06 DIAGNOSIS — Z63.8 CAREGIVER ROLE STRAIN: ICD-10-CM

## 2024-03-06 PROCEDURE — 99999 PR PBB SHADOW E&M-EST. PATIENT-LVL II: CPT | Mod: PBBFAC,,, | Performed by: STUDENT IN AN ORGANIZED HEALTH CARE EDUCATION/TRAINING PROGRAM

## 2024-03-06 PROCEDURE — 3079F DIAST BP 80-89 MM HG: CPT | Mod: CPTII,S$GLB,, | Performed by: STUDENT IN AN ORGANIZED HEALTH CARE EDUCATION/TRAINING PROGRAM

## 2024-03-06 PROCEDURE — 3077F SYST BP >= 140 MM HG: CPT | Mod: CPTII,S$GLB,, | Performed by: STUDENT IN AN ORGANIZED HEALTH CARE EDUCATION/TRAINING PROGRAM

## 2024-03-06 PROCEDURE — 90792 PSYCH DIAG EVAL W/MED SRVCS: CPT | Mod: S$GLB,,, | Performed by: STUDENT IN AN ORGANIZED HEALTH CARE EDUCATION/TRAINING PROGRAM

## 2024-03-06 RX ORDER — BUPROPION HYDROCHLORIDE 150 MG/1
150 TABLET ORAL DAILY
Qty: 30 TABLET | Refills: 1 | Status: SHIPPED | OUTPATIENT
Start: 2024-03-06 | End: 2024-04-29 | Stop reason: SDUPTHER

## 2024-03-06 SDOH — SOCIAL DETERMINANTS OF HEALTH (SDOH): OTHER SPECIFIED PROBLEMS RELATED TO PRIMARY SUPPORT GROUP: Z63.8

## 2024-03-06 NOTE — PROGRESS NOTES
Patient was originally scheduled for Elisa VENEGAS but is now with Demi Xie LCSW due to Elisa not seeing new patients at this time.
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
## 2024-03-11 ENCOUNTER — PATIENT MESSAGE (OUTPATIENT)
Dept: BEHAVIORAL HEALTH | Facility: CLINIC | Age: 59
End: 2024-03-11
Payer: COMMERCIAL

## 2024-03-11 ENCOUNTER — OFFICE VISIT (OUTPATIENT)
Dept: BEHAVIORAL HEALTH | Facility: CLINIC | Age: 59
End: 2024-03-11
Payer: COMMERCIAL

## 2024-03-11 DIAGNOSIS — G47.00 INSOMNIA, UNSPECIFIED TYPE: ICD-10-CM

## 2024-03-11 DIAGNOSIS — F41.9 ANXIETY: Primary | ICD-10-CM

## 2024-03-11 PROCEDURE — 90791 PSYCH DIAGNOSTIC EVALUATION: CPT | Mod: S$GLB,,, | Performed by: SOCIAL WORKER

## 2024-03-11 PROCEDURE — 90785 PSYTX COMPLEX INTERACTIVE: CPT | Mod: S$GLB,,, | Performed by: SOCIAL WORKER

## 2024-03-11 NOTE — PROGRESS NOTES
Primary Care Behavioral Health Integration: Initial  Date:  3/11/2024  Referral Source:  Tima Stanley MD  Type of Visit:  In person  Length of Appointment: 60  The patient location is:  [place] [address]  The patient phone number is: [phone]  Visit type: Virtual visit with synchronous audio and video  Each patient to whom he or she provides medical services by telemedicine is:  (1) informed of the relationship between the physician and patient and the respective role of any other health care provider with respect to management of the patient; and (2) notified that he or she may decline to receive medical services by telemedicine and may withdraw from such care at any time.    Chief Complaint/Reason for Encounter:  Anxiety    History of Present Illness: Shanae Kumari, a 58 y.o. female referred by Tima Stanley MD.  Patient was seen, examined and chart was reviewed. Met with patient. 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 Mom went from walking around and having some independence to being totally bed bound. PT has been dealing with several emergencies associated with mom over the past 4 months. PT has a difficult time finding balance with work and care giving. Now mom relies on PT for everything. 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 PT has turned in FMLA paperwork from her psych visit last week. PT stated its a struggle to do everything, be everywhere all on her own.    This session involved Interactive Complexity (75899); that is, specific communication factors complicated the delivery of the procedure.  In order to facilitate understanding, written communication regarding topics discussed in today's session was shared with patient via the [facility].     Past Medical History:   Diagnosis Date    Asthma     BRCA negative     Diabetes mellitus     Hypertension     Nausea after  anesthesia 1989         Current Outpatient Medications:     albuterol (PROAIR HFA) 90 mcg/actuation inhaler, Inhale 2 puffs into the lungs every 4 (four) hours as needed for Wheezing., Disp: 25.5 g, Rfl: 1    aspirin 81 MG Chew, Take 1 tablet (81 mg total) by mouth once daily., Disp: 90 tablet, Rfl: 3    atorvastatin (LIPITOR) 80 MG tablet, Take 1 tablet (80 mg total) by mouth every evening., Disp: 90 tablet, Rfl: 3    blood sugar diagnostic (BLOOD GLUCOSE TEST) Strp, 1 strip by Misc.(Non-Drug; Combo Route) route once daily., Disp: 100 strip, Rfl: 3    blood-glucose meter kit, Use as instructed to test blood sugar daily, Disp: 1 each, Rfl: 0    buPROPion (WELLBUTRIN XL) 150 MG TB24 tablet, Take 1 tablet (150 mg total) by mouth once daily., Disp: 30 tablet, Rfl: 1    clopidogreL (PLAVIX) 75 mg tablet, Take 1 tablet (75 mg total) by mouth once daily., Disp: 30 tablet, Rfl: 11    diltiaZEM (DILACOR XR) 120 MG CDCR, Take 1 capsule (120 mg total) by mouth once daily., Disp: 90 capsule, Rfl: 1    dulaglutide (TRULICITY) 1.5 mg/0.5 mL pen injector, Inject 1.5 mg into the skin once a week., Disp: 12 pen , Rfl: 0    empagliflozin (JARDIANCE) 25 mg tablet, Take 1 tablet (25 mg total) by mouth once daily., Disp: 90 tablet, Rfl: 1    EScitalopram oxalate (LEXAPRO) 20 MG tablet, Take 1 tablet (20 mg total) by mouth once daily., Disp: 30 tablet, Rfl: 2    ezetimibe (ZETIA) 10 mg tablet, Take 1 tablet (10 mg total) by mouth once daily., Disp: 90 tablet, Rfl: 3    fexofenadine (ALLEGRA) 180 MG tablet, Take 1 tablet (180 mg total) by mouth once daily., Disp: 30 tablet, Rfl: 5    flash glucose scanning reader (FREESTYLE EVELIO 2 READER) Misc, 1 kit by Misc.(Non-Drug; Combo Route) route Daily., Disp: 1 each, Rfl: 0    flash glucose sensor (FREESTYLE EVELIO 2 SENSOR) Kit, 1 each by Misc.(Non-Drug; Combo Route) route every 14 (fourteen) days., Disp: 2 kit, Rfl: 11    fluticasone propionate (FLONASE) 50 mcg/actuation nasal spray, 1 spray  (50 mcg total) by Each Nostril route once daily., Disp: 16 g, Rfl: 5    glimepiride (AMARYL) 4 MG tablet, Take 1 tablet (4 mg total) by mouth before breakfast., Disp: 90 tablet, Rfl: 1    lancets 30 gauge Misc, 1 lancet by Misc.(Non-Drug; Combo Route) route once daily., Disp: 100 each, Rfl: 3    medroxyPROGESTERone (PROVERA) 10 MG tablet, Take 1 tablet (10 mg total) by mouth once daily., Disp: 10 tablet, Rfl: 12    montelukast (SINGULAIR) 10 mg tablet, Take 1 tablet (10 mg total) by mouth once daily., Disp: 90 tablet, Rfl: 2    nitroGLYCERIN (NITROSTAT) 0.4 MG SL tablet, Place 1 tablet (0.4 mg total) under the tongue every 5 (five) minutes as needed for Chest pain., Disp: 90 tablet, Rfl: 3    Current Facility-Administered Medications:     methylPREDNISolone sodium succinate injection 40 mg, 40 mg, Intramuscular, 1 time in Clinic/HOD, Kat Harrison, LEE ANN    Current symptoms:  Depression Symptoms: dysphoric mood, anhedonia, insomnia, worthlessness/guilt, hopelessness, fatigue, and difficulty concentrating.  Anxiety Symptoms: excessive worrying, restlessness, muscle tension, and obsessions/compulsions.  Sleep Difficulties: frequent night time awakening and difficulty falling asleep.  Manic Symptoms:  easy distractibility and denies.  Psychosis: denies .    Risk assessment:  Patient reports no suicidal ideation  Patient reports no homicidal ideation  Patient reports no self-injurious behavior  Patient reports no violent behavior    Patient advised to call [phone] or present the the nearest ED if they experience suicidal or homicidal ideation, plan or intent.      Psychiatric History:  Diagnosis:    Current Psychiatric Medication: Yes - pcp prescribed Pt is taking escitalopram (Lexapro) 20mg once daily and bupropion SR (Wellbutrin) 150mg once daily for Depression. They are not interested in medication changes.   Medication Trial History:  Medication Trials: Yes Pt has taken sertraline (Zoloft) 100mg once daily for  Depression.   Outpatient Treatment: No   Inpatient Treatment: No   Suicide Attempts: No   Access to Firearms: No   History of Trauma: Yes     Current and Past Substance Use:  Alcohol: none   Drugs:  Denied.   Nicotine: denied   Caffeine:  Occasional soda     Mental Status Exam  General Appearance:  appears stated age, neatly dressed, well groomed   Speech: normal tone, normal rate, normal pitch, normal volume      Level of Cooperation: cooperative      Thought Processes: linear, logical, goal-directed   Mood: euthymic      Thought Content: {relevant and appropriate   Affect: congruent and appropriate   Orientation: Oriented x3   Memory/Attention/Concentration: No gross cognitive deficits made evident during conversation   Judgment & Insight: fair   Language  intact         3/6/2024     6:07 PM   Results of the PHQ8   Little interest or pleasure in doing things More than half the days   Feeling down, depressed, or hopeless More than half the days   Trouble falling or staying asleep, or sleeping too much Nearly every day   Feeling tired or having little energy Nearly every day   Poor appetite or overeating More than half the days   Feeling bad about yourself - or that you are a failure or have let yourself or your family down More than half the days   Trouble concentrating on things, such as reading the newspaper or watching television More than half the days   Moving or speaking so slowly that other people could have noticed. Or the opposite - being so fidgety or restless that you have been moving around a lot more than usual Several days   Total Score  17           3/6/2024     6:06 PM 1/18/2024    12:31 PM 1/25/2021     8:56 AM   GAD7   1. Feeling nervous, anxious, or on edge? 3 3 2   2. Not being able to stop or control worrying? 3 3 1   3. Worrying too much about different things? 3 3 1   4. Trouble relaxing? 2 3 3   5. Being so restless that it is hard to sit still? 2 2 0   6. Becoming easily annoyed or  irritable? 3 3 3   7. Feeling afraid as if something awful might happen? 2 2 1   8. If you checked off any problems, how difficult have these problems made it for you to do your work, take care of things at home, or get along with other people?  3    ABDON-7 Score 18 19 11       Impression: Initial appointment focused on gathering history, identifying treatment goals and developing a treatment plan.      Diagnosis:  1. Anxiety        2. Insomnia, unspecified type            Treatment Goals and Plan:   Anxiety: acquiring relapse prevention skills, reducing negative automatic thoughts, reducing physical symptoms of anxiety, and reducing time spent worrying (<30 minutes/day)  Insomnia/sleep hygiene    Future treatment will utilize CBT, Mindfulness Techniques, Motivational Interviewing, and Relaxation Techniques .      Return to Clinic: 2 weeks

## 2024-03-13 ENCOUNTER — TELEPHONE (OUTPATIENT)
Dept: PHARMACY | Facility: CLINIC | Age: 59
End: 2024-03-13
Payer: COMMERCIAL

## 2024-03-13 NOTE — TELEPHONE ENCOUNTER
Contacted the patient to perform Medication Therapy Management review, specifically in reference to refilling atorvastatin 80 mg to improve PDC for HEDIS measurements.   Waiting for patient response to verify

## 2024-03-25 ENCOUNTER — PATIENT MESSAGE (OUTPATIENT)
Dept: BEHAVIORAL HEALTH | Facility: CLINIC | Age: 59
End: 2024-03-25
Payer: COMMERCIAL

## 2024-04-23 ENCOUNTER — TELEPHONE (OUTPATIENT)
Dept: INTERNAL MEDICINE | Facility: CLINIC | Age: 59
End: 2024-04-23
Payer: COMMERCIAL

## 2024-04-23 NOTE — TELEPHONE ENCOUNTER
Called patient checking to see if they had their Diabetic eye exam done this year.   Request information for her eye doctor so can request records. She is not eligible for eye camera. [place] [facility] [place] [phone]   Rose Lopez RN HC

## 2024-04-29 ENCOUNTER — PATIENT MESSAGE (OUTPATIENT)
Dept: BEHAVIORAL HEALTH | Facility: CLINIC | Age: 59
End: 2024-04-29
Payer: COMMERCIAL

## 2024-04-29 ENCOUNTER — OFFICE VISIT (OUTPATIENT)
Dept: INTERNAL MEDICINE | Facility: CLINIC | Age: 59
End: 2024-04-29
Payer: COMMERCIAL

## 2024-04-29 ENCOUNTER — TELEPHONE (OUTPATIENT)
Dept: BEHAVIORAL HEALTH | Facility: CLINIC | Age: 59
End: 2024-04-29
Payer: COMMERCIAL

## 2024-04-29 VITALS
OXYGEN SATURATION: 98 % | DIASTOLIC BLOOD PRESSURE: 86 MMHG | HEART RATE: 106 BPM | SYSTOLIC BLOOD PRESSURE: 138 MMHG | BODY MASS INDEX: 34.27 KG/M2 | HEIGHT: 63 IN | WEIGHT: 193.44 LBS

## 2024-04-29 DIAGNOSIS — F41.1 GENERALIZED ANXIETY DISORDER: ICD-10-CM

## 2024-04-29 DIAGNOSIS — R80.9 TYPE 2 DIABETES MELLITUS WITH MICROALBUMINURIA, WITHOUT LONG-TERM CURRENT USE OF INSULIN: Chronic | ICD-10-CM

## 2024-04-29 DIAGNOSIS — Z63.8 CAREGIVER ROLE STRAIN: ICD-10-CM

## 2024-04-29 DIAGNOSIS — E78.49 OTHER HYPERLIPIDEMIA: Chronic | ICD-10-CM

## 2024-04-29 DIAGNOSIS — F32.1 CURRENT MODERATE EPISODE OF MAJOR DEPRESSIVE DISORDER WITHOUT PRIOR EPISODE: ICD-10-CM

## 2024-04-29 DIAGNOSIS — I25.10 CORONARY ARTERY DISEASE INVOLVING NATIVE CORONARY ARTERY OF NATIVE HEART WITHOUT ANGINA PECTORIS: Chronic | ICD-10-CM

## 2024-04-29 DIAGNOSIS — E66.9 OBESITY, CLASS I, BMI 30-34.9: Chronic | ICD-10-CM

## 2024-04-29 DIAGNOSIS — Z91.199 NON-ADHERENCE TO MEDICAL TREATMENT: ICD-10-CM

## 2024-04-29 DIAGNOSIS — I10 ESSENTIAL HYPERTENSION: Chronic | ICD-10-CM

## 2024-04-29 DIAGNOSIS — Z00.00 ROUTINE MEDICAL EXAM: Primary | ICD-10-CM

## 2024-04-29 DIAGNOSIS — F33.1 MODERATE EPISODE OF RECURRENT MAJOR DEPRESSIVE DISORDER: ICD-10-CM

## 2024-04-29 DIAGNOSIS — F41.9 ANXIETY: ICD-10-CM

## 2024-04-29 DIAGNOSIS — E11.29 TYPE 2 DIABETES MELLITUS WITH MICROALBUMINURIA, WITHOUT LONG-TERM CURRENT USE OF INSULIN: Chronic | ICD-10-CM

## 2024-04-29 DIAGNOSIS — Z12.11 ENCOUNTER FOR FIT (FECAL IMMUNOCHEMICAL TEST) SCREENING: ICD-10-CM

## 2024-04-29 PROBLEM — E66.01 MORBID OBESITY: Status: RESOLVED | Noted: 2023-09-14 | Resolved: 2024-04-29

## 2024-04-29 PROCEDURE — 93010 ELECTROCARDIOGRAM REPORT: CPT | Mod: S$GLB,,, | Performed by: INTERNAL MEDICINE

## 2024-04-29 PROCEDURE — 99396 PREV VISIT EST AGE 40-64: CPT | Mod: S$GLB,,, | Performed by: INTERNAL MEDICINE

## 2024-04-29 PROCEDURE — 3079F DIAST BP 80-89 MM HG: CPT | Mod: CPTII,S$GLB,, | Performed by: INTERNAL MEDICINE

## 2024-04-29 PROCEDURE — 3075F SYST BP GE 130 - 139MM HG: CPT | Mod: CPTII,S$GLB,, | Performed by: INTERNAL MEDICINE

## 2024-04-29 PROCEDURE — 93005 ELECTROCARDIOGRAM TRACING: CPT | Mod: S$GLB,,, | Performed by: INTERNAL MEDICINE

## 2024-04-29 PROCEDURE — 1159F MED LIST DOCD IN RCRD: CPT | Mod: CPTII,S$GLB,, | Performed by: INTERNAL MEDICINE

## 2024-04-29 PROCEDURE — 3008F BODY MASS INDEX DOCD: CPT | Mod: CPTII,S$GLB,, | Performed by: INTERNAL MEDICINE

## 2024-04-29 PROCEDURE — 99999 PR PBB SHADOW E&M-EST. PATIENT-LVL V: CPT | Mod: PBBFAC,,, | Performed by: INTERNAL MEDICINE

## 2024-04-29 PROCEDURE — 99214 OFFICE O/P EST MOD 30 MIN: CPT | Mod: 25,S$GLB,, | Performed by: INTERNAL MEDICINE

## 2024-04-29 PROCEDURE — 1160F RVW MEDS BY RX/DR IN RCRD: CPT | Mod: CPTII,S$GLB,, | Performed by: INTERNAL MEDICINE

## 2024-04-29 RX ORDER — DILTIAZEM HYDROCHLORIDE 120 MG/1
120 CAPSULE, EXTENDED RELEASE ORAL DAILY
Qty: 90 CAPSULE | Refills: 1 | Status: CANCELLED | OUTPATIENT
Start: 2024-04-29 | End: 2025-04-29

## 2024-04-29 RX ORDER — BUPROPION HYDROCHLORIDE 150 MG/1
150 TABLET ORAL DAILY
Qty: 30 TABLET | Refills: 1 | Status: SHIPPED | OUTPATIENT
Start: 2024-04-29 | End: 2024-05-01

## 2024-04-29 RX ORDER — ESCITALOPRAM OXALATE 20 MG/1
20 TABLET ORAL DAILY
Qty: 90 TABLET | Refills: 0 | Status: SHIPPED | OUTPATIENT
Start: 2024-04-29 | End: 2024-05-01

## 2024-04-29 RX ORDER — METOPROLOL SUCCINATE 25 MG/1
25 TABLET, EXTENDED RELEASE ORAL DAILY
Qty: 90 TABLET | Refills: 3 | Status: SHIPPED | OUTPATIENT
Start: 2024-04-29

## 2024-04-29 SDOH — SOCIAL DETERMINANTS OF HEALTH (SDOH): OTHER SPECIFIED PROBLEMS RELATED TO PRIMARY SUPPORT GROUP: Z63.8

## 2024-04-29 NOTE — ASSESSMENT & PLAN NOTE
Continue secondary prevention.  She is longer reducing Cardiology.  She has not upcoming appointment in about a month.    Having some intermittent atypical chest pain.  Also persistently tachycardic off of her diltiazem.  See above regarding hypotension.  EKG per my read shows sinus tachycardia without AV block, prolonged QTc, Q wave, ST changes.  Generally unchanged from previous

## 2024-04-29 NOTE — ASSESSMENT & PLAN NOTE
Blood pressure well-controlled off of her current antihypertensive therapy for 5 days.  Ideally her pressure should be a little bit lower.  I will plan to change her diltiazem to metoprolol as she reports her diltiazem has been causing hypotension.  She needs rate control as well

## 2024-04-29 NOTE — TELEPHONE ENCOUNTER
Refill Routing Note   Medication(s) are not appropriate for processing by [facility] for the following reason(s):        No active prescription written by provider  New or recently adjusted medication    [facility] action(s):  Defer               Appointments  past 12m or future 3m with PCP    Date Provider   Last Visit   1/17/2024 Tima Stanley MD   Next Visit   4/29/2024 Tima Stanley MD   ED visits in past 90 days: 0        Note composed:11:43 AM 04/29/2024

## 2024-04-29 NOTE — PROGRESS NOTES
Assessment & Plan  Problem List Items Addressed This Visit          Psychiatric    Anxiety (Chronic)    Current Assessment & Plan     Known to Psychiatry.  We will defer medication management to them.  I appreciate their input.  I will place a new referral to the primary care I program.  There was concern that she knew the LCSW from that program that she initially met socially.    This is a severe symptom of hers with her caregiver fatigue that is having an effect on many of her other medical conditions.  Not the least of which is her medication in appointment adherence         Relevant Orders    Ambulatory referral/consult to Primary Care Behavioral Health (Non-Opioids)       Cardiac/Vascular    Essential hypertension (Chronic)    Current Assessment & Plan     Blood pressure well-controlled off of her current antihypertensive therapy for 5 days.  Ideally her pressure should be a little bit lower.  I will plan to change her diltiazem to metoprolol as she reports her diltiazem has been causing hypotension.  She needs rate control as well         Relevant Orders    CBC Auto Differential    Comprehensive Metabolic Panel    Lipid Panel    Other hyperlipidemia (Chronic)    Current Assessment & Plan     She is prescribed atorvastatin and Zetia. The current medical regimen is effective;  continue present plan and medications.          CAD (coronary artery disease) (Chronic)    Current Assessment & Plan     Continue secondary prevention.  She is longer reducing Cardiology.  She has not upcoming appointment in about a month.    Having some intermittent atypical chest pain.  Also persistently tachycardic off of her diltiazem.  See above regarding hypotension.  EKG per my read shows sinus tachycardia without AV block, prolonged QTc, Q wave, ST changes.  Generally unchanged from previous           Relevant Orders    EKG 12-lead       Endocrine    Type 2 diabetes mellitus with microalbuminuria, without long-term current use of  insulin (Chronic)    Overview     Angioedema to ARB.  Persistent metabolic acidosis when on metformin that resolved with stopping.  ?GI losses         Current Assessment & Plan     Trulicity and Jardiance and glimepiride.  She is nonadherent to her current regimen.  She reports she is up-to-date with her eye exam.  I was not able to address her foot exam today due to multiple other complaints.  We had a long discussion about the importance of medication adherence.  Check labs         Relevant Orders    Hemoglobin A1C    Microalbumin/Creatinine Ratio, Urine    Obesity, Class I, BMI 30-34.9 (Chronic)    Current Assessment & Plan     The patient's BMI has been recorded in the chart. The patient has been provided educational materials regarding the benefits of attaining and maintaining a normal weight. We will continue to address and follow this issue during follow up visits.            Other Visit Diagnoses       Routine medical exam    -  Primary  -    Discussed healthy diet, regular exercise, necessary labs, age appropriate cancer screening, and routine vaccinations.       Encounter for FIT (fecal immunochemical test) screening        Relevant Orders    Fecal Immunochemical Test (iFOBT)    Non-adherence to medical treatment    -  Long discussion today with the patient regarding importance of medication adherence and keeping her appointments.  She is well aware of this need but is reporting large amounts of difficulty adhering to her regimen due to her anxiety and depression.              Health Maintenance reviewed, as above.    Follow-up: Follow up Pending workup, likely 3 months, for virtual.  ______________________________________________________________________    Chief Complaint  Chief Complaint   Patient presents with    Annual Exam       HPI  Shanaeenoc Kumari is a 58 y.o. female with medical diagnoses as listed in the medical history and problem list that presents for routine physical.  Pt is known to me  "with their last appointment 1/17/2024.      She is due for labs.    In addition to routine physical she is several concerns that she needs to discuss.  Specifically we need to go over her chest pain anxiety diabetes blood pressure concerns.    Please see above for detailed discussion of each issue      ROS  Review of Systems   Constitutional:  Negative for chills, fever and unexpected weight change.   Eyes:  Negative for discharge.   Respiratory:  Negative for cough, chest tightness, shortness of breath and wheezing.    Cardiovascular:  Positive for chest pain and palpitations. Negative for leg swelling.   Gastrointestinal:  Negative for abdominal pain and blood in stool.   Genitourinary:  Negative for decreased urine volume, dysuria and hematuria.   Musculoskeletal:  Negative for arthralgias, joint swelling and myalgias.   Neurological:  Negative for dizziness, light-headedness and headaches.   Psychiatric/Behavioral:  Positive for dysphoric mood. Negative for decreased concentration, sleep disturbance and suicidal ideas. The patient is nervous/anxious.            Physical Exam  Vitals:    04/29/24 0953   BP: 138/86   BP Location: Left arm   Patient Position: Sitting   BP Method: Large (Manual)   Pulse: 106   SpO2: 98%   Weight: 87.8 kg (193 lb 7.3 oz)   Height: 5' 2.5" (1.588 m)    Body mass index is 34.82 kg/m².  Weight: 87.8 kg (193 lb 7.3 oz)   Height: 5' 2.5" (158.8 cm)   Physical Exam  Constitutional:       General: She is not in acute distress.     Appearance: She is well-developed.   HENT:      Head: Normocephalic and atraumatic.   Eyes:      General: Lids are normal. No scleral icterus.     Conjunctiva/sclera: Conjunctivae normal.      Pupils: Pupils are equal, round, and reactive to light.   Neck:      Thyroid: No thyromegaly.      Vascular: No carotid bruit or JVD.   Cardiovascular:      Rate and Rhythm: Regular rhythm. Tachycardia present.      Pulses: Normal pulses.      Heart sounds: Normal heart " sounds. No murmur heard.     No friction rub. No S3 or S4 sounds.   Pulmonary:      Effort: Pulmonary effort is normal.      Breath sounds: Normal breath sounds. No wheezing, rhonchi or rales.   Abdominal:      General: Bowel sounds are normal.      Palpations: Abdomen is soft.      Tenderness: There is no abdominal tenderness.   Musculoskeletal:         General: No tenderness.      Cervical back: Full passive range of motion without pain and neck supple.      Right lower leg: No edema.      Left lower leg: No edema.   Skin:     General: Skin is warm and dry.      Findings: No rash.   Neurological:      Mental Status: She is alert and oriented to person, place, and time.   Psychiatric:         Speech: Speech normal.         Behavior: Behavior normal.         Thought Content: Thought content normal.

## 2024-04-29 NOTE — ASSESSMENT & PLAN NOTE
Known to Psychiatry.  We will defer medication management to them.  I appreciate their input.  I will place a new referral to the primary care [facility] program.  There was concern that she knew the LCSW from that program that she initially met socially.    This is a severe symptom of hers with her caregiver fatigue that is having an effect on many of her other medical conditions.  Not the least of which is her medication in appointment adherence

## 2024-04-29 NOTE — ASSESSMENT & PLAN NOTE
She is prescribed atorvastatin and Zetia. The current medical regimen is effective;  continue present plan and medications.

## 2024-04-29 NOTE — ASSESSMENT & PLAN NOTE
Trulicity and Jardiance and glimepiride.  She is nonadherent to her current regimen.  She reports she is up-to-date with her eye exam.  I was not able to address her foot exam today due to multiple other complaints.  We had a long discussion about the importance of medication adherence.  Check labs

## 2024-04-30 ENCOUNTER — PATIENT OUTREACH (OUTPATIENT)
Dept: ADMINISTRATIVE | Facility: HOSPITAL | Age: 59
End: 2024-04-30
Payer: COMMERCIAL

## 2024-04-30 LAB
OHS QRS DURATION: 70 MS
OHS QTC CALCULATION: 465 MS

## 2024-04-30 NOTE — LETTER
AUTHORIZATION FOR RELEASE OF   CONFIDENTIAL INFORMATION    Dear [facility] ,    We are seeing Shanae Kumari, date of birth 1965, in the clinic at [facility]. Tima Stanley MD is the patient's PCP. Shanae Kumari has an outstanding lab/procedure at the time we reviewed her chart. In order to help keep her health information updated, she has authorized us to request the following medical record(s):        (  )  MAMMOGRAM                                      (  )  COLONOSCOPY      (  )  PAP SMEAR                                          (  )  OUTSIDE LAB RESULTS     (  )  DEXA SCAN                                          (  x)  EYE EXAM            (  )  FOOT EXAM                                          (  )  ENTIRE RECORD     (  )  OUTSIDE IMMUNIZATIONS                 (  )  _______________         Please fax records to [facility], [place]le[place], Oliver B, MD, [phone]     If you have any questions, please contact Suzanne Alicea at [phone].           Patient Name: Shanae Kumari  : 1965  Patient Phone #: [phone]

## 2024-04-30 NOTE — PROGRESS NOTES
Health Maintenance Due   Topic Date Due    Shingles Vaccine (1 of 2) Never done    Colorectal Cancer Screening  02/20/2019    Foot Exam  11/18/2022    Eye Exam  02/09/2023    Hemoglobin A1c  11/04/2023       Chart reviewed and updated. Requested eye exam records from [facility] in [place].    Suzanne Alicea LPN   Clinical Care Coordinator  Primary Care and Wellness

## 2024-05-01 ENCOUNTER — OFFICE VISIT (OUTPATIENT)
Dept: PSYCHIATRY | Facility: CLINIC | Age: 59
End: 2024-05-01
Payer: COMMERCIAL

## 2024-05-01 VITALS
HEART RATE: 114 BPM | DIASTOLIC BLOOD PRESSURE: 86 MMHG | SYSTOLIC BLOOD PRESSURE: 156 MMHG | WEIGHT: 188.69 LBS | BODY MASS INDEX: 33.97 KG/M2

## 2024-05-01 DIAGNOSIS — Z63.8 CAREGIVER ROLE STRAIN: ICD-10-CM

## 2024-05-01 DIAGNOSIS — F41.9 ANXIETY: ICD-10-CM

## 2024-05-01 DIAGNOSIS — F32.1 CURRENT MODERATE EPISODE OF MAJOR DEPRESSIVE DISORDER WITHOUT PRIOR EPISODE: Primary | ICD-10-CM

## 2024-05-01 PROCEDURE — 99215 OFFICE O/P EST HI 40 MIN: CPT | Mod: S$GLB,,, | Performed by: STUDENT IN AN ORGANIZED HEALTH CARE EDUCATION/TRAINING PROGRAM

## 2024-05-01 PROCEDURE — 3077F SYST BP >= 140 MM HG: CPT | Mod: CPTII,S$GLB,, | Performed by: STUDENT IN AN ORGANIZED HEALTH CARE EDUCATION/TRAINING PROGRAM

## 2024-05-01 PROCEDURE — 99999 PR PBB SHADOW E&M-EST. PATIENT-LVL II: CPT | Mod: PBBFAC,,, | Performed by: STUDENT IN AN ORGANIZED HEALTH CARE EDUCATION/TRAINING PROGRAM

## 2024-05-01 PROCEDURE — 3008F BODY MASS INDEX DOCD: CPT | Mod: CPTII,S$GLB,, | Performed by: STUDENT IN AN ORGANIZED HEALTH CARE EDUCATION/TRAINING PROGRAM

## 2024-05-01 PROCEDURE — 3079F DIAST BP 80-89 MM HG: CPT | Mod: CPTII,S$GLB,, | Performed by: STUDENT IN AN ORGANIZED HEALTH CARE EDUCATION/TRAINING PROGRAM

## 2024-05-01 RX ORDER — DULOXETIN HYDROCHLORIDE 30 MG/1
CAPSULE, DELAYED RELEASE ORAL
Qty: 49 CAPSULE | Refills: 0 | Status: SHIPPED | OUTPATIENT
Start: 2024-05-01 | End: 2024-05-29

## 2024-05-01 RX ORDER — DULOXETIN HYDROCHLORIDE 60 MG/1
60 CAPSULE, DELAYED RELEASE ORAL DAILY
Qty: 30 CAPSULE | Refills: 0 | Status: SHIPPED | OUTPATIENT
Start: 2024-05-31 | End: 2024-06-30

## 2024-05-01 SDOH — SOCIAL DETERMINANTS OF HEALTH (SDOH): OTHER SPECIFIED PROBLEMS RELATED TO PRIMARY SUPPORT GROUP: Z63.8

## 2024-05-01 NOTE — PROGRESS NOTES
STAFF COMMENTS: I have discussed pt with Dr. Curry and reviewed the history and exam. I agree and concur with the assessment and plan.

## 2024-05-01 NOTE — PROGRESS NOTES
"Outpatient Psychiatry Follow-Up Visit (MD/NP)    5/1/2024    Clinical Status of Patient:  Outpatient (Ambulatory)    Chief Complaint:  Shanae Kumari is a 58 y.o. female with a past psychiatric history of major depressive disorder and anxiety who presents today for follow-up of depression and anxiety.  Met with patient.      The patient was last seen in this clinic on 3/6/2024, at which time the plan was:     Start Wellbutrin  mg daily  Continue Lexapro 20 mg daily    Interval History and Content of Current Session:  Today, the patient described her mood as "unchanged", still endorsing significant depression. She stated "this is the worse I've ever felt", but was unable to say if she felt significantly worse than her last visit in March 2024. She has been taking the newly initiated Wellbutrin, albeit inconsistently due to forgetfulness. She denied any discernible improvements since starting Wellbutrin. She has noticed occasional poor appetite, but has been unsure if this is due to Wellbutrin. She has noticed worsening anxiety, but didn't necessarily feel it has been due to Wellbutrin. She endorsed continued anhedonia, low energy, poor motivation, worsened chronic insomnia, hopelessness, and social isolation. She denied suicidal thoughts or behaviors. 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 She started working with a therapist but was unable to follow-up as she may have known that therapist personally (she has been waiting to be set up with a new therapist).       Review of Systems   PSYCHIATRIC: Pertinant items are noted in the narrative.    Past Medical, Family and Social History: The patient's past medical, family and social history have been reviewed and updated as appropriate within the electronic medical record - see encounter notes.    Current Psychotropic Medications:  Wellbutrin  mg daily  Continue Lexapro 20 mg daily    Compliance: partial " "(often forgets to take medication)    Side effects: poor appetite (unclear if related to Lexapro, Wellbutrin, or both), nausea (attributable to Lexapro), possibly worsening anxiety since starting Wellbutrin    Past Psychotropic Medication Trials:  Zoloft (ineffective, but may have kept her "level")    Risk Parameters:  Patient reports no suicidal ideation  Patient reports no homicidal ideation  Patient reports no self-injurious behavior  Patient reports no violent behavior    Exam (detailed: at least 9 elements; comprehensive: all 15 elements)   Constitutional  Vitals:  Most recent vital signs, dated less than 90 days prior to this appointment, were reviewed.   Vitals:    05/01/24 0918   BP: (!) 156/86   Pulse: (!) 114   Weight: 85.6 kg (188 lb 11.4 oz)        General:  unremarkable, age appropriate, casually dressed, neatly groomed     Mental Status Exam:  Appearance: fair hygiene and grooming, casually dressed, no acute distress, appears stated age  Behavior/Cooperation: calm, cooperative, pleasant, engaged  Language: fluent English  Speech: normal tone, normal rate, normal pitch, normal volume  Mood: "Unchanged"  Affect: mildly, mood-congruently constricted, otherwise appropriately reactive  Thought Process: linear, logical, goal-directed  Thought Content:  denies SI/HI/paranoia/delusions; no objective evidence of paranoia or delusions  Perception: denies AVH; no objective evidence of AVH   Orientation: grossly intact  Memory: intact to conversation  Attention Span/Concentration: intact to conversation  Fund of Knowledge: appropriate for education level  Insight: good, able to discuss mental health concerns with reasonable nuance and understanding  Judgment: good, behavior is appropriate for circumstances      Assessment and Diagnosis   Status/Progress: Based on the examination today, the patient's problem(s) is/are worsening.  New problems have not been presented today.   Co-morbidities, Diagnostic uncertainty, " and Lack of compliance are not complicating management of the primary condition.      General Impression:       ICD-10-CM ICD-9-CM   1. Current moderate episode of major depressive disorder without prior episode  F32.1 296.22   2. Anxiety  F41.9 300.00   3. Caregiver role strain  Z63.8 [id number]       Intervention/Counseling/Treatment Plan     Discontinue Wellbutrin (ineffective)  Begin cross-taper of Lexapro in favor of Cymbalta  Decrease Lexapro to 10 mg daily for one week, then discontinue  Start Cymbalta 30 mg daily for one week, then increase to 60 mg daily   Adequate trial of Lexapro 20 mg daily has failed to significantly improve patient's anxiety and depressive symptoms  Has tried Zoloft before, but has yet to try SNRI  Recommend psychotherapy  Patient is in the process of being referred to new therapist  Discussed hypertension  /86 today  PCP recently changed patient's antihypertensive to metoprolol (patient hasn't been able to take a dose yet)     Discussed with patient informed consent including diagnosis, risks and benefits of proposed treatment above vs. alternative treatments vs. no treatment, as well as serious and common side effects of these treatments, and the inherent unpredictability of individual responses to these treatments. The patient expresses understanding of the above and displays the capacity to agree with this current plan. Patient also agrees that, currently, the benefits outweigh the risks and would like to pursue treatment at this time, and had no other questions.     Instructions:  Take all medications as prescribed.    Abstain from recreational drugs and alcohol.  Present to ED or call 911 for SI/HI plan or intent, psychosis, or medical emergency.    Case to be discussed with supervising staff: Jose Becerra MD    Return to Clinic:  1 month    I spent a total of 49 minutes on the day of the visit.  This includes face to face time and non-face to face time preparing to see the  "patient (eg, review of tests), obtaining and/or reviewing separately obtained history, documenting clinical information in the electronic or other health record, independently interpreting results and communicating results to the patient/family/caregiver, or care coordinator.    Chan Curry Jr, MD ([place])  \[facility] [place] [facility]\""-[facility], PGY-III      "

## 2024-05-20 ENCOUNTER — PATIENT MESSAGE (OUTPATIENT)
Dept: CARDIOLOGY | Facility: CLINIC | Age: 59
End: 2024-05-20
Payer: COMMERCIAL

## 2024-06-03 ENCOUNTER — PATIENT MESSAGE (OUTPATIENT)
Dept: PSYCHIATRY | Facility: CLINIC | Age: 59
End: 2024-06-03

## 2024-06-10 ENCOUNTER — PATIENT MESSAGE (OUTPATIENT)
Dept: INTERNAL MEDICINE | Facility: CLINIC | Age: 59
End: 2024-06-10

## 2024-09-05 ENCOUNTER — ON-DEMAND VIRTUAL (OUTPATIENT)
Dept: URGENT CARE | Facility: CLINIC | Age: 59
End: 2024-09-05

## 2024-09-05 DIAGNOSIS — R39.9 UTI SYMPTOMS: Primary | ICD-10-CM

## 2024-09-05 PROCEDURE — 99213 OFFICE O/P EST LOW 20 MIN: CPT | Mod: 95,,, | Performed by: NURSE PRACTITIONER

## 2024-09-05 RX ORDER — PHENAZOPYRIDINE HYDROCHLORIDE 200 MG/1
200 TABLET, FILM COATED ORAL 3 TIMES DAILY PRN
Qty: 6 TABLET | Refills: 0 | Status: SHIPPED | OUTPATIENT
Start: 2024-09-05 | End: 2024-09-15

## 2024-09-05 RX ORDER — NITROFURANTOIN 25; 75 MG/1; MG/1
100 CAPSULE ORAL 2 TIMES DAILY
Qty: 10 CAPSULE | Refills: 0 | Status: SHIPPED | OUTPATIENT
Start: 2024-09-05 | End: 2024-09-10

## 2024-09-06 NOTE — PROGRESS NOTES
Subjective:      Patient ID: Shanae Kumari is a 58 y.o. female.    Vitals:  vitals were not taken for this visit.     Chief Complaint: No chief complaint on file.      Visit Type: TELE AUDIOVISUAL    Present with the patient at the time of consultation: TELEMED PRESENT WITH PATIENT: None    Past Medical History:   Diagnosis Date    Asthma     BRCA negative     Diabetes mellitus     Hypertension     Nausea after anesthesia 1989     Past Surgical History:   Procedure Laterality Date    CORONARY ANGIOGRAPHY N/A 11/26/2021    Procedure: ANGIOGRAM, CORONARY ARTERY;  Surgeon: Ashu Horn MD;  Location: [facility] CATH LAB;  Service: Cardiology;  Laterality: N/A;    KNEE ARTHROSCOPY      knee surgery      ULTRASOUND GUIDANCE Right 11/26/2021    Procedure: ULTRASOUND GUIDANCE;  Surgeon: Ashu Horn MD;  Location: [facility] CATH LAB;  Service: Cardiology;  Laterality: Right;     Review of patient's allergies indicates:   Allergen Reactions    Allergenic extracts Hives, Itching, Other (See Comments), Rash and Shortness Of Breath    Dog dander Hives, Shortness Of Breath, Itching, Swelling and Rash     Cat's dander, dust,  pollen    Losartan Other (See Comments)     angioedema    Mold Itching and Shortness Of Breath    Metformin Other (See Comments)     Current Outpatient Medications on File Prior to Visit   Medication Sig Dispense Refill    albuterol (PROAIR HFA) 90 mcg/actuation inhaler Inhale 2 puffs into the lungs every 4 (four) hours as needed for Wheezing. 25.5 g 1    aspirin 81 MG Chew CHEW 1 tablet (81 mg total) by mouth once daily. 90 tablet 3    atorvastatin (LIPITOR) 80 MG tablet Take 1 tablet (80 mg total) by mouth every evening. 90 tablet 3    blood sugar diagnostic (BLOOD GLUCOSE TEST) Strp 1 strip by Misc.(Non-Drug; Combo Route) route once daily. 100 strip 3    blood-glucose meter kit Use as instructed to test blood sugar daily 1 each 0    clopidogreL (PLAVIX) 75 mg tablet Take 1 tablet (75 mg total) by mouth  once daily. 30 tablet 11    dulaglutide (TRULICITY) 1.5 mg/0.5 mL pen injector Inject 1.5 mg (one pen) into the skin once a week. 12 pen 0    DULoxetine (CYMBALTA) 60 MG capsule Take 1 capsule (60 mg total) by mouth once daily. 30 capsule 0    empagliflozin (JARDIANCE) 25 mg tablet Take 1 tablet (25 mg total) by mouth once daily. 90 tablet 1    ezetimibe (ZETIA) 10 mg tablet Take 1 tablet (10 mg total) by mouth once daily. 90 tablet 3    fexofenadine (ALLEGRA) 180 MG tablet Take 1 tablet (180 mg total) by mouth once daily. 30 tablet 5    flash glucose scanning reader (FREESTYLE EVELIO 2 READER) Misc 1 kit by Misc.(Non-Drug; Combo Route) route Daily. 1 each 0    flash glucose sensor (FREESTYLE EVELIO 2 SENSOR) Kit Use as directed. Change every 14 (fourteen) days. 2 kit 11    fluticasone propionate (FLONASE) 50 mcg/actuation nasal spray 1 spray (50 mcg total) by Each Nostril route once daily. 16 g 5    glimepiride (AMARYL) 4 MG tablet Take 1 tablet (4 mg total) by mouth daily before breakfast. 90 tablet 1    lancets 30 gauge Misc 1 lancet by Misc.(Non-Drug; Combo Route) route once daily. 100 each 3    medroxyPROGESTERone (PROVERA) 10 MG tablet Take 1 tablet (10 mg total) by mouth once daily. 10 tablet 12    metoprolol succinate (TOPROL-XL) 25 MG 24 hr tablet Take 1 tablet (25 mg total) by mouth once daily. 90 tablet 3    montelukast (SINGULAIR) 10 mg tablet Take 1 tablet (10 mg total) by mouth once daily. 90 tablet 2    nitroGLYCERIN (NITROSTAT) 0.4 MG SL tablet Place 1 tablet (0.4 mg total) under the tongue every 5 (five) minutes as needed for Chest pain. 90 tablet 3     Current Facility-Administered Medications on File Prior to Visit   Medication Dose Route Frequency Provider Last Rate Last Admin    methylPREDNISolone sodium succinate injection 40 mg  40 mg Intramuscular 1 time in Clinic/HOD Kat Harrison NP         Family History   Problem Relation Name Age of Onset    Diabetes Mother      COPD Mother      Cancer  Mother          lung    Cataracts Mother      Bipolar disorder Mother      Dementia Mother      Diabetes Father AGE 77     Heart disease Father AGE 77     Hypertension Father AGE 77     Hyperlipidemia Father AGE 77     Kidney disease Father AGE 77     Cancer Sister      Diabetes Brother      Hypertension Brother      Kidney disease Brother          diabetic    Diabetes Maternal Grandmother      Blindness Maternal Grandmother      No Known Problems Maternal Grandfather      No Known Problems Paternal Grandmother      No Known Problems Paternal Grandfather      Diabetes Maternal Aunt      Diabetes Maternal Uncle      No Known Problems Paternal Aunt      No Known Problems Paternal Uncle      Breast cancer Other half sister     Colon cancer Neg Hx      Ovarian cancer Neg Hx      Stroke Neg Hx         Medications Ordered                [facility] [employer] [place] [facility] [address]   [address] [address]    Telephone: [phone]   Fax: [phone]   Hours: Not open 24 hours                         E-Prescribed (2 of 2)              nitrofurantoin, macrocrystal-monohydrate, (MACROBID) 100 MG capsule    Sig: Take 1 capsule (100 mg total) by mouth 2 (two) times daily. for 5 days       Start: 9/5/24     Quantity: 10 capsule Refills: 0                         phenazopyridine (PYRIDIUM) 200 MG tablet    Sig: Take 1 tablet (200 mg total) by mouth 3 (three) times daily as needed for Pain.       Start: 9/5/24     Quantity: 6 tablet Refills: 0                           Ohs Peq Odvv Intake    9/5/2024 10:25 PM CDT - Filed by Patient   What is your current physical address in the event of a medical emergency? UTI   Are you able to take your vital signs? No   Please attach any relevant images or files          UTI symptoms  Frequency all day today, dyruia, and blood tinged urine  No back pain, fever or chills  No vaginal discharge. No concerns for stds.       ROS     Objective:   The physical exam was  conducted virtually.  Physical Exam   Constitutional: She is oriented to person, place, and time. No distress.   HENT:   Head: Normocephalic and atraumatic.   Mouth/Throat: Oropharynx is clear and moist and mucous membranes are normal.   Eyes: Conjunctivae are normal. No scleral icterus.   Pulmonary/Chest: Effort normal. No respiratory distress.   Musculoskeletal: Normal range of motion.         General: Normal range of motion.   Neurological: She is alert and oriented to person, place, and time.   Skin: Skin is not diaphoretic.   Psychiatric: Her behavior is normal. Judgment and thought content normal.   Vitals reviewed.      Assessment:     1. UTI symptoms        Plan:       UTI symptoms  -     nitrofurantoin, macrocrystal-monohydrate, (MACROBID) 100 MG capsule; Take 1 capsule (100 mg total) by mouth 2 (two) times daily. for 5 days  Dispense: 10 capsule; Refill: 0  -     phenazopyridine (PYRIDIUM) 200 MG tablet; Take 1 tablet (200 mg total) by mouth 3 (three) times daily as needed for Pain.  Dispense: 6 tablet; Refill: 0                Thank you for choosing [facility]!    Our goal in the [facility] is to always provide outstanding medical care. You may receive a survey by mail or e-mail in the next week regarding your experience today. We would greatly appreciate you completing and returning the survey. Your feedback provides us with a way to recognize our staff who provide very good care, and it helps us learn how to improve when your experience was below our aspiration of excellence.         We appreciate you trusting us with your medical care. We hope you feel better soon. We will be happy to take care of you for all of your future medical needs.    You must understand that you've received an Urgent Care treatment only and that you may be released before all your medical problems are known or treated. You, the patient, will arrange for follow up care as  instructed.    Follow up with your PCP or specialty clinic as directed in the next 1-2 weeks if not improved or as needed.  You can call [phone] to schedule an appointment with the appropriate provider.    If your condition worsens we recommend that you receive another evaluation in person, with your primary care provider, urgent care or at the emergency room immediately or contact your primary medical clinics after hours call service to discuss your concerns.

## 2025-04-30 DIAGNOSIS — E11.9 TYPE 2 DIABETES MELLITUS WITHOUT COMPLICATION: ICD-10-CM

## 2025-05-14 DIAGNOSIS — E11.9 TYPE 2 DIABETES MELLITUS WITHOUT COMPLICATION: ICD-10-CM

## 2025-05-14 DIAGNOSIS — E11.29 TYPE 2 DIABETES MELLITUS WITH MICROALBUMINURIA, WITHOUT LONG-TERM CURRENT USE OF INSULIN: ICD-10-CM

## 2025-05-14 DIAGNOSIS — R80.9 TYPE 2 DIABETES MELLITUS WITH MICROALBUMINURIA, WITHOUT LONG-TERM CURRENT USE OF INSULIN: ICD-10-CM

## (undated) DEVICE — KIT SYR REUSABLE

## (undated) DEVICE — HEMOSTAT VASC BAND REG 24CM

## (undated) DEVICE — KIT WATCHDOG HEMSTAS VALVE 8FR

## (undated) DEVICE — PAD RADI FEMORAL

## (undated) DEVICE — CATH NC QUANTUM APEX MR 4X12

## (undated) DEVICE — CATH DXTERITY JL35 100CM 5FR

## (undated) DEVICE — KIT HAND CONTROL HIGH PRESSUR

## (undated) DEVICE — CATH DXTERITY JR40 100CM 5FR

## (undated) DEVICE — CATH NC QUANTUM APEX MR 3.5X15

## (undated) DEVICE — GUIDEWIRE RUNTHROUGH EF 180CM

## (undated) DEVICE — CATH EMERGE MR 15 X 3.50

## (undated) DEVICE — PACK CATH LAB

## (undated) DEVICE — GUIDE LAUNCHER 6FR EBU 3.5

## (undated) DEVICE — KIT MANIFOLD LOW PRESS TUBING

## (undated) DEVICE — WIRE GUIDE SAFE-T-J .035 260CM

## (undated) DEVICE — CATH PIGTAIL 4F 8SH MICRO LOOP

## (undated) DEVICE — CATH EAGLE EYE PLATINUM

## (undated) DEVICE — KIT GLIDESHEATH SLEND 6FR 10CM

## (undated) DEVICE — Device

## (undated) DEVICE — OMNIPAQUE 350MG 150ML VIAL

## (undated) DEVICE — INFLATOR ADVANTAGE ENCORE 26